# Patient Record
Sex: MALE | Race: WHITE | Employment: OTHER | URBAN - NONMETROPOLITAN AREA
[De-identification: names, ages, dates, MRNs, and addresses within clinical notes are randomized per-mention and may not be internally consistent; named-entity substitution may affect disease eponyms.]

---

## 2017-01-06 ENCOUNTER — OFFICE VISIT (OUTPATIENT)
Dept: UROLOGY | Age: 67
End: 2017-01-06
Payer: MEDICARE

## 2017-01-06 ENCOUNTER — HOSPITAL ENCOUNTER (OUTPATIENT)
Dept: GENERAL RADIOLOGY | Age: 67
Discharge: HOME | End: 2017-01-06
Payer: MEDICARE

## 2017-01-06 VITALS
OXYGEN SATURATION: 98 % | HEART RATE: 85 BPM | HEIGHT: 72 IN | BODY MASS INDEX: 30.75 KG/M2 | TEMPERATURE: 98 F | SYSTOLIC BLOOD PRESSURE: 138 MMHG | DIASTOLIC BLOOD PRESSURE: 78 MMHG | WEIGHT: 227 LBS

## 2017-01-06 DIAGNOSIS — N20.0 CALCULUS OF KIDNEY: ICD-10-CM

## 2017-01-06 DIAGNOSIS — N20.0 RENAL CALCULUS, BILATERAL: Primary | ICD-10-CM

## 2017-01-06 DIAGNOSIS — N20.0 RENAL CALCULUS, BILATERAL: ICD-10-CM

## 2017-01-06 LAB
BILIRUBIN, POC: NORMAL
BLOOD URINE, POC: NORMAL
CLARITY, POC: CLEAR
COLOR, POC: YELLOW
GLUCOSE URINE, POC: NORMAL
KETONES, POC: NORMAL
LEUKOCYTE EST, POC: NORMAL
NITRITE, POC: NORMAL
PH, POC: 7
PROTEIN, POC: NORMAL
SPECIFIC GRAVITY, POC: 1.01
UROBILINOGEN, POC: 0.2

## 2017-01-06 PROCEDURE — 81002 URINALYSIS NONAUTO W/O SCOPE: CPT | Performed by: UROLOGY

## 2017-01-06 PROCEDURE — 99213 OFFICE O/P EST LOW 20 MIN: CPT | Performed by: UROLOGY

## 2017-01-06 PROCEDURE — 74000 XR ABDOMEN LIMITED (KUB): CPT

## 2017-01-06 ASSESSMENT — ENCOUNTER SYMPTOMS
BLURRED VISION: 0
NAUSEA: 0
SORE THROAT: 0
SHORTNESS OF BREATH: 0
HEARTBURN: 0

## 2017-03-12 ENCOUNTER — APPOINTMENT (OUTPATIENT)
Dept: CT IMAGING | Age: 67
DRG: 378 | End: 2017-03-12
Payer: MEDICARE

## 2017-03-12 ENCOUNTER — HOSPITAL ENCOUNTER (INPATIENT)
Age: 67
LOS: 2 days | Discharge: HOME | DRG: 378 | End: 2017-03-14
Attending: EMERGENCY MEDICINE | Admitting: HOSPITALIST
Payer: MEDICARE

## 2017-03-12 ENCOUNTER — APPOINTMENT (OUTPATIENT)
Dept: NUCLEAR MEDICINE | Age: 67
DRG: 378 | End: 2017-03-12
Payer: MEDICARE

## 2017-03-12 DIAGNOSIS — K92.2 GASTROINTESTINAL HEMORRHAGE, UNSPECIFIED GASTROINTESTINAL HEMORRHAGE TYPE: Primary | ICD-10-CM

## 2017-03-12 LAB
ABO/RH: NORMAL
ALBUMIN SERPL-MCNC: 4.1 G/DL (ref 3.5–5.2)
ALP BLD-CCNC: 82 U/L (ref 40–130)
ALT SERPL-CCNC: 29 U/L (ref 5–41)
ANION GAP SERPL CALCULATED.3IONS-SCNC: 9 MMOL/L (ref 7–19)
ANTIBODY SCREEN: NORMAL
AST SERPL-CCNC: 24 U/L (ref 5–40)
BASOPHILS ABSOLUTE: 0 K/UL (ref 0–0.2)
BASOPHILS RELATIVE PERCENT: 0.8 % (ref 0–1)
BILIRUB SERPL-MCNC: <0.2 MG/DL (ref 0.2–1.2)
BLOOD BANK DISPENSE STATUS: NORMAL
BLOOD BANK PRODUCT CODE: NORMAL
BPU ID: NORMAL
BUN BLDV-MCNC: 22 MG/DL (ref 8–23)
CALCIUM SERPL-MCNC: 9 MG/DL (ref 8.8–10.2)
CHLORIDE BLD-SCNC: 105 MMOL/L (ref 98–111)
CO2: 26 MMOL/L (ref 22–29)
CREAT SERPL-MCNC: 0.9 MG/DL (ref 0.5–1.2)
DESCRIPTION BLOOD BANK: NORMAL
EOSINOPHILS ABSOLUTE: 0.2 K/UL (ref 0–0.6)
EOSINOPHILS RELATIVE PERCENT: 3.8 % (ref 0–5)
GFR NON-AFRICAN AMERICAN: >60
GLOBULIN: 2.5 G/DL
GLUCOSE BLD-MCNC: 109 MG/DL (ref 74–109)
HCT VFR BLD CALC: 31.6 % (ref 42–52)
HCT VFR BLD CALC: 38.5 % (ref 42–52)
HEMOGLOBIN: 10.5 G/DL (ref 14–18)
HEMOGLOBIN: 12.7 G/DL (ref 14–18)
INR BLD: 0.95 (ref 0.88–1.18)
LYMPHOCYTES ABSOLUTE: 2.3 K/UL (ref 1.1–4.5)
LYMPHOCYTES RELATIVE PERCENT: 47.3 % (ref 20–40)
MCH RBC QN AUTO: 31.1 PG (ref 27–31)
MCHC RBC AUTO-ENTMCNC: 33 G/DL (ref 33–37)
MCV RBC AUTO: 94.4 FL (ref 80–94)
MONOCYTES ABSOLUTE: 0.5 K/UL (ref 0–0.9)
MONOCYTES RELATIVE PERCENT: 11 % (ref 0–10)
NEUTROPHILS ABSOLUTE: 1.8 K/UL (ref 1.5–7.5)
NEUTROPHILS RELATIVE PERCENT: 36.9 % (ref 50–65)
PDW BLD-RTO: 13.9 % (ref 11.5–14.5)
PLATELET # BLD: 245 K/UL (ref 130–400)
PMV BLD AUTO: 10.6 FL (ref 7.4–10.4)
POTASSIUM SERPL-SCNC: 3.9 MMOL/L (ref 3.5–5)
PROTHROMBIN TIME: 12.7 SEC (ref 12–14.6)
RBC # BLD: 4.08 M/UL (ref 4.7–6.1)
SODIUM BLD-SCNC: 140 MMOL/L (ref 136–145)
TOTAL PROTEIN: 6.6 G/DL (ref 6.6–8.7)
WBC # BLD: 4.8 K/UL (ref 4.8–10.8)

## 2017-03-12 PROCEDURE — 85014 HEMATOCRIT: CPT

## 2017-03-12 PROCEDURE — 6360000002 HC RX W HCPCS: Performed by: EMERGENCY MEDICINE

## 2017-03-12 PROCEDURE — 2580000003 HC RX 258: Performed by: EMERGENCY MEDICINE

## 2017-03-12 PROCEDURE — 86850 RBC ANTIBODY SCREEN: CPT

## 2017-03-12 PROCEDURE — 85025 COMPLETE CBC W/AUTO DIFF WBC: CPT

## 2017-03-12 PROCEDURE — 96374 THER/PROPH/DIAG INJ IV PUSH: CPT

## 2017-03-12 PROCEDURE — 86901 BLOOD TYPING SEROLOGIC RH(D): CPT

## 2017-03-12 PROCEDURE — 85018 HEMOGLOBIN: CPT

## 2017-03-12 PROCEDURE — 99284 EMERGENCY DEPT VISIT MOD MDM: CPT | Performed by: EMERGENCY MEDICINE

## 2017-03-12 PROCEDURE — 99222 1ST HOSP IP/OBS MODERATE 55: CPT | Performed by: INTERNAL MEDICINE

## 2017-03-12 PROCEDURE — 85610 PROTHROMBIN TIME: CPT

## 2017-03-12 PROCEDURE — 2580000003 HC RX 258: Performed by: CLINICAL NURSE SPECIALIST

## 2017-03-12 PROCEDURE — 86900 BLOOD TYPING SEROLOGIC ABO: CPT

## 2017-03-12 PROCEDURE — 99285 EMERGENCY DEPT VISIT HI MDM: CPT

## 2017-03-12 PROCEDURE — 93005 ELECTROCARDIOGRAM TRACING: CPT

## 2017-03-12 PROCEDURE — 36415 COLL VENOUS BLD VENIPUNCTURE: CPT

## 2017-03-12 PROCEDURE — 99231 SBSQ HOSP IP/OBS SF/LOW 25: CPT | Performed by: CLINICAL NURSE SPECIALIST

## 2017-03-12 PROCEDURE — A9560 TC99M LABELED RBC: HCPCS | Performed by: CLINICAL NURSE SPECIALIST

## 2017-03-12 PROCEDURE — 6360000002 HC RX W HCPCS: Performed by: CLINICAL NURSE SPECIALIST

## 2017-03-12 PROCEDURE — 78278 ACUTE GI BLOOD LOSS IMAGING: CPT

## 2017-03-12 PROCEDURE — C9113 INJ PANTOPRAZOLE SODIUM, VIA: HCPCS | Performed by: CLINICAL NURSE SPECIALIST

## 2017-03-12 PROCEDURE — 3430000000 HC RX DIAGNOSTIC RADIOPHARMACEUTICAL: Performed by: CLINICAL NURSE SPECIALIST

## 2017-03-12 PROCEDURE — 2500000003 HC RX 250 WO HCPCS: Performed by: CLINICAL NURSE SPECIALIST

## 2017-03-12 PROCEDURE — 74177 CT ABD & PELVIS W/CONTRAST: CPT

## 2017-03-12 PROCEDURE — 80053 COMPREHEN METABOLIC PANEL: CPT

## 2017-03-12 PROCEDURE — 1210000000 HC MED SURG R&B

## 2017-03-12 PROCEDURE — 6360000004 HC RX CONTRAST MEDICATION: Performed by: INTERNAL MEDICINE

## 2017-03-12 PROCEDURE — C9113 INJ PANTOPRAZOLE SODIUM, VIA: HCPCS | Performed by: EMERGENCY MEDICINE

## 2017-03-12 RX ORDER — CIPROFLOXACIN 2 MG/ML
400 INJECTION, SOLUTION INTRAVENOUS EVERY 12 HOURS
Status: DISCONTINUED | OUTPATIENT
Start: 2017-03-12 | End: 2017-03-14 | Stop reason: HOSPADM

## 2017-03-12 RX ORDER — 0.9 % SODIUM CHLORIDE 0.9 %
250 INTRAVENOUS SOLUTION INTRAVENOUS ONCE
Status: COMPLETED | OUTPATIENT
Start: 2017-03-12 | End: 2017-03-13

## 2017-03-12 RX ORDER — 0.9 % SODIUM CHLORIDE 0.9 %
500 INTRAVENOUS SOLUTION INTRAVENOUS ONCE
Status: COMPLETED | OUTPATIENT
Start: 2017-03-12 | End: 2017-03-12

## 2017-03-12 RX ORDER — ONDANSETRON 2 MG/ML
4 INJECTION INTRAMUSCULAR; INTRAVENOUS EVERY 6 HOURS PRN
Status: DISCONTINUED | OUTPATIENT
Start: 2017-03-12 | End: 2017-03-14 | Stop reason: HOSPADM

## 2017-03-12 RX ORDER — PANTOPRAZOLE SODIUM 40 MG/10ML
40 INJECTION, POWDER, LYOPHILIZED, FOR SOLUTION INTRAVENOUS ONCE
Status: COMPLETED | OUTPATIENT
Start: 2017-03-12 | End: 2017-03-12

## 2017-03-12 RX ORDER — SODIUM CHLORIDE 9 MG/ML
INJECTION, SOLUTION INTRAVENOUS CONTINUOUS
Status: DISCONTINUED | OUTPATIENT
Start: 2017-03-12 | End: 2017-03-14 | Stop reason: HOSPADM

## 2017-03-12 RX ORDER — ONDANSETRON 2 MG/ML
4 INJECTION INTRAMUSCULAR; INTRAVENOUS EVERY 4 HOURS PRN
Status: DISCONTINUED | OUTPATIENT
Start: 2017-03-12 | End: 2017-03-12

## 2017-03-12 RX ORDER — SODIUM CHLORIDE 0.9 % (FLUSH) 0.9 %
10 SYRINGE (ML) INJECTION PRN
Status: DISCONTINUED | OUTPATIENT
Start: 2017-03-12 | End: 2017-03-14 | Stop reason: HOSPADM

## 2017-03-12 RX ORDER — ACETAMINOPHEN 325 MG/1
650 TABLET ORAL EVERY 4 HOURS PRN
Status: DISCONTINUED | OUTPATIENT
Start: 2017-03-12 | End: 2017-03-14 | Stop reason: HOSPADM

## 2017-03-12 RX ORDER — ACETAMINOPHEN 325 MG/1
650 TABLET ORAL EVERY 4 HOURS PRN
Status: DISCONTINUED | OUTPATIENT
Start: 2017-03-12 | End: 2017-03-12

## 2017-03-12 RX ORDER — SODIUM CHLORIDE 0.9 % (FLUSH) 0.9 %
10 SYRINGE (ML) INJECTION EVERY 12 HOURS SCHEDULED
Status: DISCONTINUED | OUTPATIENT
Start: 2017-03-12 | End: 2017-03-14 | Stop reason: HOSPADM

## 2017-03-12 RX ORDER — LORAZEPAM 0.5 MG/1
0.5 TABLET ORAL ONCE
Status: COMPLETED | OUTPATIENT
Start: 2017-03-12 | End: 2017-03-13

## 2017-03-12 RX ADMIN — SODIUM CHLORIDE 1000 ML: 9 INJECTION, SOLUTION INTRAVENOUS at 17:30

## 2017-03-12 RX ADMIN — Medication 20 MILLICURIE: at 20:19

## 2017-03-12 RX ADMIN — CIPROFLOXACIN 400 MG: 2 INJECTION INTRAVENOUS at 18:09

## 2017-03-12 RX ADMIN — SODIUM CHLORIDE 8 MG/HR: 9 INJECTION, SOLUTION INTRAVENOUS at 18:09

## 2017-03-12 RX ADMIN — SODIUM CHLORIDE 500 ML: 9 INJECTION, SOLUTION INTRAVENOUS at 05:58

## 2017-03-12 RX ADMIN — SODIUM CHLORIDE: 9 INJECTION, SOLUTION INTRAVENOUS at 21:30

## 2017-03-12 RX ADMIN — IOVERSOL 90 ML: 741 INJECTION INTRA-ARTERIAL; INTRAVENOUS at 22:47

## 2017-03-12 RX ADMIN — SODIUM CHLORIDE 250 ML: 9 INJECTION, SOLUTION INTRAVENOUS at 23:30

## 2017-03-12 RX ADMIN — SODIUM CHLORIDE: 9 INJECTION, SOLUTION INTRAVENOUS at 16:42

## 2017-03-12 RX ADMIN — PANTOPRAZOLE SODIUM 40 MG: 40 INJECTION, POWDER, FOR SOLUTION INTRAVENOUS at 07:10

## 2017-03-12 RX ADMIN — METRONIDAZOLE 500 MG: 500 INJECTION, SOLUTION INTRAVENOUS at 22:04

## 2017-03-12 ASSESSMENT — ENCOUNTER SYMPTOMS
EYES NEGATIVE: 1
NAUSEA: 0
BLOOD IN STOOL: 1
RESPIRATORY NEGATIVE: 1
ABDOMINAL PAIN: 0
DIARRHEA: 0
COUGH: 0
SHORTNESS OF BREATH: 0
VOMITING: 0

## 2017-03-13 LAB
ANION GAP SERPL CALCULATED.3IONS-SCNC: 9 MMOL/L (ref 7–19)
BUN BLDV-MCNC: 17 MG/DL (ref 8–23)
CALCIUM SERPL-MCNC: 8.1 MG/DL (ref 8.8–10.2)
CHLORIDE BLD-SCNC: 102 MMOL/L (ref 98–111)
CO2: 25 MMOL/L (ref 22–29)
CREAT SERPL-MCNC: 0.8 MG/DL (ref 0.5–1.2)
GFR NON-AFRICAN AMERICAN: >60
GLUCOSE BLD-MCNC: 109 MG/DL (ref 74–109)
HCT VFR BLD CALC: 29.3 % (ref 42–52)
HEMOGLOBIN: 9.7 G/DL (ref 14–18)
MAGNESIUM: 1.9 MG/DL (ref 1.6–2.4)
MCH RBC QN AUTO: 30.9 PG (ref 27–31)
MCHC RBC AUTO-ENTMCNC: 33.1 G/DL (ref 33–37)
MCV RBC AUTO: 93.3 FL (ref 80–94)
PDW BLD-RTO: 14.8 % (ref 11.5–14.5)
PHOSPHORUS: 2.5 MG/DL (ref 2.5–4.5)
PLATELET # BLD: 185 K/UL (ref 130–400)
PMV BLD AUTO: 10.3 FL (ref 7.4–10.4)
POTASSIUM SERPL-SCNC: 3.9 MMOL/L (ref 3.5–5)
RBC # BLD: 3.14 M/UL (ref 4.7–6.1)
SODIUM BLD-SCNC: 136 MMOL/L (ref 136–145)
WBC # BLD: 5.1 K/UL (ref 4.8–10.8)

## 2017-03-13 PROCEDURE — 6370000000 HC RX 637 (ALT 250 FOR IP): Performed by: INTERNAL MEDICINE

## 2017-03-13 PROCEDURE — 2580000003 HC RX 258: Performed by: CLINICAL NURSE SPECIALIST

## 2017-03-13 PROCEDURE — 85027 COMPLETE CBC AUTOMATED: CPT

## 2017-03-13 PROCEDURE — 84100 ASSAY OF PHOSPHORUS: CPT

## 2017-03-13 PROCEDURE — 36430 TRANSFUSION BLD/BLD COMPNT: CPT

## 2017-03-13 PROCEDURE — 99232 SBSQ HOSP IP/OBS MODERATE 35: CPT | Performed by: INTERNAL MEDICINE

## 2017-03-13 PROCEDURE — 6360000002 HC RX W HCPCS: Performed by: CLINICAL NURSE SPECIALIST

## 2017-03-13 PROCEDURE — 99232 SBSQ HOSP IP/OBS MODERATE 35: CPT | Performed by: HOSPITALIST

## 2017-03-13 PROCEDURE — P9016 RBC LEUKOCYTES REDUCED: HCPCS

## 2017-03-13 PROCEDURE — 36415 COLL VENOUS BLD VENIPUNCTURE: CPT

## 2017-03-13 PROCEDURE — 80048 BASIC METABOLIC PNL TOTAL CA: CPT

## 2017-03-13 PROCEDURE — 2500000003 HC RX 250 WO HCPCS: Performed by: CLINICAL NURSE SPECIALIST

## 2017-03-13 PROCEDURE — 1210000000 HC MED SURG R&B

## 2017-03-13 PROCEDURE — 83735 ASSAY OF MAGNESIUM: CPT

## 2017-03-13 PROCEDURE — C9113 INJ PANTOPRAZOLE SODIUM, VIA: HCPCS | Performed by: CLINICAL NURSE SPECIALIST

## 2017-03-13 RX ADMIN — SODIUM CHLORIDE 8 MG/HR: 9 INJECTION, SOLUTION INTRAVENOUS at 18:15

## 2017-03-13 RX ADMIN — LORAZEPAM 0.5 MG: 0.5 TABLET ORAL at 00:13

## 2017-03-13 RX ADMIN — CIPROFLOXACIN 400 MG: 2 INJECTION INTRAVENOUS at 18:15

## 2017-03-13 RX ADMIN — Medication 10 ML: at 08:05

## 2017-03-13 RX ADMIN — SODIUM CHLORIDE 8 MG/HR: 9 INJECTION, SOLUTION INTRAVENOUS at 08:00

## 2017-03-13 RX ADMIN — METRONIDAZOLE 500 MG: 500 INJECTION, SOLUTION INTRAVENOUS at 12:16

## 2017-03-13 RX ADMIN — CIPROFLOXACIN 400 MG: 2 INJECTION INTRAVENOUS at 05:35

## 2017-03-13 RX ADMIN — METRONIDAZOLE 500 MG: 500 INJECTION, SOLUTION INTRAVENOUS at 04:02

## 2017-03-13 RX ADMIN — SODIUM CHLORIDE: 9 INJECTION, SOLUTION INTRAVENOUS at 13:59

## 2017-03-13 RX ADMIN — METRONIDAZOLE 500 MG: 500 INJECTION, SOLUTION INTRAVENOUS at 20:46

## 2017-03-14 VITALS
DIASTOLIC BLOOD PRESSURE: 75 MMHG | TEMPERATURE: 98.2 F | SYSTOLIC BLOOD PRESSURE: 127 MMHG | HEIGHT: 72 IN | OXYGEN SATURATION: 96 % | HEART RATE: 65 BPM | RESPIRATION RATE: 16 BRPM | BODY MASS INDEX: 29.19 KG/M2 | WEIGHT: 215.5 LBS

## 2017-03-14 PROBLEM — K92.2 LOWER GI BLEED: Status: RESOLVED | Noted: 2017-03-12 | Resolved: 2017-03-14

## 2017-03-14 LAB
HCT VFR BLD CALC: 27.2 % (ref 42–52)
HEMOGLOBIN: 9 G/DL (ref 14–18)
MCH RBC QN AUTO: 30.9 PG (ref 27–31)
MCHC RBC AUTO-ENTMCNC: 33.1 G/DL (ref 33–37)
MCV RBC AUTO: 93.5 FL (ref 80–94)
PDW BLD-RTO: 14.5 % (ref 11.5–14.5)
PLATELET # BLD: 189 K/UL (ref 130–400)
PMV BLD AUTO: 10.6 FL (ref 7.4–10.4)
RBC # BLD: 2.91 M/UL (ref 4.7–6.1)
WBC # BLD: 3.6 K/UL (ref 4.8–10.8)

## 2017-03-14 PROCEDURE — 6360000002 HC RX W HCPCS: Performed by: CLINICAL NURSE SPECIALIST

## 2017-03-14 PROCEDURE — 99232 SBSQ HOSP IP/OBS MODERATE 35: CPT | Performed by: INTERNAL MEDICINE

## 2017-03-14 PROCEDURE — 2500000003 HC RX 250 WO HCPCS: Performed by: CLINICAL NURSE SPECIALIST

## 2017-03-14 PROCEDURE — 36415 COLL VENOUS BLD VENIPUNCTURE: CPT

## 2017-03-14 PROCEDURE — 2580000003 HC RX 258: Performed by: CLINICAL NURSE SPECIALIST

## 2017-03-14 PROCEDURE — 99238 HOSP IP/OBS DSCHRG MGMT 30/<: CPT | Performed by: HOSPITALIST

## 2017-03-14 PROCEDURE — 85027 COMPLETE CBC AUTOMATED: CPT

## 2017-03-14 PROCEDURE — C9113 INJ PANTOPRAZOLE SODIUM, VIA: HCPCS | Performed by: CLINICAL NURSE SPECIALIST

## 2017-03-14 RX ADMIN — SODIUM CHLORIDE: 9 INJECTION, SOLUTION INTRAVENOUS at 00:11

## 2017-03-14 RX ADMIN — CIPROFLOXACIN 400 MG: 2 INJECTION INTRAVENOUS at 05:56

## 2017-03-14 RX ADMIN — SODIUM CHLORIDE 8 MG/HR: 9 INJECTION, SOLUTION INTRAVENOUS at 02:56

## 2017-03-14 RX ADMIN — METRONIDAZOLE 500 MG: 500 INJECTION, SOLUTION INTRAVENOUS at 13:00

## 2017-03-14 RX ADMIN — METRONIDAZOLE 500 MG: 500 INJECTION, SOLUTION INTRAVENOUS at 04:28

## 2017-03-15 LAB
EKG P AXIS: 30 DEGREES
EKG P-R INTERVAL: 168 MS
EKG Q-T INTERVAL: 408 MS
EKG QRS DURATION: 90 MS
EKG QTC CALCULATION (BAZETT): 408 MS
EKG T AXIS: 45 DEGREES

## 2017-03-28 ENCOUNTER — OFFICE VISIT (OUTPATIENT)
Dept: FAMILY MEDICINE CLINIC | Age: 67
End: 2017-03-28
Payer: MEDICARE

## 2017-03-28 VITALS
SYSTOLIC BLOOD PRESSURE: 138 MMHG | RESPIRATION RATE: 16 BRPM | HEIGHT: 72 IN | TEMPERATURE: 98.9 F | BODY MASS INDEX: 30.61 KG/M2 | DIASTOLIC BLOOD PRESSURE: 74 MMHG | HEART RATE: 76 BPM | WEIGHT: 226 LBS

## 2017-03-28 DIAGNOSIS — E78.5 HYPERLIPIDEMIA, UNSPECIFIED HYPERLIPIDEMIA TYPE: ICD-10-CM

## 2017-03-28 DIAGNOSIS — K40.90 UNILATERAL INGUINAL HERNIA WITHOUT OBSTRUCTION OR GANGRENE, RECURRENCE NOT SPECIFIED: ICD-10-CM

## 2017-03-28 DIAGNOSIS — K57.31 DIVERTICULOSIS OF LARGE INTESTINE WITH HEMORRHAGE: ICD-10-CM

## 2017-03-28 DIAGNOSIS — N20.0 NEPHROLITHIASIS: ICD-10-CM

## 2017-03-28 DIAGNOSIS — K92.2 LOWER GI BLEED: ICD-10-CM

## 2017-03-28 DIAGNOSIS — D62 ACUTE BLOOD LOSS ANEMIA: ICD-10-CM

## 2017-03-28 DIAGNOSIS — K63.5 COLON POLYPS: ICD-10-CM

## 2017-03-28 DIAGNOSIS — K80.20 CALCULUS OF GALLBLADDER WITHOUT CHOLECYSTITIS WITHOUT OBSTRUCTION: ICD-10-CM

## 2017-03-28 DIAGNOSIS — Z92.89 TRANSFUSION HISTORY: ICD-10-CM

## 2017-03-28 DIAGNOSIS — K57.91 DIVERTICULOSIS OF INTESTINE WITH BLEEDING, UNSPECIFIED INTESTINAL TRACT LOCATION: ICD-10-CM

## 2017-03-28 LAB
ANION GAP SERPL CALCULATED.3IONS-SCNC: 12 MMOL/L (ref 7–19)
BUN BLDV-MCNC: 18 MG/DL (ref 8–23)
CALCIUM SERPL-MCNC: 8.9 MG/DL (ref 8.8–10.2)
CHLORIDE BLD-SCNC: 102 MMOL/L (ref 98–111)
CO2: 25 MMOL/L (ref 22–29)
CREAT SERPL-MCNC: 1 MG/DL (ref 0.5–1.2)
GFR NON-AFRICAN AMERICAN: >60
GLUCOSE BLD-MCNC: 115 MG/DL (ref 74–109)
HCT VFR BLD CALC: 32.8 % (ref 42–52)
HEMOGLOBIN: 10.8 G/DL (ref 14–18)
MCH RBC QN AUTO: 31.6 PG (ref 27–31)
MCHC RBC AUTO-ENTMCNC: 32.9 G/DL (ref 33–37)
MCV RBC AUTO: 95.9 FL (ref 80–94)
PDW BLD-RTO: 15.3 % (ref 11.5–14.5)
PLATELET # BLD: 254 K/UL (ref 130–400)
PMV BLD AUTO: 11.2 FL (ref 7.4–10.4)
POTASSIUM SERPL-SCNC: 4 MMOL/L (ref 3.5–5)
RBC # BLD: 3.42 M/UL (ref 4.7–6.1)
SODIUM BLD-SCNC: 139 MMOL/L (ref 136–145)
WBC # BLD: 4.1 K/UL (ref 4.8–10.8)

## 2017-03-28 PROCEDURE — 1036F TOBACCO NON-USER: CPT | Performed by: FAMILY MEDICINE

## 2017-03-28 PROCEDURE — 1111F DSCHRG MED/CURRENT MED MERGE: CPT | Performed by: FAMILY MEDICINE

## 2017-03-28 PROCEDURE — 4040F PNEUMOC VAC/ADMIN/RCVD: CPT | Performed by: FAMILY MEDICINE

## 2017-03-28 PROCEDURE — G8427 DOCREV CUR MEDS BY ELIG CLIN: HCPCS | Performed by: FAMILY MEDICINE

## 2017-03-28 PROCEDURE — 99214 OFFICE O/P EST MOD 30 MIN: CPT | Performed by: FAMILY MEDICINE

## 2017-03-28 PROCEDURE — 3017F COLORECTAL CA SCREEN DOC REV: CPT | Performed by: FAMILY MEDICINE

## 2017-03-28 PROCEDURE — G8419 CALC BMI OUT NRM PARAM NOF/U: HCPCS | Performed by: FAMILY MEDICINE

## 2017-03-28 PROCEDURE — 1123F ACP DISCUSS/DSCN MKR DOCD: CPT | Performed by: FAMILY MEDICINE

## 2017-03-28 PROCEDURE — G8484 FLU IMMUNIZE NO ADMIN: HCPCS | Performed by: FAMILY MEDICINE

## 2017-03-28 ASSESSMENT — ENCOUNTER SYMPTOMS
NAUSEA: 0
COUGH: 0
ABDOMINAL PAIN: 0
BLOOD IN STOOL: 0
DIARRHEA: 0
CHEST TIGHTNESS: 0
WHEEZING: 0
VOMITING: 0
CONSTIPATION: 0
SHORTNESS OF BREATH: 0

## 2017-04-03 ENCOUNTER — TELEPHONE (OUTPATIENT)
Dept: FAMILY MEDICINE CLINIC | Age: 67
End: 2017-04-03

## 2017-04-03 DIAGNOSIS — K92.2 GASTROINTESTINAL HEMORRHAGE, UNSPECIFIED GASTROINTESTINAL HEMORRHAGE TYPE: ICD-10-CM

## 2017-04-03 RX ORDER — LANOLIN ALCOHOL/MO/W.PET/CERES
325 CREAM (GRAM) TOPICAL 2 TIMES DAILY
Qty: 60 TABLET | Refills: 5 | Status: SHIPPED | OUTPATIENT
Start: 2017-04-03 | End: 2017-12-22 | Stop reason: ALTCHOICE

## 2017-04-26 ENCOUNTER — OFFICE VISIT (OUTPATIENT)
Dept: GASTROENTEROLOGY | Age: 67
End: 2017-04-26
Payer: MEDICARE

## 2017-04-26 VITALS
HEART RATE: 69 BPM | HEIGHT: 73 IN | BODY MASS INDEX: 29.95 KG/M2 | WEIGHT: 226 LBS | OXYGEN SATURATION: 95 % | DIASTOLIC BLOOD PRESSURE: 72 MMHG | SYSTOLIC BLOOD PRESSURE: 122 MMHG

## 2017-04-26 DIAGNOSIS — D62 ACUTE BLOOD LOSS ANEMIA: ICD-10-CM

## 2017-04-26 DIAGNOSIS — Z86.010 HISTORY OF COLON POLYPS: ICD-10-CM

## 2017-04-26 DIAGNOSIS — Z09 HOSPITAL DISCHARGE FOLLOW-UP: ICD-10-CM

## 2017-04-26 DIAGNOSIS — R19.7 BLOODY DIARRHEA: Primary | ICD-10-CM

## 2017-04-26 PROCEDURE — G8427 DOCREV CUR MEDS BY ELIG CLIN: HCPCS | Performed by: NURSE PRACTITIONER

## 2017-04-26 PROCEDURE — 1123F ACP DISCUSS/DSCN MKR DOCD: CPT | Performed by: NURSE PRACTITIONER

## 2017-04-26 PROCEDURE — 99214 OFFICE O/P EST MOD 30 MIN: CPT | Performed by: NURSE PRACTITIONER

## 2017-04-26 PROCEDURE — 4040F PNEUMOC VAC/ADMIN/RCVD: CPT | Performed by: NURSE PRACTITIONER

## 2017-04-26 PROCEDURE — 1036F TOBACCO NON-USER: CPT | Performed by: NURSE PRACTITIONER

## 2017-04-26 PROCEDURE — G8420 CALC BMI NORM PARAMETERS: HCPCS | Performed by: NURSE PRACTITIONER

## 2017-04-26 PROCEDURE — 3017F COLORECTAL CA SCREEN DOC REV: CPT | Performed by: NURSE PRACTITIONER

## 2017-04-26 ASSESSMENT — ENCOUNTER SYMPTOMS
CONSTIPATION: 0
SHORTNESS OF BREATH: 0
COUGH: 0
VOMITING: 0
CHEST TIGHTNESS: 0
BLOOD IN STOOL: 1
SORE THROAT: 0
VOICE CHANGE: 0
NAUSEA: 0
BACK PAIN: 0
TROUBLE SWALLOWING: 1
DIARRHEA: 1
ABDOMINAL DISTENTION: 0
ABDOMINAL PAIN: 0
RECTAL PAIN: 0

## 2017-05-01 DIAGNOSIS — K92.2 GASTROINTESTINAL HEMORRHAGE, UNSPECIFIED GASTROINTESTINAL HEMORRHAGE TYPE: ICD-10-CM

## 2017-05-01 LAB
HCT VFR BLD CALC: 41.6 % (ref 42–52)
HEMOGLOBIN: 13.8 G/DL (ref 14–18)
MCH RBC QN AUTO: 32.2 PG (ref 27–31)
MCHC RBC AUTO-ENTMCNC: 33.2 G/DL (ref 33–37)
MCV RBC AUTO: 97 FL (ref 80–94)
PDW BLD-RTO: 13.6 % (ref 11.5–14.5)
PLATELET # BLD: 206 K/UL (ref 130–400)
PMV BLD AUTO: 11.2 FL (ref 7.4–10.4)
RBC # BLD: 4.29 M/UL (ref 4.7–6.1)
WBC # BLD: 4.6 K/UL (ref 4.8–10.8)

## 2017-05-03 ENCOUNTER — TELEPHONE (OUTPATIENT)
Dept: FAMILY MEDICINE CLINIC | Age: 67
End: 2017-05-03

## 2017-05-04 ENCOUNTER — ANESTHESIA EVENT (OUTPATIENT)
Dept: OPERATING ROOM | Age: 67
End: 2017-05-04

## 2017-05-08 ENCOUNTER — ANESTHESIA (OUTPATIENT)
Dept: OPERATING ROOM | Age: 67
End: 2017-05-08
Payer: MEDICARE

## 2017-05-08 ENCOUNTER — HOSPITAL ENCOUNTER (OUTPATIENT)
Age: 67
Setting detail: OUTPATIENT SURGERY
Discharge: HOME | End: 2017-05-08
Attending: INTERNAL MEDICINE | Admitting: INTERNAL MEDICINE
Payer: MEDICARE

## 2017-05-08 VITALS — DIASTOLIC BLOOD PRESSURE: 99 MMHG | OXYGEN SATURATION: 98 % | SYSTOLIC BLOOD PRESSURE: 156 MMHG

## 2017-05-08 VITALS
SYSTOLIC BLOOD PRESSURE: 129 MMHG | WEIGHT: 214 LBS | HEIGHT: 72 IN | OXYGEN SATURATION: 96 % | BODY MASS INDEX: 28.99 KG/M2 | TEMPERATURE: 97.8 F | DIASTOLIC BLOOD PRESSURE: 87 MMHG | HEART RATE: 60 BPM | RESPIRATION RATE: 18 BRPM

## 2017-05-08 PROCEDURE — 45378 DIAGNOSTIC COLONOSCOPY: CPT

## 2017-05-08 PROCEDURE — G8907 PT DOC NO EVENTS ON DISCHARG: HCPCS | Performed by: NURSE PRACTITIONER

## 2017-05-08 PROCEDURE — 00810 PR ANESTH,INTESTINE,SCOPE,LOW: CPT | Performed by: NURSE ANESTHETIST, CERTIFIED REGISTERED

## 2017-05-08 PROCEDURE — 45378 DIAGNOSTIC COLONOSCOPY: CPT | Performed by: INTERNAL MEDICINE

## 2017-05-08 PROCEDURE — G8918 PT W/O PREOP ORDER IV AB PRO: HCPCS | Performed by: NURSE PRACTITIONER

## 2017-05-08 RX ORDER — PROMETHAZINE HYDROCHLORIDE 25 MG/ML
12.5 INJECTION, SOLUTION INTRAMUSCULAR; INTRAVENOUS
Status: DISCONTINUED | OUTPATIENT
Start: 2017-05-08 | End: 2017-05-08 | Stop reason: HOSPADM

## 2017-05-08 RX ORDER — PROPOFOL 10 MG/ML
INJECTION, EMULSION INTRAVENOUS PRN
Status: DISCONTINUED | OUTPATIENT
Start: 2017-05-08 | End: 2017-05-08 | Stop reason: SDUPTHER

## 2017-05-08 RX ORDER — DIPHENHYDRAMINE HYDROCHLORIDE 50 MG/ML
12.5 INJECTION INTRAMUSCULAR; INTRAVENOUS
Status: DISCONTINUED | OUTPATIENT
Start: 2017-05-08 | End: 2017-05-08 | Stop reason: HOSPADM

## 2017-05-08 RX ORDER — SODIUM CHLORIDE 9 MG/ML
INJECTION, SOLUTION INTRAVENOUS CONTINUOUS
Status: DISCONTINUED | OUTPATIENT
Start: 2017-05-08 | End: 2017-05-08 | Stop reason: HOSPADM

## 2017-05-08 RX ORDER — LABETALOL HYDROCHLORIDE 5 MG/ML
5 INJECTION, SOLUTION INTRAVENOUS EVERY 10 MIN PRN
Status: DISCONTINUED | OUTPATIENT
Start: 2017-05-08 | End: 2017-05-08 | Stop reason: HOSPADM

## 2017-05-08 RX ORDER — ONDANSETRON 2 MG/ML
4 INJECTION INTRAMUSCULAR; INTRAVENOUS
Status: DISCONTINUED | OUTPATIENT
Start: 2017-05-08 | End: 2017-05-08 | Stop reason: HOSPADM

## 2017-05-08 RX ORDER — MEPERIDINE HYDROCHLORIDE 25 MG/ML
12.5 INJECTION INTRAMUSCULAR; INTRAVENOUS; SUBCUTANEOUS EVERY 5 MIN PRN
Status: DISCONTINUED | OUTPATIENT
Start: 2017-05-08 | End: 2017-05-08 | Stop reason: HOSPADM

## 2017-05-08 RX ORDER — HYDRALAZINE HYDROCHLORIDE 20 MG/ML
5 INJECTION INTRAMUSCULAR; INTRAVENOUS EVERY 10 MIN PRN
Status: DISCONTINUED | OUTPATIENT
Start: 2017-05-08 | End: 2017-05-08 | Stop reason: HOSPADM

## 2017-05-08 RX ADMIN — PROPOFOL 200 MG: 10 INJECTION, EMULSION INTRAVENOUS at 09:29

## 2017-05-08 RX ADMIN — SODIUM CHLORIDE: 9 INJECTION, SOLUTION INTRAVENOUS at 08:51

## 2017-12-11 DIAGNOSIS — K57.31 DIVERTICULOSIS OF LARGE INTESTINE WITH HEMORRHAGE: ICD-10-CM

## 2017-12-11 DIAGNOSIS — R01.1 HEART MURMUR: ICD-10-CM

## 2017-12-11 DIAGNOSIS — Z12.5 SCREENING FOR PROSTATE CANCER: ICD-10-CM

## 2017-12-11 DIAGNOSIS — N40.0 BPH WITHOUT URINARY OBSTRUCTION: ICD-10-CM

## 2017-12-11 DIAGNOSIS — E78.2 MIXED HYPERLIPIDEMIA: ICD-10-CM

## 2017-12-13 DIAGNOSIS — R01.1 HEART MURMUR: ICD-10-CM

## 2017-12-13 DIAGNOSIS — Z12.5 SCREENING FOR PROSTATE CANCER: ICD-10-CM

## 2017-12-13 DIAGNOSIS — E78.2 MIXED HYPERLIPIDEMIA: ICD-10-CM

## 2017-12-13 DIAGNOSIS — K57.31 DIVERTICULOSIS OF LARGE INTESTINE WITH HEMORRHAGE: ICD-10-CM

## 2017-12-13 DIAGNOSIS — N40.0 BPH WITHOUT URINARY OBSTRUCTION: ICD-10-CM

## 2017-12-13 LAB
ALBUMIN SERPL-MCNC: 4.5 G/DL (ref 3.5–5.2)
ALP BLD-CCNC: 75 U/L (ref 40–130)
ALT SERPL-CCNC: 30 U/L (ref 5–41)
ANION GAP SERPL CALCULATED.3IONS-SCNC: 13 MMOL/L (ref 7–19)
AST SERPL-CCNC: 29 U/L (ref 5–40)
BILIRUB SERPL-MCNC: 0.4 MG/DL (ref 0.2–1.2)
BILIRUBIN DIRECT: 0.1 MG/DL (ref 0–0.3)
BILIRUBIN URINE: NEGATIVE
BILIRUBIN, INDIRECT: 0.3 MG/DL (ref 0.1–1)
BLOOD, URINE: NEGATIVE
BUN BLDV-MCNC: 18 MG/DL (ref 8–23)
CALCIUM SERPL-MCNC: 9.3 MG/DL (ref 8.8–10.2)
CHLORIDE BLD-SCNC: 101 MMOL/L (ref 98–111)
CLARITY: CLEAR
CO2: 26 MMOL/L (ref 22–29)
COLOR: YELLOW
CREAT SERPL-MCNC: 0.8 MG/DL (ref 0.5–1.2)
GFR NON-AFRICAN AMERICAN: >60
GLUCOSE BLD-MCNC: 102 MG/DL (ref 74–109)
GLUCOSE URINE: NEGATIVE MG/DL
HCT VFR BLD CALC: 45 % (ref 42–52)
HEMOGLOBIN: 14.9 G/DL (ref 14–18)
KETONES, URINE: NEGATIVE MG/DL
LEUKOCYTE ESTERASE, URINE: NEGATIVE
MCH RBC QN AUTO: 31.2 PG (ref 27–31)
MCHC RBC AUTO-ENTMCNC: 33.1 G/DL (ref 33–37)
MCV RBC AUTO: 94.3 FL (ref 80–94)
NITRITE, URINE: NEGATIVE
PDW BLD-RTO: 13.2 % (ref 11.5–14.5)
PH UA: 7
PLATELET # BLD: 235 K/UL (ref 130–400)
PMV BLD AUTO: 10.8 FL (ref 9.4–12.4)
POTASSIUM SERPL-SCNC: 4.9 MMOL/L (ref 3.5–5)
PROTEIN UA: NEGATIVE MG/DL
RBC # BLD: 4.77 M/UL (ref 4.7–6.1)
SODIUM BLD-SCNC: 140 MMOL/L (ref 136–145)
SPECIFIC GRAVITY UA: 1.01
T4 FREE: 1.1 NG/DL (ref 0.9–1.7)
TOTAL PROTEIN: 7.4 G/DL (ref 6.6–8.7)
TSH SERPL DL<=0.05 MIU/L-ACNC: 1.82 UIU/ML (ref 0.27–4.2)
UROBILINOGEN, URINE: 0.2 E.U./DL
WBC # BLD: 4.3 K/UL (ref 4.8–10.8)

## 2017-12-15 LAB
PROSTATE SPECIFIC ANTIGEN FREE: 0.1 NG/ML
PROSTATE SPECIFIC ANTIGEN PERCENT FREE: 33 %
PROSTATE SPECIFIC ANTIGEN: 0.3 NG/ML (ref 0–4)

## 2017-12-18 LAB
CHOLESTEROL, TOTAL: 139 MG/DL
EER LIPOPROFILE BY NMR: ABNORMAL
HDL PARTICLE NO, NMR: 32.2 UMOL/L
HDL SIZE: 8.4 NM
HDLC SERPL-MCNC: 41 MG/DL (ref 40–59)
LARGE HDL PARTICLE, NMR: <2.8 UMOL/L
LARGE VLDL PARTICLE, NMR: <1.5 NMOL/L
LDL CHOLESTEROL: 78 MG/DL
LDL PARTICLE NUMBER, NMR: 1210 NMOL/L
LDL PARTICLE SIZE: 20.5 NM
SMALL LDL PARTICLE, NMR: 703 NMOL/L
TRIGL SERPL-MCNC: 100 MG/DL (ref 30–149)
VLDL SIZE: 46.1 NM

## 2017-12-22 ENCOUNTER — HOSPITAL ENCOUNTER (OUTPATIENT)
Dept: GENERAL RADIOLOGY | Age: 67
Discharge: HOME | End: 2017-12-22
Payer: MEDICARE

## 2017-12-22 ENCOUNTER — OFFICE VISIT (OUTPATIENT)
Dept: FAMILY MEDICINE CLINIC | Age: 67
End: 2017-12-22
Payer: MEDICARE

## 2017-12-22 VITALS
DIASTOLIC BLOOD PRESSURE: 70 MMHG | OXYGEN SATURATION: 95 % | BODY MASS INDEX: 30.67 KG/M2 | HEART RATE: 68 BPM | WEIGHT: 226.13 LBS | SYSTOLIC BLOOD PRESSURE: 118 MMHG | TEMPERATURE: 98.9 F

## 2017-12-22 DIAGNOSIS — Z23 NEED FOR PNEUMOCOCCAL VACCINATION: ICD-10-CM

## 2017-12-22 DIAGNOSIS — Z98.890 H/O CERVICAL SPINE SURGERY: ICD-10-CM

## 2017-12-22 DIAGNOSIS — M25.561 ACUTE PAIN OF RIGHT KNEE: ICD-10-CM

## 2017-12-22 DIAGNOSIS — E78.5 HYPERLIPIDEMIA, UNSPECIFIED HYPERLIPIDEMIA TYPE: ICD-10-CM

## 2017-12-22 DIAGNOSIS — Z87.442 HISTORY OF KIDNEY STONES: ICD-10-CM

## 2017-12-22 DIAGNOSIS — R13.19 OTHER DYSPHAGIA: ICD-10-CM

## 2017-12-22 DIAGNOSIS — T14.8XXA MUSCLE TEAR: ICD-10-CM

## 2017-12-22 DIAGNOSIS — Z00.00 PHYSICAL EXAM: ICD-10-CM

## 2017-12-22 DIAGNOSIS — E78.5 HYPERLIPOPROTEINEMIA: ICD-10-CM

## 2017-12-22 DIAGNOSIS — K57.91 DIVERTICULOSIS OF INTESTINE WITH BLEEDING, UNSPECIFIED INTESTINAL TRACT LOCATION: ICD-10-CM

## 2017-12-22 DIAGNOSIS — Z86.718 HISTORY OF DVT (DEEP VEIN THROMBOSIS): ICD-10-CM

## 2017-12-22 DIAGNOSIS — R13.10 DYSPHAGIA, UNSPECIFIED TYPE: ICD-10-CM

## 2017-12-22 PROCEDURE — G0009 ADMIN PNEUMOCOCCAL VACCINE: HCPCS | Performed by: FAMILY MEDICINE

## 2017-12-22 PROCEDURE — 90732 PPSV23 VACC 2 YRS+ SUBQ/IM: CPT | Performed by: FAMILY MEDICINE

## 2017-12-22 PROCEDURE — 73562 X-RAY EXAM OF KNEE 3: CPT

## 2017-12-22 PROCEDURE — G0439 PPPS, SUBSEQ VISIT: HCPCS | Performed by: FAMILY MEDICINE

## 2017-12-22 RX ORDER — SIMVASTATIN 20 MG
TABLET ORAL
Qty: 90 TABLET | Refills: 3 | Status: SHIPPED | OUTPATIENT
Start: 2017-12-22 | End: 2018-12-23 | Stop reason: SDUPTHER

## 2017-12-22 ASSESSMENT — ENCOUNTER SYMPTOMS
RESPIRATORY NEGATIVE: 1
EYES NEGATIVE: 1
ALLERGIC/IMMUNOLOGIC NEGATIVE: 1
GASTROINTESTINAL NEGATIVE: 1

## 2017-12-22 NOTE — PROGRESS NOTES
Subjective:      Patient ID: Jessica Moreira is a 78 y.o. male. Chief Complaint   Patient presents with    Medicare AWV     physical       HPI  This patient is here today for their annual physical examination. He is a Medicare beneficiary. Currently the patient denies any acute problems ongoing. He has had some chronic issues which showed he is wishing to have evaluated including pain in the inferior aspect of his right knee. He states he did follow here when he was out in his garage 1-1/2-2 months ago. He has had some pain in the inferior aspect of his right knee since that time. He states if he bends down and has a lot of pressure on it and he would like to have this looked at. I did encourage him to use Aleve on an order regular basis for the next 1-2 months. I'm x-raying his right knee on his way out today and if the problem persists or worsens after that, he should have MRI performed. The patient does have a history of hyperlipidemia. Currently he is maintained on Zocor 20 mg which is prescribed at 1 by mouth daily at bedtime. He has been able to cut this back to one half a pill or 10 mg at bedtime so far. He does have some insomnia issues from time to time. He generally is able to get by on only melatonin 3 mg taken by mouth daily at bedtime. He does relate that since we saw him last, he had been in the hospital with the GI bleed. He had blood test) him that was maroon in color. He was seen initially by Dr. Jerald Day and then the head later evaluation and was scoped after dismissal by Dr. Stone. The patient was told that all they could identify was evidence that he had had some bleeding from some diverticular pouches. He denies any subsequent issues since that time. The patient does have some upper GI symptoms that have been persistent now for quite some time. He did have a dysphagia study last year by Dr. Liam Samuel. He reports that the study was okay.  He states that he gets some GERD symptoms if he Acute pain of right knee  XR KNEE RIGHT (3 VIEWS)    Secondary to a fall 1-1/2-2 months ago   3. Dysphagia, unspecified type  [facility]- [place], [place],    4. Need for pneumococcal vaccination  Pneumococcal polysaccharide vaccine 23-valent >= 3yo subcutaneous/IM (PNEUMOVAX 23)   5. Hyperlipidemia, unspecified hyperlipidemia type  simvastatin (ZOCOR) 20 MG tablet   6. History of DVT (deep vein thrombosis)      Left lower extremity after prior surgery on his left foot by podiatry. 7. Diverticulosis of intestine with bleeding, unspecified intestinal tract location     8. History of kidney stones     9. Other dysphagia     10. Muscle tear      Status post muscle tear right shoulder 2013. 11. H/O cervical spine surgery      2015 by Dr. Nusrat Helms   12. Hyperlipoproteinemia               Plan:      I am having Mr. Manuela Queen maintain his :   Outpatient Prescriptions Marked as Taking for the 12/22/17 encounter (Office Visit) with Mirna Frey MD   Medication Sig Dispense Refill    simvastatin (ZOCOR) 20 MG tablet One-half to one every evening 90 tablet 3    Probiotic Product (PROBIOTIC FORMULA PO) Take 1 tablet by mouth      Ascorbic Acid (VITAMIN C PO) Take 1 tablet by mouth daily      melatonin 3 MG TABS tablet Take 3 mg by mouth nightly      aspirin 81 MG tablet Take 81 mg by mouth daily.  Multiple Vitamins-Minerals (MULTIVITAMIN PO) Take  by mouth daily.  vitamin B-12 (CYANOCOBALAMIN) 1000 MCG tablet Take 1,000 mcg by mouth three times a week.      ,Patient states they are no longer utilizing these medication:   Medications Discontinued During This Encounter   Medication Reason    ferrous sulfate 325 (65 FE) MG EC tablet Alternate therapy    simvastatin (ZOCOR) 20 MG tablet Reorder    I have refilled the following medication today:   Requested Prescriptions     Signed Prescriptions Disp Refills    simvastatin (ZOCOR) 20 MG tablet 90 tablet 3     Sig: One-half to one every

## 2017-12-22 NOTE — PROGRESS NOTES
After obtaining consent, and per orders of Dr. Alexis Elmore, injection of Pneumovax 23 given in Left deltoid by Jamilah Pelayo. Patient instructed to remain in clinic for 20 minutes afterwards, and to report any adverse reaction to me immediately.

## 2017-12-27 ENCOUNTER — TELEPHONE (OUTPATIENT)
Dept: FAMILY MEDICINE CLINIC | Age: 67
End: 2017-12-27

## 2017-12-27 DIAGNOSIS — M17.11 OSTEOARTHRITIS OF RIGHT KNEE, UNSPECIFIED OSTEOARTHRITIS TYPE: Primary | ICD-10-CM

## 2018-01-02 DIAGNOSIS — M17.11 OSTEOARTHRITIS OF RIGHT KNEE, UNSPECIFIED OSTEOARTHRITIS TYPE: ICD-10-CM

## 2018-02-05 ENCOUNTER — OFFICE VISIT (OUTPATIENT)
Dept: GASTROENTEROLOGY | Age: 68
End: 2018-02-05
Payer: MEDICARE

## 2018-02-05 VITALS
HEIGHT: 72 IN | BODY MASS INDEX: 31.29 KG/M2 | DIASTOLIC BLOOD PRESSURE: 88 MMHG | HEART RATE: 82 BPM | SYSTOLIC BLOOD PRESSURE: 130 MMHG | OXYGEN SATURATION: 96 % | WEIGHT: 231 LBS

## 2018-02-05 DIAGNOSIS — K22.89 ESOPHAGEAL PAIN: ICD-10-CM

## 2018-02-05 DIAGNOSIS — R13.19 ESOPHAGEAL DYSPHAGIA: Primary | ICD-10-CM

## 2018-02-05 PROCEDURE — 3017F COLORECTAL CA SCREEN DOC REV: CPT | Performed by: NURSE PRACTITIONER

## 2018-02-05 PROCEDURE — G8484 FLU IMMUNIZE NO ADMIN: HCPCS | Performed by: NURSE PRACTITIONER

## 2018-02-05 PROCEDURE — 99214 OFFICE O/P EST MOD 30 MIN: CPT | Performed by: NURSE PRACTITIONER

## 2018-02-05 PROCEDURE — 1036F TOBACCO NON-USER: CPT | Performed by: NURSE PRACTITIONER

## 2018-02-05 PROCEDURE — 1123F ACP DISCUSS/DSCN MKR DOCD: CPT | Performed by: NURSE PRACTITIONER

## 2018-02-05 PROCEDURE — G8427 DOCREV CUR MEDS BY ELIG CLIN: HCPCS | Performed by: NURSE PRACTITIONER

## 2018-02-05 PROCEDURE — 4040F PNEUMOC VAC/ADMIN/RCVD: CPT | Performed by: NURSE PRACTITIONER

## 2018-02-05 PROCEDURE — G8417 CALC BMI ABV UP PARAM F/U: HCPCS | Performed by: NURSE PRACTITIONER

## 2018-02-05 ASSESSMENT — ENCOUNTER SYMPTOMS
BACK PAIN: 0
ABDOMINAL DISTENTION: 0
DIARRHEA: 0
SHORTNESS OF BREATH: 0
NAUSEA: 0
RECTAL PAIN: 0
COUGH: 0
TROUBLE SWALLOWING: 1
VOMITING: 0
CHEST TIGHTNESS: 0
ABDOMINAL PAIN: 0
CONSTIPATION: 0
SORE THROAT: 0
VOICE CHANGE: 0
BLOOD IN STOOL: 0

## 2018-02-05 NOTE — PROGRESS NOTES
Subjective:      Patient ID: Ally Rivera is a 78 y.o. male. PCP: Galina Clemente MD     [facility]    Chief Complaint   Patient presents with    Dysphagia     referred by Dr. Clarisse Molina       Patient c/o dysphagia. Seen a year ago for gi bleeding he complained of intermittent dysphagia at that time. States this started 3-4 years ago. Occurs intermitentlly. Associated with pain when it occurs - pain in esophagus. Mostly occurs with solid foods but if food is stuck, liquids will not pass either. He had esophagram 12/2016 noting no strictures or other abnormalities. Impression   1. Small hiatal hernia. 2. Otherwise negative esophagram/barium swallow. Patient denies any significant heartburn or reflux. Never has a problem unless he eats late or something really spicy. Can control very well with dietary restrictions. He denies n/v, melena, abdominal pain. No weight loss or anemia. Family History   Problem Relation Age of Onset    Cancer Mother     Lung Cancer Mother     Heart Disease Father     Colon Cancer Neg Hx     Colon Polyps Neg Hx     Esophageal Cancer Neg Hx     Liver Cancer Neg Hx     Liver Disease Neg Hx     Rectal Cancer Neg Hx     Stomach Cancer Neg Hx        Past Medical History:   Diagnosis Date    Arthritis     Colon polyps     Difficult intubation     pt just had recent cervical fusion with plate 2 months ago    DVT (deep venous thrombosis) ([address] 2014    left leg    Hx of blood clots     left leg/ after heel surg/ jan. 2015    Hyperlipidemia     Kidney stone on left side        Past Surgical History:   Procedure Laterality Date    BACK SURGERY      CARPAL TUNNEL RELEASE      CERVICAL FUSION      2015, oct    COLONOSCOPY  10/30/2009    [place]: hyperplastic polyp    COLONOSCOPY  02/2015    hyperplastic polyp (5 yr)    FOOT SURGERY  01/2014    Left foot spur removal    LITHOTRIPSY Left 12/29/2015    ESWL [address] LITHOTRIPSY [facility] Cinthia [facility]  [facility]    PILONIDAL CYST EXCISION      IA COLONOSCOPY FLX DX W/COLLJ SPEC WHEN PFRMD N/A 5/8/2017    Dr Blanca Alexis sided diverticulosis-5 yr recall   [place]  2007    right after fall, detached the subclavicle muscle    [address] [facility]    Neck  surgery        Current Outpatient Prescriptions   Medication Sig Dispense Refill    simvastatin (ZOCOR) 20 MG tablet One-half to one every evening 90 tablet 3    Probiotic Product (PROBIOTIC FORMULA PO) Take 1 tablet by mouth      Ascorbic Acid (VITAMIN C PO) Take 1 tablet by mouth daily      melatonin 3 MG TABS tablet Take 3 mg by mouth nightly      aspirin 81 MG tablet Take 81 mg by mouth daily.  Multiple Vitamins-Minerals (MULTIVITAMIN PO) Take  by mouth daily.  vitamin B-12 (CYANOCOBALAMIN) 1000 MCG tablet Take 1,000 mcg by mouth three times a week. No current facility-administered medications for this visit. No Known Allergies     reports that he has never smoked. He has never used smokeless tobacco. He reports that he does not drink alcohol or use drugs. Review of Systems   Constitutional: Negative for appetite change, fever and unexpected weight change. HENT: Positive for trouble swallowing. Negative for sore throat and voice change. Respiratory: Negative for cough, chest tightness and shortness of breath. Cardiovascular: Negative for chest pain, palpitations and leg swelling. Gastrointestinal: Negative for abdominal distention, abdominal pain, blood in stool, constipation, diarrhea, nausea, rectal pain and vomiting. Esophageal pain   Musculoskeletal: Positive for arthralgias. Negative for back pain and gait problem. Skin: Negative for pallor, rash and wound. Neurological: Negative for dizziness, weakness and light-headedness. Hematological: Negative for adenopathy. Does not bruise/bleed easily.         Anemia   All other systems reviewed and are alternatives, and the possible risks and/or complications of the planned procedure and the anesthesia methods.

## 2018-02-12 ENCOUNTER — TELEPHONE (OUTPATIENT)
Dept: GASTROENTEROLOGY | Age: 68
End: 2018-02-12

## 2018-02-12 ENCOUNTER — ANESTHESIA (OUTPATIENT)
Dept: OPERATING ROOM | Age: 68
End: 2018-02-12

## 2018-02-12 ENCOUNTER — ANESTHESIA EVENT (OUTPATIENT)
Dept: OPERATING ROOM | Age: 68
End: 2018-02-12

## 2018-02-12 ENCOUNTER — HOSPITAL ENCOUNTER (OUTPATIENT)
Age: 68
Setting detail: OUTPATIENT SURGERY
Discharge: HOME | End: 2018-02-12
Attending: INTERNAL MEDICINE | Admitting: INTERNAL MEDICINE
Payer: MEDICARE

## 2018-02-12 ENCOUNTER — HOSPITAL ENCOUNTER (OUTPATIENT)
Age: 68
Setting detail: SPECIMEN
Discharge: HOME | End: 2018-02-12
Payer: MEDICARE

## 2018-02-12 VITALS — SYSTOLIC BLOOD PRESSURE: 146 MMHG | OXYGEN SATURATION: 97 % | DIASTOLIC BLOOD PRESSURE: 82 MMHG

## 2018-02-12 VITALS
HEART RATE: 63 BPM | SYSTOLIC BLOOD PRESSURE: 151 MMHG | WEIGHT: 230 LBS | RESPIRATION RATE: 18 BRPM | BODY MASS INDEX: 31.15 KG/M2 | TEMPERATURE: 98.1 F | DIASTOLIC BLOOD PRESSURE: 96 MMHG | OXYGEN SATURATION: 95 % | HEIGHT: 72 IN

## 2018-02-12 PROCEDURE — 87077 CULTURE AEROBIC IDENTIFY: CPT

## 2018-02-12 PROCEDURE — 43239 EGD BIOPSY SINGLE/MULTIPLE: CPT

## 2018-02-12 PROCEDURE — G8907 PT DOC NO EVENTS ON DISCHARG: HCPCS

## 2018-02-12 PROCEDURE — G8918 PT W/O PREOP ORDER IV AB PRO: HCPCS

## 2018-02-12 PROCEDURE — 88305 TISSUE EXAM BY PATHOLOGIST: CPT

## 2018-02-12 PROCEDURE — 43239 EGD BIOPSY SINGLE/MULTIPLE: CPT | Performed by: INTERNAL MEDICINE

## 2018-02-12 RX ORDER — SODIUM CHLORIDE 9 MG/ML
INJECTION, SOLUTION INTRAVENOUS CONTINUOUS
Status: DISCONTINUED | OUTPATIENT
Start: 2018-02-12 | End: 2018-02-12 | Stop reason: HOSPADM

## 2018-02-12 RX ORDER — OMEPRAZOLE 20 MG/1
20 CAPSULE, DELAYED RELEASE ORAL DAILY
Qty: 30 CAPSULE | Refills: 5 | Status: ON HOLD | OUTPATIENT
Start: 2018-02-12 | End: 2018-03-26

## 2018-02-12 RX ORDER — SUCRALFATE 1 G/1
1 TABLET ORAL 4 TIMES DAILY
Qty: 120 TABLET | Refills: 0 | Status: SHIPPED | OUTPATIENT
Start: 2018-02-12 | End: 2019-01-21

## 2018-02-12 RX ORDER — PROPOFOL 10 MG/ML
INJECTION, EMULSION INTRAVENOUS PRN
Status: DISCONTINUED | OUTPATIENT
Start: 2018-02-12 | End: 2018-02-12 | Stop reason: SDUPTHER

## 2018-02-12 RX ORDER — LIDOCAINE HYDROCHLORIDE 10 MG/ML
1 INJECTION, SOLUTION EPIDURAL; INFILTRATION; INTRACAUDAL; PERINEURAL
Status: DISCONTINUED | OUTPATIENT
Start: 2018-02-12 | End: 2018-02-12 | Stop reason: HOSPADM

## 2018-02-12 RX ADMIN — PROPOFOL 200 MG: 10 INJECTION, EMULSION INTRAVENOUS at 12:05

## 2018-02-12 RX ADMIN — SODIUM CHLORIDE: 9 INJECTION, SOLUTION INTRAVENOUS at 11:22

## 2018-02-12 ASSESSMENT — PAIN - FUNCTIONAL ASSESSMENT: PAIN_FUNCTIONAL_ASSESSMENT: 0-10

## 2018-02-12 NOTE — ANESTHESIA POSTPROCEDURE EVALUATION
Department of Anesthesiology  Postprocedure Note    Patient: hSanta Schwab  MRN: [id number]  [place]: 1950  Date of evaluation: 2/12/2018  Time:  12:05 PM     Procedure Summary     Date:  02/12/18 Room / Location:  [facility] ENDO 01 / [facility] OR    Anesthesia Start:  1200 Anesthesia Stop:      Procedure:  EGD BIOPSY (N/A ) Diagnosis:  (ESOPHAGEAL, DYSPHAGIA, ESOPHAGEAL PAIN)    Surgeon:  Td Van DO Responsible Provider: Gely Rodney CRNA    Anesthesia Type:  MAC ASA Status:  1          Anesthesia Type: MAC    Ari Phase I:      Ari Phase II:      Last vitals: Reviewed and per EMR flowsheets.        Anesthesia Post Evaluation    Patient location during evaluation: bedside  Patient participation: complete - patient participated  Level of consciousness: awake  Airway patency: patent  Nausea & Vomiting: no nausea  Complications: no  Cardiovascular status: blood pressure returned to baseline  Respiratory status: room air and spontaneous ventilation  Hydration status: euvolemic

## 2018-02-12 NOTE — ANESTHESIA PRE PROCEDURE
BRIT.                                       Anesthesia Plan      MAC     ASA 1       Induction: intravenous. Anesthetic plan and risks discussed with patient.                       Graciela Nicolas CRNA   2/12/2018

## 2018-02-12 NOTE — TELEPHONE ENCOUNTER
Pt scheduled for 6 week recheck egd on 3/26/18 at 7:00 a.m at [facility] with Dr Caryle Chary.

## 2018-02-12 NOTE — H&P
dysphagia [R13.10] 12/22/2017        All other pertinent GI symptoms negative. Physical Exam:  Cardiac:  [x]WNL  []Comments:  Pulmonary:  [x]WNL   []Comments:  Neuro/Mental Status:  [x]WNL  []Comments:  Abdominal:  [x]WNL    []Comments:  Other:   []WNL  []Comments:    Informed Consent:  The risks and benefits of the procedure have been discussed with either the patient or if they cannot consent, their representative. Assessment:  Patient examined and appropriate for planned sedation and procedure. Plan:  Proceed with planned sedation and procedure as above.     Jacobo Ng DO  12:04 PM

## 2018-02-13 LAB — CLOTEST: NEGATIVE

## 2018-03-26 ENCOUNTER — HOSPITAL ENCOUNTER (OUTPATIENT)
Age: 68
Setting detail: SPECIMEN
Discharge: HOME | End: 2018-03-26
Payer: MEDICARE

## 2018-03-26 ENCOUNTER — ANESTHESIA (OUTPATIENT)
Dept: OPERATING ROOM | Age: 68
End: 2018-03-26

## 2018-03-26 ENCOUNTER — ANESTHESIA EVENT (OUTPATIENT)
Dept: OPERATING ROOM | Age: 68
End: 2018-03-26

## 2018-03-26 ENCOUNTER — HOSPITAL ENCOUNTER (OUTPATIENT)
Age: 68
Setting detail: OUTPATIENT SURGERY
Discharge: HOME | End: 2018-03-26
Attending: INTERNAL MEDICINE | Admitting: INTERNAL MEDICINE
Payer: MEDICARE

## 2018-03-26 VITALS — OXYGEN SATURATION: 96 % | DIASTOLIC BLOOD PRESSURE: 66 MMHG | SYSTOLIC BLOOD PRESSURE: 127 MMHG

## 2018-03-26 VITALS
OXYGEN SATURATION: 97 % | BODY MASS INDEX: 31.15 KG/M2 | DIASTOLIC BLOOD PRESSURE: 56 MMHG | RESPIRATION RATE: 20 BRPM | WEIGHT: 230 LBS | SYSTOLIC BLOOD PRESSURE: 113 MMHG | TEMPERATURE: 98.5 F | HEART RATE: 60 BPM | HEIGHT: 72 IN

## 2018-03-26 PROCEDURE — 43239 EGD BIOPSY SINGLE/MULTIPLE: CPT

## 2018-03-26 PROCEDURE — G8918 PT W/O PREOP ORDER IV AB PRO: HCPCS | Performed by: NURSE PRACTITIONER

## 2018-03-26 PROCEDURE — 43239 EGD BIOPSY SINGLE/MULTIPLE: CPT | Performed by: INTERNAL MEDICINE

## 2018-03-26 PROCEDURE — G8907 PT DOC NO EVENTS ON DISCHARG: HCPCS | Performed by: NURSE PRACTITIONER

## 2018-03-26 PROCEDURE — 43248 EGD GUIDE WIRE INSERTION: CPT

## 2018-03-26 PROCEDURE — 43248 EGD GUIDE WIRE INSERTION: CPT | Performed by: INTERNAL MEDICINE

## 2018-03-26 PROCEDURE — 88305 TISSUE EXAM BY PATHOLOGIST: CPT

## 2018-03-26 RX ORDER — SODIUM CHLORIDE 9 MG/ML
INJECTION, SOLUTION INTRAVENOUS CONTINUOUS
Status: DISCONTINUED | OUTPATIENT
Start: 2018-03-26 | End: 2018-03-26 | Stop reason: HOSPADM

## 2018-03-26 RX ORDER — PROPOFOL 10 MG/ML
INJECTION, EMULSION INTRAVENOUS PRN
Status: DISCONTINUED | OUTPATIENT
Start: 2018-03-26 | End: 2018-03-26 | Stop reason: SDUPTHER

## 2018-03-26 RX ORDER — OMEPRAZOLE 20 MG/1
20 CAPSULE, DELAYED RELEASE ORAL DAILY
Qty: 90 CAPSULE | Refills: 3 | Status: SHIPPED | OUTPATIENT
Start: 2018-03-26 | End: 2022-06-13

## 2018-03-26 RX ORDER — LIDOCAINE HYDROCHLORIDE 10 MG/ML
1 INJECTION, SOLUTION EPIDURAL; INFILTRATION; INTRACAUDAL; PERINEURAL
Status: COMPLETED | OUTPATIENT
Start: 2018-03-26 | End: 2018-03-26

## 2018-03-26 RX ADMIN — PROPOFOL 150 MG: 10 INJECTION, EMULSION INTRAVENOUS at 08:15

## 2018-03-26 RX ADMIN — SODIUM CHLORIDE: 9 INJECTION, SOLUTION INTRAVENOUS at 07:27

## 2018-03-26 RX ADMIN — LIDOCAINE HYDROCHLORIDE 30 MG: 10 INJECTION, SOLUTION EPIDURAL; INFILTRATION; INTRACAUDAL; PERINEURAL at 08:15

## 2018-03-26 NOTE — H&P
Patient Name: Ramirez Murray  : 1950  MRN: [id number]    Allergies: No Known Allergies      ENDOSCOPY / COLONOSCOPY / BRONCHOSCOPY      PRE-SEDATION ASSESSMENT      Procedure:    [] Colonoscopy     [x] Endoscopy      [] ERCP      [] Bronchoscopy      [] Other  [] History and Physical completed in chart for Inpatient or within 30 daysfrom office. I have examined the patient's status immediately prior to the procedure and:    [x] No interval change in patient status since H&P completed  [] Interval change in patient status (explained below)           BRIEF H&P    HPI/changes/indicators/diagnosis  Active Hospital Problems    Diagnosis Date Noted    Esophageal dysphagia [R13.10] 2017       Medications:   Prior to Admission medications    Medication Sig Start Date End Date Taking? Authorizing Provider   omeprazole (PRILOSEC) 20 MG delayed release capsule Take 1 capsule by mouth Daily 18  Yes Jacobo Ng DO   sucralfate (CARAFATE) 1 GM tablet Take 1 tablet by mouth 4 times daily 18  Yes Jacobo Ng DO   simvastatin (ZOCOR) 20 MG tablet One-half to one every evening 17  Yes Camilo Sarmiento MD   Probiotic Product (PROBIOTIC FORMULA PO) Take 1 tablet by mouth   Yes Historical Provider, MD   Ascorbic Acid (VITAMIN C PO) Take 1 tablet by mouth daily   Yes Historical Provider, MD   melatonin 3 MG TABS tablet Take 3 mg by mouth nightly   Yes Historical Provider, MD   Multiple Vitamins-Minerals (MULTIVITAMIN PO) Take  by mouth daily. Yes Historical Provider, MD   vitamin B-12 (CYANOCOBALAMIN) 1000 MCG tablet Take 1,000 mcg by mouth three times a week. Yes Historical Provider, MD   aspirin 81 MG tablet Take 81 mg by mouth daily. Historical Provider, MD       Allergies:   has No Known Allergies.       Vital Signs:   Vitals:    18 0713   BP: (!) 151/88   Pulse: 56   Resp: 20   Temp: 98.5 °F (36.9 °C)   SpO2: 96%       ROS:  Cardiac:  [x]WNL  []Comments:  Pulmonary:  [x]WNL

## 2018-03-26 NOTE — OP NOTE
Post Procedure Note    Name of surgeon / : Wyvonnia Moritz, DO    Date of Service: 03/26/18    Pre-operative Diagnosis:   Active Hospital Problems    Diagnosis Date Noted    Esophageal dysphagia [R13.10] 12/22/2017       Post-operative Diagnosis/Findings: esophageal stricture    Procedure: Procedure(s):  EGD BIOPSY  EGD DILATION SAVORY with 51 F over wire    Anesthesia: Monitor Anesthesia Care    Surgeons/Assistants: Wyvonnia Moritz, DO    Referring Physician: Silvino Alan MD    Procedure Note:  After informed consent was obtained, the patient was placed in the left lateral decubitus position and sedated per MAC. The endoscope was advanced down the oropharynx, down the esophagus, through the gastric lumen, into the duodenum. The small bowel appeared normal.  The gastric antrum was normal. The gastric body was normal.  Retroflexion was done which showed a normal fundus. The scope was withdrawn. The GE junction was at 40 cm. There was a stricture seen in the distal esophagus at 40 cm. The esophagitis was healed. Biopsies were taken to rule out cervantes's. The remaining esophagus was normal.  The scope was then advanced back down into the gastric antrum. A guidewire was placed through the endoscope and the endoscope was removed. A 51 French savory dilator was advanced down the length of the esophagus. The dilator and guidewire were removed. The patient tolerated the procedure well. Estimated Blood Loss: Minimal    Complications: None    Specimens:   ID Type Source Tests Collected by Time Destination   A : distal esophagus bx  Tissue Esophagus SURGICAL PATHOLOGY Wyvonnia Moritz, DO 3/26/2018 7153        Discussion: The patient had an esophageal stricture s/p egd with dilation. Repeat egd with dilation as needed. Ok to stop carafate. Continue omeprazole once daily.     Wyvonnia Moritz, DO  03/26/18  8:27 AM

## 2018-03-26 NOTE — ANESTHESIA PRE PROCEDURE
distance: >3 FB   Neck ROM: full  Mouth opening: > = 3 FB Dental: normal exam         Pulmonary:Negative Pulmonary ROS and normal exam                               Cardiovascular:Negative CV ROS             Beta Blocker:  Not on Beta Blocker         Neuro/Psych:   Negative Neuro/Psych ROS              GI/Hepatic/Renal:   (+) GERD:,           Endo/Other: Negative Endo/Other ROS                    Abdominal:           Vascular: negative vascular ROS. Anesthesia Plan      MAC     ASA 2       Induction: intravenous. Anesthetic plan and risks discussed with patient.                       Siri Curiel CRNA   3/26/2018

## 2018-12-23 DIAGNOSIS — E78.5 HYPERLIPIDEMIA, UNSPECIFIED HYPERLIPIDEMIA TYPE: ICD-10-CM

## 2018-12-24 RX ORDER — SIMVASTATIN 20 MG
TABLET ORAL
Qty: 90 TABLET | Refills: 0 | Status: SHIPPED | OUTPATIENT
Start: 2018-12-24 | End: 2019-01-22 | Stop reason: SDUPTHER

## 2019-01-04 ENCOUNTER — TELEPHONE (OUTPATIENT)
Dept: FAMILY MEDICINE CLINIC | Age: 69
End: 2019-01-04

## 2019-01-07 DIAGNOSIS — Z00.00 ANNUAL PHYSICAL EXAM: Primary | ICD-10-CM

## 2019-01-09 DIAGNOSIS — Z00.00 ANNUAL PHYSICAL EXAM: ICD-10-CM

## 2019-01-09 LAB
ALBUMIN SERPL-MCNC: 4.4 G/DL (ref 3.5–5.2)
ALP BLD-CCNC: 82 U/L (ref 40–130)
ALT SERPL-CCNC: 32 U/L (ref 5–41)
ANION GAP SERPL CALCULATED.3IONS-SCNC: 15 MMOL/L (ref 7–19)
AST SERPL-CCNC: 26 U/L (ref 5–40)
BILIRUB SERPL-MCNC: 0.4 MG/DL (ref 0.2–1.2)
BILIRUBIN DIRECT: 0.1 MG/DL (ref 0–0.3)
BILIRUBIN URINE: NEGATIVE
BILIRUBIN, INDIRECT: 0.3 MG/DL (ref 0.1–1)
BLOOD, URINE: NEGATIVE
BUN BLDV-MCNC: 18 MG/DL (ref 8–23)
CALCIUM SERPL-MCNC: 9.7 MG/DL (ref 8.8–10.2)
CHLORIDE BLD-SCNC: 105 MMOL/L (ref 98–111)
CHOLESTEROL, TOTAL: 124 MG/DL (ref 160–199)
CLARITY: CLEAR
CO2: 24 MMOL/L (ref 22–29)
COLOR: YELLOW
CREAT SERPL-MCNC: 0.9 MG/DL (ref 0.5–1.2)
GFR NON-AFRICAN AMERICAN: >60
GLUCOSE BLD-MCNC: 107 MG/DL (ref 74–109)
GLUCOSE URINE: NEGATIVE MG/DL
HCT VFR BLD CALC: 44.7 % (ref 42–52)
HDLC SERPL-MCNC: 38 MG/DL (ref 55–121)
HEMOGLOBIN: 14.4 G/DL (ref 14–18)
KETONES, URINE: NEGATIVE MG/DL
LDL CHOLESTEROL CALCULATED: 70 MG/DL
LEUKOCYTE ESTERASE, URINE: NEGATIVE
MCH RBC QN AUTO: 30.7 PG (ref 27–31)
MCHC RBC AUTO-ENTMCNC: 32.2 G/DL (ref 33–37)
MCV RBC AUTO: 95.3 FL (ref 80–94)
NITRITE, URINE: NEGATIVE
PDW BLD-RTO: 13.9 % (ref 11.5–14.5)
PH UA: 7
PLATELET # BLD: 225 K/UL (ref 130–400)
PMV BLD AUTO: 10.9 FL (ref 9.4–12.4)
POTASSIUM SERPL-SCNC: 4.6 MMOL/L (ref 3.5–5)
PROTEIN UA: NEGATIVE MG/DL
RBC # BLD: 4.69 M/UL (ref 4.7–6.1)
SODIUM BLD-SCNC: 144 MMOL/L (ref 136–145)
SPECIFIC GRAVITY UA: 1.02
T4 FREE: 1.1 NG/DL (ref 0.9–1.7)
TOTAL PROTEIN: 7.5 G/DL (ref 6.6–8.7)
TRIGL SERPL-MCNC: 79 MG/DL (ref 0–149)
TSH SERPL DL<=0.05 MIU/L-ACNC: 2.2 UIU/ML (ref 0.27–4.2)
UROBILINOGEN, URINE: 0.2 E.U./DL
WBC # BLD: 3.9 K/UL (ref 4.8–10.8)

## 2019-01-12 LAB
PROSTATE SPECIFIC ANTIGEN FREE: 0.1 NG/ML
PROSTATE SPECIFIC ANTIGEN PERCENT FREE: 17 %
PROSTATE SPECIFIC ANTIGEN: 0.6 NG/ML (ref 0–4)

## 2019-01-16 ENCOUNTER — TELEPHONE (OUTPATIENT)
Dept: FAMILY MEDICINE CLINIC | Age: 69
End: 2019-01-16

## 2019-01-21 ENCOUNTER — OFFICE VISIT (OUTPATIENT)
Dept: FAMILY MEDICINE CLINIC | Age: 69
End: 2019-01-21
Payer: MEDICARE

## 2019-01-21 VITALS
BODY MASS INDEX: 32.06 KG/M2 | HEART RATE: 73 BPM | WEIGHT: 229 LBS | SYSTOLIC BLOOD PRESSURE: 146 MMHG | DIASTOLIC BLOOD PRESSURE: 82 MMHG | HEIGHT: 71 IN | OXYGEN SATURATION: 97 % | RESPIRATION RATE: 18 BRPM | TEMPERATURE: 97.8 F

## 2019-01-21 DIAGNOSIS — Z98.890 H/O CERVICAL SPINE SURGERY: ICD-10-CM

## 2019-01-21 DIAGNOSIS — E78.5 HYPERLIPIDEMIA, UNSPECIFIED HYPERLIPIDEMIA TYPE: ICD-10-CM

## 2019-01-21 DIAGNOSIS — I82.409 DEEP VEIN THROMBOSIS (DVT) OF LOWER EXTREMITY, UNSPECIFIED CHRONICITY, UNSPECIFIED LATERALITY, UNSPECIFIED VEIN (HCC): ICD-10-CM

## 2019-01-21 DIAGNOSIS — N20.0 NEPHROLITHIASIS: ICD-10-CM

## 2019-01-21 DIAGNOSIS — K22.70 BARRETT'S ESOPHAGUS WITHOUT DYSPLASIA: ICD-10-CM

## 2019-01-21 DIAGNOSIS — S86.012S: ICD-10-CM

## 2019-01-21 DIAGNOSIS — L98.9 LEG SKIN LESION, RIGHT: ICD-10-CM

## 2019-01-21 DIAGNOSIS — Z00.00 NORMAL PHYSICAL EXAMINATION: ICD-10-CM

## 2019-01-21 DIAGNOSIS — E78.5 HYPERLIPOPROTEINEMIA: ICD-10-CM

## 2019-01-21 DIAGNOSIS — T14.8XXA MUSCLE TEAR: ICD-10-CM

## 2019-01-21 PROCEDURE — 1123F ACP DISCUSS/DSCN MKR DOCD: CPT | Performed by: FAMILY MEDICINE

## 2019-01-21 PROCEDURE — 3017F COLORECTAL CA SCREEN DOC REV: CPT | Performed by: FAMILY MEDICINE

## 2019-01-21 PROCEDURE — G8417 CALC BMI ABV UP PARAM F/U: HCPCS | Performed by: FAMILY MEDICINE

## 2019-01-21 PROCEDURE — G8484 FLU IMMUNIZE NO ADMIN: HCPCS | Performed by: FAMILY MEDICINE

## 2019-01-21 PROCEDURE — 99214 OFFICE O/P EST MOD 30 MIN: CPT | Performed by: FAMILY MEDICINE

## 2019-01-21 PROCEDURE — 4040F PNEUMOC VAC/ADMIN/RCVD: CPT | Performed by: FAMILY MEDICINE

## 2019-01-21 PROCEDURE — 1036F TOBACCO NON-USER: CPT | Performed by: FAMILY MEDICINE

## 2019-01-21 PROCEDURE — 1101F PT FALLS ASSESS-DOCD LE1/YR: CPT | Performed by: FAMILY MEDICINE

## 2019-01-21 PROCEDURE — G8427 DOCREV CUR MEDS BY ELIG CLIN: HCPCS | Performed by: FAMILY MEDICINE

## 2019-01-21 RX ORDER — CLOTRIMAZOLE AND BETAMETHASONE DIPROPIONATE 10; .64 MG/G; MG/G
CREAM TOPICAL
Qty: 45 G | Refills: 3 | Status: SHIPPED | OUTPATIENT
Start: 2019-01-21 | End: 2020-01-22 | Stop reason: SDUPTHER

## 2019-01-21 RX ORDER — SILDENAFIL CITRATE 20 MG/1
20 TABLET ORAL DAILY PRN
Qty: 50 TABLET | Refills: 1 | Status: SHIPPED | OUTPATIENT
Start: 2019-01-21 | End: 2019-01-30

## 2019-01-21 RX ORDER — SILDENAFIL 50 MG/1
50 TABLET, FILM COATED ORAL DAILY PRN
Qty: 50 TABLET | Refills: 1 | Status: SHIPPED | OUTPATIENT
Start: 2019-01-21 | End: 2019-01-21 | Stop reason: CLARIF

## 2019-01-21 ASSESSMENT — ENCOUNTER SYMPTOMS
EYES NEGATIVE: 1
VOMITING: 0
BLOOD IN STOOL: 0
DIARRHEA: 0
WHEEZING: 0
NAUSEA: 0
SHORTNESS OF BREATH: 0
CONSTIPATION: 0
CHEST TIGHTNESS: 0
COUGH: 0

## 2019-01-21 ASSESSMENT — ANXIETY QUESTIONNAIRES: GAD7 TOTAL SCORE: 0

## 2019-01-21 ASSESSMENT — LIFESTYLE VARIABLES: HOW OFTEN DO YOU HAVE A DRINK CONTAINING ALCOHOL: 0

## 2019-01-21 ASSESSMENT — PATIENT HEALTH QUESTIONNAIRE - PHQ9
SUM OF ALL RESPONSES TO PHQ QUESTIONS 1-9: 0
SUM OF ALL RESPONSES TO PHQ QUESTIONS 1-9: 0

## 2019-01-22 DIAGNOSIS — E78.5 HYPERLIPIDEMIA, UNSPECIFIED HYPERLIPIDEMIA TYPE: ICD-10-CM

## 2019-01-22 RX ORDER — SIMVASTATIN 20 MG
TABLET ORAL
Qty: 90 TABLET | Refills: 0 | Status: SHIPPED | OUTPATIENT
Start: 2019-01-22 | End: 2019-12-27 | Stop reason: SDUPTHER

## 2019-01-30 ENCOUNTER — OFFICE VISIT (OUTPATIENT)
Dept: GASTROENTEROLOGY | Age: 69
End: 2019-01-30
Payer: MEDICARE

## 2019-01-30 VITALS
BODY MASS INDEX: 30.85 KG/M2 | DIASTOLIC BLOOD PRESSURE: 88 MMHG | WEIGHT: 227.8 LBS | OXYGEN SATURATION: 98 % | HEIGHT: 72 IN | HEART RATE: 78 BPM | SYSTOLIC BLOOD PRESSURE: 128 MMHG

## 2019-01-30 DIAGNOSIS — K22.70 BARRETT'S ESOPHAGUS DETERMINED BY BIOPSY: ICD-10-CM

## 2019-01-30 DIAGNOSIS — K21.9 CHRONIC GERD: Primary | ICD-10-CM

## 2019-01-30 PROCEDURE — 1123F ACP DISCUSS/DSCN MKR DOCD: CPT | Performed by: NURSE PRACTITIONER

## 2019-01-30 PROCEDURE — 1101F PT FALLS ASSESS-DOCD LE1/YR: CPT | Performed by: NURSE PRACTITIONER

## 2019-01-30 PROCEDURE — G8484 FLU IMMUNIZE NO ADMIN: HCPCS | Performed by: NURSE PRACTITIONER

## 2019-01-30 PROCEDURE — G8417 CALC BMI ABV UP PARAM F/U: HCPCS | Performed by: NURSE PRACTITIONER

## 2019-01-30 PROCEDURE — 1036F TOBACCO NON-USER: CPT | Performed by: NURSE PRACTITIONER

## 2019-01-30 PROCEDURE — G8427 DOCREV CUR MEDS BY ELIG CLIN: HCPCS | Performed by: NURSE PRACTITIONER

## 2019-01-30 PROCEDURE — 3017F COLORECTAL CA SCREEN DOC REV: CPT | Performed by: NURSE PRACTITIONER

## 2019-01-30 PROCEDURE — 99214 OFFICE O/P EST MOD 30 MIN: CPT | Performed by: NURSE PRACTITIONER

## 2019-01-30 PROCEDURE — 4040F PNEUMOC VAC/ADMIN/RCVD: CPT | Performed by: NURSE PRACTITIONER

## 2019-01-30 ASSESSMENT — ENCOUNTER SYMPTOMS
COUGH: 0
BACK PAIN: 0
CHEST TIGHTNESS: 0
VOICE CHANGE: 0
SORE THROAT: 0
SHORTNESS OF BREATH: 0
DIARRHEA: 0
ABDOMINAL PAIN: 0
NAUSEA: 0
ABDOMINAL DISTENTION: 0
CONSTIPATION: 0
RECTAL PAIN: 0
VOMITING: 0
BLOOD IN STOOL: 0

## 2019-02-25 ENCOUNTER — HOSPITAL ENCOUNTER (EMERGENCY)
Age: 69
Discharge: HOME | End: 2019-02-25
Payer: MEDICARE

## 2019-02-25 ENCOUNTER — APPOINTMENT (OUTPATIENT)
Dept: GENERAL RADIOLOGY | Age: 69
End: 2019-02-25
Payer: MEDICARE

## 2019-02-25 VITALS
DIASTOLIC BLOOD PRESSURE: 78 MMHG | RESPIRATION RATE: 20 BRPM | TEMPERATURE: 98.4 F | OXYGEN SATURATION: 98 % | SYSTOLIC BLOOD PRESSURE: 142 MMHG | HEART RATE: 72 BPM

## 2019-02-25 DIAGNOSIS — R11.11 NON-INTRACTABLE VOMITING WITHOUT NAUSEA, UNSPECIFIED VOMITING TYPE: ICD-10-CM

## 2019-02-25 DIAGNOSIS — R10.9 LEFT FLANK PAIN: Primary | ICD-10-CM

## 2019-02-25 LAB
ALBUMIN SERPL-MCNC: 4.9 G/DL (ref 3.5–5.2)
ALP BLD-CCNC: 96 U/L (ref 40–130)
ALT SERPL-CCNC: 34 U/L (ref 5–41)
ANION GAP SERPL CALCULATED.3IONS-SCNC: 12 MMOL/L (ref 7–19)
AST SERPL-CCNC: 27 U/L (ref 5–40)
BACTERIA: NEGATIVE /HPF
BASOPHILS ABSOLUTE: 0 K/UL (ref 0–0.2)
BASOPHILS RELATIVE PERCENT: 0.5 % (ref 0–1)
BILIRUB SERPL-MCNC: 0.3 MG/DL (ref 0.2–1.2)
BILIRUBIN URINE: NEGATIVE
BLOOD, URINE: ABNORMAL
BUN BLDV-MCNC: 17 MG/DL (ref 8–23)
CALCIUM SERPL-MCNC: 9.5 MG/DL (ref 8.8–10.2)
CHLORIDE BLD-SCNC: 100 MMOL/L (ref 98–111)
CLARITY: CLEAR
CO2: 28 MMOL/L (ref 22–29)
COLOR: YELLOW
CREAT SERPL-MCNC: 0.9 MG/DL (ref 0.5–1.2)
EOSINOPHILS ABSOLUTE: 0 K/UL (ref 0–0.6)
EOSINOPHILS RELATIVE PERCENT: 0.1 % (ref 0–5)
EPITHELIAL CELLS, UA: 0 /HPF (ref 0–5)
GFR NON-AFRICAN AMERICAN: >60
GLUCOSE BLD-MCNC: 120 MG/DL (ref 74–109)
GLUCOSE URINE: NEGATIVE MG/DL
HCT VFR BLD CALC: 45.6 % (ref 42–52)
HEMOGLOBIN: 15.2 G/DL (ref 14–18)
HYALINE CASTS: 1 /HPF (ref 0–8)
KETONES, URINE: NEGATIVE MG/DL
LEUKOCYTE ESTERASE, URINE: NEGATIVE
LYMPHOCYTES ABSOLUTE: 1.1 K/UL (ref 1.1–4.5)
LYMPHOCYTES RELATIVE PERCENT: 13.2 % (ref 20–40)
MCH RBC QN AUTO: 31 PG (ref 27–31)
MCHC RBC AUTO-ENTMCNC: 33.3 G/DL (ref 33–37)
MCV RBC AUTO: 93.1 FL (ref 80–94)
MONOCYTES ABSOLUTE: 0.3 K/UL (ref 0–0.9)
MONOCYTES RELATIVE PERCENT: 3.8 % (ref 0–10)
NEUTROPHILS ABSOLUTE: 7 K/UL (ref 1.5–7.5)
NEUTROPHILS RELATIVE PERCENT: 82.2 % (ref 50–65)
NITRITE, URINE: NEGATIVE
PDW BLD-RTO: 13.2 % (ref 11.5–14.5)
PH UA: 7
PLATELET # BLD: 230 K/UL (ref 130–400)
PMV BLD AUTO: 10.6 FL (ref 9.4–12.4)
POTASSIUM SERPL-SCNC: 4.3 MMOL/L (ref 3.5–5)
PROTEIN UA: NEGATIVE MG/DL
RBC # BLD: 4.9 M/UL (ref 4.7–6.1)
RBC UA: 16 /HPF (ref 0–4)
SODIUM BLD-SCNC: 140 MMOL/L (ref 136–145)
SPECIFIC GRAVITY UA: 1.01
TOTAL PROTEIN: 7.9 G/DL (ref 6.6–8.7)
UROBILINOGEN, URINE: 0.2 E.U./DL
WBC # BLD: 8.5 K/UL (ref 4.8–10.8)
WBC UA: 1 /HPF (ref 0–5)

## 2019-02-25 PROCEDURE — 99284 EMERGENCY DEPT VISIT MOD MDM: CPT

## 2019-02-25 PROCEDURE — 36415 COLL VENOUS BLD VENIPUNCTURE: CPT

## 2019-02-25 PROCEDURE — 2580000003 HC RX 258: Performed by: NURSE PRACTITIONER

## 2019-02-25 PROCEDURE — 81001 URINALYSIS AUTO W/SCOPE: CPT

## 2019-02-25 PROCEDURE — 99283 EMERGENCY DEPT VISIT LOW MDM: CPT | Performed by: NURSE PRACTITIONER

## 2019-02-25 PROCEDURE — 85025 COMPLETE CBC W/AUTO DIFF WBC: CPT

## 2019-02-25 PROCEDURE — 6370000000 HC RX 637 (ALT 250 FOR IP)

## 2019-02-25 PROCEDURE — 80053 COMPREHEN METABOLIC PANEL: CPT

## 2019-02-25 PROCEDURE — 74018 RADEX ABDOMEN 1 VIEW: CPT

## 2019-02-25 RX ORDER — ONDANSETRON 4 MG/1
TABLET, ORALLY DISINTEGRATING ORAL
Status: COMPLETED
Start: 2019-02-25 | End: 2019-02-25

## 2019-02-25 RX ORDER — HYDROCODONE BITARTRATE AND ACETAMINOPHEN 5; 325 MG/1; MG/1
1 TABLET ORAL EVERY 6 HOURS PRN
Qty: 10 TABLET | Refills: 0 | Status: SHIPPED | OUTPATIENT
Start: 2019-02-25 | End: 2019-03-04

## 2019-02-25 RX ORDER — ONDANSETRON 2 MG/ML
INJECTION INTRAMUSCULAR; INTRAVENOUS
Status: DISCONTINUED
Start: 2019-02-25 | End: 2019-02-26 | Stop reason: HOSPADM

## 2019-02-25 RX ORDER — 0.9 % SODIUM CHLORIDE 0.9 %
500 INTRAVENOUS SOLUTION INTRAVENOUS ONCE
Status: COMPLETED | OUTPATIENT
Start: 2019-02-25 | End: 2019-02-25

## 2019-02-25 RX ORDER — ONDANSETRON 4 MG/1
4 TABLET, ORALLY DISINTEGRATING ORAL EVERY 8 HOURS PRN
Qty: 10 TABLET | Refills: 0 | Status: ON HOLD | OUTPATIENT
Start: 2019-02-25 | End: 2021-03-28 | Stop reason: HOSPADM

## 2019-02-25 RX ADMIN — SODIUM CHLORIDE 500 ML: 9 INJECTION, SOLUTION INTRAVENOUS at 17:30

## 2019-02-25 RX ADMIN — ONDANSETRON 4 MG: 4 TABLET, ORALLY DISINTEGRATING ORAL at 13:19

## 2019-02-25 ASSESSMENT — ENCOUNTER SYMPTOMS
VOMITING: 1
ABDOMINAL PAIN: 1
NAUSEA: 1

## 2019-05-01 ENCOUNTER — ANESTHESIA EVENT (OUTPATIENT)
Dept: OPERATING ROOM | Age: 69
End: 2019-05-01

## 2019-05-02 ENCOUNTER — HOSPITAL ENCOUNTER (OUTPATIENT)
Age: 69
Setting detail: OUTPATIENT SURGERY
Discharge: HOME | End: 2019-05-02
Attending: INTERNAL MEDICINE | Admitting: INTERNAL MEDICINE
Payer: MEDICARE

## 2019-05-02 ENCOUNTER — HOSPITAL ENCOUNTER (OUTPATIENT)
Age: 69
Setting detail: SPECIMEN
Discharge: HOME | End: 2019-05-02
Payer: MEDICARE

## 2019-05-02 ENCOUNTER — APPOINTMENT (OUTPATIENT)
Dept: OPERATING ROOM | Age: 69
End: 2019-05-02

## 2019-05-02 ENCOUNTER — ANESTHESIA (OUTPATIENT)
Dept: OPERATING ROOM | Age: 69
End: 2019-05-02

## 2019-05-02 VITALS
SYSTOLIC BLOOD PRESSURE: 169 MMHG | WEIGHT: 230 LBS | HEART RATE: 58 BPM | TEMPERATURE: 98.3 F | RESPIRATION RATE: 16 BRPM | HEIGHT: 72 IN | OXYGEN SATURATION: 96 % | BODY MASS INDEX: 31.15 KG/M2 | DIASTOLIC BLOOD PRESSURE: 91 MMHG

## 2019-05-02 VITALS — SYSTOLIC BLOOD PRESSURE: 142 MMHG | DIASTOLIC BLOOD PRESSURE: 81 MMHG | OXYGEN SATURATION: 96 %

## 2019-05-02 PROCEDURE — G8907 PT DOC NO EVENTS ON DISCHARG: HCPCS

## 2019-05-02 PROCEDURE — 88342 IMHCHEM/IMCYTCHM 1ST ANTB: CPT

## 2019-05-02 PROCEDURE — G8918 PT W/O PREOP ORDER IV AB PRO: HCPCS

## 2019-05-02 PROCEDURE — 43239 EGD BIOPSY SINGLE/MULTIPLE: CPT | Performed by: INTERNAL MEDICINE

## 2019-05-02 PROCEDURE — 88305 TISSUE EXAM BY PATHOLOGIST: CPT

## 2019-05-02 PROCEDURE — 43239 EGD BIOPSY SINGLE/MULTIPLE: CPT

## 2019-05-02 RX ORDER — SODIUM CHLORIDE, SODIUM LACTATE, POTASSIUM CHLORIDE, CALCIUM CHLORIDE 600; 310; 30; 20 MG/100ML; MG/100ML; MG/100ML; MG/100ML
INJECTION, SOLUTION INTRAVENOUS CONTINUOUS PRN
Status: DISCONTINUED | OUTPATIENT
Start: 2019-05-02 | End: 2019-05-02 | Stop reason: SDUPTHER

## 2019-05-02 RX ORDER — PROPOFOL 10 MG/ML
INJECTION, EMULSION INTRAVENOUS PRN
Status: DISCONTINUED | OUTPATIENT
Start: 2019-05-02 | End: 2019-05-02 | Stop reason: SDUPTHER

## 2019-05-02 RX ORDER — LIDOCAINE HYDROCHLORIDE 10 MG/ML
INJECTION, SOLUTION EPIDURAL; INFILTRATION; INTRACAUDAL; PERINEURAL PRN
Status: DISCONTINUED | OUTPATIENT
Start: 2019-05-02 | End: 2019-05-02 | Stop reason: SDUPTHER

## 2019-05-02 RX ADMIN — LIDOCAINE HYDROCHLORIDE 30 MG: 10 INJECTION, SOLUTION EPIDURAL; INFILTRATION; INTRACAUDAL; PERINEURAL at 10:13

## 2019-05-02 RX ADMIN — SODIUM CHLORIDE, SODIUM LACTATE, POTASSIUM CHLORIDE, CALCIUM CHLORIDE: 600; 310; 30; 20 INJECTION, SOLUTION INTRAVENOUS at 09:39

## 2019-05-02 RX ADMIN — PROPOFOL 150 MG: 10 INJECTION, EMULSION INTRAVENOUS at 10:13

## 2019-05-02 ASSESSMENT — PAIN SCALES - GENERAL: PAINLEVEL_OUTOF10: 0

## 2019-05-02 NOTE — ANESTHESIA PRE PROCEDURE
Department of Anesthesiology  Preprocedure Note       Name:  Lenora Ramon   Age:  77 y.o.  :  1950                                          MRN:  [id number]         Date:  2019      Surgeon: Michael Postal):  Tre Ann MD    Procedure: Procedure(s):  EGD BIOPSY    Medications prior to admission:   Prior to Admission medications    Medication Sig Start Date End Date Taking? Authorizing Provider   ondansetron (ZOFRAN ODT) 4 MG disintegrating tablet Take 1 tablet by mouth every 8 hours as needed for Nausea or Vomiting 19   LUIS Merino   simvastatin (ZOCOR) 20 MG tablet TAKE 1/2 (ONE-HALF) TO 1 TABLET BY MOUTH ONCE DAILY IN THE EVENING. NEEDS APPOINTMENT BEFORE ANYMORE REFILLS AFTER THIS ONE. 19   Deysi Magana MD   clotrimazole-betamethasone (LOTRISONE) 1-0.05 % cream Apply topically 2 times daily. 19   Deysi Magana MD   omeprazole (PRILOSEC) 20 MG delayed release capsule Take 1 capsule by mouth Daily 3/26/18   Jacobo Ng DO   Probiotic Product (PROBIOTIC FORMULA PO) Take 1 tablet by mouth    Historical Provider, MD   Ascorbic Acid (VITAMIN C PO) Take 1 tablet by mouth daily    Historical Provider, MD   Multiple Vitamins-Minerals (MULTIVITAMIN PO) Take  by mouth daily. Historical Provider, MD   vitamin B-12 (CYANOCOBALAMIN) 1000 MCG tablet Take 1,000 mcg by mouth three times a week. Historical Provider, MD       Current medications:    No current outpatient medications on file. No current facility-administered medications for this visit.         Allergies:  No Known Allergies    Problem List:    Patient Active Problem List   Diagnosis Code    Deep vein thrombosis (DVT) ([facility]) I82.409    Hyperlipidemia E78.5    Decreased carotid pulse R09.89    Numbness and tingling in right hand R20.0, R20.2    Heart murmur R01.1    Chest pain R07.9    History of colon polyps Z86.010    Nephrolithiasis N20.0    Colon polyps K63.5    Gastrointestinal hemorrhage K92.2    Diverticulosis of large intestine with hemorrhage K57.31    Acute blood loss anemia D62    Hypotension I95.9    Bloody diarrhea R19.7    History of kidney stones Z87.442    Diverticulosis of intestine with bleeding K57.91    Esophageal dysphagia R13.10    Acute pain of right knee M25.561    Hyperlipoproteinemia E78.5    H/O cervical spine surgery Z98.890    Esophageal pain K22.8    Garces's esophagus determined by biopsy K22.70    Chronic GERD K21.9       Past Medical History:        Diagnosis Date    Arthritis     Colon polyps     Difficult intubation     pt just had recent cervical fusion with plate 2 months ago    DVT ([facility] thrombosis) [address] [address] 2014    left leg    GERD (gastroesophageal reflux disease)     Hx of blood clots     left leg/ after heel surg/ jan. 2015    Hyperlipidemia     Kidney stone on left side        Past Surgical History:        Procedure Laterality Date    BACK SURGERY      CARPAL TUNNEL RELEASE      CERVICAL FUSION      2015, oct    [facility]    COLONOSCOPY  02/2015    hyperplastic polyp (5 yr)    FOOT SURGERY  01/2014    Left foot spur removal    LITHOTRIPSY Left 12/29/2015    ESWL EXTRACORPEAL SHOCK WAVE LITHOTRIPSY performed by Drew Cai MD at [place] [address]  11/2015    PILONIDAL CYST EXCISION      IA COLONOSCOPY FLX DX W/COLLJ SPEC WHEN PFRMD N/A 5/8/2017    Dr Connor Whiteside sided diverticulosis-5 yr recall    IA EGD TRANSORAL BIOPSY SINGLE/MULTIPLE N/A 3/26/2018    Dr Ng-w/dilation over wire-51 [place]-esophageal stricture-Garces's (+) dysplasia (-)--1 yr recall    SHOULDER SURGERY  2007    right after fall, detached the subclavicle muscle    [address]    Neck  surgery     UPPER GASTROINTESTINAL ENDOSCOPY N/A 2/12/2018    Dr Srinath Guzman Grade B esophagitis, hiatal hernia, stricture-(-) Марина, (+) Garces's (+) low grade dysplasia, active esophagogastritis, repeat: 3/26/18    UPPER GASTROINTESTINAL ENDOSCOPY  3/26/2018    Dr Ng-w/dilation over wire-52 [place]-esophageal stricture-Garces's (+) dysplasia (-)--1 yr recall       Social History:    Social History     Tobacco Use    Smoking status: Never Smoker    Smokeless tobacco: Never Used   Substance Use Topics    Alcohol use: No                                Counseling given: Not Answered      Vital Signs (Current): There were no vitals filed for this visit. BP Readings from Last 3 Encounters:   02/25/19 (!) 142/78   01/30/19 128/88   01/21/19 (!) 146/82       NPO Status:                                                                                 BMI:   Wt Readings from Last 3 Encounters:   01/30/19 227 lb 12.8 oz (103.3 kg)   01/21/19 229 lb (103.9 kg)   03/26/18 230 lb (104.3 kg)     There is no height or weight on file to calculate BMI.    CBC:   Lab Results   Component Value Date    WBC 8.5 02/25/2019    RBC 4.90 02/25/2019    HGB 15.2 02/25/2019    HCT 45.6 02/25/2019    MCV 93.1 02/25/2019    RDW 13.2 02/25/2019     02/25/2019       CMP:   Lab Results   Component Value Date     02/25/2019    K 4.3 02/25/2019     02/25/2019    CO2 28 02/25/2019    BUN 17 02/25/2019    CREATININE 0.9 02/25/2019    LABGLOM >60 02/25/2019    GLUCOSE 120 02/25/2019    PROT 7.9 02/25/2019    CALCIUM 9.5 02/25/2019    BILITOT 0.3 02/25/2019    ALKPHOS 96 02/25/2019    AST 27 02/25/2019    ALT 34 02/25/2019       POC Tests: No results for input(s): POCGLU, POCNA, POCK, POCCL, POCBUN, POCHEMO, POCHCT in the last 72 hours.     Coags:   Lab Results   Component Value Date    PROTIME 12.7 03/12/2017    INR 0.95 03/12/2017    APTT 30.6 12/28/2015       HCG (If Applicable): No results found for: PREGTESTUR, PREGSERUM, HCG, HCGQUANT     ABGs: No results found for: PHART, PO2ART, ZHZ1VGI, UAR4FIQ, BEART, L5HFQOAH     Type & Screen (If Applicable):  No results found for: LABABO, [address]    Anesthesia Evaluation  Patient summary reviewed and Nursing notes reviewed  Airway: Mallampati: II  TM distance: >3 FB   Neck ROM: full  Mouth opening: > = 3 FB Dental: normal exam         Pulmonary:Negative Pulmonary ROS and normal exam                               Cardiovascular:Negative CV ROS             Beta Blocker:  Not on Beta Blocker         Neuro/Psych:   Negative Neuro/Psych ROS              GI/Hepatic/Renal:   (+) GERD:,           Endo/Other: Negative Endo/Other ROS                    Abdominal:           Vascular: negative vascular ROS. Anesthesia Plan      MAC     ASA 2       Induction: intravenous. Anesthetic plan and risks discussed with patient.                       Jose Duncan, APRN - CRNA   5/2/2019

## 2019-05-02 NOTE — OP NOTE
Endoscopic Procedure Note    Patient: Verna Woodard: 1950  Med Rec#: [id number] Acc#: [id number]     Primary Care Provider Marisol Frank MD  Referring Provider: Deniz SMITH    Endoscopist: Roxanne Hermosillo MD    Date of Procedure:  5/2/2019    Procedure:   1. EGD with biopsies      Indications:   1. History of Cervantes's  2. Reflux    Anesthesia:  Sedation was administered by anesthesia who monitored the patient during the procedure. Estimated Blood Loss: minimal    Procedure:   After reviewing the patient's chart and obtaining informed consent, the patient was placed in the left lateral decubitus position. A forward-viewing Olympus endoscope was lubricated and inserted through the mouth into the oropharynx. Under direct visualization, the upper esophagus was intubated. The scope was advanced to the level of the third portion of duodenum. Scope was slowly withdrawn with careful inspection of the mucosal surfaces. The scope was retroflexed for inspection of the gastric fundus and incisura. Findings and maneuvers are listed in impression below. The patient tolerated the procedure well. The scope was removed. There were no immediate complications. Findings:   Esophagus: abnormal: Two small tongues of salmon colored mucosa c/w history of cervantes's noted- biopsied. There is no hiatal hernia present. Stomach: Abnormal: Mild mucosal changes suggestive of gastritis noted -  Gastric biopsies were taken from the antrum and body to rule out Helicobacter pylori infection. Duodenum: normal      IMPRESSION:  1. Gastric and esophageal biopsies obtained as above. RECOMMENDATIONS:    1. Await path results, the patient will be contacted in 7-10 days with biopsy results. 2.  Continue PPI   3. Repeat EGD in 3 years for Cervantes's screening    The results were discussed with the patient and family. A copy of the images obtained were given to the patient.      poonam Do for and in the presence of Dr. Zamzam Lindsey MD.  Electronically signed by Ludmila Macias RN on 5/2/2019 at 9:52 AM    I personally performed the services described in this documentation as scribed by Ludmila Macias, and it appears accurate and complete.      Tre Ann MD  5/2/2019

## 2019-05-02 NOTE — H&P
Patient Name: Saad Ling  : 1950  MRN: [id number]  DATE: 19    Allergies: No Known Allergies     ENDOSCOPY  History and Physical    Procedure:    [] Diagnostic Colonoscopy       [] Screening Colonoscopy  [x] EGD      [] ERCP      [] EUS       [] Other    [x] Previous office notes/History and Physical reviewed from the patients chart. Please see EMR for further details of HPI. I have examined the patient's status immediately prior to the procedure and:      Indications/HPI:    []Abdominal Pain   []Cancer- GI/Lung     []Fhx of colon CA/polyps  []History of Polyps  [x]Barretts            []Melena  []Abnormal Imaging              []Dysphagia              []Persistent Pneumonia   []Anemia                            []Food Impaction        []History of Polyps  [] GI Bleed             []Pulmonary nodule/Mass   []Change in bowel habits [x]Heartburn/Reflux  []Rectal Bleed (BRBPR)  []Chest Pain - Non Cardiac []Heme (+) Stool []Ulcers  []Constipation  []Hemoptysis  []Varices  []Diarrhea  []Hypoxemia    []Nausea/Vomiting   []Screening   []Crohns/Colitis  []Other:     Anesthesia:   [x] MAC [] Moderate Sedation   [] General   [] None     ROS: 12 pt Review of Symptoms was negative unless mentioned above    Medications:   Prior to Admission medications    Medication Sig Start Date End Date Taking? Authorizing Provider   ondansetron (ZOFRAN ODT) 4 MG disintegrating tablet Take 1 tablet by mouth every 8 hours as needed for Nausea or Vomiting 19   LUIS Finnegan   simvastatin (ZOCOR) 20 MG tablet TAKE 1/2 (ONE-HALF) TO 1 TABLET BY MOUTH ONCE DAILY IN THE EVENING. NEEDS APPOINTMENT BEFORE ANYMORE REFILLS AFTER THIS ONE. 19   Scott Kearns MD   clotrimazole-betamethasone (LOTRISONE) 1-0.05 % cream Apply topically 2 times daily.  19   Scott Kearns MD   omeprazole (PRILOSEC) 20 MG delayed release capsule Take 1 capsule by mouth Daily 3/26/18   Jacobo Ng DO   Probiotic Product (PROBIOTIC FORMULA PO) Take 1 tablet by mouth    Historical Provider, MD   Ascorbic Acid (VITAMIN C PO) Take 1 tablet by mouth daily    Historical Provider, MD   Multiple Vitamins-Minerals (MULTIVITAMIN PO) Take  by mouth daily. Historical Provider, MD   vitamin B-12 (CYANOCOBALAMIN) 1000 MCG tablet Take 1,000 mcg by mouth three times a week. Historical Provider, MD       Past Medical History:  Past Medical History:   Diagnosis Date    Arthritis     Colon polyps     Difficult intubation     pt just had recent cervical fusion with plate 2 months ago    DVT (deep venous thrombosis) ([address] 2014    left leg    GERD (gastroesophageal reflux disease)     Hx of blood clots     left leg/ after heel surg/ jan. 2015    Hyperlipidemia     Kidney stone on left side        Past Surgical History:  Past Surgical History:   Procedure Laterality Date    BACK SURGERY      CARPAL TUNNEL RELEASE      CERVICAL FUSION      2015, oct    COLONOSCOPY  10/30/2009    [place]: hyperplastic polyp    COLONOSCOPY  02/2015    hyperplastic polyp (5 yr)    FOOT SURGERY  01/2014    Left foot spur removal    LITHOTRIPSY Left 12/29/2015    ESWL EXTRACORPEAL SHOCK WAVE LITHOTRIPSY performed by Leta De La Torre MD at [place] [address]  11/2015    PILONIDAL CYST EXCISION      [place] COLONOSCOPY FLX DX W/COLLJ SPEC WHEN PFRMD N/A 5/8/2017    Dr Kimberly Levi sided diverticulosis-5 yr recall    [place] EGD TRANSORAL BIOPSY SINGLE/MULTIPLE N/A 3/26/2018    Dr Ng-w/dilation over wire-51 Welsh-esophageal stricture-Garces's (+) dysplasia (-)--1 yr recall    SHOULDER SURGERY  2007    right after fall, detached the subclavicle muscle    [facility]    Neck  surgery     UPPER GASTROINTESTINAL ENDOSCOPY N/A 2/12/2018    Dr Liv Blackburn Grade B esophagitis, hiatal hernia, stricture-(-) Марина, (+) Garces's (+) low grade dysplasia, active esophagogastritis, repeat: 3/26/18    UPPER GASTROINTESTINAL ENDOSCOPY  3/26/2018    Dr Ng-w/dilation over wire-51 Kiswahili-esophageal stricture-Garces's (+) dysplasia (-)--1 yr recall       Social History:  Social History     Tobacco Use    Smoking status: Never Smoker    Smokeless tobacco: Never Used   Substance Use Topics    Alcohol use: No    Drug use: No       Vital Signs: There were no vitals filed for this visit. Physical Exam:  Cardiac:  [x]WNL  []Comments:  Pulmonary:  [x]WNL   []Comments:  Neuro/Mental Status:  [x]WNL  []Comments:  Abdominal:  [x]WNL    []Comments:  Other:   []WNL  []Comments:    Informed Consent:  The risks and benefits of the procedure have been discussed with either the patient or if they cannot consent, their representative. Assessment:  Patient examined and appropriate for planned sedation and procedure. Plan:  Proceed with planned sedation and procedure as above. Neel Richardson am scribing for and in the presence of Dr. Sarwat Varghese MD.  Electronically signed by Ta Lowery RN on 5/2/2019 at 9:26 AM    I personally performed the services described in this documentation as scribed by Ta Lowery, and it appears accurate and complete.      Sarwat Varghese MD  5/2/2019

## 2019-05-03 ENCOUNTER — LAB REQUISITION (OUTPATIENT)
Dept: LAB | Facility: HOSPITAL | Age: 69
End: 2019-05-03

## 2019-05-03 DIAGNOSIS — Z00.00 ROUTINE GENERAL MEDICAL EXAMINATION AT A HEALTH CARE FACILITY: ICD-10-CM

## 2019-05-03 PROCEDURE — 88342 IMHCHEM/IMCYTCHM 1ST ANTB: CPT

## 2019-05-06 LAB
LAB AP CASE REPORT: NORMAL
PATH REPORT.FINAL DX SPEC: NORMAL

## 2019-12-16 DIAGNOSIS — E78.5 HYPERLIPIDEMIA, UNSPECIFIED HYPERLIPIDEMIA TYPE: ICD-10-CM

## 2019-12-27 DIAGNOSIS — E78.5 HYPERLIPIDEMIA, UNSPECIFIED HYPERLIPIDEMIA TYPE: Primary | ICD-10-CM

## 2019-12-27 RX ORDER — SIMVASTATIN 20 MG
TABLET ORAL
Qty: 30 TABLET | Refills: 0 | Status: SHIPPED | OUTPATIENT
Start: 2019-12-27 | End: 2020-01-22 | Stop reason: ALTCHOICE

## 2020-01-13 ENCOUNTER — TELEPHONE (OUTPATIENT)
Dept: FAMILY MEDICINE CLINIC | Age: 70
End: 2020-01-13

## 2020-01-15 DIAGNOSIS — Z00.00 ANNUAL PHYSICAL EXAM: ICD-10-CM

## 2020-01-15 LAB
ALBUMIN SERPL-MCNC: 4.5 G/DL (ref 3.5–5.2)
ALP BLD-CCNC: 83 U/L (ref 40–130)
ALT SERPL-CCNC: 24 U/L (ref 5–41)
ANION GAP SERPL CALCULATED.3IONS-SCNC: 16 MMOL/L (ref 7–19)
AST SERPL-CCNC: 23 U/L (ref 5–40)
BASOPHILS ABSOLUTE: 0.1 K/UL (ref 0–0.2)
BASOPHILS RELATIVE PERCENT: 1.2 % (ref 0–1)
BILIRUB SERPL-MCNC: 0.5 MG/DL (ref 0.2–1.2)
BILIRUBIN DIRECT: 0.1 MG/DL (ref 0–0.3)
BILIRUBIN URINE: NEGATIVE
BILIRUBIN, INDIRECT: 0.4 MG/DL (ref 0.1–1)
BLOOD, URINE: NEGATIVE
BUN BLDV-MCNC: 22 MG/DL (ref 8–23)
CALCIUM SERPL-MCNC: 9.3 MG/DL (ref 8.8–10.2)
CHLORIDE BLD-SCNC: 102 MMOL/L (ref 98–111)
CHOLESTEROL, TOTAL: 105 MG/DL (ref 160–199)
CLARITY: CLEAR
CO2: 23 MMOL/L (ref 22–29)
COLOR: ABNORMAL
CREAT SERPL-MCNC: 0.9 MG/DL (ref 0.5–1.2)
EOSINOPHILS ABSOLUTE: 0.1 K/UL (ref 0–0.6)
EOSINOPHILS RELATIVE PERCENT: 3.1 % (ref 0–5)
GFR NON-AFRICAN AMERICAN: >60
GLUCOSE BLD-MCNC: 110 MG/DL (ref 74–109)
GLUCOSE URINE: NEGATIVE MG/DL
HCT VFR BLD CALC: 46.3 % (ref 42–52)
HDLC SERPL-MCNC: 38 MG/DL (ref 55–121)
HEMOGLOBIN: 15 G/DL (ref 14–18)
IMMATURE GRANULOCYTES #: 0 K/UL
KETONES, URINE: NEGATIVE MG/DL
LDL CHOLESTEROL CALCULATED: 54 MG/DL
LEUKOCYTE ESTERASE, URINE: NEGATIVE
LYMPHOCYTES ABSOLUTE: 1.7 K/UL (ref 1.1–4.5)
LYMPHOCYTES RELATIVE PERCENT: 41.1 % (ref 20–40)
MCH RBC QN AUTO: 31.4 PG (ref 27–31)
MCHC RBC AUTO-ENTMCNC: 32.4 G/DL (ref 33–37)
MCV RBC AUTO: 97.1 FL (ref 80–94)
MONOCYTES ABSOLUTE: 0.4 K/UL (ref 0–0.9)
MONOCYTES RELATIVE PERCENT: 10.1 % (ref 0–10)
NEUTROPHILS ABSOLUTE: 1.8 K/UL (ref 1.5–7.5)
NEUTROPHILS RELATIVE PERCENT: 44.3 % (ref 50–65)
NITRITE, URINE: NEGATIVE
PDW BLD-RTO: 13.4 % (ref 11.5–14.5)
PH UA: 7 (ref 5–8)
PLATELET # BLD: 220 K/UL (ref 130–400)
PMV BLD AUTO: 11.2 FL (ref 9.4–12.4)
POTASSIUM SERPL-SCNC: 4.7 MMOL/L (ref 3.5–5)
PROTEIN UA: NEGATIVE MG/DL
RBC # BLD: 4.77 M/UL (ref 4.7–6.1)
SODIUM BLD-SCNC: 141 MMOL/L (ref 136–145)
SPECIFIC GRAVITY UA: 1.02 (ref 1–1.03)
T4 FREE: 1.17 NG/DL (ref 0.93–1.7)
TOTAL PROTEIN: 7 G/DL (ref 6.6–8.7)
TRIGL SERPL-MCNC: 63 MG/DL (ref 0–149)
TSH SERPL DL<=0.05 MIU/L-ACNC: 2.22 UIU/ML (ref 0.27–4.2)
UROBILINOGEN, URINE: 0.2 E.U./DL
WBC # BLD: 4.2 K/UL (ref 4.8–10.8)

## 2020-01-18 LAB
PROSTATE SPECIFIC ANTIGEN FREE: 0.1 NG/ML
PROSTATE SPECIFIC ANTIGEN PERCENT FREE: 25 %
PROSTATE SPECIFIC ANTIGEN: 0.4 NG/ML (ref 0–4)

## 2020-01-21 ENCOUNTER — TELEPHONE (OUTPATIENT)
Dept: FAMILY MEDICINE CLINIC | Age: 70
End: 2020-01-21

## 2020-01-22 ENCOUNTER — OFFICE VISIT (OUTPATIENT)
Dept: FAMILY MEDICINE CLINIC | Age: 70
End: 2020-01-22
Payer: MEDICARE

## 2020-01-22 VITALS
HEART RATE: 100 BPM | HEIGHT: 72 IN | TEMPERATURE: 98.3 F | BODY MASS INDEX: 30.23 KG/M2 | DIASTOLIC BLOOD PRESSURE: 68 MMHG | OXYGEN SATURATION: 97 % | RESPIRATION RATE: 16 BRPM | WEIGHT: 223.2 LBS | SYSTOLIC BLOOD PRESSURE: 138 MMHG

## 2020-01-22 PROCEDURE — 99214 OFFICE O/P EST MOD 30 MIN: CPT | Performed by: FAMILY MEDICINE

## 2020-01-22 PROCEDURE — 93000 ELECTROCARDIOGRAM COMPLETE: CPT | Performed by: FAMILY MEDICINE

## 2020-01-22 PROCEDURE — 3017F COLORECTAL CA SCREEN DOC REV: CPT | Performed by: FAMILY MEDICINE

## 2020-01-22 PROCEDURE — G8427 DOCREV CUR MEDS BY ELIG CLIN: HCPCS | Performed by: FAMILY MEDICINE

## 2020-01-22 PROCEDURE — G8417 CALC BMI ABV UP PARAM F/U: HCPCS | Performed by: FAMILY MEDICINE

## 2020-01-22 PROCEDURE — 4040F PNEUMOC VAC/ADMIN/RCVD: CPT | Performed by: FAMILY MEDICINE

## 2020-01-22 PROCEDURE — G8484 FLU IMMUNIZE NO ADMIN: HCPCS | Performed by: FAMILY MEDICINE

## 2020-01-22 PROCEDURE — 1123F ACP DISCUSS/DSCN MKR DOCD: CPT | Performed by: FAMILY MEDICINE

## 2020-01-22 PROCEDURE — 1036F TOBACCO NON-USER: CPT | Performed by: FAMILY MEDICINE

## 2020-01-22 RX ORDER — CLOTRIMAZOLE AND BETAMETHASONE DIPROPIONATE 10; .64 MG/G; MG/G
CREAM TOPICAL
Qty: 45 G | Refills: 3 | Status: SHIPPED | OUTPATIENT
Start: 2020-01-22 | End: 2020-11-10 | Stop reason: ALTCHOICE

## 2020-01-22 ASSESSMENT — ENCOUNTER SYMPTOMS
SHORTNESS OF BREATH: 0
NAUSEA: 0
CHEST TIGHTNESS: 0
CONSTIPATION: 0
COUGH: 0
DIARRHEA: 0
BLOOD IN STOOL: 0
WHEEZING: 0
EYES NEGATIVE: 1
VOMITING: 0

## 2020-01-22 ASSESSMENT — PATIENT HEALTH QUESTIONNAIRE - PHQ9
SUM OF ALL RESPONSES TO PHQ QUESTIONS 1-9: 0
SUM OF ALL RESPONSES TO PHQ QUESTIONS 1-9: 0

## 2020-01-22 ASSESSMENT — LIFESTYLE VARIABLES: HOW OFTEN DO YOU HAVE A DRINK CONTAINING ALCOHOL: 0

## 2020-01-22 NOTE — PROGRESS NOTES
Subjective:      Patient ID: Yosef Larose is a 77 y.o. male. HPI when the patient arrived he was here for made Medicare annual wellness exam but he had extenuating circumstances. The patient was noted to have tachycardia and he was evaluated thusly. Patient is seen here by the undersigned for management of chronic disease states. Today he is here regarding Medicare annual wellness visit. During the rooming of the patient he was noted to have some skipping in his heart. He does have on exam today 1/6 systolic murmur auscultated loudest at the right upper sternal border likely indicative of aortic stenosis component. Also he was noted to have some skipping and I was not sure if this was atrial fibrillation or if in fact it was a PAC. We did do EKG and the patient notably was having atrial fibrillation. He does have a history of having heart murmur in the past as he was identified with this by Dr. Nubia Donato many years ago. Apparently he may have had some skipping around at that time. He is not on anticoagulants at this time. The patient did take an 81 mg aspirin for many years but then had GI bleed so he was told to stop that. Additionally, he did have Achilles tendon repair of the left lower extremity approximately 2015 and subsequent to his surgical intervention did develop a DVT in his left lower extremity. The patient was on anticoagulation for short period of time during that episode but did tolerate this well. States that he has had 2 stress test done years ago most recently by Dr. Bird Co. Did review epic records and look for EKG tracings. He did have EKG done heart of 2017 which was in normal sinus rhythm. In December of 2015 he did have preop EKG for his Achilles repair and this did show normal sinus rhythm as well. EKG of 12/15/2016 he was noted to have an occasional PAC. No evidence of atrial fibrillation has been identified at this point in time.   I did recommend cardiology Garces's esophagus. He was managed with treatment and currently remains on Prilosec 20 mg p.o. daily which he states has controlled the issue. Review of Systems   Constitutional: Negative. HENT: Negative. Eyes: Negative. Respiratory: Negative for cough, chest tightness, shortness of breath and wheezing. Cardiovascular: Negative for chest pain, palpitations and leg swelling. Does have a past history of heart murmur noted long ago by Dr. Jay Mcgovern. Also has been told that he had some skipping in his heart. The patient is not  on the coagulant therapy at this time. He does have a history of DVT after he had Achilles tendon repair on the left lower extremity. Involve the left lower extremity. For short period of time he was on anticoagulation then. Subsequent problems have been reported. Gastrointestinal: Negative for blood in stool, constipation, diarrhea, nausea and vomiting. Genitourinary: Negative for hematuria. Skin: Negative. Neurological: Negative. Psychiatric/Behavioral: Negative. Objective:   Physical Exam  Vitals signs and nursing note reviewed. Constitutional:       General: He is not in acute distress. Appearance: Normal appearance. He is well-developed. He is not ill-appearing, toxic-appearing or diaphoretic. HENT:      Head: Normocephalic and atraumatic. Right Ear: Tympanic membrane, ear canal and external ear normal. There is no impacted cerumen. Left Ear: Tympanic membrane, ear canal and external ear normal. There is no impacted cerumen. Nose: Nose normal.      Mouth/Throat:      Lips: Pink. Mouth: Mucous membranes are moist.      Dentition: Normal dentition. Tongue: No lesions. Pharynx: Oropharynx is clear. Uvula midline. Tonsils: No tonsillar exudate or tonsillar abscesses. Eyes:      General: Lids are normal.         Right eye: No discharge. Left eye: No discharge.       Extraocular Movements: Extraocular movements intact. Right eye: Normal extraocular motion. Left eye: Normal extraocular motion. Conjunctiva/sclera: Conjunctivae normal.      Right eye: Right conjunctiva is not injected. Left eye: Left conjunctiva is not injected. Pupils: Pupils are equal, round, and reactive to light. Neck:      Musculoskeletal: Normal range of motion and neck supple. No neck rigidity or muscular tenderness. Thyroid: No thyromegaly. Vascular: No carotid bruit or JVD. Cardiovascular:      Rate and Rhythm: Normal rate. Rhythm irregular. Pulses:           Carotid pulses are 2+ on the right side and 2+ on the left side. Radial pulses are 2+ on the right side and 2+ on the left side. Heart sounds: Normal heart sounds, S1 normal and S2 normal. No murmur. No friction rub. No gallop. Comments: Is noted to have slightly irregular rhythm to the undersigned. Because of this, we did do an EKG and the patient does in fact have atrial fibrillation. Rate is fairly well controlled at 95. Pulmonary:      Effort: Pulmonary effort is normal. No accessory muscle usage or respiratory distress. Breath sounds: Normal breath sounds. No stridor. No wheezing, rhonchi or rales. Chest:      Chest wall: No tenderness. Abdominal:      General: Bowel sounds are normal. There is no distension or abdominal bruit. Palpations: Abdomen is soft. There is no mass. Tenderness: There is no tenderness. There is no right CVA tenderness, left CVA tenderness, guarding or rebound. Hernia: No hernia is present. Musculoskeletal: Normal range of motion. General: No swelling, tenderness, deformity or signs of injury. Right lower leg: No edema. Left lower leg: No edema. Lymphadenopathy:      Cervical: No cervical adenopathy. Right cervical: No superficial cervical adenopathy. Left cervical: No superficial cervical adenopathy.    Skin:     General: Skin is

## 2020-01-23 ENCOUNTER — OFFICE VISIT (OUTPATIENT)
Dept: CARDIOLOGY | Age: 70
End: 2020-01-23
Payer: MEDICARE

## 2020-01-23 VITALS
BODY MASS INDEX: 30.88 KG/M2 | DIASTOLIC BLOOD PRESSURE: 108 MMHG | SYSTOLIC BLOOD PRESSURE: 172 MMHG | HEART RATE: 80 BPM | WEIGHT: 228 LBS | HEIGHT: 72 IN

## 2020-01-23 PROBLEM — I49.5 SINOATRIAL NODE DYSFUNCTION (HCC): Status: ACTIVE | Noted: 2020-01-23

## 2020-01-23 PROCEDURE — 99204 OFFICE O/P NEW MOD 45 MIN: CPT | Performed by: INTERNAL MEDICINE

## 2020-01-23 PROCEDURE — G8484 FLU IMMUNIZE NO ADMIN: HCPCS | Performed by: INTERNAL MEDICINE

## 2020-01-23 PROCEDURE — G8417 CALC BMI ABV UP PARAM F/U: HCPCS | Performed by: INTERNAL MEDICINE

## 2020-01-23 PROCEDURE — 1123F ACP DISCUSS/DSCN MKR DOCD: CPT | Performed by: INTERNAL MEDICINE

## 2020-01-23 PROCEDURE — G8427 DOCREV CUR MEDS BY ELIG CLIN: HCPCS | Performed by: INTERNAL MEDICINE

## 2020-01-23 PROCEDURE — 4040F PNEUMOC VAC/ADMIN/RCVD: CPT | Performed by: INTERNAL MEDICINE

## 2020-01-23 PROCEDURE — 1036F TOBACCO NON-USER: CPT | Performed by: INTERNAL MEDICINE

## 2020-01-23 PROCEDURE — 3017F COLORECTAL CA SCREEN DOC REV: CPT | Performed by: INTERNAL MEDICINE

## 2020-01-23 NOTE — PROGRESS NOTES
[facility] [address], [address] [address]  Phone: [phone]  Fax: [phone] Complaint / Reason for Being Seen:  Newly discovered atrial fibrillation    Subjective:  Patient went in for his yearly physical with Dr. Nancy Leroy yesterday and was found to be in atrial fibrillation. This is new for him and was started on Eliquis 5 mg BID. Patient is asymptomatic and was unaware he was out of rhythm. Patient has a history of small GI bleed from asa but had a DVT in leg after and was able to tolerate blood thinner at that time. Old records have been obtained from the referring providers. Those records have been reviewed and summarized. Rachel Miles is a 77 y.o. male with the [facility]:  Patient Active Problem List    Diagnosis Date Noted    Garces's esophagus without dysplasia 01/30/2019    Chronic GERD 01/30/2019    Esophageal pain 02/05/2018    History of kidney stones 12/22/2017    Diverticulosis of intestine with bleeding 12/22/2017    Esophageal dysphagia 12/22/2017    Acute pain of right knee 12/22/2017    Hyperlipoproteinemia 12/22/2017    H/O cervical spine surgery 12/22/2017    Bloody diarrhea 04/26/2017    Hypotension     Gastrointestinal hemorrhage 03/12/2017    Colon polyps     Diverticulosis of large intestine with hemorrhage     Acute blood loss anemia     Nephrolithiasis 12/29/2015    History of colon polyps 11/19/2014    Decreased carotid pulse 09/19/2014    Numbness and tingling in right hand 09/19/2014    Heart murmur 09/19/2014    Chest pain 09/19/2014    Deep vein thrombosis (DVT) ([address] 05/14/2014    Hyperlipidemia 05/14/2014     Current Outpatient Medications   Medication Sig Dispense Refill    clotrimazole-betamethasone (LOTRISONE) 1-0.05 % cream Apply topically 2 times daily.  45 g 3    ondansetron (ZOFRAN ODT) 4 MG disintegrating tablet Take 1 tablet by mouth every 8 hours as needed for Nausea or Vomiting 10 tablet 0    omeprazole (PRILOSEC) 20 MG delayed release capsule Take 1 capsule by mouth Daily 90 capsule 3    Probiotic Product (PROBIOTIC FORMULA PO) Take 1 tablet by mouth      Ascorbic Acid (VITAMIN C PO) Take 1 tablet by mouth daily      Multiple Vitamins-Minerals (MULTIVITAMIN PO) Take  by mouth daily.  vitamin B-12 (CYANOCOBALAMIN) 1000 MCG tablet Take 1,000 mcg by mouth daily        No current facility-administered medications for this visit. Allergies: Patient has no known allergies. Past Medical History:   Diagnosis Date    Arthritis     Colon polyps     Difficult intubation     pt just had recent cervical fusion with plate 2 months ago    D[address] ([facility] [address]) [address] 2014    left leg    GERD (gastroesophageal reflux disease)     Hx of blood clots     left leg/ after heel surg/ jan. 2015    Hyperlipidemia     Kidney stone on left side      Past Surgical History:   Procedure Laterality Date    BACK SURGERY      CARPAL TUNNEL RELEASE      CERVICAL FUSION      2015, oct    [facility]    COLONOSCOPY  02/2015    hyperplastic polyp (5 yr)    FOOT SURGERY  01/2014    Left foot spur removal    LITHOTRIPSY Left 12/29/2015    ESWL EXTRACORPEAL SHOCK WAVE LITHOTRIPSY performed by Gyu Guerrier MD at [place] [address]  11/2015    PILONIDAL CYST EXCISION      [place] COLONOSCOPY FLX DX W/COLLJ SPEC WHEN PFRMD N/A 5/8/2017    Dr Alex Estrada sided diverticulosis-5 yr recall    [place] EGD TRANSORAL BIOPSY SINGLE/MULTIPLE N/A 3/26/2018    Dr Ng-w/dilation over wire-51 French-esophageal stricture-Garces's (+) dysplasia (-)--1 yr recall    SHOULDER SURGERY  2007    right after fall, detached the subclavicle muscle    [facility]    Neck  surgery     UPPER GASTROINTESTINAL ENDOSCOPY N/A 2/12/2018    Dr Lan Gardner Grade B esophagitis, hiatal hernia, stricture-(-) Марина, (+)

## 2020-01-23 NOTE — PATIENT INSTRUCTIONS
Arrive tomorrow 1/24/20 @ 900am, go to main entrance of the hospital make an immediate left and check in at the end of the cortez. NPO after midnight tonight may take morning medications with sip of water.

## 2020-01-24 ENCOUNTER — HOSPITAL ENCOUNTER (OUTPATIENT)
Dept: CARDIAC CATH/INVASIVE PROCEDURES | Age: 70
Setting detail: OBSERVATION
Discharge: HOME | End: 2020-01-25
Attending: INTERNAL MEDICINE | Admitting: INTERNAL MEDICINE
Payer: MEDICARE

## 2020-01-24 PROBLEM — I48.19 PERSISTENT ATRIAL FIBRILLATION (HCC): Status: ACTIVE | Noted: 2020-01-24

## 2020-01-24 LAB
INR BLD: 1.11 (ref 0.88–1.18)
PROTHROMBIN TIME: 13.7 SEC (ref 12–14.6)

## 2020-01-24 PROCEDURE — 6370000000 HC RX 637 (ALT 250 FOR IP)

## 2020-01-24 PROCEDURE — 2580000003 HC RX 258: Performed by: INTERNAL MEDICINE

## 2020-01-24 PROCEDURE — 6370000000 HC RX 637 (ALT 250 FOR IP): Performed by: INTERNAL MEDICINE

## 2020-01-24 PROCEDURE — 92960 CARDIOVERSION ELECTRIC EXT: CPT | Performed by: INTERNAL MEDICINE

## 2020-01-24 PROCEDURE — 99152 MOD SED SAME PHYS/QHP 5/>YRS: CPT | Performed by: INTERNAL MEDICINE

## 2020-01-24 PROCEDURE — 85610 PROTHROMBIN TIME: CPT

## 2020-01-24 PROCEDURE — 93312 ECHO TRANSESOPHAGEAL: CPT | Performed by: INTERNAL MEDICINE

## 2020-01-24 PROCEDURE — 36415 COLL VENOUS BLD VENIPUNCTURE: CPT

## 2020-01-24 PROCEDURE — 93325 DOPPLER ECHO COLOR FLOW MAPG: CPT

## 2020-01-24 PROCEDURE — 99153 MOD SED SAME PHYS/QHP EA: CPT | Performed by: INTERNAL MEDICINE

## 2020-01-24 PROCEDURE — G0378 HOSPITAL OBSERVATION PER HR: HCPCS

## 2020-01-24 PROCEDURE — 6360000002 HC RX W HCPCS

## 2020-01-24 PROCEDURE — 93005 ELECTROCARDIOGRAM TRACING: CPT | Performed by: INTERNAL MEDICINE

## 2020-01-24 RX ORDER — CHOLECALCIFEROL (VITAMIN D3) 125 MCG
1000 CAPSULE ORAL DAILY
Status: DISCONTINUED | OUTPATIENT
Start: 2020-01-24 | End: 2020-01-25 | Stop reason: HOSPADM

## 2020-01-24 RX ORDER — ONDANSETRON 4 MG/1
4 TABLET, ORALLY DISINTEGRATING ORAL EVERY 8 HOURS PRN
Status: DISCONTINUED | OUTPATIENT
Start: 2020-01-24 | End: 2020-01-25 | Stop reason: HOSPADM

## 2020-01-24 RX ORDER — WARFARIN SODIUM 5 MG/1
5 TABLET ORAL ONCE
Status: COMPLETED | OUTPATIENT
Start: 2020-01-24 | End: 2020-01-24

## 2020-01-24 RX ORDER — OMEPRAZOLE 20 MG/1
20 CAPSULE, DELAYED RELEASE ORAL DAILY
Status: DISCONTINUED | OUTPATIENT
Start: 2020-01-24 | End: 2020-01-25 | Stop reason: HOSPADM

## 2020-01-24 RX ORDER — SODIUM CHLORIDE 0.9 % (FLUSH) 0.9 %
10 SYRINGE (ML) INJECTION PRN
Status: DISCONTINUED | OUTPATIENT
Start: 2020-01-24 | End: 2020-01-25 | Stop reason: HOSPADM

## 2020-01-24 RX ORDER — SODIUM CHLORIDE 0.9 % (FLUSH) 0.9 %
10 SYRINGE (ML) INJECTION EVERY 12 HOURS SCHEDULED
Status: DISCONTINUED | OUTPATIENT
Start: 2020-01-24 | End: 2020-01-25 | Stop reason: HOSPADM

## 2020-01-24 RX ORDER — ASPIRIN 81 MG/1
81 TABLET, CHEWABLE ORAL DAILY
Status: DISCONTINUED | OUTPATIENT
Start: 2020-01-24 | End: 2020-01-25 | Stop reason: HOSPADM

## 2020-01-24 RX ORDER — SOTALOL HYDROCHLORIDE 80 MG/1
80 TABLET ORAL 2 TIMES DAILY
Status: DISCONTINUED | OUTPATIENT
Start: 2020-01-24 | End: 2020-01-25 | Stop reason: HOSPADM

## 2020-01-24 RX ORDER — SOTALOL HYDROCHLORIDE 80 MG/1
80 TABLET ORAL 2 TIMES DAILY
Status: DISCONTINUED | OUTPATIENT
Start: 2020-01-24 | End: 2020-01-24 | Stop reason: SDUPTHER

## 2020-01-24 RX ORDER — SODIUM CHLORIDE 9 MG/ML
INJECTION, SOLUTION INTRAVENOUS CONTINUOUS
Status: DISCONTINUED | OUTPATIENT
Start: 2020-01-24 | End: 2020-01-25

## 2020-01-24 RX ADMIN — SOTALOL HYDROCHLORIDE 80 MG: 80 TABLET ORAL at 21:55

## 2020-01-24 RX ADMIN — SODIUM CHLORIDE: 9 INJECTION, SOLUTION INTRAVENOUS at 09:53

## 2020-01-24 RX ADMIN — ASPIRIN 81 MG 81 MG: 81 TABLET ORAL at 15:24

## 2020-01-24 RX ADMIN — SOTALOL HYDROCHLORIDE 80 MG: 80 TABLET ORAL at 15:24

## 2020-01-24 RX ADMIN — SODIUM CHLORIDE, PRESERVATIVE FREE 10 ML: 5 INJECTION INTRAVENOUS at 21:55

## 2020-01-24 RX ADMIN — WARFARIN SODIUM 5 MG: 5 TABLET ORAL at 18:15

## 2020-01-24 RX ADMIN — OMEPRAZOLE 20 MG: 20 CAPSULE, DELAYED RELEASE ORAL at 15:24

## 2020-01-24 RX ADMIN — CYANOCOBALAMIN TAB 500 MCG 1000 MCG: 500 TAB at 15:24

## 2020-01-24 RX ADMIN — APIXABAN 5 MG: 5 TABLET, FILM COATED ORAL at 21:55

## 2020-01-24 NOTE — PROGRESS NOTES
Clinical Pharmacy Note     Saw García is a 77 y.o. male for whom pharmacy has been asked to manage warfarin therapy. Reason for Admission: Newly discovered atrial fibrillation     Consulting Physician:Yves  Warfarin dose prior to admission: none----just started Eliquis 5mg po BID yesterday and last dose was this am at 0600. Warfarin indication: Afib  Target INR range: 2-3             Past Medical History:   Diagnosis Date    Arthritis      Atrial fibrillation ([facility]      Colon polyps      Difficult intubation       pt just had recent cervical fusion with plate 2 months ago    DVT (deep venous thrombosis) ([facility] 2014     left leg    GERD (gastroesophageal reflux disease)      Hx of blood clots       left leg/ after heel surg/ jan. 2015    Hyperlipidemia      Kidney stone on left side              Results for Kortney Garcia (MRN [id number]) as of 1/24/2020 16:28   Ref. Range 5/7/2014 00:00 12/28/2015 11:56 3/12/2017 05:40 1/24/2020 15:23   Prothrombin Time Latest Ref Range: 12.0 - 14.6 sec  12.3 12.7 13.7   INR Latest Ref Range: 0.88 - 1.18  2.2 0.94 0.95 1.11        Current warfarin drug-drug interactions:Omeprazole, overlapping Eliquis (Apixaban can elevate the INR). Date INR Warfarin Dose    01/24/20 1. 11  5mg                                                       Plan: Warfarin 5mg po X1 tonight. Continue Eliquis 5mg po bid as overlap coverage until INR is therapeutic (>2). Daily PT/INR until stable within therapeutic range. Thank you for the consult.       Electronically signed by Marko Amezcua, [address] [place] [address] on 1/24/2020 at 1:49 PM

## 2020-01-24 NOTE — PROCEDURES
[address] [place] [address]       Two Procedures were performed in this single setting    1. Transesophageal Echocardiogram        .Date of Procedure:  1/24/20      :  REE Blank MD     Indications:  Persistent / newly discovered atrial fibrillation (sino - atrial node dysfunction) of uncertain duration     Description of Procedure: Moderate Conscious Sedation Protocol Used During this Procedure -    An independent trained observer, registered nurse, administered medications at my direction. We, myself and the independent trained observer / registered nurse, monitored the patients's level of consciousness, vital signs, and physiological status, (including ECG and pulse oximetry) throughout the procedure duration. (See start and stop times below). Anesthesia: Moderate   Sedation: 9 mg Midazolam (Versed)  0 mcg Fentanyl   Start time: 11:45   Stop time: 12:38   ASA Class: III                Additionally, please review the \"[facility] Procedure Report\", which is generated electronically through the [employer]. This report includes additional details regarding this procedure including, but not limited to:                 1.         Patient Data,              2.         Admission,              3.         Procedure,              4.         Hemodynamics,              5.         Vital Signs,              6.         Medications, including conscious sedation medications given during the                         procedure (as note above),              7.         Procedure Log,              8.         Device Usage,              9.         Signature Audit [place], and,              10.       Signatures. It should be noted, that I sign the \"Signatures\" line electronically through the \"HPF Def Portals\" tab on my computer. The patient was placed int he left lateral decubitus position.   The probe was passed easily to the fundus of the stomach with adequate views

## 2020-01-24 NOTE — H&P
Rishi Muhammad MD   Physician   Cardiology   Progress Notes      Signed   Encounter Date:  1/23/2020          Related encounter: Office Visit from 1/23/2020 in [facility] All Collapse All      Show:Clear all  [x]Manual[x]Template[]Copied    Added by:  [x]Jasson Villalta MD[x][place], [place]    []Amanver for details  [facility] [place] [address], [address] [address]  Phone: [phone]  Fax: [phone]        Chief Complaint / Reason for Being Seen:  Newly discovered atrial fibrillation     Subjective:  Patient went in for his yearly physical with Dr. David Beavers yesterday and was found to be in atrial fibrillation. This is new for him and was started on Eliquis 5 mg BID. Patient is asymptomatic and was unaware he was out of rhythm. Patient has a history of small GI bleed from asa but had a DVT in leg after and was able to tolerate blood thinner at that time. Old records have been obtained from the referring providers.   Those records have been reviewed and summarized.       Jelena Rojo is a 77 y.o. male with the following history as recorded in "[facility]:       Patient Active Problem List     Diagnosis Date Noted    Garces's esophagus without dysplasia 01/30/2019    Chronic GERD 01/30/2019    Esophageal pain 02/05/2018    History of kidney stones 12/22/2017    Diverticulosis of intestine with bleeding 12/22/2017    Esophageal dysphagia 12/22/2017    Acute pain of right knee 12/22/2017    Hyperlipoproteinemia 12/22/2017    H/O cervical spine surgery 12/22/2017    Bloody diarrhea 04/26/2017    Hypotension      Gastrointestinal hemorrhage 03/12/2017    Colon polyps      Diverticulosis of large intestine with hemorrhage      Acute blood loss anemia      Nephrolithiasis 12/29/2015    History of colon polyps 11/19/2014    Decreased carotid pulse 09/19/2014    Numbness and tingling in right hand EXCISION        MD COLONOSCOPY FLX DX W/COLLJ SPEC WHEN PFRMD N/A 5/8/2017     Dr Brittaney Horowitz sided diverticulosis-5 yr recall    MD EGD TRANSORAL BIOPSY SINGLE/MULTIPLE N/A 3/26/2018     Dr Ng-w/dilation over wire-51 Tamazight-esophageal stricture-Garces's (+) dysplasia (-)--1 yr recall    SHOULDER SURGERY   2007     right after fall, detached the subclavicle muscle    SPINE SURGERY   1990     Neck  surgery     UPPER GASTROINTESTINAL ENDOSCOPY N/A 2/12/2018     Dr Gao Catracho Grade B esophagitis, hiatal hernia, stricture-(-) Марина, (+) Garces's (+) low grade dysplasia, active esophagogastritis, repeat: 3/26/18    UPPER GASTROINTESTINAL ENDOSCOPY   3/26/2018     Dr Ng-w/dilation over wire-51 Tamazight-esophageal stricture-Garces's (+) dysplasia (-)--1 yr recall    UPPER GASTROINTESTINAL ENDOSCOPY N/A 5/2/2019     Dr Diane Welch (+) Garces's, (-) dysplasia         Family History         Family History   Problem Relation Age of Onset    Cancer Mother      Lung Cancer Mother      Heart Disease Father      Colon Cancer Neg Hx      Colon Polyps Neg Hx      Esophageal Cancer Neg Hx      Liver Cancer Neg Hx      Liver Disease Neg Hx      Rectal Cancer Neg Hx      Stomach Cancer Neg Hx           Social History           Tobacco Use    Smoking status: Never Smoker    Smokeless tobacco: Never Used   Substance Use Topics    Alcohol use: No            Review of System:        Except as noted in HPI, cardiovascular and respiratory systems are otherwise negative.        Objective:     BP (!) 172/108   Pulse 80   Ht 6' (1.829 m)   Wt 228 lb (103.4 kg)   BMI 30.92 kg/m²      GENERAL - well developed and well nourished    HEENT -  PERRLA, Hearing appears normal  NECK - no thyromegaly, no JVD, trachea is in the midline  CARDIOVASCULAR - PMI is in the mid line clavicular position, Normal S1 and S2 with a grade 1/6 systolic murmur. No S3 or S4    PULMONARY - no respiratory distress. No wheezes or rales.  Lungs are clear to ausculation   ABDOMEN  - soft, non tender, no rebound  MUSCULOSKELETAL  - range of motion of the upper and lower extermites appears normal and equal and is without pain   EXTREMITIES - no significant edema   NEUROLOGIC - gait and station are normal  SKIN - turgor is normal  PSYCHIATRIC - normal mood and affect, alert and orientated x 3,        ASSESSMENT:     ALL THE CARDIOLOGY PROBLEMS ARE LISTED ABOVE; HOWEVER, THE FOLLOWING SPECIFIC CARDIAC PROBLEMS / CONDITIONS WERE ADDRESSED AND TREATED DURING THE OFFICE VISIT TODAY:          Cardiac Specific Problem   Discussion and Plan             1. New atrial fibrillation  are worsening    Newly discovered at yearly physical yesterday, was started on Eliquis 5 mg BID. Patient is asymptomatic. All previous EKGs were NSR. Will order lexiscan & echo after DCCV  Discussed patient's options of doing VAISHNAVI/DCCV or waiting 6 weeks to let blood thinner get in his system and bring him back in for DCCV at that time. Patient has no Rx coverage and will not be able to afford Eliquis, will plan to bring in for VAISHNAVI/DCCV and to start antiarrythmic and switch to coumadin,.                2. Hyperlipidemia  show no change    Managed by Dr. Sabiha Fitch, cholesterol medication was stopped yesterday due to multiple labs in low range.              3.                  PLAN:     1. Continue present medications except for changes as noted above  2. Continue to monitor rhythm  3. Further orders per clinical course.      Discussed with patient.     I greatly appreciate the opportunity to meet Santiago  and your confidence in allowing me to participate in his cardiovascular care.  Tavo TAYLOR 50. [facility] [address] [place] [address] am scribing for and in the presence of REE Gee MD,FACC.     Gayathri English MA     1/23/20 10:21 AM  IREE MD, [facility], personally performed the services described in this documentation as scribed by Romario Brandon in my presence, and it is both accurate and complete.     Electronically signed by REE Irizarry MD, [facility]     1/23/20 10:33 AM               Revision History                                Date of the Proposed Procedure:s   01/24/20     Proposed Procedures:  Transesophageal Echocardiography (VAISHNAVI) Guided - Direct Current Cardioversion (DDCV)    :  REE Irizarry MD    Indications:  Paroxymal Atrial Fibrillation    American Society of Anesthesiologists (ASA) Classification III    Plan of Sedation:  Moderate Sedation    Mallampati Classification:  II    I have examined this patient and find no interval changes since the original History and Physical / Consult note written by myself, on 01/23/20     With the constellation of symptoms and these findings, I recommend Transesophageal Echocardiography (VAISHNAVI) Guided - Direct Current Cardioversion (DCCV). I discussed with him  in detail  the risks and benefits of these procedure, the first to be performed is the Transesophageal Echocardiography (VAISHNAVI). The risks mentioned to him are relative low but include trauma to the throat and esophagus, aspiration, pulmonary wheezing, accidental placement of the tube in the trachea and arrhythmia. The total risk of complications with transesophageal echocardiography (VAISHNAVI) is 0.2% to 0.5% and the risk of death is extremely rare (0.0004%). (Modified from Braunwald's Heart Disease, 10th edition, page [phone]). After the Transesophageal Echocardiography is preformed and with acceptable findings, I discussed with him  in detail  the risks and benefits of Direct Current Cardioversion (DCCV).   The risks of Direct Current Cardioversion I mentioned to him are rare but can potentially include:  More dangerous heart rhythms (which are treated with medications or a stronger electrical shock), other less dangerous abnormal rhythms, temporary low blood pressure, heart damage (usually temporary and without symptoms), heart failure, skin irritation, and dislodged blood clots, which can cause stroke, pulmonary embolism, or other problems. Additionally, there are risks associated with sedation which includes transient drop in blood pressure and need for assisted ventilation. These procedures are scheduled for 01/24/20 .     Bjorn Felton MD

## 2020-01-25 ENCOUNTER — APPOINTMENT (OUTPATIENT)
Dept: NUCLEAR MEDICINE | Age: 70
End: 2020-01-25
Attending: INTERNAL MEDICINE
Payer: MEDICARE

## 2020-01-25 VITALS
SYSTOLIC BLOOD PRESSURE: 151 MMHG | OXYGEN SATURATION: 91 % | DIASTOLIC BLOOD PRESSURE: 83 MMHG | TEMPERATURE: 97.3 F | BODY MASS INDEX: 30.27 KG/M2 | RESPIRATION RATE: 16 BRPM | HEART RATE: 63 BPM | HEIGHT: 72 IN | WEIGHT: 223.5 LBS

## 2020-01-25 LAB
ANION GAP SERPL CALCULATED.3IONS-SCNC: 11 MMOL/L (ref 7–19)
BUN BLDV-MCNC: 21 MG/DL (ref 8–23)
CALCIUM SERPL-MCNC: 9.6 MG/DL (ref 8.8–10.2)
CHLORIDE BLD-SCNC: 100 MMOL/L (ref 98–111)
CO2: 27 MMOL/L (ref 22–29)
CREAT SERPL-MCNC: 1 MG/DL (ref 0.5–1.2)
GFR NON-AFRICAN AMERICAN: >60
GLUCOSE BLD-MCNC: 104 MG/DL (ref 74–109)
HCT VFR BLD CALC: 45.6 % (ref 42–52)
HEMOGLOBIN: 14.5 G/DL (ref 14–18)
INR BLD: 1.25 (ref 0.88–1.18)
MCH RBC QN AUTO: 30.6 PG (ref 27–31)
MCHC RBC AUTO-ENTMCNC: 31.8 G/DL (ref 33–37)
MCV RBC AUTO: 96.2 FL (ref 80–94)
PDW BLD-RTO: 13.5 % (ref 11.5–14.5)
PLATELET # BLD: 240 K/UL (ref 130–400)
PMV BLD AUTO: 10.5 FL (ref 9.4–12.4)
POTASSIUM REFLEX MAGNESIUM: 4.2 MMOL/L (ref 3.5–5)
PROTHROMBIN TIME: 15.1 SEC (ref 12–14.6)
RBC # BLD: 4.74 M/UL (ref 4.7–6.1)
SODIUM BLD-SCNC: 138 MMOL/L (ref 136–145)
WBC # BLD: 7.4 K/UL (ref 4.8–10.8)

## 2020-01-25 PROCEDURE — 85610 PROTHROMBIN TIME: CPT

## 2020-01-25 PROCEDURE — 93005 ELECTROCARDIOGRAM TRACING: CPT | Performed by: INTERNAL MEDICINE

## 2020-01-25 PROCEDURE — G0378 HOSPITAL OBSERVATION PER HR: HCPCS

## 2020-01-25 PROCEDURE — 3430000000 HC RX DIAGNOSTIC RADIOPHARMACEUTICAL: Performed by: INTERNAL MEDICINE

## 2020-01-25 PROCEDURE — 85027 COMPLETE CBC AUTOMATED: CPT

## 2020-01-25 PROCEDURE — 2580000003 HC RX 258: Performed by: INTERNAL MEDICINE

## 2020-01-25 PROCEDURE — 6370000000 HC RX 637 (ALT 250 FOR IP): Performed by: INTERNAL MEDICINE

## 2020-01-25 PROCEDURE — 80048 BASIC METABOLIC PNL TOTAL CA: CPT

## 2020-01-25 PROCEDURE — 6360000002 HC RX W HCPCS: Performed by: INTERNAL MEDICINE

## 2020-01-25 PROCEDURE — A9500 TC99M SESTAMIBI: HCPCS | Performed by: INTERNAL MEDICINE

## 2020-01-25 PROCEDURE — 78452 HT MUSCLE IMAGE SPECT MULT: CPT

## 2020-01-25 PROCEDURE — 93306 TTE W/DOPPLER COMPLETE: CPT

## 2020-01-25 PROCEDURE — 36415 COLL VENOUS BLD VENIPUNCTURE: CPT

## 2020-01-25 PROCEDURE — 99217 PR OBSERVATION CARE DISCHARGE MANAGEMENT: CPT | Performed by: INTERNAL MEDICINE

## 2020-01-25 RX ORDER — SOTALOL HYDROCHLORIDE 80 MG/1
80 TABLET ORAL 2 TIMES DAILY
Qty: 60 TABLET | Refills: 3 | Status: SHIPPED | OUTPATIENT
Start: 2020-01-25 | End: 2020-05-07 | Stop reason: SDUPTHER

## 2020-01-25 RX ORDER — WARFARIN SODIUM 5 MG/1
5 TABLET ORAL ONCE
Status: COMPLETED | OUTPATIENT
Start: 2020-01-25 | End: 2020-01-25

## 2020-01-25 RX ORDER — WARFARIN SODIUM 5 MG/1
5 TABLET ORAL DAILY
Qty: 30 TABLET | Refills: 3 | Status: SHIPPED | OUTPATIENT
Start: 2020-01-25 | End: 2020-07-02 | Stop reason: ALTCHOICE

## 2020-01-25 RX ORDER — ASPIRIN 81 MG/1
81 TABLET, CHEWABLE ORAL DAILY
Qty: 30 TABLET | Refills: 3 | Status: ON HOLD | OUTPATIENT
Start: 2020-01-26 | End: 2020-07-16 | Stop reason: ALTCHOICE

## 2020-01-25 RX ADMIN — TETRAKIS(2-METHOXYISOBUTYLISOCYANIDE)COPPER(I) TETRAFLUOROBORATE 30 MILLICURIE: 1 INJECTION, POWDER, LYOPHILIZED, FOR SOLUTION INTRAVENOUS at 10:08

## 2020-01-25 RX ADMIN — SODIUM CHLORIDE, PRESERVATIVE FREE 10 ML: 5 INJECTION INTRAVENOUS at 08:12

## 2020-01-25 RX ADMIN — REGADENOSON 0.4 MG: 0.08 INJECTION, SOLUTION INTRAVENOUS at 10:08

## 2020-01-25 RX ADMIN — TETRAKIS(2-METHOXYISOBUTYLISOCYANIDE)COPPER(I) TETRAFLUOROBORATE 10 MILLICURIE: 1 INJECTION, POWDER, LYOPHILIZED, FOR SOLUTION INTRAVENOUS at 10:07

## 2020-01-25 RX ADMIN — ASPIRIN 81 MG 81 MG: 81 TABLET ORAL at 08:09

## 2020-01-25 RX ADMIN — OMEPRAZOLE 20 MG: 20 CAPSULE, DELAYED RELEASE ORAL at 06:20

## 2020-01-25 RX ADMIN — CYANOCOBALAMIN TAB 500 MCG 1000 MCG: 500 TAB at 08:09

## 2020-01-25 RX ADMIN — WARFARIN SODIUM 5 MG: 5 TABLET ORAL at 17:46

## 2020-01-25 RX ADMIN — SOTALOL HYDROCHLORIDE 80 MG: 80 TABLET ORAL at 17:13

## 2020-01-25 ASSESSMENT — PAIN SCALES - GENERAL: PAINLEVEL_OUTOF10: 0

## 2020-01-25 NOTE — PLAN OF CARE
Problem: Safety:  Goal: Free from accidental physical injury  Description  Free from accidental physical injury  Outcome: Ongoing  Goal: Free from intentional harm  Description  Free from intentional harm  Outcome: Ongoing     Problem: Daily Care:  Goal: Daily care needs are met  Description  Daily care needs are met  Outcome: Ongoing     Problem:  Activity:  Goal: Ability to tolerate increased activity will improve  Description  Ability to tolerate increased activity will improve  Outcome: Ongoing  Goal: Expression of feelings of increased energy will increase  Description  Expression of feelings of increased energy will increase  Outcome: Ongoing     Problem: Cardiac:  Goal: Ability to maintain an adequate cardiac output will improve  Description  Ability to maintain an adequate cardiac output will improve  Outcome: Ongoing  Goal: Complications related to the disease process, condition or treatment will be avoided or minimized  Description  Complications related to the disease process, condition or treatment will be avoided or minimized  Outcome: Ongoing

## 2020-01-25 NOTE — PROGRESS NOTES
Clinical Pharmacy Note    Warfarin consult follow-up    Recent Labs     01/25/20  0249   INR 1.25*     Recent Labs     01/25/20  1313   HGB 14.5   HCT 45.6        Current warfarin drug-drug interactions:Omeprazole, overlapping Eliquis (Apixaban can elevate the INR). Date INR Warfarin Dose    01/24/20 1. 11  5mg     01/25/20 1.24   5 mg                                       Notes:        Give Coumadin 5 mg x 1 today             Daily PT/INR until stable within therapeutic range.      Electronically signed by Ronald Zayas [facility] on 1/25/2020 at 2:31 PM

## 2020-01-25 NOTE — PLAN OF CARE
Problem: Safety:  Goal: Free from accidental physical injury  Description  Free from accidental physical injury  1/25/2020 0205 by Parul Vuong RN  Outcome: Ongoing  1/24/2020 1811 by Breanne Aggarwal RN  Outcome: Ongoing  Goal: Free from intentional harm  Description  Free from intentional harm  1/25/2020 0205 by Parul Vuong RN  Outcome: Ongoing  1/24/2020 1811 by Breanne Aggarwal RN  Outcome: Ongoing     Problem: Daily Care:  Goal: Daily care needs are met  Description  Daily care needs are met  1/25/2020 0205 by Parul Vuong RN  Outcome: Ongoing  1/24/2020 1811 by Breanne Aggarwal RN  Outcome: Ongoing     Problem:  Activity:  Goal: Ability to tolerate increased activity will improve  Description  Ability to tolerate increased activity will improve  1/25/2020 0205 by Parul Vuong RN  Outcome: Ongoing  1/24/2020 1811 by Breanne Aggarwal RN  Outcome: Ongoing  Goal: Expression of feelings of increased energy will increase  Description  Expression of feelings of increased energy will increase  1/25/2020 0205 by Parul Vuong RN  Outcome: Ongoing  1/24/2020 1811 by Breanne Aggarwal RN  Outcome: Ongoing     Problem: Cardiac:  Goal: Ability to maintain an adequate cardiac output will improve  Description  Ability to maintain an adequate cardiac output will improve  1/25/2020 0205 by Parul Vuong RN  Outcome: Ongoing  1/24/2020 1811 by Breanne Aggarwal RN  Outcome: Ongoing  Goal: Complications related to the disease process, condition or treatment will be avoided or minimized  Description  Complications related to the disease process, condition or treatment will be avoided or minimized  1/25/2020 0205 by Parul Vuong RN  Outcome: Ongoing  1/24/2020 1811 by Breanne Aggarwal RN  Outcome: Ongoing   Electronically signed by Parul Vuong RN on 1/25/2020 at 2:05 AM

## 2020-01-26 LAB
EKG P AXIS: NORMAL DEGREES
EKG P-R INTERVAL: NORMAL MS
EKG Q-T INTERVAL: 376 MS
EKG QRS DURATION: 90 MS
EKG QTC CALCULATION (BAZETT): 398 MS
EKG T AXIS: 42 DEGREES

## 2020-01-26 NOTE — DISCHARGE SUMMARY
[id number] [facility]    Discharge Summary          I personally saw the patient and rounded with:  Renny Wheeler, on  1/25/20      The observations documented in this note, including the assessment and plan are mine              Patient ID: Rico Sites      Patient's PCP: Richerd Nyhan, MD    Admit Date: 1/24/2020     Discharge Date:  01/25/20     Admitting Physician:  Kay Sims MD       Discharge Physician: Kay Sims MD     Discharge Diagnoses:    1. Sinoatrial node dysfunction, admitted with persistent AF, discharged in normal sinus rhythm after successful DCCV 1/24/2019                TYPE DURATION NONVALVULAR     VALVULAR PRIOR TREATMENT ANTI-  COAGULATION                  Persistent          Unknown but more likely than not, persistent    Nonvalvular    Successful DC cardioversion of atrial fibrillation to normal sinus rhythm on amiodarone and eliquis. Discharged on betapace and warfarin   warfarin         2. Systemic arterial hypertension  3. Hypercholesteremia      Cardiac Specific Diagnoses:    Specialty Problems        Cardiology Problems    Sinoatrial node dysfunction (HCC)        Persistent atrial fibrillation        Deep vein thrombosis (DVT) (HCC)        Hypotension                The patient was seen and examined on day of discharge and this discharge summary is in conjunction with any daily progress note from day of discharge. History of Present Illness: The patient was seen in the office on 1/23/2020 by myself and the following was noted:    \"Patient went in for his yearly physical with Dr. Kailyn Shaw yesterday and was found to be in atrial fibrillation.  This is new for him and was started on Eliquis 5 mg BID.  Patient is asymptomatic and was unaware he was out of rhythm.  Patient has a history of small GI bleed from asa but had a DVT in leg after and was able to tolerate blood thinner at that time.  \"     Additionally,  It was noted by the same observer at that PHOS 2.5 03/13/2017         Discharge Medications:     Current Discharge Medication List           Details   aspirin 81 MG chewable tablet Take 1 tablet by mouth daily  Qty: 30 tablet, Refills: 3      sotalol (BETAPACE) 80 MG tablet Take 1 tablet by mouth 2 times daily  Qty: 60 tablet, Refills: 3      warfarin (COUMADIN) 5 MG tablet Take 1 tablet by mouth daily  Qty: 30 tablet, Refills: 3              Details   clotrimazole-betamethasone (LOTRISONE) 1-0.05 % cream Apply topically 2 times daily. Qty: 45 g, Refills: 3    Associated Diagnoses: Leg skin lesion, right      omeprazole (PRILOSEC) 20 MG delayed release capsule Take 1 capsule by mouth Daily  Qty: 90 capsule, Refills: 3      Probiotic Product (PROBIOTIC FORMULA PO) Take 1 tablet by mouth      Ascorbic Acid (VITAMIN C PO) Take 1 tablet by mouth daily      Multiple Vitamins-Minerals (MULTIVITAMIN PO) Take  by mouth daily. vitamin B-12 (CYANOCOBALAMIN) 1000 MCG tablet Take 1,000 mcg by mouth daily       ondansetron (ZOFRAN ODT) 4 MG disintegrating tablet Take 1 tablet by mouth every 8 hours as needed for Nausea or Vomiting  Qty: 10 tablet, Refills: 0               Condition At Discharge:  Improved    Disposition:        1. Home  2. Follow up with cardiology as arranged  3. Follow up with primary care provider as arranged      [id number] Deepti Higgins with me to discharge after the bedrest is complete, hemostatis is achieved, the patient is hemodynamically stable and comfortable. Please remind the patient that driving is absolutely prohibited for the next day (24 hours) after this procedure. Additionally, caution should be exercised to avoid heights, swimming, tub baths, open flames, or heavy machinery.         Electronically signed by Mckayla Pugh MD on 1/25/20

## 2020-01-27 LAB
EKG P AXIS: 25 DEGREES
EKG P-R INTERVAL: 204 MS
EKG Q-T INTERVAL: 430 MS
EKG QRS DURATION: 84 MS
EKG QTC CALCULATION (BAZETT): 435 MS
EKG T AXIS: 6 DEGREES
LV EF: 60 %
LVEF MODALITY: NORMAL

## 2020-01-27 PROCEDURE — 93010 ELECTROCARDIOGRAM REPORT: CPT | Performed by: INTERNAL MEDICINE

## 2020-01-28 ENCOUNTER — OFFICE VISIT (OUTPATIENT)
Dept: FAMILY MEDICINE CLINIC | Age: 70
End: 2020-01-28
Payer: MEDICARE

## 2020-01-28 VITALS
BODY MASS INDEX: 30.66 KG/M2 | SYSTOLIC BLOOD PRESSURE: 138 MMHG | HEART RATE: 71 BPM | HEIGHT: 72 IN | TEMPERATURE: 98.1 F | RESPIRATION RATE: 16 BRPM | OXYGEN SATURATION: 95 % | WEIGHT: 226.4 LBS | DIASTOLIC BLOOD PRESSURE: 68 MMHG

## 2020-01-28 DIAGNOSIS — I48.91 ATRIAL FIBRILLATION, UNSPECIFIED TYPE (HCC): ICD-10-CM

## 2020-01-28 LAB
INR BLD: 1.2 (ref 0.9–1.1)
LV EF: 62 %
LVEF MODALITY: NORMAL

## 2020-01-28 PROCEDURE — 3017F COLORECTAL CA SCREEN DOC REV: CPT | Performed by: FAMILY MEDICINE

## 2020-01-28 PROCEDURE — G8417 CALC BMI ABV UP PARAM F/U: HCPCS | Performed by: FAMILY MEDICINE

## 2020-01-28 PROCEDURE — 99214 OFFICE O/P EST MOD 30 MIN: CPT | Performed by: FAMILY MEDICINE

## 2020-01-28 PROCEDURE — 1036F TOBACCO NON-USER: CPT | Performed by: FAMILY MEDICINE

## 2020-01-28 PROCEDURE — 1123F ACP DISCUSS/DSCN MKR DOCD: CPT | Performed by: FAMILY MEDICINE

## 2020-01-28 PROCEDURE — 93000 ELECTROCARDIOGRAM COMPLETE: CPT | Performed by: FAMILY MEDICINE

## 2020-01-28 PROCEDURE — G8484 FLU IMMUNIZE NO ADMIN: HCPCS | Performed by: FAMILY MEDICINE

## 2020-01-28 PROCEDURE — G8427 DOCREV CUR MEDS BY ELIG CLIN: HCPCS | Performed by: FAMILY MEDICINE

## 2020-01-28 PROCEDURE — 4040F PNEUMOC VAC/ADMIN/RCVD: CPT | Performed by: FAMILY MEDICINE

## 2020-01-28 ASSESSMENT — ENCOUNTER SYMPTOMS
BLOOD IN STOOL: 0
NAUSEA: 0
VOMITING: 0
CHEST TIGHTNESS: 0
EYES NEGATIVE: 1
CONSTIPATION: 0
SHORTNESS OF BREATH: 0
WHEEZING: 0
DIARRHEA: 0
COUGH: 0

## 2020-01-28 NOTE — PROGRESS NOTES
Subjective:      Patient ID: Gio Umana is a 77 y.o. male. HPI   During the rooming of the patient he was noted to have some skipping in his heart.  He does have on exam today 1/6 systolic murmur auscultated loudest at the right upper sternal border likely indicative of aortic stenosis component.  Also he was noted to have some skipping and I was not sure if this was atrial fibrillation or if in fact it was a PAC.  We did do EKG and the patient notably was having atrial fibrillation.  He does have a history of having heart murmur in the past as he was identified with this by Dr. Cyndie Cuello many years ago.  Apparently he may have had some skipping around at that time. [facility] is not on anticoagulants at this time.  The patient did take an 81 mg aspirin for many years but then had GI bleed so he was told to stop that.  Additionally, he did have Achilles tendon repair of the left lower extremity approximately 2015 and subsequent to his surgical intervention did develop a DVT in his left lower extremity.  The patient was on anticoagulation for short period of time during that episode but did tolerate this well.  States that he has had 2 stress test done years ago most recently by Dr. Scott Gomez review epic records and look for EKG tracings.  He did have EKG done heart of 2017 which was in normal sinus rhythm.  In December of 2015 he did have preop EKG for his Achilles repair and this did show normal sinus rhythm as well.  EKG of 12/15/2016 he was noted to have an occasional PAC.  No evidence of atrial fibrillation has been identified at this point in time.  I did recommend cardiology consultation for the patient and did discuss this with him in detail. Discussed this situation with Dr. Glen Artis firsthand. He did in fact see him on day after my evaluation here.   After his evaluation of Lili Guy, he recommended to him that he come into the hospital for admission have esophageal echogram and then undergo cardioversion. He was admitted to the hospital at [facility] on 1/24/2020 and had these above studies done. Patient had cardioversion x4 and was able to be replaced into normal sinus rhythm. Due to the outrageous expense of the Eliquis that we had arranged for him to take initially, he was reluctant to take that medicine. Dr. Mary Penn then did put him on Coumadin at 5mg after loading dose daily. Is here today for further evaluation and management regarding that. PT/INR today was noted to be 1.2. I did ask him to take an extra 5 mg right away. We did discuss alternatives to that including Eliquis 5 mg p.o. twice daily with a co-pay card that may entitled him to a $10 co-pay for the next 24 months. He certainly was receptive to this and took a prescription to [facility] to check and see about the feasibility of this with his particular insurance.   Jose Guadalupe Vallejo was in normal sinus rhythm on the EKG that I did here on him today. There were no acute changes and his heart rate was noted to be 63. He will call me and let me know about the situation with regard to the Eliquis and his insurance coverage. They are not able to utilize the Eliquis, I then will begin further management of his INR times with Coumadin utilization. Was noted to have a very soft systolic heart murmur approximately 1/6 in intensity auscultated loudest at the right upper sternal border.     He does have a history of hyperlipidemia.  Lipids were done prior to our visit here today and show that Strahl was indeed actually low again at 1/1/2005.  Triglycerides are quite good at 63.  HDL is a bit low at 38 but LDL is good at 54.  The patient takes one half of a 20 mg Zocor and we did discuss this. Jesika Bertrand would like to stop this if possible so we are going to stop Zocor and recheck lipids and hepatic function panel in 6 months. Review of Systems   Constitutional: Negative. HENT: Negative. Eyes: Negative.     Respiratory: Negative for cough, chest

## 2020-02-11 ENCOUNTER — TELEPHONE (OUTPATIENT)
Dept: FAMILY MEDICINE CLINIC | Age: 70
End: 2020-02-11

## 2020-02-11 NOTE — TELEPHONE ENCOUNTER
Patient left voicemail saying that he started noticing bright red blood in his urine yesterday. Patient says that he is currently taking 2-5mg Eliquis and low dose aspirin. Patient wondering if Dr. Kenyon Phipps would like to adjust his medication. Discussed with Dr. Kenyon Phipps. Patient to stop aspirin. If symptoms do not improve patient should follow up in office. Patient verbalized understanding.

## 2020-02-17 ENCOUNTER — TELEPHONE (OUTPATIENT)
Dept: FAMILY MEDICINE CLINIC | Age: 70
End: 2020-02-17

## 2020-02-24 ENCOUNTER — OFFICE VISIT (OUTPATIENT)
Dept: FAMILY MEDICINE CLINIC | Age: 70
End: 2020-02-24
Payer: MEDICARE

## 2020-02-24 VITALS
SYSTOLIC BLOOD PRESSURE: 148 MMHG | DIASTOLIC BLOOD PRESSURE: 70 MMHG | BODY MASS INDEX: 31.02 KG/M2 | OXYGEN SATURATION: 97 % | HEIGHT: 72 IN | HEART RATE: 70 BPM | TEMPERATURE: 98.2 F | RESPIRATION RATE: 16 BRPM | WEIGHT: 229 LBS

## 2020-02-24 PROCEDURE — 99213 OFFICE O/P EST LOW 20 MIN: CPT | Performed by: FAMILY MEDICINE

## 2020-02-24 PROCEDURE — 1036F TOBACCO NON-USER: CPT | Performed by: FAMILY MEDICINE

## 2020-02-24 PROCEDURE — G8417 CALC BMI ABV UP PARAM F/U: HCPCS | Performed by: FAMILY MEDICINE

## 2020-02-24 PROCEDURE — 4040F PNEUMOC VAC/ADMIN/RCVD: CPT | Performed by: FAMILY MEDICINE

## 2020-02-24 PROCEDURE — 3017F COLORECTAL CA SCREEN DOC REV: CPT | Performed by: FAMILY MEDICINE

## 2020-02-24 PROCEDURE — G8484 FLU IMMUNIZE NO ADMIN: HCPCS | Performed by: FAMILY MEDICINE

## 2020-02-24 PROCEDURE — 1123F ACP DISCUSS/DSCN MKR DOCD: CPT | Performed by: FAMILY MEDICINE

## 2020-02-24 PROCEDURE — G8427 DOCREV CUR MEDS BY ELIG CLIN: HCPCS | Performed by: FAMILY MEDICINE

## 2020-02-24 PROCEDURE — 12002 RPR S/N/AX/GEN/TRNK2.6-7.5CM: CPT | Performed by: FAMILY MEDICINE

## 2020-02-24 ASSESSMENT — ENCOUNTER SYMPTOMS
EYES NEGATIVE: 1
DIARRHEA: 0
CHEST TIGHTNESS: 0
SHORTNESS OF BREATH: 0
COUGH: 0
CONSTIPATION: 0
WHEEZING: 0
BLOOD IN STOOL: 0
NAUSEA: 0
VOMITING: 0

## 2020-02-24 NOTE — PROGRESS NOTES
nausea and vomiting. Genitourinary: Negative for hematuria. Skin: Positive for wound. Neurological: Negative. Psychiatric/Behavioral: Negative. Objective:   Physical Exam  Vitals signs and nursing note reviewed. Constitutional:       General: He is not in acute distress. Appearance: Normal appearance. He is well-developed. He is not ill-appearing, toxic-appearing or diaphoretic. HENT:      Head: Normocephalic and atraumatic. Right Ear: Tympanic membrane, ear canal and external ear normal. There is no impacted cerumen. Left Ear: Tympanic membrane, ear canal and external ear normal. There is no impacted cerumen. Nose: Nose normal.      Mouth/Throat:      Lips: Pink. Mouth: Mucous membranes are moist.      Dentition: Normal dentition. Tongue: No lesions. Pharynx: Oropharynx is clear. Uvula midline. Tonsils: No tonsillar exudate or tonsillar abscesses. Eyes:      General: Lids are normal.         Right eye: No discharge. Left eye: No discharge. Extraocular Movements: Extraocular movements intact. Right eye: Normal extraocular motion. Left eye: Normal extraocular motion. Conjunctiva/sclera: Conjunctivae normal.      Right eye: Right conjunctiva is not injected. Left eye: Left conjunctiva is not injected. Pupils: Pupils are equal, round, and reactive to light. Neck:      Musculoskeletal: Normal range of motion and neck supple. No neck rigidity or muscular tenderness. Thyroid: No thyromegaly. Vascular: No carotid bruit or JVD. Cardiovascular:      Rate and Rhythm: Normal rate and regular rhythm. Pulses:           Carotid pulses are 2+ on the right side and 2+ on the left side. Radial pulses are 2+ on the right side and 2+ on the left side. Heart sounds: Normal heart sounds, S1 normal and S2 normal. No murmur. No friction rub. No gallop.     Pulmonary:      Effort: Pulmonary effort is normal. No accessory muscle usage or respiratory distress. Breath sounds: Normal breath sounds. No stridor. No wheezing, rhonchi or rales. Chest:      Chest wall: No tenderness. Abdominal:      General: Bowel sounds are normal. There is no distension or abdominal bruit. Palpations: Abdomen is soft. There is no mass. Tenderness: There is no abdominal tenderness. There is no right CVA tenderness, left CVA tenderness, guarding or rebound. Hernia: No hernia is present. Musculoskeletal: Normal range of motion. General: No swelling, tenderness, deformity or signs of injury. Right lower leg: No edema. Left lower leg: No edema. Lymphadenopathy:      Cervical: No cervical adenopathy. Right cervical: No superficial cervical adenopathy. Left cervical: No superficial cervical adenopathy. Skin:     General: Skin is warm and dry. Nails: There is no clubbing. Neurological:      General: No focal deficit present. Mental Status: He is alert and oriented to person, place, and time. Mental status is at baseline. Cranial Nerves: No facial asymmetry. Motor: No weakness or tremor. Coordination: Coordination normal.      Gait: Gait normal.      Deep Tendon Reflexes: Reflexes are normal and symmetric. Psychiatric:         Attention and Perception: Attention normal.         Mood and Affect: Mood normal.         Speech: Speech normal.         Behavior: Behavior normal.         Thought Content: Thought content normal.         Cognition and Memory: Memory normal.         Judgment: Judgment normal.         BP (!) 148/70 (Site: Left Upper Arm, Position: Sitting, Cuff Size: Large Adult)   Pulse 70   Temp 98.2 °F (36.8 °C) (Oral)   Resp 16   Ht 6' (1.829 m)   Wt 229 lb (103.9 kg)   SpO2 97%   BMI 31.06 kg/m²   Assessment:         Diagnosis Orders   1.  Laceration of right thumb without foreign body without damage to nail, initial encounter  Tetanus-Diphth-Acell Pertussis (BOOSTRIX) injection 0.5 mL   2. Suture of skin wound     3. History of DVT (deep vein thrombosis)      Occurred after Achilles tendon repair. Patient required Coumadin for short-term utilization during that treatment. 4. Garces's esophagus without dysplasia     5. Paroxysmal atrial fibrillation (HCC)     6. Anticoagulated      Patient anticoagulated for atrial fibrillation with Eliquis at 5 mg p.o. twice daily           Plan:       I am having Mr. Spring Sites maintain his :   Outpatient Medications Marked as Taking for the 2/24/20 encounter (Office Visit) with Richerd Nyhan, MD   Medication Sig Dispense Refill    apixaban (ELIQUIS) 5 MG TABS tablet Take 1 tablet by mouth 2 times daily 60 tablet 5    aspirin 81 MG chewable tablet Take 1 tablet by mouth daily 30 tablet 3    sotalol (BETAPACE) 80 MG tablet Take 1 tablet by mouth 2 times daily 60 tablet 3    warfarin (COUMADIN) 5 MG tablet Take 1 tablet by mouth daily 30 tablet 3    clotrimazole-betamethasone (LOTRISONE) 1-0.05 % cream Apply topically 2 times daily. 45 g 3    ondansetron (ZOFRAN ODT) 4 MG disintegrating tablet Take 1 tablet by mouth every 8 hours as needed for Nausea or Vomiting 10 tablet 0    omeprazole (PRILOSEC) 20 MG delayed release capsule Take 1 capsule by mouth Daily 90 capsule 3    Probiotic Product (PROBIOTIC FORMULA PO) Take 1 tablet by mouth      Ascorbic Acid (VITAMIN C PO) Take 1 tablet by mouth daily      Multiple Vitamins-Minerals (MULTIVITAMIN PO) Take  by mouth daily.  vitamin B-12 (CYANOCOBALAMIN) 1000 MCG tablet Take 1,000 mcg by mouth daily      ,Patient states they are no longer utilizing these medication: There are no discontinued medications.  I have refilled the following medication today:     Orders Placed This Encounter   Medications    Tetanus-Diphth-Acell Pertussis (BOOSTRIX) injection 0.5 mL             Richerd Nyhan, MD

## 2020-03-09 ENCOUNTER — OFFICE VISIT (OUTPATIENT)
Dept: FAMILY MEDICINE CLINIC | Age: 70
End: 2020-03-09
Payer: MEDICARE

## 2020-03-09 VITALS
OXYGEN SATURATION: 98 % | HEART RATE: 60 BPM | WEIGHT: 229 LBS | BODY MASS INDEX: 31.02 KG/M2 | TEMPERATURE: 98.2 F | RESPIRATION RATE: 16 BRPM | DIASTOLIC BLOOD PRESSURE: 70 MMHG | HEIGHT: 72 IN | SYSTOLIC BLOOD PRESSURE: 130 MMHG

## 2020-03-09 PROCEDURE — G8484 FLU IMMUNIZE NO ADMIN: HCPCS | Performed by: FAMILY MEDICINE

## 2020-03-09 PROCEDURE — 4040F PNEUMOC VAC/ADMIN/RCVD: CPT | Performed by: FAMILY MEDICINE

## 2020-03-09 PROCEDURE — 1123F ACP DISCUSS/DSCN MKR DOCD: CPT | Performed by: FAMILY MEDICINE

## 2020-03-09 PROCEDURE — G8417 CALC BMI ABV UP PARAM F/U: HCPCS | Performed by: FAMILY MEDICINE

## 2020-03-09 PROCEDURE — G8427 DOCREV CUR MEDS BY ELIG CLIN: HCPCS | Performed by: FAMILY MEDICINE

## 2020-03-09 PROCEDURE — 99213 OFFICE O/P EST LOW 20 MIN: CPT | Performed by: FAMILY MEDICINE

## 2020-03-09 PROCEDURE — 1036F TOBACCO NON-USER: CPT | Performed by: FAMILY MEDICINE

## 2020-03-09 PROCEDURE — 3017F COLORECTAL CA SCREEN DOC REV: CPT | Performed by: FAMILY MEDICINE

## 2020-03-09 RX ORDER — CEFDINIR 300 MG/1
300 CAPSULE ORAL 2 TIMES DAILY
Qty: 20 CAPSULE | Refills: 0 | Status: SHIPPED | OUTPATIENT
Start: 2020-03-09 | End: 2020-03-19

## 2020-03-09 RX ORDER — MONTELUKAST SODIUM 10 MG/1
10 TABLET ORAL DAILY
Qty: 30 TABLET | Refills: 3 | Status: ON HOLD | OUTPATIENT
Start: 2020-03-09 | End: 2020-07-16 | Stop reason: ALTCHOICE

## 2020-03-09 ASSESSMENT — ENCOUNTER SYMPTOMS
COUGH: 0
DIARRHEA: 0
WHEEZING: 0
VOMITING: 0
NAUSEA: 0
SHORTNESS OF BREATH: 0
EYES NEGATIVE: 1
BLOOD IN STOOL: 0
CONSTIPATION: 0
CHEST TIGHTNESS: 0

## 2020-03-09 NOTE — PROGRESS NOTES
Patient with sutures to left thumb. Sutures removed X 4. Edges well approximated. No redness or swelling. Steri strips applied.

## 2020-03-09 NOTE — PROGRESS NOTES
Subjective:      Patient ID: Magaly Ga is a 77 y.o. male. HPI  Had a previous visit on 1/28/2020, he was noted to be in atrial fibrillation. He was sent to see Dr. Angelia Carter who did cardiovert him and put him on Eliquis at 5 mg p.o. twice daily which he remains on at this time. Keri Armas does have a history of having heart murmur in the past as he was identified with this by Dr. Maricarmen Dacosta many years ago.  Apparently he may have had some skipping around at that time.    The patient did take an 81 mg aspirin for many years but then had GI bleed so he was told to stop that. Before, he has tolerated the Eliquis well at 5 mg twice daily. Additionally, he did have Achilles tendon repair of the left lower extremity approximately 2015 and subsequent to his surgical intervention did develop a DVT in his left lower extremity.  The patient was on anticoagulation for short period of time during that episode but did tolerate this well.  States that he has had 2 stress test done years ago most recently by Dr. Barbara Lanza review epic records and look for EKG tracings.  He did have EKG done heart of 2017 which was in normal sinus rhythm.  In December of 2015 he did have preop EKG for his Achilles repair and this did show normal sinus rhythm as well. DVT occurred after his Achilles repair. EKG of 12/15/2016 he was noted to have an occasional PAC.  No evidence of atrial fibrillation had been venously identified.   There are Shauna Riedel did, in fact, see him on day after my evaluation here.  After his evaluation of [place], he recommended to him that he come into the hospital for admission have esophageal echogram and then undergo cardioversion. Keri Armas was admitted to the hospital at [facility] on 1/24/2020 and had these above studies done. Fuentes Capri had cardioversion x4 and was able to be replaced into normal sinus rhythm.  Due to the outrageous expense of the Eliquis that we had arranged for him to take initially, he was reluctant to take that medicine.  Dr. Ramin Hernandez then did put him on Coumadin at 5mg after loading dose daily.  Is here today for further evaluation and management regarding that.  PT/INR today was noted to be 1.2.  I did ask him to take an extra 5 mg right away.  We did discuss alternatives to that including Eliquis 5 mg p.o. twice daily with a co-pay card that may entitled him to a $10 co-pay for the next 24 months. Anthony Fox certainly was receptive to this and took a prescription to [facility] to check and see about the feasibility of this with his particular insurance.   Anders was in normal sinus rhythm on the EKG that I did here on him today. There were no acute changes and his heart rate was noted to be 63.     He did state today that he had had some congestion that had been ongoing for a number of days now. He is able to cough up some sputum which has a slight tinge in color to it. Because of this, I am going to give him some cefdinir 300 mg p.o. twice daily for acute sinusitis of an unspecified location. Medication will be prescribed for 10 days. He also is encouraged to utilize Singulair at 10 mg p.o. daily and he should use a vaporizer anytime he is exposed to gas heat.     He does have a history of hyperlipidemia.  Lipids were done prior to our visit here today and show that Jose Asencio was indeed actually low again at 1/1/2005.  Triglycerides are quite good at 63.  HDL is a bit low at 38 but LDL is good at 54.  The patient takes one half of a 20 mg Zocor and we did discuss this. Anthony Fox would like to stop this if possible so we did  stop Zocor and we will  recheck lipids and hepatic function panel in 6 months. Did sustain laceration to his right hand on 2/24/2020 which was sutured here by the undersigned. He is here today for suture removal of that wound. Sutures were removed without difficulty no evidence of infection was noted. Steri-Strips were applied by Jolynn Leventhal, RN. Review of Systems   Constitutional: Negative. side and 2+ on the left side. Radial pulses are 2+ on the right side and 2+ on the left side. Heart sounds: Normal heart sounds, S1 normal and S2 normal. No murmur. No friction rub. No gallop. Pulmonary:      Effort: Pulmonary effort is normal. No accessory muscle usage or respiratory distress. Breath sounds: Normal breath sounds. No stridor. No wheezing, rhonchi or rales. Chest:      Chest wall: No tenderness. Abdominal:      General: Bowel sounds are normal. There is no distension or abdominal bruit. Palpations: Abdomen is soft. There is no mass. Tenderness: There is no abdominal tenderness. There is no right CVA tenderness, left CVA tenderness, guarding or rebound. Hernia: No hernia is present. Musculoskeletal: Normal range of motion. General: No swelling, tenderness, deformity or signs of injury. Right lower leg: No edema. Left lower leg: No edema. Lymphadenopathy:      Cervical: No cervical adenopathy. Right cervical: No superficial cervical adenopathy. Left cervical: No superficial cervical adenopathy. Skin:     General: Skin is warm and dry. Nails: There is no clubbing. Neurological:      General: No focal deficit present. Mental Status: He is alert and oriented to person, place, and time. Mental status is at baseline. Cranial Nerves: No facial asymmetry. Motor: No weakness or tremor. Coordination: Coordination normal.      Gait: Gait normal.      Deep Tendon Reflexes: Reflexes are normal and symmetric. Psychiatric:         Attention and Perception: Attention normal.         Mood and Affect: Mood normal.         Speech: Speech normal.         Behavior: Behavior normal.         Thought Content:  Thought content normal.         Cognition and Memory: Memory normal.         Judgment: Judgment normal.         /70 (Site: Left Upper Arm, Position: Sitting, Cuff Size: Large Adult)   Pulse 60   Temp 98.2 °F (36.8 °C) (Oral)   Resp 16   Ht 6' (1.829 m)   Wt 229 lb (103.9 kg)   SpO2 98%   BMI 31.06 kg/m²   Assessment:         Diagnosis Orders   1. Acute sinusitis, recurrence not specified, unspecified location  cefdinir (OMNICEF) 300 MG capsule    montelukast (SINGULAIR) 10 MG tablet   2. Persistent atrial fibrillation     3. Sinoatrial node dysfunction (HCC)     4. Chronic GERD     5. Garces's esophagus without dysplasia     6. Hyperlipoproteinemia     7. H/O cervical spine surgery     8. Deep vein thrombosis (DVT) of left lower extremity, unspecified chronicity, unspecified vein (HCC)             Plan:       I am having Mr. Jonny Carrillo maintain his :   Outpatient Medications Marked as Taking for the 3/9/20 encounter (Office Visit) with Ermias Lopez MD   Medication Sig Dispense Refill    cefdinir (OMNICEF) 300 MG capsule Take 1 capsule by mouth 2 times daily for 10 days 20 capsule 0    montelukast (SINGULAIR) 10 MG tablet Take 1 tablet by mouth daily 30 tablet 3    apixaban (ELIQUIS) 5 MG TABS tablet Take 1 tablet by mouth 2 times daily 60 tablet 5    aspirin 81 MG chewable tablet Take 1 tablet by mouth daily 30 tablet 3    sotalol (BETAPACE) 80 MG tablet Take 1 tablet by mouth 2 times daily 60 tablet 3    warfarin (COUMADIN) 5 MG tablet Take 1 tablet by mouth daily 30 tablet 3    clotrimazole-betamethasone (LOTRISONE) 1-0.05 % cream Apply topically 2 times daily. 45 g 3    ondansetron (ZOFRAN ODT) 4 MG disintegrating tablet Take 1 tablet by mouth every 8 hours as needed for Nausea or Vomiting 10 tablet 0    omeprazole (PRILOSEC) 20 MG delayed release capsule Take 1 capsule by mouth Daily 90 capsule 3    Probiotic Product (PROBIOTIC FORMULA PO) Take 1 tablet by mouth      Ascorbic Acid (VITAMIN C PO) Take 1 tablet by mouth daily      Multiple Vitamins-Minerals (MULTIVITAMIN PO) Take  by mouth daily.       vitamin B-12 (CYANOCOBALAMIN) 1000 MCG tablet Take 1,000 mcg by mouth daily Requested Prescriptions     Signed Prescriptions Disp Refills    cefdinir (OMNICEF) 300 MG capsule 20 capsule 0     Sig: Take 1 capsule by mouth 2 times daily for 10 days    montelukast (SINGULAIR) 10 MG tablet 30 tablet 3     Sig: Take 1 tablet by mouth daily   .      Orders Placed This Encounter   Medications    cefdinir (OMNICEF) 300 MG capsule     Sig: Take 1 capsule by mouth 2 times daily for 10 days     Dispense:  20 capsule     Refill:  0    montelukast (SINGULAIR) 10 MG tablet     Sig: Take 1 tablet by mouth daily     Dispense:  30 tablet     Refill:  3             Denia Waters MD

## 2020-04-02 ENCOUNTER — TELEMEDICINE (OUTPATIENT)
Dept: FAMILY MEDICINE CLINIC | Age: 70
End: 2020-04-02
Payer: MEDICARE

## 2020-04-02 VITALS
WEIGHT: 225 LBS | HEIGHT: 72 IN | DIASTOLIC BLOOD PRESSURE: 80 MMHG | HEART RATE: 68 BPM | SYSTOLIC BLOOD PRESSURE: 140 MMHG | BODY MASS INDEX: 30.48 KG/M2

## 2020-04-02 PROCEDURE — G0439 PPPS, SUBSEQ VISIT: HCPCS | Performed by: FAMILY MEDICINE

## 2020-04-02 PROCEDURE — 4040F PNEUMOC VAC/ADMIN/RCVD: CPT | Performed by: FAMILY MEDICINE

## 2020-04-02 PROCEDURE — 3017F COLORECTAL CA SCREEN DOC REV: CPT | Performed by: FAMILY MEDICINE

## 2020-04-02 PROCEDURE — 1123F ACP DISCUSS/DSCN MKR DOCD: CPT | Performed by: FAMILY MEDICINE

## 2020-04-02 ASSESSMENT — PATIENT HEALTH QUESTIONNAIRE - PHQ9
SUM OF ALL RESPONSES TO PHQ QUESTIONS 1-9: 0
SUM OF ALL RESPONSES TO PHQ QUESTIONS 1-9: 0

## 2020-04-02 ASSESSMENT — LIFESTYLE VARIABLES: HOW OFTEN DO YOU HAVE A DRINK CONTAINING ALCOHOL: 0

## 2020-04-02 NOTE — PROGRESS NOTES
Medicare Annual Wellness Visit  Name: Radha Ventura Date: 2020   MRN: [id number] Sex: Male   Age: 77 y.o. Ethnicity: Non-/Non    : 1950 Race: Rajan Reedre is here for Medicare AWV    Screenings for behavioral, psychosocial and functional/safety risks, and cognitive dysfunction are all negative except as indicated below. These results, as well as other patient data from the [address] form, are documented in Flowsheets linked to this Encounter. No Known Allergies    Prior to Visit Medications    Medication Sig Taking? Authorizing Provider   montelukast (SINGULAIR) 10 MG tablet Take 1 tablet by mouth daily  Domingo Araujo MD   apixaban (ELIQUIS) 5 MG TABS tablet Take 1 tablet by mouth 2 times daily  Domingo Araujo MD   aspirin 81 MG chewable tablet Take 1 tablet by mouth daily  Isidra Bullard MD   sotalol (BETAPACE) 80 MG tablet Take 1 tablet by mouth 2 times daily  Isidra Bullard MD   warfarin (COUMADIN) 5 MG tablet Take 1 tablet by mouth daily  Isidra Bullard MD   clotrimazole-betamethasone (LOTRISONE) 1-0.05 % cream Apply topically 2 times daily. Domingo Araujo MD   ondansetron (ZOFRAN ODT) 4 MG disintegrating tablet Take 1 tablet by mouth every 8 hours as needed for Nausea or Vomiting  LUIS Gonzalez   omeprazole (PRILOSEC) 20 MG delayed release capsule Take 1 capsule by mouth Daily  Jacobo Ng DO   Probiotic Product (PROBIOTIC FORMULA PO) Take 1 tablet by mouth  Historical Provider, MD   Ascorbic Acid (VITAMIN C PO) Take 1 tablet by mouth daily  Historical Provider, MD   Multiple Vitamins-Minerals (MULTIVITAMIN PO) Take  by mouth daily.   Historical Provider, MD   vitamin B-12 (CYANOCOBALAMIN) 1000 MCG tablet Take 1,000 mcg by mouth daily   Historical Provider, MD       Past Medical History:   Diagnosis Date    Arthritis     Atrial fibrillation ([address]     Colon polyps     Difficult intubation     pt just had recent cervical fusion [facility])     Colon polyps     Difficult intubation     pt just had recent cervical fusion with plate 2 months ago    DVT (deep venous thrombosis) ([address] 2014    left leg    GERD (gastroesophageal reflux disease)     Hx of blood clots     left leg/ after heel surg/ jan. 2015    Hyperlipidemia     recently able to be taken off cholesterol meds    Hypertension     Kidney stone on left side        Past Surgical History:   Procedure Laterality Date    BACK SURGERY      CARPAL TUNNEL RELEASE      CERVICAL FUSION      2015, oct    COLONOSCOPY  10/30/2009    Bod[place]azmynuk: hyperplastic polyp    COLONOSCOPY  02/2015    hyperplastic polyp (5 yr)    DILATATION, ESOPHAGUS      ENDOSCOPY, COLON, DIAGNOSTIC      EYE SURGERY      FOOT SURGERY  01/2014    Left foot spur removal    LITHOTRIPSY Left 12/29/2015    ESWL EXTRACORPEAL SHOCK WAVE LITHOTRIPSY performed by Abel Monteiro MD at [place] [address]  11/2015    PILONIDAL CYST EXCISION      HI COLONOSCOPY FLX DX W/COLLJ SPEC WHEN PFRMD N/A 5/8/2017    Dr Sanford Downy sided diverticulosis-5 yr recall    HI EGD TRANSORAL BIOPSY SINGLE/MULTIPLE N/A 3/26/2018    Dr Ng-w/dilation over wire-51 Chinese-esophageal stricture-Garces's (+) dysplasia (-)--1 yr recall    SHOULDER SURGERY  2007    right after fall, detached the subclavicle muscle    [address] [facility]    Neck  surgery     UPPER GASTROINTESTINAL ENDOSCOPY N/A 2/12/2018    Dr Wolfgang Kapoor Grade B esophagitis, hiatal hernia, stricture-(-) Марина, (+) Garces's (+) low grade dysplasia, active esophagogastritis, repeat: 3/26/18    UPPER GASTROINTESTINAL ENDOSCOPY  3/26/2018    Dr Ng-w/dilation over wire-51 Chinese-esophageal stricture-Garces's (+) dysplasia (-)--1 yr recall    UPPER GASTROINTESTINAL ENDOSCOPY N/A 5/2/2019    Dr Ramon Noe (+) Garces's, (-) dysplasia       Family History   Problem Relation Age of Onset    Cancer Mother     Lung Cancer Mother     Heart Disease Father     Heart Disease Sister     Diabetes Sister     Heart Disease Brother     Diabetes Brother     Colon Cancer Neg Hx     Colon Polyps Neg Hx     Esophageal Cancer Neg Hx     Liver Cancer Neg Hx     Liver Disease Neg Hx     Rectal Cancer Neg Hx     Stomach Cancer Neg Hx        CareTeam (Including outside providers/suppliers regularly involved in providing care):   Patient Care Team:  Yesi Lopez MD as PCP - General (Family Medicine)  Yesi Lopez MD as PCP - [facility] Empaneled Provider  LUIS Mckeon as Nurse Practitioner (Certified Nurse Practitioner)    Wt Readings from Last 3 Encounters:   04/02/20 225 lb (102.1 kg)   03/09/20 229 lb (103.9 kg)   02/24/20 229 lb (103.9 kg)     Vitals:    04/02/20 1405   BP: (!) 140/80   Pulse: 68   Weight: 225 lb (102.1 kg)   Height: 6' (1.829 m)     Body mass index is 30.52 kg/m². Based upon direct observation of the patient, evaluation of cognition reveals recent and remote memory intact. Patient's complete Health Risk Assessment and screening values have been reviewed and are found in Flowsheets. The following problems were reviewed today and where indicated follow up appointments were made and/or referrals ordered. Positive Risk Factor Screenings with Interventions:     Health Habits/Nutrition:  Health Habits/Nutrition  Do you exercise for at least 20 minutes 2-3 times per week?: (!) No  Have you lost any weight without trying in the past 3 months?: No  Do you eat fewer than 2 meals per day?: No  Have you seen a dentist within the past year?: Yes  Body mass index is 30.52 kg/m².   Health Habits/Nutrition Interventions:  · Inadequate physical activity:  patient is not ready to increase his/her physical activity level at this time    Personalized Preventive Plan   Current Health Maintenance Status  Immunization History   Administered Date(s) Administered    Pneumococcal Conjugate 13-valent ([id number]) 12/15/2016    Pneumococcal Polysaccharide ([id number]) 12/22/2017

## 2020-05-07 RX ORDER — SOTALOL HYDROCHLORIDE 80 MG/1
80 TABLET ORAL 2 TIMES DAILY
Qty: 60 TABLET | Refills: 5 | Status: SHIPPED | OUTPATIENT
Start: 2020-05-07 | End: 2020-10-26 | Stop reason: SDUPTHER

## 2020-07-02 ENCOUNTER — OFFICE VISIT (OUTPATIENT)
Dept: FAMILY MEDICINE CLINIC | Age: 70
End: 2020-07-02
Payer: MEDICARE

## 2020-07-02 ENCOUNTER — HOSPITAL ENCOUNTER (OUTPATIENT)
Dept: GENERAL RADIOLOGY | Age: 70
Discharge: HOME | End: 2020-07-02
Payer: MEDICARE

## 2020-07-02 VITALS
DIASTOLIC BLOOD PRESSURE: 70 MMHG | SYSTOLIC BLOOD PRESSURE: 130 MMHG | OXYGEN SATURATION: 95 % | RESPIRATION RATE: 16 BRPM | TEMPERATURE: 97.9 F | HEIGHT: 72 IN | BODY MASS INDEX: 30.88 KG/M2 | HEART RATE: 67 BPM | WEIGHT: 228 LBS

## 2020-07-02 LAB
AMPHETAMINE SCREEN, URINE: NEGATIVE
BARBITURATE SCREEN, URINE: NEGATIVE
BENZODIAZEPINE SCREEN, URINE: NEGATIVE
BUPRENORPHINE URINE: NEGATIVE
COCAINE METABOLITE SCREEN URINE: NEGATIVE
GABAPENTIN SCREEN, URINE: NORMAL
MDMA URINE: NEGATIVE
METHADONE SCREEN, URINE: NEGATIVE
METHAMPHETAMINE, URINE: NEGATIVE
OPIATE SCREEN URINE: NEGATIVE
OXYCODONE SCREEN URINE: NEGATIVE
PHENCYCLIDINE SCREEN URINE: NEGATIVE
PROPOXYPHENE SCREEN, URINE: NORMAL
THC SCREEN, URINE: NEGATIVE
TRICYCLIC ANTIDEPRESSANTS, UR: NORMAL

## 2020-07-02 PROCEDURE — 96372 THER/PROPH/DIAG INJ SC/IM: CPT | Performed by: FAMILY MEDICINE

## 2020-07-02 PROCEDURE — 72100 X-RAY EXAM L-S SPINE 2/3 VWS: CPT

## 2020-07-02 PROCEDURE — 1036F TOBACCO NON-USER: CPT | Performed by: FAMILY MEDICINE

## 2020-07-02 PROCEDURE — 73502 X-RAY EXAM HIP UNI 2-3 VIEWS: CPT

## 2020-07-02 PROCEDURE — 4040F PNEUMOC VAC/ADMIN/RCVD: CPT | Performed by: FAMILY MEDICINE

## 2020-07-02 PROCEDURE — 80305 DRUG TEST PRSMV DIR OPT OBS: CPT | Performed by: FAMILY MEDICINE

## 2020-07-02 PROCEDURE — 3017F COLORECTAL CA SCREEN DOC REV: CPT | Performed by: FAMILY MEDICINE

## 2020-07-02 PROCEDURE — G8427 DOCREV CUR MEDS BY ELIG CLIN: HCPCS | Performed by: FAMILY MEDICINE

## 2020-07-02 PROCEDURE — 99214 OFFICE O/P EST MOD 30 MIN: CPT | Performed by: FAMILY MEDICINE

## 2020-07-02 PROCEDURE — G8417 CALC BMI ABV UP PARAM F/U: HCPCS | Performed by: FAMILY MEDICINE

## 2020-07-02 PROCEDURE — 1123F ACP DISCUSS/DSCN MKR DOCD: CPT | Performed by: FAMILY MEDICINE

## 2020-07-02 RX ORDER — MELOXICAM 7.5 MG/1
7.5 TABLET ORAL DAILY
Qty: 30 TABLET | Refills: 3 | Status: SHIPPED | OUTPATIENT
Start: 2020-07-02 | End: 2020-11-10 | Stop reason: ALTCHOICE

## 2020-07-02 RX ORDER — TRIAMCINOLONE ACETONIDE 40 MG/ML
80 INJECTION, SUSPENSION INTRA-ARTICULAR; INTRAMUSCULAR ONCE
Status: COMPLETED | OUTPATIENT
Start: 2020-07-02 | End: 2020-07-02

## 2020-07-02 RX ORDER — OXYCODONE AND ACETAMINOPHEN 10; 325 MG/1; MG/1
1 TABLET ORAL EVERY 8 HOURS PRN
Qty: 90 TABLET | Refills: 0 | Status: SHIPPED | OUTPATIENT
Start: 2020-07-02 | End: 2020-08-01

## 2020-07-02 RX ADMIN — TRIAMCINOLONE ACETONIDE 80 MG: 40 INJECTION, SUSPENSION INTRA-ARTICULAR; INTRAMUSCULAR at 11:24

## 2020-07-02 ASSESSMENT — ENCOUNTER SYMPTOMS
EYES NEGATIVE: 1
VOMITING: 0
BLOOD IN STOOL: 0
WHEEZING: 0
NAUSEA: 0
CONSTIPATION: 0
COUGH: 0
DIARRHEA: 0
BACK PAIN: 1
SHORTNESS OF BREATH: 0
CHEST TIGHTNESS: 0

## 2020-07-02 NOTE — PROGRESS NOTES
Subjective:      Patient ID: Manolo Silva is a 72 y.o. male. HPI this patient is seen here intermittently by the undersigned for management of chronic disease states. He has a long history of chronic spinal disease. He has had cervical spine operation x2 in the past by Dr. Mario Bonner of [facility]. He was told at that time that he did have spinal stenosis in his low back but has not had surgical intervention. He has had intermittent flareups with back issues. Most often he has low back pain with radiculopathy to the left lower extremity. He states that now over the past 4 to 5 days he has been having significant pain in his right low back extending to the right lower extremity. He states that his right leg actually does go out on him and he does have to catch to something to prevent falling. I am going to order LS spine view along with right hip just to be on the safe side. We then will be giving him Kenalog 80 mg IM and placing him on meloxicam at 7.5 mg p.o. daily with food. He is given Percocet 10 to use it every 8 hour intervals as needed for severe pain. He does have available Flexeril 10 mg at home but I would have him use that for muscle spasm. If the need arises later on, we may consider treatment for neuropathic issues such as Neurontin or Lyrica. I did tell Judi Alaniz that very likely we would be ordering either an MRI or CT of his low back as well. Had a previous visit on 1/28/2020, he was noted to be in atrial fibrillation. He was sent to see Dr. Nolvia Thomas who did cardiovert him and put him on Eliquis at 5 mg p.o. twice daily which he remains on at this time. Alireza Arriaza does have a history of having heart murmur in the past as he was identified with this by Dr. Hector Weinberg many years ago.  Apparently he may have had some skipping around at that time.    The patient did take an 81 mg aspirin for many years but then had GI bleed so he was told to stop that.   Before, he has tolerated the Eliquis well at 5 mg twice daily. Additionally, he did have Achilles tendon repair of the left lower extremity approximately 2015 and subsequent to his surgical intervention did develop a DVT in his left lower extremity.  The patient was on anticoagulation for short period of time during that episode but did tolerate this well.  States that he has had 2 stress test done years ago most recently by Dr. Azul Ahn review epic records and look for EKG tracings.  He did have EKG done heart of 2017 which was in normal sinus rhythm.  In December of 2015 he did have preop EKG for his Achilles repair and this did show normal sinus rhythm as well. DVT occurred after his Achilles repair. EKG of 12/15/2016 he was noted to have an occasional PAC.  No evidence of atrial fibrillation had been venously identified. There are Medina Haigler did, in fact, see him on day after my evaluation here.  After his evaluation of Lena Alan, he recommended to him that he come into the hospital for admission have esophageal echogram and then undergo cardioversion. Roseline Boggs was admitted to the hospital at [facility] on 1/24/2020 and had these above studies done. Damaris Berkowitz had cardioversion x4 and was able to be replaced into normal sinus rhythm.  Due to the outrageous expense of the Eliquis that we had arranged for him to take initially, he was reluctant to take that medicine.  Dr. Delta Ernandez then did put him on Coumadin at 5mg after loading dose daily. Lena Alan is no longer taking Coumadin but remains on Eliquis at 5 mg twice daily.  He  is here today for further evaluation and management regarding that.  PT/INR today was noted to be 1.2.  I did ask him to take an extra 5 mg right away.  We did discuss alternatives to that including Eliquis 5 mg p.o. twice daily with a co-pay card that may entitled him to a $10 co-pay for the next 24 months. Roseline Boggs certainly was receptive to this and [facility] to check and see about the feasibility of this with his particular insurance.   Anders was in normal sinus rhythm on the EKG that I did here on him today.  There were no acute changes and his heart rate was noted to be 63.      He does have a history of hyperlipidemia.  Lipids were done prior to our visit here today and show that Anaya Mathis was indeed actually low again at 1/1/2005.  Triglycerides are quite good at 63.  HDL is a bit low at 38 but LDL is good at 54.  The patient takes one half of a 20 mg Zocor and we did discuss this. Chrissy De Santiago would like to stop this if possible so we did  stop Zocor and we will  recheck lipids and hepatic function panel in 6 months. Is being given the first of 2 shingles vaccines here in the office today as well.     Review of Systems   Constitutional: Negative. HENT: Negative. Eyes: Negative. Respiratory: Negative for cough, chest tightness, shortness of breath and wheezing. Cardiovascular: Negative for chest pain, palpitations and leg swelling. Gastrointestinal: Negative for blood in stool, constipation, diarrhea, nausea and vomiting. Genitourinary: Negative for hematuria. Musculoskeletal: Positive for arthralgias and back pain. Skin: Negative. Neurological: Negative. Psychiatric/Behavioral: Negative. Objective:   Physical Exam  Vitals signs and nursing note reviewed. Constitutional:       General: He is not in acute distress. Appearance: Normal appearance. He is well-developed. He is not ill-appearing, toxic-appearing or diaphoretic. HENT:      Head: Normocephalic and atraumatic. Right Ear: Tympanic membrane, ear canal and external ear normal. There is no impacted cerumen. Left Ear: Tympanic membrane, ear canal and external ear normal. There is no impacted cerumen. Nose: Nose normal.      Mouth/Throat:      Lips: Pink. Mouth: Mucous membranes are moist.      Dentition: Normal dentition. Tongue: No lesions. Pharynx: Oropharynx is clear. Uvula midline.       Tonsils: No tonsillar exudate or tonsillar abscesses. Eyes:      General: Lids are normal.         Right eye: No discharge. Left eye: No discharge. Extraocular Movements: Extraocular movements intact. Right eye: Normal extraocular motion. Left eye: Normal extraocular motion. Conjunctiva/sclera: Conjunctivae normal.      Right eye: Right conjunctiva is not injected. Left eye: Left conjunctiva is not injected. Pupils: Pupils are equal, round, and reactive to light. Neck:      Musculoskeletal: Normal range of motion and neck supple. No neck rigidity or muscular tenderness. Thyroid: No thyromegaly. Vascular: No carotid bruit or JVD. Cardiovascular:      Rate and Rhythm: Normal rate and regular rhythm. Pulses:           Carotid pulses are 2+ on the right side and 2+ on the left side. Radial pulses are 2+ on the right side and 2+ on the left side. Heart sounds: Normal heart sounds, S1 normal and S2 normal. No murmur. No friction rub. No gallop. Pulmonary:      Effort: Pulmonary effort is normal. No accessory muscle usage or respiratory distress. Breath sounds: Normal breath sounds. No stridor. No wheezing, rhonchi or rales. Chest:      Chest wall: No tenderness. Abdominal:      General: Bowel sounds are normal. There is no distension or abdominal bruit. Palpations: Abdomen is soft. There is no mass. Tenderness: There is no abdominal tenderness. There is no right CVA tenderness, left CVA tenderness, guarding or rebound. Hernia: No hernia is present. Musculoskeletal: Normal range of motion. General: No swelling, tenderness, deformity or signs of injury. Right lower leg: No edema. Left lower leg: No edema. Lymphadenopathy:      Cervical: No cervical adenopathy. Right cervical: No superficial cervical adenopathy. Left cervical: No superficial cervical adenopathy. Skin:     General: Skin is warm and dry. Nails: There is no clubbing. Neurological:      General: No focal deficit present. Mental Status: He is alert and oriented to person, place, and time. Mental status is at baseline. Cranial Nerves: No facial asymmetry. Motor: No weakness or tremor. Coordination: Coordination normal.      Gait: Gait normal.      Deep Tendon Reflexes: Reflexes are normal and symmetric. Psychiatric:         Attention and Perception: Attention normal.         Mood and Affect: Mood normal.         Speech: Speech normal.         Behavior: Behavior normal.         Thought Content: Thought content normal.         Cognition and Memory: Memory normal.         Judgment: Judgment normal.         /70   Pulse 67   Temp 97.9 °F (36.6 °C) (Infrared)   Resp 16   Ht 6' (1.829 m)   Wt 228 lb (103.4 kg)   SpO2 95%   BMI 30.92 kg/m²   Assessment:         Diagnosis Orders   1. Hyperlipoproteinemia  Lipid Panel    Hepatic Function Panel   2. Low back pain, unspecified back pain laterality, unspecified chronicity, unspecified whether sciatica present  XR HIP RIGHT (2-3 VIEWS)    meloxicam (MOBIC) 7.5 MG tablet    triamcinolone acetonide (KENALOG-40) injection 80 mg    oxyCODONE-acetaminophen (PERCOCET)  MG per tablet    XR LUMBAR SPINE (2-3 VIEWS)    POCT Rapid Drug Screen   3. Pain of right hip joint  XR HIP RIGHT (2-3 VIEWS)    meloxicam (MOBIC) 7.5 MG tablet    triamcinolone acetonide (KENALOG-40) injection 80 mg    oxyCODONE-acetaminophen (PERCOCET)  MG per tablet    XR LUMBAR SPINE (2-3 VIEWS)   4. Chronic deep vein thrombosis (DVT) of distal vein of lower extremity, unspecified laterality (HCC)     5. Chronic GERD     6. H/O cervical spine surgery     7.  Need for shingles vaccine  zoster recombinant adjuvanted vaccine [facility]) injection 50 mcg           Plan:       I am having Mr. Hernadez Lias maintain his :   Outpatient Medications Marked as Taking for the 7/2/20 encounter (Office Visit) with Jordan Almanzar MD   Medication Sig Dispense Refill    meloxicam (MOBIC) 7.5 MG tablet Take 1 tablet by mouth daily 30 tablet 3    oxyCODONE-acetaminophen (PERCOCET)  MG per tablet Take 1 tablet by mouth every 8 hours as needed for Pain for up to 30 days. 90 tablet 0    sotalol (BETAPACE) 80 MG tablet Take 1 tablet by mouth 2 times daily 60 tablet 5    montelukast (SINGULAIR) 10 MG tablet Take 1 tablet by mouth daily 30 tablet 3    apixaban (ELIQUIS) 5 MG TABS tablet Take 1 tablet by mouth 2 times daily 60 tablet 5    aspirin 81 MG chewable tablet Take 1 tablet by mouth daily 30 tablet 3    clotrimazole-betamethasone (LOTRISONE) 1-0.05 % cream Apply topically 2 times daily. 45 g 3    ondansetron (ZOFRAN ODT) 4 MG disintegrating tablet Take 1 tablet by mouth every 8 hours as needed for Nausea or Vomiting 10 tablet 0    omeprazole (PRILOSEC) 20 MG delayed release capsule Take 1 capsule by mouth Daily 90 capsule 3    Probiotic Product (PROBIOTIC FORMULA PO) Take 1 tablet by mouth      Ascorbic Acid (VITAMIN C PO) Take 1 tablet by mouth daily      Multiple Vitamins-Minerals (MULTIVITAMIN PO) Take  by mouth daily.  vitamin B-12 (CYANOCOBALAMIN) 1000 MCG tablet Take 1,000 mcg by mouth daily      ,Patient states they are no longer utilizing these medication:   Medications Discontinued During This Encounter   Medication Reason    warfarin (COUMADIN) 5 MG tablet DISCONTINUED BY ANOTHER CLINICIAN    I have refilled the following medication today:   Requested Prescriptions     Signed Prescriptions Disp Refills    meloxicam (MOBIC) 7.5 MG tablet 30 tablet 3     Sig: Take 1 tablet by mouth daily    oxyCODONE-acetaminophen (PERCOCET)  MG per tablet 90 tablet 0     Sig: Take 1 tablet by mouth every 8 hours as needed for Pain for up to 30 days.    .       Orders Placed This Encounter   Procedures    XR HIP RIGHT (2-3 VIEWS)     Standing Status:   Future     Number of Occurrences:   1     Standing Expiration Date:   7/2/2021     Order Specific Question:   Reason for exam:     Answer:   pain    XR LUMBAR SPINE (2-3 VIEWS)     Standing Status:   Future     Number of Occurrences:   1     Standing Expiration Date:   7/2/2021     Order Specific Question:   Reason for exam:     Answer:   back pain    Lipid Panel     Standing Status:   Future     Standing Expiration Date:   7/2/2021     Order Specific Question:   Is Patient Fasting?/# of Hours     Answer:   8    Hepatic Function Panel     Standing Status:   Future     Standing Expiration Date:   7/2/2021    POCT Rapid Drug Screen     Orders Placed This Encounter   Medications    meloxicam (MOBIC) 7.5 MG tablet     Sig: Take 1 tablet by mouth daily     Dispense:  30 tablet     Refill:  3    triamcinolone acetonide (KENALOG-40) injection 80 mg    oxyCODONE-acetaminophen (PERCOCET)  MG per tablet     Sig: Take 1 tablet by mouth every 8 hours as needed for Pain for up to 30 days.      Dispense:  90 tablet     Refill:  0     Reduce doses taken as pain becomes manageable    zoster recombinant adjuvanted vaccine [facility]) injection 50 mcg             Twin Albert MD

## 2020-07-02 NOTE — PROGRESS NOTES
After obtaining consent, and per orders of Dr. Álvarez Found, injection of Kenalog given in Right upper quad. gluteus by Archana Mcfarlane. Patient instructed to remain in clinic for 20 minutes afterwards, and to report any adverse reaction to me immediately. After obtaining consent, and per orders of Dr. Álvarez Found, injection of Shingrix given in Right deltoid by Archana Mcfarlane. Patient instructed to remain in clinic for 20 minutes afterwards, and to report any adverse reaction to me immediately.

## 2020-07-02 NOTE — LETTER
MEDICATION AGREEMENT     Canary Lennox  [birth year]      For certain conditions, multiple classes of medications may be used to help better manage your symptoms, and to improve your ability to function at home, work and in social settings. However, these medications do have risks, which will be discussed with you, including addiction and dependency. The following prescribed medications need frequent monitoring and will require you to partner and assist in your healthcare. Medication  Dose, instructions and quantity as indicated on current prescription bottle Diagnosis/Reason(s) for Taking Category     Percocet 10mg/325mg q 8 prn Back pain                            Benefits and goals of Controlled Substance Medications: There are two potential goals for your treatment: (1) decreased pain and suffering (2) improved daily life functions. There are many possible treatments for your chronic condition(s), and, in addition to controlled substance medications, we will try alternatives such as physical therapy, yoga, massage, home daily exercise, meditation, relaxation techniques, injections, chiropractic manipulations, surgery, cognitive therapy, hypnosis and many medications that are not habit-forming. Use of controlled substance medications may be helpful, but they are unlikely to resolve all of your symptoms or restore all function.      Risks of Controlled Substance Medications: Benzodiazepines and non-benzodiazepine sleep medications: These medications can lead to problems such as addiction/dependence, sedation, fatigue, lightheadedness, dizziness, incoordination, falls, depression, hallucinations, and impaired judgment, memory and concentration. The ability to drive and operate machinery may also be affected. Abnormal sleep-related behaviors have been reported, including sleep walking, driving, making telephone calls, eating, or having sex while not fully awake. These medications can suppress breathing and worsen sleep apnea, particularly when combined with alcohol or other sedating medications, potentially leading to death. Dependence withdrawal symptoms may include tremors, anxiety, hallucinations and seizures. Stimulants:  Common adverse effects include addiction/dependence, increased blood pressure and heart rate, decreased appetite, nausea, involuntary weight loss, insomnia, irritability, and headaches. These risks may increase when these medications are combined with other stimulants, such as caffeine pills or energy drinks, certain weight loss supplements and oral decongestants. Dependence withdrawal symptoms may include depressed mood, loss of interest, suicidal thoughts, anxiety, fatigue, appetite changes and agitation. Testosterone replacement therapy:  Potential side effects include increased risk of stroke and heart attack, blood clots, increased blood pressure, increased cholesterol, enlarged prostate, sleep apnea, irritability/aggression and other mood disorders, and decreased fertility. Other:     1. I understand that I have the following responsibilities:  · I will take medications at the dose and frequency prescribed. · I will not increase or change how I take my medications without the approval of the health care provider who signs this Medication Agreement. · I will arrange for refills at the prescribed interval ONLY during regular office hours. I will not ask for refills earlier than agreed, after-hours, on holidays or on weekends. · I will obtain all refills for these medications at  ·  ____________________________________  pharmacy (phone number  ·  ________________________), with full consent for my provider and pharmacist to exchange information in writing or verbally. · I will not request any pain medications or controlled substances from other providers and will inform this provider of all other medications I am taking. · I will inform my other health care providers that I am taking these medications and of the existence of this [id number]. In the event of an emergency, I will provide the same information to the emergency department providers. · I will protect my prescriptions and medications. I understand that lost or misplaced prescriptions will not be replaced. · I will keep medications only for my own use and will not share them with others. I will keep all medications away from children. · I agree to participate in any medical, psychological or psychiatric assessments recommended by my provider. · I will actively participate in any program designed to improve function, including social, physical, psychological and daily or work activities. 2. I will not use illegal or street drugs or another person's prescription. If I have an addiction problem with drugs or alcohol and my provider asks me to enter a program to address this issue, I agree to follow through.  Such programs may include:  · 12-Step program and securing a sponsor  · Individual counseling   · Inpatient or outpatient treatment  · Other:_____________________________________________________________________________________________________________________________________________ If in treatment, I will request that a copy of the programs initial evaluation and treatment recommendations be sent to this provider and will not expect refills until that is received. I will also request written monthly updates be sent to this provider to verify my continuing treatment. 3. I will consent to drug screening upon my providers request to assure I am only taking the prescribed drugs, described in this MEDICATION AGREEMENT. I understand that a drug screen is a laboratory test in which a sample of my urine, blood or saliva is checked to see what drugs I have been taking. 4. I agree that I will treat the providers and staff at this office with respect at all times. I will keep all of my scheduled appointments, but if I need to cancel my appointment, I will do so a minimum of 24 hours before it is scheduled. 5. I understand that this provider may stop prescribing the medications listed if:  · I do not show any improvement in pain, or my activity has not improved. · I develop rapid tolerance or loss of improvement, as described in my treatment plan. · I develop significant side effects from the medication. · My behavior is inconsistent with the responsibilities outlined above, which may also result in my being prevented from receiving further care from this office.   · Other:____________________________________________________________________    AGREEMENT:

## 2020-07-09 DIAGNOSIS — E78.5 HYPERLIPOPROTEINEMIA: ICD-10-CM

## 2020-07-09 LAB
ALBUMIN SERPL-MCNC: 4.3 G/DL (ref 3.5–5.2)
ALP BLD-CCNC: 79 U/L (ref 40–130)
ALT SERPL-CCNC: 23 U/L (ref 5–41)
AST SERPL-CCNC: 18 U/L (ref 5–40)
BILIRUB SERPL-MCNC: 0.5 MG/DL (ref 0.2–1.2)
BILIRUBIN DIRECT: 0.2 MG/DL (ref 0–0.3)
BILIRUBIN, INDIRECT: 0.3 MG/DL (ref 0.1–1)
CHOLESTEROL, TOTAL: 196 MG/DL (ref 160–199)
HDLC SERPL-MCNC: 36 MG/DL (ref 55–121)
LDL CHOLESTEROL CALCULATED: 141 MG/DL
TOTAL PROTEIN: 7.3 G/DL (ref 6.6–8.7)
TRIGL SERPL-MCNC: 93 MG/DL (ref 0–149)

## 2020-07-10 ENCOUNTER — TELEPHONE (OUTPATIENT)
Dept: FAMILY MEDICINE CLINIC | Age: 70
End: 2020-07-10

## 2020-07-15 ENCOUNTER — OFFICE VISIT (OUTPATIENT)
Age: 70
End: 2020-07-15

## 2020-07-15 ENCOUNTER — HOSPITAL ENCOUNTER (OUTPATIENT)
Dept: PREADMISSION TESTING | Age: 70
Setting detail: OUTPATIENT SURGERY
Discharge: HOME | End: 2020-07-19
Payer: MEDICARE

## 2020-07-15 VITALS — WEIGHT: 220 LBS | BODY MASS INDEX: 29.8 KG/M2 | HEIGHT: 72 IN

## 2020-07-15 VITALS — HEART RATE: 57 BPM | OXYGEN SATURATION: 95 % | TEMPERATURE: 97.9 F

## 2020-07-15 LAB
ANION GAP SERPL CALCULATED.3IONS-SCNC: 10 MMOL/L (ref 7–19)
BASOPHILS ABSOLUTE: 0.1 K/UL (ref 0–0.2)
BASOPHILS RELATIVE PERCENT: 1 % (ref 0–1)
BUN BLDV-MCNC: 23 MG/DL (ref 8–23)
CALCIUM SERPL-MCNC: 9.5 MG/DL (ref 8.8–10.2)
CHLORIDE BLD-SCNC: 102 MMOL/L (ref 98–111)
CO2: 27 MMOL/L (ref 22–29)
CREAT SERPL-MCNC: 1 MG/DL (ref 0.5–1.2)
EKG P AXIS: 43 DEGREES
EKG P-R INTERVAL: 170 MS
EKG Q-T INTERVAL: 436 MS
EKG QRS DURATION: 84 MS
EKG QTC CALCULATION (BAZETT): 429 MS
EKG T AXIS: 38 DEGREES
EOSINOPHILS ABSOLUTE: 0.1 K/UL (ref 0–0.6)
EOSINOPHILS RELATIVE PERCENT: 2.3 % (ref 0–5)
GFR AFRICAN AMERICAN: >59
GFR NON-AFRICAN AMERICAN: >60
GLUCOSE BLD-MCNC: 115 MG/DL (ref 74–109)
HCT VFR BLD CALC: 43.8 % (ref 42–52)
HEMOGLOBIN: 14.4 G/DL (ref 14–18)
IMMATURE GRANULOCYTES #: 0 K/UL
LYMPHOCYTES ABSOLUTE: 1.6 K/UL (ref 1.1–4.5)
LYMPHOCYTES RELATIVE PERCENT: 27.3 % (ref 20–40)
MCH RBC QN AUTO: 31.4 PG (ref 27–31)
MCHC RBC AUTO-ENTMCNC: 32.9 G/DL (ref 33–37)
MCV RBC AUTO: 95.6 FL (ref 80–94)
MONOCYTES ABSOLUTE: 0.5 K/UL (ref 0–0.9)
MONOCYTES RELATIVE PERCENT: 8.5 % (ref 0–10)
NEUTROPHILS ABSOLUTE: 3.6 K/UL (ref 1.5–7.5)
NEUTROPHILS RELATIVE PERCENT: 60.6 % (ref 50–65)
PDW BLD-RTO: 13.3 % (ref 11.5–14.5)
PLATELET # BLD: 233 K/UL (ref 130–400)
PMV BLD AUTO: 10.5 FL (ref 9.4–12.4)
POTASSIUM SERPL-SCNC: 4.3 MMOL/L (ref 3.5–5)
RBC # BLD: 4.58 M/UL (ref 4.7–6.1)
SARS-COV-2, PCR: NOT DETECTED
SODIUM BLD-SCNC: 139 MMOL/L (ref 136–145)
WBC # BLD: 6 K/UL (ref 4.8–10.8)

## 2020-07-15 PROCEDURE — 93005 ELECTROCARDIOGRAM TRACING: CPT | Performed by: ORTHOPAEDIC SURGERY

## 2020-07-15 PROCEDURE — 93010 ELECTROCARDIOGRAM REPORT: CPT | Performed by: INTERNAL MEDICINE

## 2020-07-15 PROCEDURE — 80048 BASIC METABOLIC PNL TOTAL CA: CPT

## 2020-07-15 PROCEDURE — 85025 COMPLETE CBC W/AUTO DIFF WBC: CPT

## 2020-07-16 ENCOUNTER — APPOINTMENT (OUTPATIENT)
Dept: GENERAL RADIOLOGY | Age: 70
End: 2020-07-16
Attending: ORTHOPAEDIC SURGERY
Payer: MEDICARE

## 2020-07-16 ENCOUNTER — ANESTHESIA (OUTPATIENT)
Dept: OPERATING ROOM | Age: 70
End: 2020-07-16
Payer: MEDICARE

## 2020-07-16 ENCOUNTER — ANESTHESIA EVENT (OUTPATIENT)
Dept: OPERATING ROOM | Age: 70
End: 2020-07-16
Payer: MEDICARE

## 2020-07-16 ENCOUNTER — HOSPITAL ENCOUNTER (OUTPATIENT)
Age: 70
Setting detail: OUTPATIENT SURGERY
Discharge: HOME | End: 2020-07-16
Attending: ORTHOPAEDIC SURGERY | Admitting: ORTHOPAEDIC SURGERY
Payer: MEDICARE

## 2020-07-16 VITALS
BODY MASS INDEX: 29.8 KG/M2 | HEIGHT: 72 IN | OXYGEN SATURATION: 95 % | DIASTOLIC BLOOD PRESSURE: 80 MMHG | HEART RATE: 53 BPM | RESPIRATION RATE: 16 BRPM | WEIGHT: 220 LBS | TEMPERATURE: 97.2 F | SYSTOLIC BLOOD PRESSURE: 153 MMHG

## 2020-07-16 VITALS
TEMPERATURE: 96.8 F | RESPIRATION RATE: 5 BRPM | DIASTOLIC BLOOD PRESSURE: 72 MMHG | SYSTOLIC BLOOD PRESSURE: 157 MMHG | OXYGEN SATURATION: 97 %

## 2020-07-16 PROCEDURE — 3700000001 HC ADD 15 MINUTES (ANESTHESIA): Performed by: ORTHOPAEDIC SURGERY

## 2020-07-16 PROCEDURE — 7100000001 HC PACU RECOVERY - ADDTL 15 MIN: Performed by: ORTHOPAEDIC SURGERY

## 2020-07-16 PROCEDURE — 6360000002 HC RX W HCPCS: Performed by: NURSE ANESTHETIST, CERTIFIED REGISTERED

## 2020-07-16 PROCEDURE — 7100000011 HC PHASE II RECOVERY - ADDTL 15 MIN: Performed by: ORTHOPAEDIC SURGERY

## 2020-07-16 PROCEDURE — 2580000003 HC RX 258: Performed by: ANESTHESIOLOGY

## 2020-07-16 PROCEDURE — 6360000002 HC RX W HCPCS: Performed by: ANESTHESIOLOGY

## 2020-07-16 PROCEDURE — 64415 NJX AA&/STRD BRCH PLXS IMG: CPT | Performed by: NURSE ANESTHETIST, CERTIFIED REGISTERED

## 2020-07-16 PROCEDURE — 6360000002 HC RX W HCPCS: Performed by: ORTHOPAEDIC SURGERY

## 2020-07-16 PROCEDURE — 7100000000 HC PACU RECOVERY - FIRST 15 MIN: Performed by: ORTHOPAEDIC SURGERY

## 2020-07-16 PROCEDURE — C1713 ANCHOR/SCREW BN/BN,TIS/BN: HCPCS | Performed by: ORTHOPAEDIC SURGERY

## 2020-07-16 PROCEDURE — 3600000004 HC SURGERY LEVEL 4 BASE: Performed by: ORTHOPAEDIC SURGERY

## 2020-07-16 PROCEDURE — 3700000000 HC ANESTHESIA ATTENDED CARE: Performed by: ORTHOPAEDIC SURGERY

## 2020-07-16 PROCEDURE — 2709999900 HC NON-CHARGEABLE SUPPLY: Performed by: ORTHOPAEDIC SURGERY

## 2020-07-16 PROCEDURE — 2500000003 HC RX 250 WO HCPCS: Performed by: ANESTHESIOLOGY

## 2020-07-16 PROCEDURE — 2500000003 HC RX 250 WO HCPCS: Performed by: NURSE ANESTHETIST, CERTIFIED REGISTERED

## 2020-07-16 PROCEDURE — 2720000010 HC SURG SUPPLY STERILE: Performed by: ORTHOPAEDIC SURGERY

## 2020-07-16 PROCEDURE — 3209999900 FLUORO FOR SURGICAL PROCEDURES

## 2020-07-16 PROCEDURE — 3600000014 HC SURGERY LEVEL 4 ADDTL 15MIN: Performed by: ORTHOPAEDIC SURGERY

## 2020-07-16 PROCEDURE — 6360000002 HC RX W HCPCS

## 2020-07-16 PROCEDURE — 7100000010 HC PHASE II RECOVERY - FIRST 15 MIN: Performed by: ORTHOPAEDIC SURGERY

## 2020-07-16 PROCEDURE — 73070 X-RAY EXAM OF ELBOW: CPT

## 2020-07-16 DEVICE — SYSTEM IMPL DST BICEPS REP DEL W/ BICEPS BTTN 7X10MM PEEK: Type: IMPLANTABLE DEVICE | Site: ARM | Status: FUNCTIONAL

## 2020-07-16 RX ORDER — HYDROMORPHONE HYDROCHLORIDE 1 MG/ML
0.5 INJECTION, SOLUTION INTRAMUSCULAR; INTRAVENOUS; SUBCUTANEOUS EVERY 5 MIN PRN
Status: DISCONTINUED | OUTPATIENT
Start: 2020-07-16 | End: 2020-07-16 | Stop reason: HOSPADM

## 2020-07-16 RX ORDER — MEPERIDINE HYDROCHLORIDE 50 MG/ML
12.5 INJECTION INTRAMUSCULAR; INTRAVENOUS; SUBCUTANEOUS EVERY 5 MIN PRN
Status: DISCONTINUED | OUTPATIENT
Start: 2020-07-16 | End: 2020-07-16 | Stop reason: HOSPADM

## 2020-07-16 RX ORDER — LIDOCAINE HYDROCHLORIDE 10 MG/ML
1 INJECTION, SOLUTION EPIDURAL; INFILTRATION; INTRACAUDAL; PERINEURAL
Status: COMPLETED | OUTPATIENT
Start: 2020-07-16 | End: 2020-07-16

## 2020-07-16 RX ORDER — SODIUM CHLORIDE 0.9 % (FLUSH) 0.9 %
10 SYRINGE (ML) INJECTION EVERY 12 HOURS SCHEDULED
Status: DISCONTINUED | OUTPATIENT
Start: 2020-07-16 | End: 2020-07-16 | Stop reason: HOSPADM

## 2020-07-16 RX ORDER — SODIUM CHLORIDE, SODIUM LACTATE, POTASSIUM CHLORIDE, CALCIUM CHLORIDE 600; 310; 30; 20 MG/100ML; MG/100ML; MG/100ML; MG/100ML
INJECTION, SOLUTION INTRAVENOUS CONTINUOUS
Status: DISCONTINUED | OUTPATIENT
Start: 2020-07-16 | End: 2020-07-16 | Stop reason: HOSPADM

## 2020-07-16 RX ORDER — FENTANYL CITRATE 50 UG/ML
25 INJECTION, SOLUTION INTRAMUSCULAR; INTRAVENOUS
Status: DISCONTINUED | OUTPATIENT
Start: 2020-07-16 | End: 2020-07-16 | Stop reason: HOSPADM

## 2020-07-16 RX ORDER — FENTANYL CITRATE 50 UG/ML
50 INJECTION, SOLUTION INTRAMUSCULAR; INTRAVENOUS
Status: DISCONTINUED | OUTPATIENT
Start: 2020-07-16 | End: 2020-07-16 | Stop reason: HOSPADM

## 2020-07-16 RX ORDER — OXYCODONE HYDROCHLORIDE 5 MG/1
5 TABLET ORAL EVERY 4 HOURS PRN
Qty: 30 TABLET | Refills: 0 | Status: SHIPPED | OUTPATIENT
Start: 2020-07-16 | End: 2020-07-19

## 2020-07-16 RX ORDER — ENALAPRILAT 2.5 MG/2ML
1.25 INJECTION INTRAVENOUS
Status: DISCONTINUED | OUTPATIENT
Start: 2020-07-16 | End: 2020-07-16 | Stop reason: HOSPADM

## 2020-07-16 RX ORDER — ONDANSETRON 2 MG/ML
INJECTION INTRAMUSCULAR; INTRAVENOUS PRN
Status: DISCONTINUED | OUTPATIENT
Start: 2020-07-16 | End: 2020-07-16 | Stop reason: SDUPTHER

## 2020-07-16 RX ORDER — MIDAZOLAM HYDROCHLORIDE 1 MG/ML
2 INJECTION INTRAMUSCULAR; INTRAVENOUS
Status: COMPLETED | OUTPATIENT
Start: 2020-07-16 | End: 2020-07-16

## 2020-07-16 RX ORDER — MORPHINE SULFATE 4 MG/ML
2 INJECTION, SOLUTION INTRAMUSCULAR; INTRAVENOUS EVERY 5 MIN PRN
Status: DISCONTINUED | OUTPATIENT
Start: 2020-07-16 | End: 2020-07-16 | Stop reason: HOSPADM

## 2020-07-16 RX ORDER — ROCURONIUM BROMIDE 10 MG/ML
INJECTION, SOLUTION INTRAVENOUS PRN
Status: DISCONTINUED | OUTPATIENT
Start: 2020-07-16 | End: 2020-07-16 | Stop reason: SDUPTHER

## 2020-07-16 RX ORDER — GLYCOPYRROLATE 0.6MG/3ML
SYRINGE (ML) INTRAVENOUS PRN
Status: DISCONTINUED | OUTPATIENT
Start: 2020-07-16 | End: 2020-07-16 | Stop reason: SDUPTHER

## 2020-07-16 RX ORDER — METOCLOPRAMIDE HYDROCHLORIDE 5 MG/ML
10 INJECTION INTRAMUSCULAR; INTRAVENOUS
Status: DISCONTINUED | OUTPATIENT
Start: 2020-07-16 | End: 2020-07-16 | Stop reason: HOSPADM

## 2020-07-16 RX ORDER — SODIUM CHLORIDE 0.9 % (FLUSH) 0.9 %
10 SYRINGE (ML) INJECTION PRN
Status: DISCONTINUED | OUTPATIENT
Start: 2020-07-16 | End: 2020-07-16 | Stop reason: HOSPADM

## 2020-07-16 RX ORDER — ROPIVACAINE HYDROCHLORIDE 5 MG/ML
INJECTION, SOLUTION EPIDURAL; INFILTRATION; PERINEURAL
Status: COMPLETED | OUTPATIENT
Start: 2020-07-16 | End: 2020-07-16

## 2020-07-16 RX ORDER — PROMETHAZINE HYDROCHLORIDE 25 MG/ML
6.25 INJECTION, SOLUTION INTRAMUSCULAR; INTRAVENOUS
Status: DISCONTINUED | OUTPATIENT
Start: 2020-07-16 | End: 2020-07-16 | Stop reason: HOSPADM

## 2020-07-16 RX ORDER — MORPHINE SULFATE 4 MG/ML
4 INJECTION, SOLUTION INTRAMUSCULAR; INTRAVENOUS EVERY 5 MIN PRN
Status: DISCONTINUED | OUTPATIENT
Start: 2020-07-16 | End: 2020-07-16 | Stop reason: HOSPADM

## 2020-07-16 RX ORDER — SODIUM CHLORIDE 9 MG/ML
INJECTION, SOLUTION INTRAVENOUS CONTINUOUS
Status: DISCONTINUED | OUTPATIENT
Start: 2020-07-16 | End: 2020-07-16 | Stop reason: HOSPADM

## 2020-07-16 RX ORDER — DIPHENHYDRAMINE HYDROCHLORIDE 50 MG/ML
12.5 INJECTION INTRAMUSCULAR; INTRAVENOUS
Status: DISCONTINUED | OUTPATIENT
Start: 2020-07-16 | End: 2020-07-16 | Stop reason: HOSPADM

## 2020-07-16 RX ORDER — IBUPROFEN 400 MG/1
400 TABLET ORAL EVERY 8 HOURS PRN
Qty: 30 TABLET | Refills: 0 | Status: SHIPPED | OUTPATIENT
Start: 2020-07-16 | End: 2020-11-10 | Stop reason: ALTCHOICE

## 2020-07-16 RX ORDER — MIDAZOLAM HYDROCHLORIDE 1 MG/ML
INJECTION INTRAMUSCULAR; INTRAVENOUS
Status: COMPLETED
Start: 2020-07-16 | End: 2020-07-16

## 2020-07-16 RX ORDER — HYDRALAZINE HYDROCHLORIDE 20 MG/ML
5 INJECTION INTRAMUSCULAR; INTRAVENOUS EVERY 10 MIN PRN
Status: DISCONTINUED | OUTPATIENT
Start: 2020-07-16 | End: 2020-07-16 | Stop reason: HOSPADM

## 2020-07-16 RX ORDER — PROPOFOL 10 MG/ML
INJECTION, EMULSION INTRAVENOUS PRN
Status: DISCONTINUED | OUTPATIENT
Start: 2020-07-16 | End: 2020-07-16 | Stop reason: SDUPTHER

## 2020-07-16 RX ORDER — LABETALOL HYDROCHLORIDE 5 MG/ML
5 INJECTION, SOLUTION INTRAVENOUS EVERY 10 MIN PRN
Status: DISCONTINUED | OUTPATIENT
Start: 2020-07-16 | End: 2020-07-16 | Stop reason: HOSPADM

## 2020-07-16 RX ORDER — HYDROMORPHONE HYDROCHLORIDE 1 MG/ML
0.25 INJECTION, SOLUTION INTRAMUSCULAR; INTRAVENOUS; SUBCUTANEOUS EVERY 5 MIN PRN
Status: DISCONTINUED | OUTPATIENT
Start: 2020-07-16 | End: 2020-07-16 | Stop reason: HOSPADM

## 2020-07-16 RX ADMIN — Medication 0.1 MG: at 07:36

## 2020-07-16 RX ADMIN — ROCURONIUM BROMIDE 10 MG: 10 INJECTION, SOLUTION INTRAVENOUS at 08:13

## 2020-07-16 RX ADMIN — Medication 0.6 MG: at 08:38

## 2020-07-16 RX ADMIN — PROPOFOL 200 MG: 10 INJECTION, EMULSION INTRAVENOUS at 07:07

## 2020-07-16 RX ADMIN — Medication 0.1 MG: at 07:56

## 2020-07-16 RX ADMIN — ONDANSETRON HYDROCHLORIDE 4 MG: 2 INJECTION, SOLUTION INTRAMUSCULAR; INTRAVENOUS at 08:35

## 2020-07-16 RX ADMIN — ROCURONIUM BROMIDE 30 MG: 10 INJECTION, SOLUTION INTRAVENOUS at 07:07

## 2020-07-16 RX ADMIN — NEOSTIGMINE METHYLSULFATE 3 MG: 1 INJECTION, SOLUTION INTRAMUSCULAR; INTRAVENOUS; SUBCUTANEOUS at 08:38

## 2020-07-16 RX ADMIN — SODIUM CHLORIDE, SODIUM LACTATE, POTASSIUM CHLORIDE, AND CALCIUM CHLORIDE: 600; 310; 30; 20 INJECTION, SOLUTION INTRAVENOUS at 06:01

## 2020-07-16 RX ADMIN — SODIUM CHLORIDE, SODIUM LACTATE, POTASSIUM CHLORIDE, AND CALCIUM CHLORIDE: 600; 310; 30; 20 INJECTION, SOLUTION INTRAVENOUS at 07:02

## 2020-07-16 RX ADMIN — FENTANYL CITRATE 25 MCG: 50 INJECTION INTRAMUSCULAR; INTRAVENOUS at 07:26

## 2020-07-16 RX ADMIN — LIDOCAINE HYDROCHLORIDE 50 MG: 10 INJECTION, SOLUTION EPIDURAL; INFILTRATION; INTRACAUDAL; PERINEURAL at 07:07

## 2020-07-16 RX ADMIN — ONDANSETRON HYDROCHLORIDE 4 MG: 2 INJECTION, SOLUTION INTRAMUSCULAR; INTRAVENOUS at 07:28

## 2020-07-16 RX ADMIN — ROPIVACAINE HYDROCHLORIDE 30 ML: 5 INJECTION, SOLUTION EPIDURAL; INFILTRATION; PERINEURAL at 06:50

## 2020-07-16 RX ADMIN — FENTANYL CITRATE 50 MCG: 50 INJECTION INTRAMUSCULAR; INTRAVENOUS at 08:13

## 2020-07-16 RX ADMIN — Medication 2 G: at 07:15

## 2020-07-16 RX ADMIN — MIDAZOLAM HYDROCHLORIDE 2 MG: 1 INJECTION INTRAMUSCULAR; INTRAVENOUS at 06:43

## 2020-07-16 RX ADMIN — MIDAZOLAM 2 MG: 1 INJECTION INTRAMUSCULAR; INTRAVENOUS at 06:43

## 2020-07-16 RX ADMIN — FENTANYL CITRATE 25 MCG: 50 INJECTION INTRAMUSCULAR; INTRAVENOUS at 07:28

## 2020-07-16 ASSESSMENT — PAIN DESCRIPTION - PAIN TYPE
TYPE: SURGICAL PAIN
TYPE: SURGICAL PAIN

## 2020-07-16 ASSESSMENT — ENCOUNTER SYMPTOMS: SHORTNESS OF BREATH: 0

## 2020-07-16 ASSESSMENT — LIFESTYLE VARIABLES: SMOKING_STATUS: 0

## 2020-07-16 ASSESSMENT — PAIN SCALES - GENERAL
PAINLEVEL_OUTOF10: 0
PAINLEVEL_OUTOF10: 0

## 2020-07-16 NOTE — OP NOTE
SHAISTA vChatter OF [facility] JOSE ENRIQUE [address], [address] [facility]                                OPERATIVE REPORT    PATIENT NAME: Shashank Cardoza                :        1950  MED REC NO:   [id number]                              ROOM:  ACCOUNT NO:   [de-identified]                           ADMIT DATE: 2020  PROVIDER:     Cherelle Gaviria MD    DATE OF PROCEDURE:  2020    PREOPERATIVE DIAGNOSIS:  Right distal biceps tendon rupture. POSTOPERATIVE DIAGNOSIS:  Right distal biceps tendon rupture. PROCEDURE:  Right biceps tendon repair with the Arthrex biceps  EndoButton tension-slide technique. SURGEON:  Cherelle Gaviria MD    ASSISTANT:  Michaela Alberts    ANESTHESIA:  General with interscalene block. ESTIMATED BLOOD LOSS:  Minimal.    TOURNIQUET TIME:  None. COMPLICATIONS:  None. FINDINGS:  None. PROCEDURE:  The patient was brought into the operating room, placed in  the supine position on the operating room table. General endotracheal  anesthetic was placed, given 2 gm of Ancef IV. The right upper  extremity was prepped and draped with chlorhexidine and alcohol in  standard draping technique. A transverse incision was made over just  about 3 cm distal to the elbow flexion crease transversely. Careful  dissection was carried downward through dermis. Lateral cutaneous nerve  branch protected. Interval between the brachioradialis and brachialis  was developed by finger dissection down to the radial tuberosity. Retractors were placed, care to stay directly on bone to avoid injuring  the posterior interosseous nerve. The biceps tendon was identified. It  was completely torn. It was debrided back to 7 mm in diameter with the  Bovie back to healthy tendon tissue. The FiberLoop was then used to  create a baseball whipstitch in the distal 1 inch of the tendon.   The  FiberLoop was then cut at its distal end and the two suture limbs  hemostated. We then went back to the radial tuberosity, we cleaned that  up with the Bovie, placed the Steinmann pin centered on the bicipital  tuberosity, advanced it perpendicular to the bone, aimed a little bit  ulnarly and passed it through both cortices. We then over-reamed that  with an 8 mm reamer just to the near cortex. We then thoroughly  irrigated and cleaned all the bone debris out. We checked with our  C-arm when we placed that pin just to make sure it was in the right  position. At this point, we got the EndoButton on its  and  passed the suture limbs through it as described in the techniques  manual, removed the pin and passed the EndoButton down through the far  cortex hole and pulled back with both suture limbs to lock the  EndoButton in place. We then flexed the elbow little bit and pulled the  tendon down into the repair to a centimeter marked depth we had marked  on the tendon with a marking pen. Once in place, we then passed the  needle through the tendon at the bone-tendon junction and tied it back  to the other suture limb with 3 knots. We then placed one of the limbs  over a 7 mm PEEK anchor, and then on the radial side of the hole we  placed that anchor, screwed it into place flush with the cortical bone,  it gave excellent fixation of the tendon. The suture limb through this  PEEK anchor was then tied to the other suture limb with 3 knots, sutures  cut. Brought in C-arm, made sure everything was in good position. The  elbow went through a full range of motion including full supination,  pronation and the tendon was completely intact, nice and had good  tension. Wound was irrigated. Subcu closed with 2-0 Vicryl, skin  closed with 3-0 Vicryl and Prineo. Sterile dressings applied. He was  awakened, extubated and transferred to the recovery room in stable  condition. He will be in an arm sling for 2 weeks, no lifting more than  a coffee cup.   He can take his bandage off tomorrow.         Domonique Hinton MD    D: 07/16/2020 9:41:58      T: 07/16/2020 10:20:57     [id number]  Job#: [id number]     Doc#: [id number]    CC:

## 2020-07-16 NOTE — ANESTHESIA PRE PROCEDURE
10/30/2009    [place]: hyperplastic polyp    COLONOSCOPY  02/2015    hyperplastic polyp (5 yr)    DILATATION, ESOPHAGUS      ENDOSCOPY, COLON, DIAGNOSTIC      EYE SURGERY      FOOT SURGERY  01/2014    Left foot spur removal    LITHOTRIPSY Left 12/29/2015    ESWL EXTRACORPEAL SHOCK WAVE LITHOTRIPSY performed by Gail Fritz MD at [facility]  11/2015    PILONIDAL CYST EXCISION      [place] COLONOSCOPY FLX DX W/COLLJ SPEC WHEN PFRMD N/A 5/8/2017    Dr Elise Yanez sided diverticulosis-5 yr recall    [place] EGD TRANSORAL BIOPSY SINGLE/MULTIPLE N/A 3/26/2018    Dr Ng-w/dilation over wire-51 [place]-esophageal stricture-Garces's (+) dysplasia (-)--1 yr recall    SHOULDER SURGERY  2007    right after fall, detached the subclavicle muscle    [address]    Neck  surgery     UPPER GASTROINTESTINAL ENDOSCOPY N/A 2/12/2018    Dr Magaly Mera Grade B esophagitis, hiatal hernia, stricture-(-) Марина, (+) Garces's (+) low grade dysplasia, active esophagogastritis, repeat: 3/26/18    UPPER GASTROINTESTINAL ENDOSCOPY  3/26/2018    Dr Ng-w/dilation over wire-51 [place]-esophageal stricture-Garces's (+) dysplasia (-)--1 yr recall    UPPER GASTROINTESTINAL ENDOSCOPY N/A 5/2/2019    Dr Ricardo Jimenez (+) Garces's, (-) dysplasia       Social History:    Social History     Tobacco Use    Smoking status: Never Smoker    Smokeless tobacco: Never Used   Substance Use Topics    Alcohol use:  No                                Counseling given: Not Answered      Vital Signs (Current):   Vitals:    07/16/20 0553   BP: 129/79   Pulse: 52   Resp: 18   Temp: 97.9 °F (36.6 °C)   TempSrc: Temporal   SpO2: 98%   Weight: 220 lb (99.8 kg)   Height: 6' (1.829 m)                                              BP Readings from Last 3 Encounters:   07/16/20 129/79   07/02/20 130/70   04/02/20 (!) 140/80       NPO Status: Time of last liquid consumption: 0000                        Time of last solid consumption: 0000 Date of last liquid consumption: 07/15/20                        Date of last solid food consumption: 07/15/20    BMI:   Wt Readings from Last 3 Encounters:   07/16/20 220 lb (99.8 kg)   07/15/20 220 lb (99.8 kg)   07/02/20 228 lb (103.4 kg)     Body mass index is 29.84 kg/m². CBC:   Lab Results   Component Value Date    WBC 6.0 07/15/2020    RBC 4.58 07/15/2020    HGB 14.4 07/15/2020    HCT 43.8 07/15/2020    MCV 95.6 07/15/2020    RDW 13.3 07/15/2020     07/15/2020       CMP:   Lab Results   Component Value Date     07/15/2020    K 4.3 07/15/2020    K 4.2 01/25/2020     07/15/2020    CO2 27 07/15/2020    BUN 23 07/15/2020    CREATININE 1.0 07/15/2020    GFRAA >59 07/15/2020    LABGLOM >60 07/15/2020    GLUCOSE 115 07/15/2020    PROT 7.3 07/09/2020    CALCIUM 9.5 07/15/2020    BILITOT 0.5 07/09/2020    ALKPHOS 79 07/09/2020    AST 18 07/09/2020    ALT 23 07/09/2020       POC Tests: No results for input(s): POCGLU, POCNA, POCK, POCCL, POCBUN, POCHEMO, POCHCT in the last 72 hours.     Coags:   Lab Results   Component Value Date    PROTIME 15.1 01/25/2020    INR 1.2 01/28/2020    APTT 30.6 12/28/2015       HCG (If Applicable): No results found for: PREGTESTUR, PREGSERUM, HCG, HCGQUANT     ABGs: No results found for: PHART, PO2ART, JSW8BGV, QOR5XEU, BEART, U8PAXCZH     Type & Screen (If Applicable):  No results found for: LABABO, LABRH    Drug/Infectious Status (If Applicable):  No results found for: HIV, HEPCAB    COVID-19 Screening (If Applicable):   Lab Results   Component Value Date    COVID19 Not Detected 07/15/2020         Anesthesia Evaluation  Patient summary reviewed and Nursing notes reviewed no history of anesthetic complications:   Airway: Mallampati: I  TM distance: >3 FB   Neck ROM: limited  Mouth opening: > = 3 FB Dental:          Pulmonary:       (-) shortness of breath, sleep apnea and not a current smoker                           Cardiovascular:  Exercise tolerance: good (>4 METS),   (+) hypertension:, dysrhythmias (rate controlled with sotalol): atrial fibrillation,     (-) valvular problems/murmurs, past MI, CAD, CABG/stent and  angina          Echocardiogram reviewed  Stress test reviewed       Beta Blocker:  Dose within 24 Hrs      ROS comment: Stress test:  Findings:   1. Analysis of the stress and rest images reveals no obvious areas of   ischemia or infarction. 2. Analysis of the gated images reveals grossly normal left   ventricular function with a calculated ejection fraction of 62 %. Conclusions:   Grossly negative Cardiolyte for the presence of ischemia or infarction    VAISHNAVI:  Findings:     1.  The aortic root was not well visualized. 2.  The aortic valve was tri leaflet.  There was no significant aortic stenosis, there was no aortic insufficiency. 3.  The mitral valve leaflets were of normal thickness and mobility.  There was no significant mitral stenosis, there was mild mitral regurgitation. 4.  The left atrium was not well visualized.    5.  The left ventricle was of normal size with an estimated ejection fraction of > 60%. 6. Radha Dart was noted to no thrombus in the appendage, nor is there atrial \"smoke\"  7.  No significant pericardial effusion was detected. 8.  There was no intra cardiac communication noted with agitated saline injection.     Complications:  None     Impression:     This transesophageal echocardiogram revealed:     1.   No obvious intra cardiac communication or thrombus noted     Neuro/Psych:      (-) seizures and CVA           GI/Hepatic/Renal:   (+) GERD: well controlled,      (-) liver disease and no renal disease       Endo/Other:    (+) blood dyscrasia (elequis use stopped 2 days ago)::., .    (-) diabetes mellitus, no electrolyte abnormalities               Abdominal:           Vascular:     - DVT and PE. Anesthesia Plan      general and regional     ASA 3     (Very limited neck extension. Supraclavicular block.)  Induction: intravenous. MIPS: Postoperative opioids intended and Prophylactic antiemetics administered. Anesthetic plan and risks discussed with patient.                       Alda Vega DO   7/16/2020

## 2020-07-16 NOTE — ANESTHESIA PROCEDURE NOTES
Peripheral Block    Patient location during procedure: holding area  Start time: 7/16/2020 6:50 AM  End time: 7/16/2020 6:50 AM  Staffing  Anesthesiologist: Liliana Ayers DO  Performed: anesthesiologist   Preanesthetic Checklist  Completed: patient identified, site marked, surgical consent, pre-op evaluation, timeout performed, IV checked, risks and benefits discussed, monitors and equipment checked, anesthesia consent given, oxygen available and patient being monitored  Peripheral Block  Patient position: supine  Prep: ChloraPrep  Patient monitoring: cardiac monitor, continuous pulse ox, frequent blood pressure checks and IV access  Block type: Brachial plexus  Laterality: right  Injection technique: single-shot  Procedures: ultrasound guided  Supraclavicular  Provider prep: sterile gloves and mask  Needle  Needle type: combined needle/nerve stimulator   Needle gauge: 22 G  Needle length: 5 cm  Needle localization: ultrasound guidance  Test dose: negative  Assessment  Injection assessment: negative aspiration for heme, no paresthesia on injection and local visualized surrounding nerve on ultrasound  Paresthesia pain: none  Slow fractionated injection: yes  Hemodynamics: stable  Additional Notes  Needle was introduced aprroximately the C5-C6 region and using a inplane imaging technique the needle was directed thru the middle scalene muscle and brought to bear adjacent to the brachial plexus. Needle advancement was under direct vision at all times . Local Anesthesia was injected and all vascular structures were avoided. The local anesthetic hydrodissected in a perineural fashion. Ropivicaine 0.5% injected in divided doses, total of 30 cc injected. The plexus appeared anatomically normal and no obvious pathology was noted.    Medications Administered  Ropivacaine (NAROPIN) injection 0.5%, 30 mL  Reason for block: post-op pain management

## 2020-07-16 NOTE — ANESTHESIA POSTPROCEDURE EVALUATION
Department of Anesthesiology  Postprocedure Note    Patient: Audrey Donohue  MRN: [id number]  [place]: 1950  Date of evaluation: 7/16/2020  Time:  9:01 AM     Procedure Summary     Date:  07/16/20 Room / Location:  [address]    Anesthesia Start:  8739 Anesthesia Stop:  0901    Procedure:  RIGHT BICEPS TENDON REPAIR (Right Arm Upper) Diagnosis:  (M25.521)    Surgeon:  Maicol Iglesias MD Responsible Provider:  LUIS Avilez CRNA    Anesthesia Type:  general, regional ASA Status:  3          Anesthesia Type: general, regional    Ari Phase I: Ari Score: 6    Ari Phase II:      Last vitals: Reviewed and per EMR flowsheets.        Anesthesia Post Evaluation    Patient location during evaluation: PACU  Patient participation: waiting for patient participation  Level of consciousness: sleepy but conscious  Pain score: 0  Airway patency: patent  Nausea & Vomiting: no nausea and no vomiting  Complications: no  Cardiovascular status: hemodynamically stable  Respiratory status: acceptable  Hydration status: euvolemic

## 2020-08-04 ENCOUNTER — HOSPITAL ENCOUNTER (OUTPATIENT)
Dept: MRI IMAGING | Age: 70
Discharge: HOME | End: 2020-08-04
Payer: MEDICARE

## 2020-08-04 PROCEDURE — 72148 MRI LUMBAR SPINE W/O DYE: CPT

## 2020-08-06 ENCOUNTER — TELEPHONE (OUTPATIENT)
Dept: FAMILY MEDICINE CLINIC | Age: 70
End: 2020-08-06

## 2020-09-29 ENCOUNTER — HOSPITAL ENCOUNTER (OUTPATIENT)
Dept: PAIN MANAGEMENT | Age: 70
Discharge: HOME | End: 2020-09-29
Payer: MEDICARE

## 2020-09-29 VITALS
DIASTOLIC BLOOD PRESSURE: 98 MMHG | SYSTOLIC BLOOD PRESSURE: 155 MMHG | OXYGEN SATURATION: 92 % | TEMPERATURE: 97.6 F | HEART RATE: 78 BPM | RESPIRATION RATE: 18 BRPM

## 2020-09-29 PROCEDURE — 3209999900 FLUORO FOR SURGICAL PROCEDURES

## 2020-09-29 PROCEDURE — 2580000003 HC RX 258

## 2020-09-29 PROCEDURE — 6360000002 HC RX W HCPCS

## 2020-09-29 PROCEDURE — 62323 NJX INTERLAMINAR LMBR/SAC: CPT

## 2020-09-29 PROCEDURE — 2500000003 HC RX 250 WO HCPCS

## 2020-09-29 RX ORDER — LIDOCAINE HYDROCHLORIDE 10 MG/ML
INJECTION, SOLUTION EPIDURAL; INFILTRATION; INTRACAUDAL; PERINEURAL
Status: COMPLETED | OUTPATIENT
Start: 2020-09-29 | End: 2020-09-29

## 2020-09-29 RX ORDER — METHYLPREDNISOLONE ACETATE 80 MG/ML
INJECTION, SUSPENSION INTRA-ARTICULAR; INTRALESIONAL; INTRAMUSCULAR; SOFT TISSUE
Status: COMPLETED | OUTPATIENT
Start: 2020-09-29 | End: 2020-09-29

## 2020-09-29 RX ORDER — SODIUM CHLORIDE 9 MG/ML
INJECTION INTRAVENOUS
Status: COMPLETED | OUTPATIENT
Start: 2020-09-29 | End: 2020-09-29

## 2020-09-29 RX ADMIN — SODIUM CHLORIDE 5 ML: 9 INJECTION INTRAVENOUS at 08:17

## 2020-09-29 RX ADMIN — METHYLPREDNISOLONE ACETATE 80 MG: 80 INJECTION, SUSPENSION INTRA-ARTICULAR; INTRALESIONAL; INTRAMUSCULAR; SOFT TISSUE at 08:17

## 2020-09-29 RX ADMIN — LIDOCAINE HYDROCHLORIDE 5 ML: 10 INJECTION, SOLUTION EPIDURAL; INFILTRATION; INTRACAUDAL; PERINEURAL at 08:16

## 2020-09-29 ASSESSMENT — PAIN SCALES - GENERAL: PAINLEVEL_OUTOF10: 1

## 2020-09-29 ASSESSMENT — PAIN DESCRIPTION - ONSET: ONSET: AWAKENED FROM SLEEP

## 2020-09-29 ASSESSMENT — PAIN DESCRIPTION - ORIENTATION: ORIENTATION: LEFT

## 2020-09-29 ASSESSMENT — PAIN DESCRIPTION - PAIN TYPE: TYPE: CHRONIC PAIN

## 2020-09-29 ASSESSMENT — PAIN DESCRIPTION - DESCRIPTORS: DESCRIPTORS: SORE

## 2020-09-29 ASSESSMENT — PAIN DESCRIPTION - FREQUENCY: FREQUENCY: CONTINUOUS

## 2020-09-29 ASSESSMENT — PAIN - FUNCTIONAL ASSESSMENT: PAIN_FUNCTIONAL_ASSESSMENT: PREVENTS OR INTERFERES SOME ACTIVE ACTIVITIES AND ADLS

## 2020-09-29 ASSESSMENT — PAIN DESCRIPTION - PROGRESSION: CLINICAL_PROGRESSION: GRADUALLY WORSENING

## 2020-09-29 ASSESSMENT — PAIN DESCRIPTION - LOCATION: LOCATION: BACK;LEG

## 2020-09-29 NOTE — INTERVAL H&P NOTE
Update History & Physical    The patient's History and Physical  was reviewed with the patient and I examined the patient. There was  NO [id number]}. The surgical site was confirmed by the patient and me. Plan: The risks, benefits, expected outcome, and alternative to the recommended procedure have been discussed with the patient. Patient understands and wants to proceed with the procedure.      Electronically signed by Sadiq Nair MD on 9/29/2020 at 8:22 AM

## 2020-10-02 ENCOUNTER — NURSE ONLY (OUTPATIENT)
Dept: FAMILY MEDICINE CLINIC | Age: 70
End: 2020-10-02

## 2020-10-02 NOTE — PROGRESS NOTES
After obtaining consent, and per orders of Billie Boateng, injection of Shingrix given in Right deltoid by Margaret Enriqueist. Patient instructed to remain in clinic for 20 minutes afterwards, and to report any adverse reaction to me immediately.

## 2020-10-26 ENCOUNTER — OFFICE VISIT (OUTPATIENT)
Dept: CARDIOLOGY | Age: 70
End: 2020-10-26
Payer: MEDICARE

## 2020-10-26 VITALS
SYSTOLIC BLOOD PRESSURE: 126 MMHG | HEIGHT: 72 IN | HEART RATE: 54 BPM | WEIGHT: 221 LBS | OXYGEN SATURATION: 97 % | BODY MASS INDEX: 29.93 KG/M2 | DIASTOLIC BLOOD PRESSURE: 78 MMHG

## 2020-10-26 PROCEDURE — 93000 ELECTROCARDIOGRAM COMPLETE: CPT | Performed by: INTERNAL MEDICINE

## 2020-10-26 PROCEDURE — 99213 OFFICE O/P EST LOW 20 MIN: CPT | Performed by: INTERNAL MEDICINE

## 2020-10-26 RX ORDER — SOTALOL HYDROCHLORIDE 80 MG/1
80 TABLET ORAL 2 TIMES DAILY
Qty: 60 TABLET | Refills: 5 | Status: SHIPPED | OUTPATIENT
Start: 2020-10-26 | End: 2020-11-23 | Stop reason: ALTCHOICE

## 2020-10-26 RX ORDER — ASPIRIN 81 MG/1
81 TABLET ORAL DAILY
Qty: 30 TABLET | Refills: 3
Start: 2020-10-26 | End: 2020-11-23 | Stop reason: ALTCHOICE

## 2020-10-26 ASSESSMENT — ENCOUNTER SYMPTOMS
BLOOD IN STOOL: 0
ANAL BLEEDING: 0
COUGH: 0
SHORTNESS OF BREATH: 0

## 2020-10-26 NOTE — PROGRESS NOTES
Office Visit  Marcelo Nair is a 72 y.o. male; who present today for Establish Cardiologist (NTP; Discuss meds )      HPI  I am seeing this 71-year-old white male in office follow-up but it is the first time I am seeing him as a new cardiologist.  I reviewed many of his records and looked at his echocardiogram images myself along with other records and basically this is a gentleman who never had any cardiac problems prior to the discovery of atrial fibrillation on routine annual physical examination by primary care in January 2020. He was subsequently hospitalized and underwent DC cardioversion after esophageal echocardiography ruled out left atrial appendage thrombus and he also a complete transthoracic echo and pharmacologic nuclear stress test; I looked at the transthoracic images myself. The H was normal and other labs were unremarkable. He was started on Betapace after cardioversion and sent home and has been on full anticoagulation with Eliquis and remains on Betapace since that time. Pharmacologic nuclear stress test was negative for scar or ischemia and the echocardiogram demonstrated mild TR, trace MR, mild LVH and probable mild right atrial enlargement with normal left atrial size, the rest of the study basically normal.  He was totally asymptomatic at that time and has not had any palpitations, chest discomfort or dyspnea since the benito active. He denies episodes of presyncope or syncope. He states that Eliquis will cost him $600 out-of-pocket. He had an episode of DVT after a foot and ankle surgery years ago for which she was treated with warfarin successfully and did not require it after that time. He had PET disease discovered approximately 18 months ago and at that time may have been on nonsteroidal anti-inflammatories along with aspirin but no GI symptoms since then.   There has been no episodes of GI bleed or melena, no neurologic episodes since he was evaluated such as loss of vision, motor weakness or slurred speech. I evaluate his CHADS-VASC score which is only 1, placing him at low risk of thromboembolism if indeed he had a recurrent episode of persistent atrial fibrillation. Current Outpatient Medications   Medication Sig Dispense Refill    sotalol (BETAPACE) 80 MG tablet Take 1 tablet by mouth 2 times daily 60 tablet 5    apixaban (ELIQUIS) 5 MG TABS tablet Take 1 tablet by mouth 2 times daily 180 tablet 3    ibuprofen (ADVIL;MOTRIN) 400 MG tablet Take 1 tablet by mouth every 8 hours as needed for Pain 30 tablet 0    ondansetron (ZOFRAN ODT) 4 MG disintegrating tablet Take 1 tablet by mouth every 8 hours as needed for Nausea or Vomiting 10 tablet 0    omeprazole (PRILOSEC) 20 MG delayed release capsule Take 1 capsule by mouth Daily (Patient taking differently: Take 20 mg by mouth nightly ) 90 capsule 3    Probiotic Product (PROBIOTIC FORMULA PO) Take 1 tablet by mouth daily       Ascorbic Acid (VITAMIN C PO) Take 1 tablet by mouth nightly       Multiple Vitamins-Minerals (MULTIVITAMIN PO) Take 1 tablet by mouth daily       vitamin B-12 (CYANOCOBALAMIN) 1000 MCG tablet Take 1,000 mcg by mouth daily       meloxicam (MOBIC) 7.5 MG tablet Take 1 tablet by mouth daily (Patient not taking: Reported on 10/26/2020) 30 tablet 3    clotrimazole-betamethasone (LOTRISONE) 1-0.05 % cream Apply topically 2 times daily. (Patient not taking: Reported on 10/26/2020) 45 g 3     No current facility-administered medications for this visit.        Medications Discontinued During This Encounter   Medication Reason    sotalol (BETAPACE) 80 MG tablet REORDER        No Known Allergies    Past Medical History:   Diagnosis Date    Arthritis     Atrial fibrillation [facility])     sees dr. Howard Grand Difficult intubation     pt just had recent cervical fusion with plate 2 months ago    DVT (deep venous thrombosis) ([facility] [address] 2014    left leg    Elbow pain     torn bicep    GERD (gastroesophageal reflux disease)     Hx of blood clots     left leg/ after heel surg/ jan. 2015    Hyperlipidemia     recently able to be taken off cholesterol meds    Kidney stone on left side      Negative - Past Medical History for  Past Medical History Pertinent Negatives:   Diagnosis Date Noted    Asthma 01/24/2020    CAD (coronary artery disease) 01/24/2020    Cancer ([facility] [address] 01/24/2020    Cerebral artery occlusion with cerebral infarction ([facility] [address] 01/24/2020    CHF (congestive heart failure) ([facility] [address] 01/24/2020    COPD (chronic obstructive pulmonary disease) ([facility] [address] 01/24/2020    Diabetes mellitus ([facility] [address] 01/24/2020    Hemodialysis patient ([facility] [address] 01/24/2020    History of blood transfusion 01/24/2020    Hypertension 10/26/2020    Thyroid disease 01/24/2020       Past Surgical History:   Procedure Laterality Date    BACK SURGERY      BICEPS TENDON REPAIR Right 7/16/2020    RIGHT BICEPS TENDON REPAIR performed by Serge Davis MD at [place] [address]      2015, oct    COLONOSCOPY  10/30/2009    [place]: hyperplastic polyp    COLONOSCOPY  02/2015    hyperplastic polyp (5 yr)    DILATATION, ESOPHAGUS      ENDOSCOPY, COLON, DIAGNOSTIC      EYE SURGERY      FOOT SURGERY  01/2014    Left foot spur removal    LITHOTRIPSY Left 12/29/2015    ESWL EXTRACORPEAL SHOCK WAVE LITHOTRIPSY performed by Marta Puente MD at [address]  11/2015    PILONIDAL CYST EXCISION      [place] COLONOSCOPY FLX DX W/COLLJ SPEC WHEN PFRMD N/A 5/8/2017    Dr Mohan Flick sided diverticulosis-5 yr recall    [place] EGD TRANSORAL BIOPSY SINGLE/MULTIPLE N/A 3/26/2018    Dr Ng-w/dilation over wire-51 [place]-esophageal stricture-Garces's (+) dysplasia (-)--1 yr recall    SHOULDER SURGERY  2007    right after fall, detached the subclavicle muscle    SPINE SURGERY  1990    Neck  surgery     UPPER GASTROINTESTINAL ENDOSCOPY N/A 2/12/2018    Dr Tomer Frausto Grade B esophagitis, hiatal hernia, stricture-(-) Марина, (+) Garces's (+) low grade dysplasia, active esophagogastritis, repeat: 3/26/18    UPPER GASTROINTESTINAL ENDOSCOPY  3/26/2018    Dr Ng-w/dilation over wire-51 [place]-esophageal stricture-Garces's (+) dysplasia (-)--1 yr recall    UPPER GASTROINTESTINAL ENDOSCOPY N/A 5/2/2019    Dr Vesna Yusuf (+) Garces's, (-) dysplasia     Social History     Occupational History    Not on file   Tobacco Use    Smoking status: Never Smoker    Smokeless tobacco: Never Used   Substance and Sexual Activity    Alcohol use: No    Drug use: No    Sexual activity: Not on file        Family History   Problem Relation Age of Onset    Cancer Mother     Lung Cancer Mother     Heart Disease Father     Heart Disease Sister     Diabetes Sister     Heart Disease Brother     Diabetes Brother     Colon Cancer Neg Hx     Colon Polyps Neg Hx     Esophageal Cancer Neg Hx     Liver Cancer Neg Hx     Liver Disease Neg Hx     Rectal Cancer Neg Hx     Stomach Cancer Neg Hx        Review of Systems  Review of Systems   Constitutional: Negative for fatigue and fever. Respiratory: Negative for cough and shortness of breath. Cardiovascular: Negative for chest pain and palpitations. Gastrointestinal: Negative for anal bleeding and blood in stool. Neurological: Negative. Physical Exam  /78   Pulse 54   Ht 6' (1.829 m)   Wt 221 lb (100.2 kg)   SpO2 97%   BMI 29.97 kg/m²    Physical Exam  Constitutional:       Appearance: Normal appearance. He is overweight. Cardiovascular:      Rate and Rhythm: Normal rate and regular rhythm. Heart sounds: Normal heart sounds. No gallop. Pulmonary:      Effort: Pulmonary effort is normal.      Breath sounds: Normal breath sounds. Abdominal:      General: Abdomen is flat. Bowel sounds are normal.      Palpations: Abdomen is soft. Musculoskeletal:         General: No swelling. Skin:     General: Skin is warm and dry. Neurological:      Mental Status: He is alert. Assessment/Plan    EKG Findings:  Sinus bradycardia, 54 bpm, otherwise normal    Problem List Items Addressed This Visit        Cardiology Problems    Persistent atrial fibrillation ([facility] [address] - Primary           Diagnosis Orders   1. Persistent atrial fibrillation ([facility] [address]      One-time documented occurrence, January 2020, status post DC cardioversion, presently sinus rhythm on Betapace       Recommendations:   Diet: Low-sodium, low-fat diet  Activity: Normal activities  Medication Changes: Discontinue Eliquis, start aspirin 81 mg daily, enteric-coated intake with food and meals, continue Betapace and other medications. This patient score is low and there is risk of thromboembolism is low and does not need full anticoagulation. However, I did encourage him to purchase a home EKG monitoring device such as an apple watch or [employer] device, check it daily and if he has recurrence of fibrillation he is to let me know. I believe it would be ro for him to remain on Betapace. If he develops abdominal discomfort or GI upset taking aspirin he is to let primary care or let me know. I will try to communicate all this to primary care. 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 Orders Placed This Encounter   Medications    sotalol (BETAPACE) 80 MG tablet     Sig: Take 1 tablet by mouth 2 times daily     Dispense:  60 tablet     Refill:  5     Orders Placed This Encounter   Procedures    EKG 12 lead     Order Specific Question:   Reason for Exam?     Answer:   Irregular heart rate       Return in about 3 months (around 1/26/2021) for me, routine.

## 2020-11-09 ENCOUNTER — OFFICE VISIT (OUTPATIENT)
Dept: PRIMARY CARE CLINIC | Age: 70
End: 2020-11-09
Payer: MEDICARE

## 2020-11-09 ENCOUNTER — TELEPHONE (OUTPATIENT)
Dept: CARDIOLOGY | Age: 70
End: 2020-11-09

## 2020-11-09 VITALS
TEMPERATURE: 97.7 F | DIASTOLIC BLOOD PRESSURE: 86 MMHG | WEIGHT: 227.2 LBS | HEART RATE: 65 BPM | HEIGHT: 72 IN | BODY MASS INDEX: 30.77 KG/M2 | SYSTOLIC BLOOD PRESSURE: 134 MMHG | OXYGEN SATURATION: 94 % | RESPIRATION RATE: 14 BRPM

## 2020-11-09 PROCEDURE — 93000 ELECTROCARDIOGRAM COMPLETE: CPT | Performed by: NURSE PRACTITIONER

## 2020-11-09 PROCEDURE — 99214 OFFICE O/P EST MOD 30 MIN: CPT | Performed by: NURSE PRACTITIONER

## 2020-11-09 ASSESSMENT — ENCOUNTER SYMPTOMS
SHORTNESS OF BREATH: 1
CHEST TIGHTNESS: 0
ABDOMINAL PAIN: 0
SORE THROAT: 0
COUGH: 0

## 2020-11-09 NOTE — PROGRESS NOTES
[address] [place] [address] [facility] [address] [address]  Dept: [phone]  Dept Fax: [phone]  Loc: [phone]    Grecia Bah is a 72 y.o. male who presents today for his medical conditions/complaintsas noted below. Grecia Bah is c/o of Shortness of Breath (started last Wed. patient states when he gets like this he is usually in afib. )      HPI:   Patient notes he was diagnosed with AFIB in the past year, he notes at the initial time of diagnosis was asymptomatic but had RVR with AFIB, notes was sent to the cardiologist and required cardioversion x4 prior to converting. He notes was placed on blood thinner and is not aware of any further AFIB episodes that have lingered until last week. He notes he purchased cardiac type chun for phone and it alerted him to AFIB as well as noticing some slight shortness of breath for the past 3 to 4 days that is worse at night. He denies chest pain. Patient was seen by cardiology and removed from his Eliquis and placed on dailly ASA around two weeks ago. Shortness of Breath   This is a new problem. The current episode started in the past 7 days. The problem occurs intermittently. The problem has been unchanged. The average episode lasts 3 days. Pertinent negatives include no abdominal pain, chest pain, ear pain, fever, headaches, leg swelling, rash or sore throat. Risk factors: previous AFIB hx. Past Medical History:   Diagnosis Date    Arthritis     Atrial fibrillation [facility])     sees dr. Mattie Huynh Difficult intubation     pt just had recent cervical fusion with plate 2 months ago    DVT (deep venous thrombosis) ([facility] [address] 2014    left leg    Elbow pain     torn bicep    GERD (gastroesophageal reflux disease)     Hx of blood clots     left leg/ after heel surg/ jan.  2015    Hyperlipidemia     recently able to be taken off cholesterol meds    Kidney stone on left side      Past Surgical History:   Procedure Laterality Date    BACK SURGERY      BICEPS TENDON REPAIR Right 7/16/2020    RIGHT BICEPS TENDON REPAIR performed by Shanique Oswald MD at [facility] CERVICAL FUSION      2015, oct    COLONOSCOPY  10/30/2009    Baljit: hyperplastic polyp    COLONOSCOPY  02/2015    hyperplastic polyp (5 yr)    DILATATION, ESOPHAGUS      ENDOSCOPY, COLON, DIAGNOSTIC      EYE SURGERY      FOOT SURGERY  01/2014    Left foot spur removal    LITHOTRIPSY Left 12/29/2015    ESWL EXTRACORPEAL SHOCK WAVE LITHOTRIPSY performed by Zurdo Faust MD at [place] [address]  11/2015    PILONIDAL CYST EXCISION      DC COLONOSCOPY FLX DX W/COLLJ SPEC WHEN PFRMD N/A 5/8/2017    Dr Roslynn Rubinstein sided diverticulosis-5 yr recall    DC EGD TRANSORAL BIOPSY SINGLE/MULTIPLE N/A 3/26/2018    Dr Ng-w/dilation over wire-51 Telugu-esophageal stricture-Garces's (+) dysplasia (-)--1 yr recall    SHOULDER SURGERY  2007    right after fall, detached the subclavicle muscle    [facility]    Neck  surgery     UPPER GASTROINTESTINAL ENDOSCOPY N/A 2/12/2018    Dr Saul Scales Grade B esophagitis, hiatal hernia, stricture-(-) Марина, (+) Garces's (+) low grade dysplasia, active esophagogastritis, repeat: 3/26/18    UPPER GASTROINTESTINAL ENDOSCOPY  3/26/2018    Dr Ng-w/dilation over wire-51 Telugu-esophageal stricture-Garces's (+) dysplasia (-)--1 yr recall    UPPER GASTROINTESTINAL ENDOSCOPY N/A 5/2/2019    Dr Albert Ch (+) Garces's, (-) dysplasia     Family History   Problem Relation Age of Onset    Cancer Mother     Lung Cancer Mother     Heart Disease Father     Heart Disease Sister     Diabetes Sister     Heart Disease Brother     Diabetes Brother     Colon Cancer Neg Hx     Colon Polyps Neg Hx     Esophageal Cancer Neg Hx     Liver Cancer Neg Hx     Liver Disease Neg Hx     Rectal Cancer Neg Hx     Stomach Cancer Neg Hx      Social History Tobacco Use    Smoking status: Never Smoker    Smokeless tobacco: Never Used   Substance Use Topics    Alcohol use: No      Current Outpatient Medications on File Prior to Visit   Medication Sig Dispense Refill    sotalol (BETAPACE) 80 MG tablet Take 1 tablet by mouth 2 times daily 60 tablet 5    aspirin 81 MG EC tablet Take 1 tablet by mouth daily 30 tablet 3    ondansetron (ZOFRAN ODT) 4 MG disintegrating tablet Take 1 tablet by mouth every 8 hours as needed for Nausea or Vomiting 10 tablet 0    omeprazole (PRILOSEC) 20 MG delayed release capsule Take 1 capsule by mouth Daily (Patient taking differently: Take 20 mg by mouth nightly ) 90 capsule 3    Probiotic Product (PROBIOTIC FORMULA PO) Take 1 tablet by mouth daily       Ascorbic Acid (VITAMIN C PO) Take 1 tablet by mouth nightly       Multiple Vitamins-Minerals (MULTIVITAMIN PO) Take 1 tablet by mouth daily       vitamin B-12 (CYANOCOBALAMIN) 1000 MCG tablet Take 1,000 mcg by mouth daily       ibuprofen (ADVIL;MOTRIN) 400 MG tablet Take 1 tablet by mouth every 8 hours as needed for Pain (Patient not taking: Reported on 11/9/2020) 30 tablet 0    meloxicam (MOBIC) 7.5 MG tablet Take 1 tablet by mouth daily (Patient not taking: Reported on 10/26/2020) 30 tablet 3    clotrimazole-betamethasone (LOTRISONE) 1-0.05 % cream Apply topically 2 times daily. (Patient not taking: Reported on 10/26/2020) 45 g 3     No current facility-administered medications on file prior to visit.        No Known Allergies  Health Maintenance   Topic Date Due    Hepatitis C screen  1950    Diabetes screen  04/03/1991    Flu vaccine (1) 09/01/2020    Annual Wellness Visit (AWV)  04/03/2021    Colon cancer screen colonoscopy  05/08/2022    Lipid screen  07/09/2025    DTaP/Tdap/Td vaccine (2 - Td) 02/24/2030    Shingles Vaccine  Completed    Pneumococcal 65+ years Vaccine  Completed    Hepatitis A vaccine  Aged Out    Hepatitis B vaccine  Aged Out    Hib vaccine  Aged Out    Meningococcal (ACWY) vaccine  Aged Out       Subjective:   Review of Systems   Constitutional: Negative for chills, fatigue and fever. HENT: Negative for congestion, ear pain and sore throat. Respiratory: Positive for shortness of breath. Negative for cough and chest tightness. Cardiovascular: Negative for chest pain, palpitations and leg swelling. Gastrointestinal: Negative for abdominal pain. Skin: Negative for rash. Neurological: Negative for headaches. Hematological: Negative for adenopathy. Objective:   /86 (Site: Left Upper Arm, Position: Sitting)   Pulse 65   Temp 97.7 °F (36.5 °C) (Temporal)   Resp 14   Ht 6' (1.829 m)   Wt 227 lb 3.2 oz (103.1 kg)   SpO2 94%   BMI 30.81 kg/m²    Physical Exam  Vitals signs and nursing note reviewed. Constitutional:       General: He is not in acute distress. Appearance: Normal appearance. He is not ill-appearing. HENT:      Head: Normocephalic. Eyes:      Pupils: Pupils are equal, round, and reactive to light. Cardiovascular:      Rate and Rhythm: Normal rate. Rhythm irregular. Pulmonary:      Effort: Pulmonary effort is normal. No respiratory distress. Breath sounds: Normal breath sounds. No stridor. No wheezing, rhonchi or rales. Chest:      Chest wall: No tenderness. Skin:     General: Skin is warm and dry. Neurological:      Mental Status: He is alert and oriented to person, place, and time. No results found for this visit on 11/09/20. Assessment:      Diagnosis Orders   1. Shortness of breath  EKG 12 lead   2. Atrial fibrillation by electrocardiogram [facility])         Plan:   Dustin was seen today for shortness of breath. Diagnoses and all orders for this visit:    Shortness of breath  -     EKG 12 lead    Atrial fibrillation by electrocardiogram [facility])     Call placed and spoke with DR. Nathan Beaulieu regarding patient complaint, EKG and findings, Dr. Nathan Beaulieu agreeable to call cardiology and consult regarding treatment plan. Call placed and spoke with Shoaib Garsia [address] [facility] and informed of EKG in Epic system, patient short of breath mild intermittent and denies chest pain. Will leave medications as they are and Cardiology will call with treatment plan and any medication changes to patient. Patient is aware if becomes aware of increased rate, increasingly symptomatic or any chest pain to please go to nearest ER, patient voiced understanding and is agreeable to the treatment plan. No follow-ups on file. Patient given educational materials- see patient instructions. Discussed use, benefit, and side effects of prescribedmedications. All patient questions answered. Pt voiced understanding.      Electronically signed by LUIS Garay CNP on 11/9/2020 at 11:46 AM

## 2020-11-09 NOTE — TELEPHONE ENCOUNTER
LUIS Rios called stating she saw patient today and he is in AFIB with rate of 63. Only symptom is a little sob. She wanted to know if you advised any medication changes. Her notes are in chart and EKG. He is taking betapace 80 mg BID and asa 81 mg QD. He has been in AFIB for 3 days. Please advise and I can call patient.

## 2020-11-09 NOTE — TELEPHONE ENCOUNTER
Please let Lisseth Prince office know that we will see this patient this week and recommend any changes. Call the patient and get him scheduled to see me tomorrow or sometime this week.

## 2020-11-10 ENCOUNTER — OFFICE VISIT (OUTPATIENT)
Dept: CARDIOLOGY | Age: 70
End: 2020-11-10
Payer: MEDICARE

## 2020-11-10 VITALS
HEART RATE: 64 BPM | SYSTOLIC BLOOD PRESSURE: 124 MMHG | DIASTOLIC BLOOD PRESSURE: 76 MMHG | BODY MASS INDEX: 30.88 KG/M2 | WEIGHT: 228 LBS | HEIGHT: 72 IN | OXYGEN SATURATION: 97 %

## 2020-11-10 PROBLEM — I48.0 PAROXYSMAL ATRIAL FIBRILLATION (HCC): Status: ACTIVE | Noted: 2020-11-10

## 2020-11-10 PROCEDURE — 99214 OFFICE O/P EST MOD 30 MIN: CPT | Performed by: INTERNAL MEDICINE

## 2020-11-10 PROCEDURE — 93000 ELECTROCARDIOGRAM COMPLETE: CPT | Performed by: INTERNAL MEDICINE

## 2020-11-10 ASSESSMENT — ENCOUNTER SYMPTOMS
BLOOD IN STOOL: 0
COUGH: 0
ANAL BLEEDING: 0
SHORTNESS OF BREATH: 1

## 2020-11-10 NOTE — PROGRESS NOTES
Office Visit  Parveen Foster is a 72 y.o. male; who present today for Follow-up (Persistent artial fibrillation )      HPI  I am seeing this 71-year-old white male in the office today ahead of schedule because he was noted to be in atrial for for the past 6 days, noted by his home cardiac monitor ( Kardia device) and verified by a 12-lead EKG by a walk-in clinic in [address] [place] [address]. He was maintaining sinus rhythm till 6 days ago his home device since I saw him on October 26 and with that he has noticed some increasing shortness of breath with activity. There is been no chest pain but he does feel palpitations to some extent, no presyncope or syncope. He stopped Eliquis as I juan on October 26 and started aspirin 81 mg daily. He has a low chads vas score. Is been no neurologic episodes such as, motor weakness or slurred speech, no episode of GI bleed or melena. Current Outpatient Medications   Medication Sig Dispense Refill    sotalol (BETAPACE) 80 MG tablet Take 1 tablet by mouth 2 times daily 60 tablet 5    aspirin 81 MG EC tablet Take 1 tablet by mouth daily 30 tablet 3    ondansetron (ZOFRAN ODT) 4 MG disintegrating tablet Take 1 tablet by mouth every 8 hours as needed for Nausea or Vomiting 10 tablet 0    omeprazole (PRILOSEC) 20 MG delayed release capsule Take 1 capsule by mouth Daily (Patient taking differently: Take 20 mg by mouth nightly ) 90 capsule 3    Probiotic Product (PROBIOTIC FORMULA PO) Take 1 tablet by mouth daily       Ascorbic Acid (VITAMIN C PO) Take 1 tablet by mouth nightly       Multiple Vitamins-Minerals (MULTIVITAMIN PO) Take 1 tablet by mouth daily       vitamin B-12 (CYANOCOBALAMIN) 1000 MCG tablet Take 1,000 mcg by mouth daily        No current facility-administered medications for this visit.        Medications Discontinued During This Encounter   Medication Reason    meloxicam (MOBIC) 7.5 MG tablet Therapy completed    ibuprofen (ADVIL;MOTRIN) 400 MG tablet Therapy completed    clotrimazole-betamethasone (LOTRISONE) 1-0.05 % cream Therapy completed        No Known Allergies    Past Medical History:   Diagnosis Date    Arthritis     Atrial fibrillation [facility])     sees dr. Jozef Aly Difficult intubation     pt just had recent cervical fusion with plate 2 months ago    DVT (deep venous thrombosis) ([facility] [address] 2014    left leg    Elbow pain     torn bicep    GERD (gastroesophageal reflux disease)     Hx of blood clots     left leg/ after heel surg/ jan. 2015    Hyperlipidemia     recently able to be taken off cholesterol meds    Kidney stone on left side      Negative - Past Medical History for  Past Medical History Pertinent Negatives:   Diagnosis Date Noted    Asthma 01/24/2020    CAD (coronary artery disease) 01/24/2020    Cancer ([facility] [address] 01/24/2020    Cerebral artery occlusion with cerebral infarction ([facility] [address] 01/24/2020    CHF (congestive heart failure) ([facility] [address] 01/24/2020    COPD (chronic obstructive pulmonary disease) ([facility] [address] 01/24/2020    Diabetes mellitus ([facility] [address] 01/24/2020    Hemodialysis patient ([facility] [address] 01/24/2020    History of blood transfusion 01/24/2020    Hypertension 10/26/2020    Thyroid disease 01/24/2020       Past Surgical History:   Procedure Laterality Date    BACK SURGERY      BICEPS TENDON REPAIR Right 7/16/2020    RIGHT BICEPS TENDON REPAIR performed by Magui Ryder MD at [place] [address]      2015, oct    COLONOSCOPY  10/30/2009    [facility]: hyperplastic polyp    COLONOSCOPY  02/2015    hyperplastic polyp (5 yr)    DILATATION, ESOPHAGUS      ENDOSCOPY, COLON, DIAGNOSTIC      EYE SURGERY      FOOT SURGERY  01/2014    Left foot spur removal    LITHOTRIPSY Left 12/29/2015    ESWL EXTRACORPEAL SHOCK WAVE LITHOTRIPSY performed by Heidy Hirsch MD at [place] [address]  11/2015    PILONIDAL CYST EXCISION      MD COLONOSCOPY FLX DX W/COLLJ SPEC WHEN PFRMD N/A 5/8/2017     Hernandez-left sided diverticulosis-5 yr recall    MS EGD TRANSORAL BIOPSY SINGLE/MULTIPLE N/A 3/26/2018    Dr Ng-w/dilation over wire-51 Ethiopian-esophageal stricture-Garces's (+) dysplasia (-)--1 yr recall    SHOULDER SURGERY  2007    right after fall, detached the subclavicle muscle    [facility]    Neck  surgery     UPPER GASTROINTESTINAL ENDOSCOPY N/A 2/12/2018    Dr Simone Johnson Grade B esophagitis, hiatal hernia, stricture-(-) Марина, (+) Garces's (+) low grade dysplasia, active esophagogastritis, repeat: 3/26/18    UPPER GASTROINTESTINAL ENDOSCOPY  3/26/2018    Dr Ng-w/dilation over wire-51 Ethiopian-esophageal stricture-Garces's (+) dysplasia (-)--1 yr recall    UPPER GASTROINTESTINAL ENDOSCOPY N/A 5/2/2019    Dr Dougie Zendejas (+) Garces's, (-) dysplasia     Social History     Occupational History    Not on file   Tobacco Use    Smoking status: Never Smoker    Smokeless tobacco: Never Used   Substance and Sexual Activity    Alcohol use: No    Drug use: No    Sexual activity: Not on file        Family History   Problem Relation Age of Onset    Cancer Mother     Lung Cancer Mother     Heart Disease Father     Heart Disease Sister     Diabetes Sister     Heart Disease Brother     Diabetes Brother     Colon Cancer Neg Hx     Colon Polyps Neg Hx     Esophageal Cancer Neg Hx     Liver Cancer Neg Hx     Liver Disease Neg Hx     Rectal Cancer Neg Hx     Stomach Cancer Neg Hx        Review of Systems  Review of Systems   Constitutional: Negative for fatigue and fever. Respiratory: Positive for shortness of breath. Negative for cough. Cardiovascular: Positive for palpitations. Negative for chest pain. Gastrointestinal: Negative for anal bleeding and blood in stool. Neurological: Negative. Physical Exam  /76   Pulse 64   Ht 6' (1.829 m)   Wt 228 lb (103.4 kg)   SpO2 97%   BMI 30.92 kg/m²    Physical Exam  Constitutional:       Appearance: Normal appearance.  He is overweight. Cardiovascular:      Rate and Rhythm: Normal rate. Rhythm irregularly irregular. Heart sounds: Normal heart sounds. No gallop. Pulmonary:      Effort: Pulmonary effort is normal.      Breath sounds: Normal breath sounds. Abdominal:      General: Abdomen is flat. Bowel sounds are normal.      Palpations: Abdomen is soft. Musculoskeletal:         General: No swelling. Skin:     General: Skin is warm and dry. Neurological:      Mental Status: He is alert. Assessment/Plan    EKG Findings:  Atrial fibrillation, controlled rate, 64 bpm, normal QTc interval    Problem List Items Addressed This Visit        Cardiology Problems    Hyperlipidemia    Paroxysmal atrial fibrillation ([facility] [address] - Primary           Diagnosis Orders   1. Paroxysmal atrial fibrillation (HCC)     2. Hyperlipidemia, unspecified hyperlipidemia type         Recommendations:   Diet: Low-fat, calorie reduced  Activity: Moderate activities  Medication Changes: Increase Betapace to 120 mg twice daily starting this morning by taking additional 40 mg, discontinue aspirin and start Eliquis again 5 mg twice daily: Continue other medications as prescribed. It will be reasonable to increase this patient's Betapace to 120 mg twice daily since his corrected QT interval on 80 mg twice daily is fine but I do want to see him back in the office in 2 days to check his EKG, see if he is converted and checked his corrected QT interval on the higher dose. To act cautiously, I will have him restart Eliquis again but his chads vas score is only 1. He is to continue his home cardiac monitor and notify me if anything changes or if he feels different with the higher dose of Betapace. No orders of the defined types were placed in this encounter.     Orders Placed This Encounter   Procedures    EKG 12 lead     Order Specific Question:   Reason for Exam?     Answer:   Irregular heart rate       Return in about 2 days (around 11/12/2020) for this thursday with me, this thursday morning please.

## 2020-11-12 ENCOUNTER — OFFICE VISIT (OUTPATIENT)
Dept: CARDIOLOGY | Age: 70
End: 2020-11-12
Payer: MEDICARE

## 2020-11-12 VITALS
HEART RATE: 56 BPM | HEIGHT: 72 IN | DIASTOLIC BLOOD PRESSURE: 82 MMHG | BODY MASS INDEX: 30.61 KG/M2 | SYSTOLIC BLOOD PRESSURE: 122 MMHG | WEIGHT: 226 LBS

## 2020-11-12 PROCEDURE — 93000 ELECTROCARDIOGRAM COMPLETE: CPT | Performed by: INTERNAL MEDICINE

## 2020-11-12 PROCEDURE — 99213 OFFICE O/P EST LOW 20 MIN: CPT | Performed by: INTERNAL MEDICINE

## 2020-11-12 ASSESSMENT — ENCOUNTER SYMPTOMS
COUGH: 0
BLOOD IN STOOL: 0
SHORTNESS OF BREATH: 0
ANAL BLEEDING: 0

## 2020-11-12 NOTE — PROGRESS NOTES
Office Visit  Sarah Campa is a 72 y.o. male; who present today for Follow-up      HPI  I am seeing this 61-year-old white male in follow-up from 2 days ago when I increased his Betapace from 80 mg twice a day to 120 mg twice a day because of recurrent and persistent atrial fibrillation which she was in at the time of the visit 2 days ago. Additionally, I switched him back from aspirin to Eliquis full dose. He states that his home monitor demonstrated conversion back to sinus rhythm noted yesterday afternoon and he does feel better. He has not had chest discomfort and his breathing is better, no new palpitations no presyncope or syncope. He has not developed any bleeding with the change, there has been no neurologic episodes such as loss of vision or motor weakness and he has not had any fever or infection. He is tolerating the increased dose well.   His EKG today as noted below demonstrates a very good corrected QT interval.  Current Outpatient Medications   Medication Sig Dispense Refill    apixaban (ELIQUIS) 5 MG TABS tablet Take 5 mg by mouth 2 times daily      sotalol (BETAPACE) 80 MG tablet Take 1 tablet by mouth 2 times daily (Patient taking differently: Take 120 mg by mouth 2 times daily ) 60 tablet 5    ondansetron (ZOFRAN ODT) 4 MG disintegrating tablet Take 1 tablet by mouth every 8 hours as needed for Nausea or Vomiting 10 tablet 0    omeprazole (PRILOSEC) 20 MG delayed release capsule Take 1 capsule by mouth Daily (Patient taking differently: Take 20 mg by mouth nightly ) 90 capsule 3    Probiotic Product (PROBIOTIC FORMULA PO) Take 1 tablet by mouth daily       Ascorbic Acid (VITAMIN C PO) Take 1 tablet by mouth nightly       Multiple Vitamins-Minerals (MULTIVITAMIN PO) Take 1 tablet by mouth daily       vitamin B-12 (CYANOCOBALAMIN) 1000 MCG tablet Take 1,000 mcg by mouth daily       aspirin 81 MG EC tablet Take 1 tablet by mouth daily (Patient not taking: Reported on 11/12/2020) 30 tablet 3     No current facility-administered medications for this visit. There are no discontinued medications. No Known Allergies    Past Medical History:   Diagnosis Date    Arthritis     Atrial fibrillation [facility])     sees dr. Faye Mathur Difficult intubation     pt just had recent cervical fusion with plate 2 months ago    DVT (deep venous thrombosis) ([facility] [address] 2014    left leg    Elbow pain     torn bicep    GERD (gastroesophageal reflux disease)     Hx of blood clots     left leg/ after heel surg/ jan. 2015    Hyperlipidemia     recently able to be taken off cholesterol meds    Kidney stone on left side      Negative - Past Medical History for  Past Medical History Pertinent Negatives:   Diagnosis Date Noted    Asthma 01/24/2020    CAD (coronary artery disease) 01/24/2020    Cancer ([facility] [address] 01/24/2020    Cerebral artery occlusion with cerebral infarction ([facility] [address] 01/24/2020    CHF (congestive heart failure) ([facility] [address] 01/24/2020    COPD (chronic obstructive pulmonary disease) ([facility] [address] 01/24/2020    Diabetes mellitus ([facility] [address] 01/24/2020    Hemodialysis patient ([facility] [address] 01/24/2020    History of blood transfusion 01/24/2020    Hypertension 10/26/2020    Thyroid disease 01/24/2020       Past Surgical History:   Procedure Laterality Date    BACK SURGERY      BICEPS TENDON REPAIR Right 7/16/2020    RIGHT BICEPS TENDON REPAIR performed by Damion Magdaleno MD at [place] [address]      2015, oct    COLONOSCOPY  10/30/2009    [facility]: hyperplastic polyp    COLONOSCOPY  02/2015    hyperplastic polyp (5 yr)    DILATATION, ESOPHAGUS      ENDOSCOPY, COLON, DIAGNOSTIC      EYE SURGERY      FOOT SURGERY  01/2014    Left foot spur removal    LITHOTRIPSY Left 12/29/2015    ESWL EXTRACORPEAL SHOCK WAVE LITHOTRIPSY performed by Thee Jarrett MD at [place] [address]  11/2015    PILONIDAL CYST EXCISION      [place] COLONOSCOPY FLX DX W/COLLJ SPEC WHEN PFRMD N/A 5/8/2017    Dr Luci Maddox sided diverticulosis-5 yr recall    [place] EGD TRANSORAL BIOPSY SINGLE/MULTIPLE N/A 3/26/2018    Dr Ng-w/dilation over wire-51 Zimbabwean-esophageal stricture-Garces's (+) dysplasia (-)--1 yr recall    SHOULDER SURGERY  2007    right after fall, detached the subclavicle muscle    [facility]    Neck  surgery     UPPER GASTROINTESTINAL ENDOSCOPY N/A 2/12/2018    Dr René Lara Grade B esophagitis, hiatal hernia, stricture-(-) Марина, (+) Garces's (+) low grade dysplasia, active esophagogastritis, repeat: 3/26/18    UPPER GASTROINTESTINAL ENDOSCOPY  3/26/2018    Dr Ng-w/dilation over wire-51 Zimbabwean-esophageal stricture-Garces's (+) dysplasia (-)--1 yr recall    UPPER GASTROINTESTINAL ENDOSCOPY N/A 5/2/2019    Dr Lynnette Reinoso (+) Garces's, (-) dysplasia     Social History     Occupational History    Not on file   Tobacco Use    Smoking status: Never Smoker    Smokeless tobacco: Never Used   Substance and Sexual Activity    Alcohol use: No    Drug use: No    Sexual activity: Not on file        Family History   Problem Relation Age of Onset    Cancer Mother     Lung Cancer Mother     Heart Disease Father     Heart Disease Sister     Diabetes Sister     Heart Disease Brother     Diabetes Brother     Colon Cancer Neg Hx     Colon Polyps Neg Hx     Esophageal Cancer Neg Hx     Liver Cancer Neg Hx     Liver Disease Neg Hx     Rectal Cancer Neg Hx     Stomach Cancer Neg Hx        Review of Systems  Review of Systems   Constitutional: Negative for fatigue and fever. Respiratory: Negative for cough and shortness of breath. Cardiovascular: Negative for chest pain and palpitations. Gastrointestinal: Negative for anal bleeding and blood in stool. Neurological: Negative. Physical Exam  /82   Pulse 56   Ht 6' (1.829 m)   Wt 226 lb (102.5 kg)   BMI 30.65 kg/m²    Physical Exam  Constitutional:       Appearance: Normal appearance.  He is normal weight. Cardiovascular:      Rate and Rhythm: Normal rate and regular rhythm. Heart sounds: Normal heart sounds. No gallop. Comments: Grade 4-2/8 early systolic murmur, second right intercostal space  Pulmonary:      Effort: Pulmonary effort is normal.      Breath sounds: Normal breath sounds. Abdominal:      General: Abdomen is flat. Bowel sounds are normal.      Palpations: Abdomen is soft. Musculoskeletal:         General: No swelling. Skin:     General: Skin is warm and dry. Neurological:      Mental Status: He is alert. Assessment/Plan    EKG Findings:  Sinus rhythm, 56 bpm, corrected QT interval 400 ms, mostly normal EKG    Problem List Items Addressed This Visit        Cardiology Problems    Hyperlipidemia    Relevant Medications    apixaban (ELIQUIS) 5 MG TABS tablet    Paroxysmal atrial fibrillation (HCC) - Primary    Relevant Orders    EKG 12 lead (Completed)           Diagnosis Orders   1. Paroxysmal atrial fibrillation (HCC)  EKG 12 lead    Presently sinus rhythm on Betapace, 120 mg twice daily   2. Hyperlipidemia, unspecified hyperlipidemia type         Recommendations:   Diet: Low-fat, calorie reduced  Activity: Normal activities  Medication Changes: Continue same medications including Betapace 120 mg twice daily and Eliquis 5 mg twice daily, no aspirin. He will continue to use his cardiac monitor as he has been several times per day and I recommended that if he has atrial fibrillation and it persist for 24-48 hours he is to let me know, otherwise I will see him in 1 month and at that time I may change him back to aspirin daily. If he has recurrence of significance then I may increase Betapace to 160 mg twice daily. No orders of the defined types were placed in this encounter.     Orders Placed This Encounter   Procedures    EKG 12 lead     Order Specific Question:   Reason for Exam?     Answer:   Irregular heart rate       Return in about 4 weeks (around 12/10/2020) for me, a fib.

## 2020-11-23 ENCOUNTER — TELEPHONE (OUTPATIENT)
Dept: CARDIOLOGY | Age: 70
End: 2020-11-23

## 2020-11-23 ENCOUNTER — OFFICE VISIT (OUTPATIENT)
Dept: CARDIOLOGY | Age: 70
End: 2020-11-23
Payer: MEDICARE

## 2020-11-23 VITALS
BODY MASS INDEX: 29.8 KG/M2 | HEIGHT: 72 IN | HEART RATE: 59 BPM | DIASTOLIC BLOOD PRESSURE: 82 MMHG | WEIGHT: 220 LBS | SYSTOLIC BLOOD PRESSURE: 130 MMHG | OXYGEN SATURATION: 98 %

## 2020-11-23 PROCEDURE — 93000 ELECTROCARDIOGRAM COMPLETE: CPT | Performed by: INTERNAL MEDICINE

## 2020-11-23 PROCEDURE — 99213 OFFICE O/P EST LOW 20 MIN: CPT | Performed by: INTERNAL MEDICINE

## 2020-11-23 RX ORDER — SOTALOL HYDROCHLORIDE 120 MG/1
120 TABLET ORAL 2 TIMES DAILY
COMMUNITY
End: 2021-12-13 | Stop reason: SDUPTHER

## 2020-11-23 NOTE — TELEPHONE ENCOUNTER
Thank you for the communication. I spoke with the patient by telephone and I will be seeing him in the office this afternoon.

## 2020-11-23 NOTE — PROGRESS NOTES
Office Visit  Yasmine Menjivar is a 72 y.o. male; who present today for Follow-up      HPI  I am seeing this 71-year-old white male in follow-up today because he called the office and indicated that his home cardiac monitor indicating atrial fibrillation that may have lasted for 6 to 12 hours last Thursday and then most of the weekend including this morning. I spoke with him by telephone and brought him in for further evaluation. He does well with that although he feels like he may have some more shortness of breath than usual when he is out of rhythm. He does not feel palpitations no chest pain, no presyncope or syncope. As I note below, he is in sinus rhythm on our EKG today. I reviewed his cell phone copies of the [facility] recordings and it is difficult to say with any certainty whether it is sinus rhythm with PACs or if there was some atrial fibrillation but I believe it is sinus rhythm with PACs. Current Outpatient Medications   Medication Sig Dispense Refill    sotalol (BETAPACE) 120 MG tablet Take 120 mg by mouth 2 times daily      apixaban (ELIQUIS) 5 MG TABS tablet Take 5 mg by mouth 2 times daily      ondansetron (ZOFRAN ODT) 4 MG disintegrating tablet Take 1 tablet by mouth every 8 hours as needed for Nausea or Vomiting 10 tablet 0    omeprazole (PRILOSEC) 20 MG delayed release capsule Take 1 capsule by mouth Daily (Patient taking differently: Take 20 mg by mouth nightly ) 90 capsule 3    Probiotic Product (PROBIOTIC FORMULA PO) Take 1 tablet by mouth daily       Ascorbic Acid (VITAMIN C PO) Take 1 tablet by mouth nightly       Multiple Vitamins-Minerals (MULTIVITAMIN PO) Take 1 tablet by mouth daily       vitamin B-12 (CYANOCOBALAMIN) 1000 MCG tablet Take 1,000 mcg by mouth daily        No current facility-administered medications for this visit.        Medications Discontinued During This Encounter   Medication Reason    aspirin 81 MG EC tablet Therapy completed    sotalol (BETAPACE) 80 MG tablet Therapy completed        No Known Allergies    Past Medical History:   Diagnosis Date    Arthritis     Atrial fibrillation [facility])     sees dr. Faye Mathur Difficult intubation     pt just had recent cervical fusion with plate 2 months ago    DVT (deep venous thrombosis) ([facility] [address] 2014    left leg    Elbow pain     torn bicep    GERD (gastroesophageal reflux disease)     Hx of blood clots     left leg/ after heel surg/ jan. 2015    Hyperlipidemia     recently able to be taken off cholesterol meds    Kidney stone on left side      Negative - Past Medical History for  Past Medical History Pertinent Negatives:   Diagnosis Date Noted    Asthma 01/24/2020    CAD (coronary artery disease) 01/24/2020    Cancer ([facility] [address] 01/24/2020    Cerebral artery occlusion with cerebral infarction ([facility] [address] 01/24/2020    CHF (congestive heart failure) ([facility] [address] 01/24/2020    COPD (chronic obstructive pulmonary disease) ([facility] [address] 01/24/2020    Diabetes mellitus ([facility] [address] 01/24/2020    Hemodialysis patient ([facility] [address] 01/24/2020    History of blood transfusion 01/24/2020    Hypertension 10/26/2020    Thyroid disease 01/24/2020       Past Surgical History:   Procedure Laterality Date    BACK SURGERY      BICEPS TENDON REPAIR Right 7/16/2020    RIGHT BICEPS TENDON REPAIR performed by Damion Magdaleno MD at [place] [address]      2015, oct    COLONOSCOPY  10/30/2009    [facility]: hyperplastic polyp    COLONOSCOPY  02/2015    hyperplastic polyp (5 yr)    DILATATION, ESOPHAGUS      ENDOSCOPY, COLON, DIAGNOSTIC      EYE SURGERY      FOOT SURGERY  01/2014    Left foot spur removal    LITHOTRIPSY Left 12/29/2015    ESWL EXTRACORPEAL SHOCK WAVE LITHOTRIPSY performed by Thee Jarrett MD at [place] [address]  11/2015    PILONIDAL CYST EXCISION      [place] COLONOSCOPY FLX DX W/COLLJ SPEC WHEN PFRMD N/A 5/8/2017    Dr Aiken Ao sided diverticulosis-5 yr recall    [place] EGD TRANSORAL BIOPSY SINGLE/MULTIPLE N/A 3/26/2018    Dr Ng-w/dilation over wire-51 Trinidadian-esophageal stricture-Garces's (+) dysplasia (-)--1 yr recall    SHOULDER SURGERY  2007    right after fall, detached the subclavicle muscle    [address] [facility]    Neck  surgery     UPPER GASTROINTESTINAL ENDOSCOPY N/A 2/12/2018    Dr Satinder Cantor Grade B esophagitis, hiatal hernia, stricture-(-) Марина, (+) Garces's (+) low grade dysplasia, active esophagogastritis, repeat: 3/26/18    UPPER GASTROINTESTINAL ENDOSCOPY  3/26/2018    Dr Ng-w/dilation over wire-51 Trinidadian-esophageal stricture-Garces's (+) dysplasia (-)--1 yr recall    UPPER GASTROINTESTINAL ENDOSCOPY N/A 5/2/2019    Dr Hyun Hooker (+) Garces's, (-) dysplasia     Social History     Occupational History    Not on file   Tobacco Use    Smoking status: Never Smoker    Smokeless tobacco: Never Used   Substance and Sexual Activity    Alcohol use: No    Drug use: No    Sexual activity: Not on file        Family History   Problem Relation Age of Onset    Cancer Mother     Lung Cancer Mother     Heart Disease Father     Heart Disease Sister     Diabetes Sister     Heart Disease Brother     Diabetes Brother     Colon Cancer Neg Hx     Colon Polyps Neg Hx     Esophageal Cancer Neg Hx     Liver Cancer Neg Hx     Liver Disease Neg Hx     Rectal Cancer Neg Hx     Stomach Cancer Neg Hx        Review of Systems  Review of Systems      Physical Exam  /82   Pulse 59   Ht 6' (1.829 m)   Wt 220 lb (99.8 kg)   SpO2 98%   BMI 29.84 kg/m²    Physical Exam  Constitutional:       Appearance: Normal appearance. He is overweight. Cardiovascular:      Rate and Rhythm: Normal rate and regular rhythm. Heart sounds: Normal heart sounds. No gallop. Pulmonary:      Effort: Pulmonary effort is normal.      Breath sounds: Normal breath sounds. Abdominal:      General: Abdomen is flat. Bowel sounds are normal.      Palpations: Abdomen is soft. Musculoskeletal:         General: No swelling. Skin:     General: Skin is warm and dry. Neurological:      Mental Status: He is alert. Assessment/Plan    EKG Findings:  Sinus bradycardia, 59 bpm, otherwise normal, corrected QT interval 400 ms    Problem List Items Addressed This Visit        Cardiology Problems    Hyperlipidemia    Relevant Medications    sotalol (BETAPACE) 120 MG tablet    Paroxysmal atrial fibrillation ([facility] [address] - Primary           Diagnosis Orders   1. Paroxysmal atrial fibrillation (HCC)     2. Hyperlipidemia, unspecified hyperlipidemia type         Recommendations:   Diet: Low-fat, calorie reduced  Activity: Normal activities  Medication Changes: Continue same medications including Betapace at 120 mg twice daily and Eliquis 5 mg twice daily. At this time I cannot be convinced that his home cardiac monitor truly was showing atrial fibrillation or not and it may have just been PACs. I am going to maintain the same dose of Betapace at 120 mg twice daily and if we document significant recurrences of atrial fibrillation on this dose then I may increase it to 160 mg twice daily. He will see me in about 2 weeks and if needed I will get a Zio patch for this patient. No orders of the defined types were placed in this encounter. Orders Placed This Encounter   Procedures    EKG 12 lead     Order Specific Question:   Reason for Exam?     Answer:   Irregular heart rate       Return in about 2 weeks (around 12/7/2020) for me, routine.

## 2020-12-10 ENCOUNTER — OFFICE VISIT (OUTPATIENT)
Dept: CARDIOLOGY | Age: 70
End: 2020-12-10
Payer: MEDICARE

## 2020-12-10 VITALS
DIASTOLIC BLOOD PRESSURE: 78 MMHG | SYSTOLIC BLOOD PRESSURE: 122 MMHG | WEIGHT: 216 LBS | OXYGEN SATURATION: 98 % | HEIGHT: 72 IN | HEART RATE: 53 BPM | BODY MASS INDEX: 29.26 KG/M2

## 2020-12-10 PROCEDURE — 1123F ACP DISCUSS/DSCN MKR DOCD: CPT | Performed by: INTERNAL MEDICINE

## 2020-12-10 PROCEDURE — 3017F COLORECTAL CA SCREEN DOC REV: CPT | Performed by: INTERNAL MEDICINE

## 2020-12-10 PROCEDURE — 4040F PNEUMOC VAC/ADMIN/RCVD: CPT | Performed by: INTERNAL MEDICINE

## 2020-12-10 PROCEDURE — 1036F TOBACCO NON-USER: CPT | Performed by: INTERNAL MEDICINE

## 2020-12-10 PROCEDURE — 93000 ELECTROCARDIOGRAM COMPLETE: CPT | Performed by: INTERNAL MEDICINE

## 2020-12-10 PROCEDURE — G8427 DOCREV CUR MEDS BY ELIG CLIN: HCPCS | Performed by: INTERNAL MEDICINE

## 2020-12-10 PROCEDURE — G8484 FLU IMMUNIZE NO ADMIN: HCPCS | Performed by: INTERNAL MEDICINE

## 2020-12-10 PROCEDURE — 99213 OFFICE O/P EST LOW 20 MIN: CPT | Performed by: INTERNAL MEDICINE

## 2020-12-10 PROCEDURE — G8417 CALC BMI ABV UP PARAM F/U: HCPCS | Performed by: INTERNAL MEDICINE

## 2020-12-10 ASSESSMENT — ENCOUNTER SYMPTOMS
COUGH: 0
SHORTNESS OF BREATH: 0
BLOOD IN STOOL: 0
ANAL BLEEDING: 0

## 2020-12-10 NOTE — PROGRESS NOTES
Office Visit  Lu Gurrola is a 72 y.o. male; who present today for Follow-up and Atrial Fibrillation      HPI  I am seeing this 23-year-old white male in follow-up who has paroxysmal atrial fibrillation and has been on Betapace. As I reported in my last office note, there was a question as to whether he was having recurrent episodes based upon his home cardiac monitor but that did not appear to be accurate. I did not change the dose of Betapace. Since that visit he has been doing very well and has had no palpitations to suggest recurrence and his cardiac monitor is consistently showing sinus rhythm. He is semiactive and does not experience any chest discomfort or dyspnea, no presyncope or syncope. There has been no bleeding or melena, no neurologic episodes such as loss of vision, motor weakness or slurred speech. Current Outpatient Medications   Medication Sig Dispense Refill    sotalol (BETAPACE) 120 MG tablet Take 120 mg by mouth 2 times daily      apixaban (ELIQUIS) 5 MG TABS tablet Take 5 mg by mouth 2 times daily      ondansetron (ZOFRAN ODT) 4 MG disintegrating tablet Take 1 tablet by mouth every 8 hours as needed for Nausea or Vomiting 10 tablet 0    omeprazole (PRILOSEC) 20 MG delayed release capsule Take 1 capsule by mouth Daily (Patient taking differently: Take 20 mg by mouth nightly ) 90 capsule 3    Probiotic Product (PROBIOTIC FORMULA PO) Take 1 tablet by mouth daily       Ascorbic Acid (VITAMIN C PO) Take 1 tablet by mouth nightly       Multiple Vitamins-Minerals (MULTIVITAMIN PO) Take 1 tablet by mouth daily       vitamin B-12 (CYANOCOBALAMIN) 1000 MCG tablet Take 1,000 mcg by mouth daily        No current facility-administered medications for this visit. There are no discontinued medications.      No Known Allergies    Past Medical History:   Diagnosis Date    Arthritis     Atrial fibrillation [facility])     sees dr. Daphnie Ackerman Difficult intubation     pt just had recent cervical fusion with plate 2 months ago    DVT (deep venous thrombosis) ([facility] [address] 2014    left leg    Elbow pain     torn bicep    GERD (gastroesophageal reflux disease)     Hx of blood clots     left leg/ after heel surg/ jan. 2015    Hyperlipidemia     recently able to be taken off cholesterol meds    Kidney stone on left side      Negative - Past Medical History for  Past Medical History Pertinent Negatives:   Diagnosis Date Noted    Asthma 01/24/2020    CAD (coronary artery disease) 01/24/2020    Cancer ([facility] [address] 01/24/2020    Cerebral artery occlusion with cerebral infarction ([facility] [address] 01/24/2020    CHF (congestive heart failure) ([facility] [address] 01/24/2020    COPD (chronic obstructive pulmonary disease) ([facility] [address] 01/24/2020    Diabetes mellitus ([facility] [address] 01/24/2020    Hemodialysis patient ([facility] [address] 01/24/2020    History of blood transfusion 01/24/2020    Hypertension 10/26/2020    Thyroid disease 01/24/2020       Past Surgical History:   Procedure Laterality Date    BACK SURGERY      BICEPS TENDON REPAIR Right 7/16/2020    RIGHT BICEPS TENDON REPAIR performed by Gerda Aguilar MD at [place] [address]      2015, oct    COLONOSCOPY  10/30/2009    [facility]: hyperplastic polyp    COLONOSCOPY  02/2015    hyperplastic polyp (5 yr)    DILATATION, ESOPHAGUS      ENDOSCOPY, COLON, DIAGNOSTIC      EYE SURGERY      FOOT SURGERY  01/2014    Left foot spur removal    LITHOTRIPSY Left 12/29/2015    ESWL EXTRACORPEAL SHOCK WAVE LITHOTRIPSY performed by Eleanor Shepard MD at [place] [address]  11/2015    PILONIDAL CYST EXCISION      [place] COLONOSCOPY FLX DX W/COLLJ SPEC WHEN PFRMD N/A 5/8/2017    Dr Lennie Huynh sided diverticulosis-5 yr recall    [place] EGD TRANSORAL BIOPSY SINGLE/MULTIPLE N/A 3/26/2018    Dr Ng-w/dilation over wire-51 Sinhala-esophageal stricture-Garces's (+) dysplasia (-)--1 yr recall    SHOULDER SURGERY  2007    right after fall, detached the subclavicle muscle    SPINE SURGERY  1990    Neck  surgery     UPPER GASTROINTESTINAL ENDOSCOPY N/A 2/12/2018    Dr Marek Lou Grade B esophagitis, hiatal hernia, stricture-(-) Марина, (+) Garces's (+) low grade dysplasia, active esophagogastritis, repeat: 3/26/18    UPPER GASTROINTESTINAL ENDOSCOPY  3/26/2018    Dr Ng-w/dilation over wire-51 Guyanese-esophageal stricture-Garces's (+) dysplasia (-)--1 yr recall    UPPER GASTROINTESTINAL ENDOSCOPY N/A 5/2/2019    Dr Ayde Wooten (+) Garces's, (-) dysplasia     Social History     Occupational History    Not on file   Tobacco Use    Smoking status: Never Smoker    Smokeless tobacco: Never Used   Substance and Sexual Activity    Alcohol use: No    Drug use: No    Sexual activity: Not on file        Family History   Problem Relation Age of Onset    Cancer Mother     Lung Cancer Mother     Heart Disease Father     Heart Disease Sister     Diabetes Sister     Heart Disease Brother     Diabetes Brother     Colon Cancer Neg Hx     Colon Polyps Neg Hx     Esophageal Cancer Neg Hx     Liver Cancer Neg Hx     Liver Disease Neg Hx     Rectal Cancer Neg Hx     Stomach Cancer Neg Hx        Review of Systems  Review of Systems   Constitutional: Negative for fatigue and fever. Respiratory: Negative for cough and shortness of breath. Cardiovascular: Negative for chest pain and palpitations. Gastrointestinal: Negative for anal bleeding and blood in stool. Neurological: Negative. Physical Exam  /78   Pulse 53   Ht 6' (1.829 m)   Wt 216 lb (98 kg)   SpO2 98%   BMI 29.29 kg/m²    Physical Exam  Constitutional:       Appearance: Normal appearance. He is normal weight. Cardiovascular:      Rate and Rhythm: Normal rate and regular rhythm. Heart sounds: Normal heart sounds. No gallop. Pulmonary:      Effort: Pulmonary effort is normal.      Breath sounds: Normal breath sounds. Abdominal:      General: Abdomen is flat.  Bowel sounds are normal.      Palpations: Abdomen is soft. Musculoskeletal:         General: No swelling. Skin:     General: Skin is warm and dry. Neurological:      Mental Status: He is alert. Assessment/Plan    EKG Findings:  EKG demonstrates sinus bradycardia, early repolarization, corrected QT interval as measured by me 427 ms    Problem List Items Addressed This Visit        Cardiology Problems    Hyperlipidemia    Paroxysmal atrial fibrillation ([facility] [address] - Primary    Relevant Orders    EKG 12 lead (Completed)           Diagnosis Orders   1. Paroxysmal atrial fibrillation (HCC)  EKG 12 lead    Presently sinus rhythm on Betapace   2. Hyperlipidemia, unspecified hyperlipidemia type         Recommendations:   Diet: Low-fat, calorie reduced diet  Activity: Normal activities  Medication Changes: Discontinue Eliquis and start aspirin 81 mg daily, continue other medications as prescribed. This patient is maintaining sinus rhythm and he has a low chads-vascular score of 1 and does not need full anticoagulation. He is to let me know if anything changes or he is seeing atrial fibrillation on his home monitor. No orders of the defined types were placed in this encounter. Orders Placed This Encounter   Procedures    EKG 12 lead     Order Specific Question:   Reason for Exam?     Answer:   Irregular heart rate       Return in about 6 months (around 6/10/2021) for me, routine.

## 2020-12-18 ENCOUNTER — TELEPHONE (OUTPATIENT)
Dept: CARDIOTHORACIC SURGERY | Age: 70
End: 2020-12-18

## 2020-12-18 NOTE — TELEPHONE ENCOUNTER
Pt called stating sotalol 120mg that Dr Whitney Goldberg put in the computer to [facility] in [place] did not go through. Called pharmacy with order.

## 2021-03-22 PROCEDURE — 99284 EMERGENCY DEPT VISIT MOD MDM: CPT

## 2021-03-23 ENCOUNTER — ANESTHESIA EVENT (OUTPATIENT)
Dept: OPERATING ROOM | Age: 71
DRG: 418 | End: 2021-03-23
Payer: MEDICARE

## 2021-03-23 ENCOUNTER — HOSPITAL ENCOUNTER (INPATIENT)
Age: 71
LOS: 3 days | Discharge: HOME | DRG: 418 | End: 2021-03-28
Attending: PEDIATRICS | Admitting: SURGERY
Payer: MEDICARE

## 2021-03-23 ENCOUNTER — APPOINTMENT (OUTPATIENT)
Dept: GENERAL RADIOLOGY | Age: 71
DRG: 418 | End: 2021-03-23
Payer: MEDICARE

## 2021-03-23 ENCOUNTER — ANESTHESIA (OUTPATIENT)
Dept: OPERATING ROOM | Age: 71
DRG: 418 | End: 2021-03-23
Payer: MEDICARE

## 2021-03-23 ENCOUNTER — APPOINTMENT (OUTPATIENT)
Dept: ULTRASOUND IMAGING | Age: 71
DRG: 418 | End: 2021-03-23
Payer: MEDICARE

## 2021-03-23 VITALS — OXYGEN SATURATION: 100 % | SYSTOLIC BLOOD PRESSURE: 103 MMHG | TEMPERATURE: 97 F | DIASTOLIC BLOOD PRESSURE: 61 MMHG

## 2021-03-23 DIAGNOSIS — R10.10 PAIN OF UPPER ABDOMEN: ICD-10-CM

## 2021-03-23 DIAGNOSIS — K81.0 ACUTE CHOLECYSTITIS: Primary | ICD-10-CM

## 2021-03-23 DIAGNOSIS — R07.9 CHEST PAIN, UNSPECIFIED TYPE: ICD-10-CM

## 2021-03-23 LAB
ALBUMIN SERPL-MCNC: 3.7 G/DL (ref 3.5–5.2)
ALBUMIN SERPL-MCNC: 4.5 G/DL (ref 3.5–5.2)
ALP BLD-CCNC: 108 U/L (ref 40–130)
ALP BLD-CCNC: 123 U/L (ref 40–130)
ALT SERPL-CCNC: 104 U/L (ref 5–41)
ALT SERPL-CCNC: 35 U/L (ref 5–41)
ANION GAP SERPL CALCULATED.3IONS-SCNC: 10 MMOL/L (ref 7–19)
ANION GAP SERPL CALCULATED.3IONS-SCNC: 5 MMOL/L (ref 7–19)
APTT: 29.9 SEC (ref 26–36.2)
AST SERPL-CCNC: 27 U/L (ref 5–40)
AST SERPL-CCNC: 68 U/L (ref 5–40)
BASOPHILS ABSOLUTE: 0 K/UL (ref 0–0.2)
BASOPHILS RELATIVE PERCENT: 0.5 % (ref 0–1)
BILIRUB SERPL-MCNC: 0.4 MG/DL (ref 0.2–1.2)
BILIRUB SERPL-MCNC: <0.2 MG/DL (ref 0.2–1.2)
BUN BLDV-MCNC: 18 MG/DL (ref 8–23)
BUN BLDV-MCNC: 21 MG/DL (ref 8–23)
CALCIUM SERPL-MCNC: 8.5 MG/DL (ref 8.8–10.2)
CALCIUM SERPL-MCNC: 9.1 MG/DL (ref 8.8–10.2)
CHLORIDE BLD-SCNC: 104 MMOL/L (ref 98–111)
CHLORIDE BLD-SCNC: 105 MMOL/L (ref 98–111)
CO2: 26 MMOL/L (ref 22–29)
CO2: 30 MMOL/L (ref 22–29)
CREAT SERPL-MCNC: 0.8 MG/DL (ref 0.5–1.2)
CREAT SERPL-MCNC: 0.9 MG/DL (ref 0.5–1.2)
EKG P AXIS: NORMAL DEGREES
EKG P AXIS: NORMAL DEGREES
EKG P-R INTERVAL: NORMAL MS
EKG P-R INTERVAL: NORMAL MS
EKG Q-T INTERVAL: 394 MS
EKG Q-T INTERVAL: 396 MS
EKG QRS DURATION: 82 MS
EKG QRS DURATION: 84 MS
EKG QTC CALCULATION (BAZETT): 401 MS
EKG QTC CALCULATION (BAZETT): 416 MS
EKG T AXIS: 53 DEGREES
EKG T AXIS: 65 DEGREES
EOSINOPHILS ABSOLUTE: 0.1 K/UL (ref 0–0.6)
EOSINOPHILS RELATIVE PERCENT: 1.6 % (ref 0–5)
GFR AFRICAN AMERICAN: >59
GFR AFRICAN AMERICAN: >59
GFR NON-AFRICAN AMERICAN: >60
GFR NON-AFRICAN AMERICAN: >60
GLUCOSE BLD-MCNC: 105 MG/DL (ref 74–109)
GLUCOSE BLD-MCNC: 170 MG/DL (ref 74–109)
HCT VFR BLD CALC: 39.7 % (ref 42–52)
HCT VFR BLD CALC: 44.3 % (ref 42–52)
HEMOGLOBIN: 13.2 G/DL (ref 14–18)
HEMOGLOBIN: 14.7 G/DL (ref 14–18)
IMMATURE GRANULOCYTES #: 0 K/UL
INR BLD: 0.95 (ref 0.88–1.18)
LIPASE: 19 U/L (ref 13–60)
LYMPHOCYTES ABSOLUTE: 1.7 K/UL (ref 1.1–4.5)
LYMPHOCYTES RELATIVE PERCENT: 19.2 % (ref 20–40)
MCH RBC QN AUTO: 31.3 PG (ref 27–31)
MCH RBC QN AUTO: 31.7 PG (ref 27–31)
MCHC RBC AUTO-ENTMCNC: 33.2 G/DL (ref 33–37)
MCHC RBC AUTO-ENTMCNC: 33.2 G/DL (ref 33–37)
MCV RBC AUTO: 94.5 FL (ref 80–94)
MCV RBC AUTO: 95.2 FL (ref 80–94)
MONOCYTES ABSOLUTE: 0.8 K/UL (ref 0–0.9)
MONOCYTES RELATIVE PERCENT: 8.8 % (ref 0–10)
NEUTROPHILS ABSOLUTE: 6.1 K/UL (ref 1.5–7.5)
NEUTROPHILS RELATIVE PERCENT: 69.6 % (ref 50–65)
PDW BLD-RTO: 13.5 % (ref 11.5–14.5)
PDW BLD-RTO: 13.6 % (ref 11.5–14.5)
PLATELET # BLD: 190 K/UL (ref 130–400)
PLATELET # BLD: 213 K/UL (ref 130–400)
PMV BLD AUTO: 10.7 FL (ref 9.4–12.4)
PMV BLD AUTO: 10.9 FL (ref 9.4–12.4)
POTASSIUM SERPL-SCNC: 4.1 MMOL/L (ref 3.5–5)
POTASSIUM SERPL-SCNC: 4.4 MMOL/L (ref 3.5–5)
PRO-BNP: 351 PG/ML (ref 0–900)
PROTHROMBIN TIME: 12.6 SEC (ref 12–14.6)
RBC # BLD: 4.17 M/UL (ref 4.7–6.1)
RBC # BLD: 4.69 M/UL (ref 4.7–6.1)
SARS-COV-2, NAAT: NOT DETECTED
SODIUM BLD-SCNC: 140 MMOL/L (ref 136–145)
SODIUM BLD-SCNC: 140 MMOL/L (ref 136–145)
TOTAL PROTEIN: 6.1 G/DL (ref 6.6–8.7)
TOTAL PROTEIN: 7.4 G/DL (ref 6.6–8.7)
TROPONIN: <0.01 NG/ML (ref 0–0.03)
WBC # BLD: 6.3 K/UL (ref 4.8–10.8)
WBC # BLD: 8.7 K/UL (ref 4.8–10.8)

## 2021-03-23 PROCEDURE — G0378 HOSPITAL OBSERVATION PER HR: HCPCS

## 2021-03-23 PROCEDURE — 2500000003 HC RX 250 WO HCPCS

## 2021-03-23 PROCEDURE — 7100000000 HC PACU RECOVERY - FIRST 15 MIN: Performed by: SURGERY

## 2021-03-23 PROCEDURE — 93005 ELECTROCARDIOGRAM TRACING: CPT | Performed by: EMERGENCY MEDICINE

## 2021-03-23 PROCEDURE — 85025 COMPLETE CBC W/AUTO DIFF WBC: CPT

## 2021-03-23 PROCEDURE — 84484 ASSAY OF TROPONIN QUANT: CPT

## 2021-03-23 PROCEDURE — 6360000002 HC RX W HCPCS: Performed by: SURGERY

## 2021-03-23 PROCEDURE — 6360000002 HC RX W HCPCS: Performed by: EMERGENCY MEDICINE

## 2021-03-23 PROCEDURE — 6360000002 HC RX W HCPCS

## 2021-03-23 PROCEDURE — 80053 COMPREHEN METABOLIC PANEL: CPT

## 2021-03-23 PROCEDURE — 7100000001 HC PACU RECOVERY - ADDTL 15 MIN: Performed by: SURGERY

## 2021-03-23 PROCEDURE — 2580000003 HC RX 258: Performed by: ANESTHESIOLOGY

## 2021-03-23 PROCEDURE — 93005 ELECTROCARDIOGRAM TRACING: CPT | Performed by: PEDIATRICS

## 2021-03-23 PROCEDURE — G0378 HOSPITAL OBSERVATION PER HR: HCPCS | Performed by: INTERNAL MEDICINE

## 2021-03-23 PROCEDURE — 93010 ELECTROCARDIOGRAM REPORT: CPT | Performed by: INTERNAL MEDICINE

## 2021-03-23 PROCEDURE — 2580000003 HC RX 258: Performed by: EMERGENCY MEDICINE

## 2021-03-23 PROCEDURE — 3209999900 FLUORO FOR SURGICAL PROCEDURES

## 2021-03-23 PROCEDURE — 0FT44ZZ RESECTION OF GALLBLADDER, PERCUTANEOUS ENDOSCOPIC APPROACH: ICD-10-PCS | Performed by: SURGERY

## 2021-03-23 PROCEDURE — 2500000003 HC RX 250 WO HCPCS: Performed by: SURGERY

## 2021-03-23 PROCEDURE — 2720000010 HC SURG SUPPLY STERILE: Performed by: SURGERY

## 2021-03-23 PROCEDURE — 76705 ECHO EXAM OF ABDOMEN: CPT

## 2021-03-23 PROCEDURE — 47563 LAPARO CHOLECYSTECTOMY/GRAPH: CPT | Performed by: SURGERY

## 2021-03-23 PROCEDURE — 85027 COMPLETE CBC AUTOMATED: CPT

## 2021-03-23 PROCEDURE — 36415 COLL VENOUS BLD VENIPUNCTURE: CPT

## 2021-03-23 PROCEDURE — 6360000004 HC RX CONTRAST MEDICATION: Performed by: SURGERY

## 2021-03-23 PROCEDURE — 96366 THER/PROPH/DIAG IV INF ADDON: CPT

## 2021-03-23 PROCEDURE — 3700000001 HC ADD 15 MINUTES (ANESTHESIA): Performed by: SURGERY

## 2021-03-23 PROCEDURE — 88304 TISSUE EXAM BY PATHOLOGIST: CPT

## 2021-03-23 PROCEDURE — 83690 ASSAY OF LIPASE: CPT

## 2021-03-23 PROCEDURE — 3700000000 HC ANESTHESIA ATTENDED CARE: Performed by: SURGERY

## 2021-03-23 PROCEDURE — 85610 PROTHROMBIN TIME: CPT

## 2021-03-23 PROCEDURE — 3600000014 HC SURGERY LEVEL 4 ADDTL 15MIN: Performed by: SURGERY

## 2021-03-23 PROCEDURE — 74300 X-RAY BILE DUCTS/PANCREAS: CPT

## 2021-03-23 PROCEDURE — 2709999900 HC NON-CHARGEABLE SUPPLY: Performed by: SURGERY

## 2021-03-23 PROCEDURE — 87635 SARS-COV-2 COVID-19 AMP PRB: CPT

## 2021-03-23 PROCEDURE — 71045 X-RAY EXAM CHEST 1 VIEW: CPT

## 2021-03-23 PROCEDURE — 3600000004 HC SURGERY LEVEL 4 BASE: Performed by: SURGERY

## 2021-03-23 PROCEDURE — 2580000003 HC RX 258: Performed by: SURGERY

## 2021-03-23 PROCEDURE — 83880 ASSAY OF NATRIURETIC PEPTIDE: CPT

## 2021-03-23 PROCEDURE — 99219 PR INITIAL OBSERVATION CARE/DAY 50 MINUTES: CPT | Performed by: SURGERY

## 2021-03-23 PROCEDURE — 6360000002 HC RX W HCPCS: Performed by: ANESTHESIOLOGY

## 2021-03-23 PROCEDURE — 85730 THROMBOPLASTIN TIME PARTIAL: CPT

## 2021-03-23 PROCEDURE — 96365 THER/PROPH/DIAG IV INF INIT: CPT

## 2021-03-23 PROCEDURE — 2580000003 HC RX 258

## 2021-03-23 RX ORDER — PROPOFOL 10 MG/ML
INJECTION, EMULSION INTRAVENOUS PRN
Status: DISCONTINUED | OUTPATIENT
Start: 2021-03-23 | End: 2021-03-23 | Stop reason: SDUPTHER

## 2021-03-23 RX ORDER — DEXAMETHASONE SODIUM PHOSPHATE 4 MG/ML
4 INJECTION, SOLUTION INTRA-ARTICULAR; INTRALESIONAL; INTRAMUSCULAR; INTRAVENOUS; SOFT TISSUE ONCE
Status: DISCONTINUED | OUTPATIENT
Start: 2021-03-23 | End: 2021-03-23 | Stop reason: HOSPADM

## 2021-03-23 RX ORDER — BUPIVACAINE HYDROCHLORIDE AND EPINEPHRINE 2.5; 5 MG/ML; UG/ML
INJECTION, SOLUTION INFILTRATION; PERINEURAL PRN
Status: DISCONTINUED | OUTPATIENT
Start: 2021-03-23 | End: 2021-03-23 | Stop reason: HOSPADM

## 2021-03-23 RX ORDER — SODIUM CHLORIDE, SODIUM LACTATE, POTASSIUM CHLORIDE, CALCIUM CHLORIDE 600; 310; 30; 20 MG/100ML; MG/100ML; MG/100ML; MG/100ML
INJECTION, SOLUTION INTRAVENOUS CONTINUOUS
Status: DISCONTINUED | OUTPATIENT
Start: 2021-03-23 | End: 2021-03-24

## 2021-03-23 RX ORDER — SOTALOL HYDROCHLORIDE 120 MG/1
120 TABLET ORAL 2 TIMES DAILY
Status: DISCONTINUED | OUTPATIENT
Start: 2021-03-23 | End: 2021-03-28 | Stop reason: HOSPADM

## 2021-03-23 RX ORDER — HYDROCODONE BITARTRATE AND ACETAMINOPHEN 5; 325 MG/1; MG/1
2 TABLET ORAL EVERY 4 HOURS PRN
Status: DISCONTINUED | OUTPATIENT
Start: 2021-03-23 | End: 2021-03-24 | Stop reason: ALTCHOICE

## 2021-03-23 RX ORDER — HYDROCODONE BITARTRATE AND ACETAMINOPHEN 5; 325 MG/1; MG/1
1 TABLET ORAL EVERY 4 HOURS PRN
Status: DISCONTINUED | OUTPATIENT
Start: 2021-03-23 | End: 2021-03-24 | Stop reason: ALTCHOICE

## 2021-03-23 RX ORDER — ROCURONIUM BROMIDE 10 MG/ML
INJECTION, SOLUTION INTRAVENOUS PRN
Status: DISCONTINUED | OUTPATIENT
Start: 2021-03-23 | End: 2021-03-23 | Stop reason: SDUPTHER

## 2021-03-23 RX ORDER — HYDROMORPHONE HYDROCHLORIDE 1 MG/ML
0.5 INJECTION, SOLUTION INTRAMUSCULAR; INTRAVENOUS; SUBCUTANEOUS
Status: DISCONTINUED | OUTPATIENT
Start: 2021-03-23 | End: 2021-03-23

## 2021-03-23 RX ORDER — SODIUM CHLORIDE 0.9 % (FLUSH) 0.9 %
10 SYRINGE (ML) INJECTION PRN
Status: DISCONTINUED | OUTPATIENT
Start: 2021-03-23 | End: 2021-03-23 | Stop reason: HOSPADM

## 2021-03-23 RX ORDER — ACETAMINOPHEN 325 MG/1
650 TABLET ORAL EVERY 4 HOURS PRN
Status: DISCONTINUED | OUTPATIENT
Start: 2021-03-23 | End: 2021-03-28 | Stop reason: HOSPADM

## 2021-03-23 RX ORDER — FENTANYL CITRATE 50 UG/ML
50 INJECTION, SOLUTION INTRAMUSCULAR; INTRAVENOUS EVERY 5 MIN PRN
Status: DISCONTINUED | OUTPATIENT
Start: 2021-03-23 | End: 2021-03-23 | Stop reason: HOSPADM

## 2021-03-23 RX ORDER — HYDROMORPHONE HYDROCHLORIDE 1 MG/ML
0.5 INJECTION, SOLUTION INTRAMUSCULAR; INTRAVENOUS; SUBCUTANEOUS
Status: DISCONTINUED | OUTPATIENT
Start: 2021-03-23 | End: 2021-03-28 | Stop reason: HOSPADM

## 2021-03-23 RX ORDER — ASPIRIN 81 MG/1
81 TABLET ORAL DAILY
COMMUNITY

## 2021-03-23 RX ORDER — PROMETHAZINE HYDROCHLORIDE 25 MG/ML
6.25 INJECTION, SOLUTION INTRAMUSCULAR; INTRAVENOUS ONCE
Status: COMPLETED | OUTPATIENT
Start: 2021-03-23 | End: 2021-03-23

## 2021-03-23 RX ORDER — PANTOPRAZOLE SODIUM 40 MG/1
40 TABLET, DELAYED RELEASE ORAL
Status: DISCONTINUED | OUTPATIENT
Start: 2021-03-24 | End: 2021-03-28 | Stop reason: HOSPADM

## 2021-03-23 RX ORDER — SODIUM CHLORIDE 0.9 % (FLUSH) 0.9 %
10 SYRINGE (ML) INJECTION PRN
Status: DISCONTINUED | OUTPATIENT
Start: 2021-03-23 | End: 2021-03-23 | Stop reason: SDUPTHER

## 2021-03-23 RX ORDER — ONDANSETRON 2 MG/ML
INJECTION INTRAMUSCULAR; INTRAVENOUS PRN
Status: DISCONTINUED | OUTPATIENT
Start: 2021-03-23 | End: 2021-03-23 | Stop reason: SDUPTHER

## 2021-03-23 RX ORDER — SUCCINYLCHOLINE/SOD CL,ISO/PF 100 MG/5ML
SYRINGE (ML) INTRAVENOUS PRN
Status: DISCONTINUED | OUTPATIENT
Start: 2021-03-23 | End: 2021-03-23 | Stop reason: SDUPTHER

## 2021-03-23 RX ORDER — ONDANSETRON 2 MG/ML
4 INJECTION INTRAMUSCULAR; INTRAVENOUS
Status: COMPLETED | OUTPATIENT
Start: 2021-03-23 | End: 2021-03-23

## 2021-03-23 RX ORDER — SODIUM CHLORIDE, SODIUM LACTATE, POTASSIUM CHLORIDE, CALCIUM CHLORIDE 600; 310; 30; 20 MG/100ML; MG/100ML; MG/100ML; MG/100ML
INJECTION, SOLUTION INTRAVENOUS CONTINUOUS PRN
Status: DISCONTINUED | OUTPATIENT
Start: 2021-03-23 | End: 2021-03-23 | Stop reason: SDUPTHER

## 2021-03-23 RX ORDER — FENTANYL CITRATE 50 UG/ML
INJECTION, SOLUTION INTRAMUSCULAR; INTRAVENOUS PRN
Status: DISCONTINUED | OUTPATIENT
Start: 2021-03-23 | End: 2021-03-23 | Stop reason: SDUPTHER

## 2021-03-23 RX ORDER — EPHEDRINE SULFATE 50 MG/ML
INJECTION, SOLUTION INTRAVENOUS PRN
Status: DISCONTINUED | OUTPATIENT
Start: 2021-03-23 | End: 2021-03-23 | Stop reason: SDUPTHER

## 2021-03-23 RX ORDER — SODIUM CHLORIDE 0.9 % (FLUSH) 0.9 %
10 SYRINGE (ML) INJECTION PRN
Status: DISCONTINUED | OUTPATIENT
Start: 2021-03-23 | End: 2021-03-28 | Stop reason: HOSPADM

## 2021-03-23 RX ORDER — SODIUM CHLORIDE 9 MG/ML
INJECTION, SOLUTION INTRAVENOUS CONTINUOUS
Status: DISCONTINUED | OUTPATIENT
Start: 2021-03-23 | End: 2021-03-28 | Stop reason: HOSPADM

## 2021-03-23 RX ORDER — HYDROMORPHONE HYDROCHLORIDE 1 MG/ML
0.5 INJECTION, SOLUTION INTRAMUSCULAR; INTRAVENOUS; SUBCUTANEOUS EVERY 5 MIN PRN
Status: DISCONTINUED | OUTPATIENT
Start: 2021-03-23 | End: 2021-03-23 | Stop reason: HOSPADM

## 2021-03-23 RX ORDER — ONDANSETRON 2 MG/ML
4 INJECTION INTRAMUSCULAR; INTRAVENOUS EVERY 8 HOURS PRN
Status: DISCONTINUED | OUTPATIENT
Start: 2021-03-23 | End: 2021-03-28 | Stop reason: HOSPADM

## 2021-03-23 RX ORDER — SODIUM CHLORIDE 0.9 % (FLUSH) 0.9 %
10 SYRINGE (ML) INJECTION EVERY 12 HOURS SCHEDULED
Status: DISCONTINUED | OUTPATIENT
Start: 2021-03-23 | End: 2021-03-28 | Stop reason: HOSPADM

## 2021-03-23 RX ORDER — LIDOCAINE HYDROCHLORIDE 10 MG/ML
INJECTION, SOLUTION EPIDURAL; INFILTRATION; INTRACAUDAL; PERINEURAL PRN
Status: DISCONTINUED | OUTPATIENT
Start: 2021-03-23 | End: 2021-03-23 | Stop reason: SDUPTHER

## 2021-03-23 RX ORDER — SODIUM CHLORIDE 0.9 % (FLUSH) 0.9 %
10 SYRINGE (ML) INJECTION EVERY 12 HOURS SCHEDULED
Status: DISCONTINUED | OUTPATIENT
Start: 2021-03-23 | End: 2021-03-23 | Stop reason: HOSPADM

## 2021-03-23 RX ORDER — HYDROMORPHONE HYDROCHLORIDE 1 MG/ML
0.25 INJECTION, SOLUTION INTRAMUSCULAR; INTRAVENOUS; SUBCUTANEOUS EVERY 5 MIN PRN
Status: DISCONTINUED | OUTPATIENT
Start: 2021-03-23 | End: 2021-03-23 | Stop reason: HOSPADM

## 2021-03-23 RX ORDER — 0.9 % SODIUM CHLORIDE 0.9 %
1000 INTRAVENOUS SOLUTION INTRAVENOUS ONCE
Status: COMPLETED | OUTPATIENT
Start: 2021-03-23 | End: 2021-03-23

## 2021-03-23 RX ORDER — SODIUM CHLORIDE 0.9 % (FLUSH) 0.9 %
10 SYRINGE (ML) INJECTION EVERY 12 HOURS SCHEDULED
Status: DISCONTINUED | OUTPATIENT
Start: 2021-03-23 | End: 2021-03-23 | Stop reason: SDUPTHER

## 2021-03-23 RX ADMIN — FENTANYL CITRATE 100 MCG: 50 INJECTION, SOLUTION INTRAMUSCULAR; INTRAVENOUS at 16:15

## 2021-03-23 RX ADMIN — EPHEDRINE SULFATE 10 MG: 50 INJECTION INTRAMUSCULAR; INTRAVENOUS; SUBCUTANEOUS at 16:32

## 2021-03-23 RX ADMIN — FENTANYL CITRATE 50 MCG: 50 INJECTION, SOLUTION INTRAMUSCULAR; INTRAVENOUS at 16:56

## 2021-03-23 RX ADMIN — ONDANSETRON 4 MG: 2 INJECTION INTRAMUSCULAR; INTRAVENOUS at 18:41

## 2021-03-23 RX ADMIN — SODIUM CHLORIDE, PRESERVATIVE FREE 10 ML: 5 INJECTION INTRAVENOUS at 19:54

## 2021-03-23 RX ADMIN — SODIUM CHLORIDE, SODIUM LACTATE, POTASSIUM CHLORIDE, AND CALCIUM CHLORIDE: 600; 310; 30; 20 INJECTION, SOLUTION INTRAVENOUS at 16:11

## 2021-03-23 RX ADMIN — SODIUM CHLORIDE, SODIUM LACTATE, POTASSIUM CHLORIDE, AND CALCIUM CHLORIDE: 600; 310; 30; 20 INJECTION, SOLUTION INTRAVENOUS at 18:00

## 2021-03-23 RX ADMIN — LIDOCAINE HYDROCHLORIDE 50 MG: 10 INJECTION, SOLUTION EPIDURAL; INFILTRATION; INTRACAUDAL; PERINEURAL at 16:17

## 2021-03-23 RX ADMIN — ROCURONIUM BROMIDE 20 MG: 10 INJECTION, SOLUTION INTRAVENOUS at 16:55

## 2021-03-23 RX ADMIN — PHENYLEPHRINE HYDROCHLORIDE 100 MCG: 10 INJECTION INTRAVENOUS at 16:33

## 2021-03-23 RX ADMIN — FENTANYL CITRATE 50 MCG: 50 INJECTION, SOLUTION INTRAMUSCULAR; INTRAVENOUS at 16:53

## 2021-03-23 RX ADMIN — ONDANSETRON HYDROCHLORIDE 4 MG: 2 INJECTION, SOLUTION INTRAMUSCULAR; INTRAVENOUS at 18:08

## 2021-03-23 RX ADMIN — ROCURONIUM BROMIDE 30 MG: 10 INJECTION, SOLUTION INTRAVENOUS at 16:35

## 2021-03-23 RX ADMIN — HYDROMORPHONE HYDROCHLORIDE 0.5 MG: 1 INJECTION, SOLUTION INTRAMUSCULAR; INTRAVENOUS; SUBCUTANEOUS at 18:40

## 2021-03-23 RX ADMIN — SUGAMMADEX 200 MG: 100 INJECTION, SOLUTION INTRAVENOUS at 18:15

## 2021-03-23 RX ADMIN — SODIUM CHLORIDE 1000 ML: 9 INJECTION, SOLUTION INTRAVENOUS at 05:14

## 2021-03-23 RX ADMIN — PROPOFOL 200 MG: 10 INJECTION, EMULSION INTRAVENOUS at 16:17

## 2021-03-23 RX ADMIN — HYDROMORPHONE HYDROCHLORIDE 0.5 MG: 1 INJECTION, SOLUTION INTRAMUSCULAR; INTRAVENOUS; SUBCUTANEOUS at 19:15

## 2021-03-23 RX ADMIN — HYDROMORPHONE HYDROCHLORIDE 0.5 MG: 1 INJECTION, SOLUTION INTRAMUSCULAR; INTRAVENOUS; SUBCUTANEOUS at 18:51

## 2021-03-23 RX ADMIN — PHENYLEPHRINE HYDROCHLORIDE 100 MCG: 10 INJECTION INTRAVENOUS at 16:45

## 2021-03-23 RX ADMIN — PROMETHAZINE HYDROCHLORIDE 6.25 MG: 25 INJECTION INTRAMUSCULAR; INTRAVENOUS at 18:56

## 2021-03-23 RX ADMIN — EPHEDRINE SULFATE 10 MG: 50 INJECTION INTRAMUSCULAR; INTRAVENOUS; SUBCUTANEOUS at 16:47

## 2021-03-23 RX ADMIN — SODIUM CHLORIDE: 9 INJECTION, SOLUTION INTRAVENOUS at 06:30

## 2021-03-23 RX ADMIN — Medication 180 MG: at 16:17

## 2021-03-23 RX ADMIN — SODIUM CHLORIDE, POTASSIUM CHLORIDE, SODIUM LACTATE AND CALCIUM CHLORIDE: 600; 310; 30; 20 INJECTION, SOLUTION INTRAVENOUS at 19:55

## 2021-03-23 RX ADMIN — HYDROMORPHONE HYDROCHLORIDE 0.5 MG: 1 INJECTION, SOLUTION INTRAMUSCULAR; INTRAVENOUS; SUBCUTANEOUS at 21:15

## 2021-03-23 RX ADMIN — PIPERACILLIN AND TAZOBACTAM 3375 MG: 3; .375 INJECTION, POWDER, LYOPHILIZED, FOR SOLUTION INTRAVENOUS at 05:26

## 2021-03-23 ASSESSMENT — ENCOUNTER SYMPTOMS
CONSTIPATION: 0
EYE PAIN: 0
ABDOMINAL PAIN: 1
EYE DISCHARGE: 0
VOMITING: 1
WHEEZING: 0
ABDOMINAL DISTENTION: 1
EYE REDNESS: 0
COLOR CHANGE: 0
SHORTNESS OF BREATH: 0
BACK PAIN: 0
NAUSEA: 1
COUGH: 0
SORE THROAT: 0
DIARRHEA: 0
RHINORRHEA: 0
SHORTNESS OF BREATH: 1

## 2021-03-23 ASSESSMENT — PAIN SCALES - GENERAL
PAINLEVEL_OUTOF10: 7
PAINLEVEL_OUTOF10: 0
PAINLEVEL_OUTOF10: 7
PAINLEVEL_OUTOF10: 7
PAINLEVEL_OUTOF10: 9

## 2021-03-23 ASSESSMENT — PAIN DESCRIPTION - PROGRESSION: CLINICAL_PROGRESSION: NOT CHANGED

## 2021-03-23 ASSESSMENT — PAIN - FUNCTIONAL ASSESSMENT: PAIN_FUNCTIONAL_ASSESSMENT: PREVENTS OR INTERFERES SOME ACTIVE ACTIVITIES AND ADLS

## 2021-03-23 ASSESSMENT — LIFESTYLE VARIABLES: SMOKING_STATUS: 0

## 2021-03-23 NOTE — ED PROVIDER NOTES
[address] [facility]  eMERGENCYdEPARTMENT eNCOUnter      Pt Name: Marbella Carlos  MRN: [id number]  [place] 1950  Date of evaluation: 3/22/2021  Mal Saravia MD    Emergency Department care of this patient was assumed at 0430 from Dr. Bashir Soto. We have discussed the case and the plan of care. I have seen and evaluated patient and reviewed ED course. CHIEF COMPLAINT       Chief Complaint   Patient presents with    Abdominal Pain     started yesterday    Chest Pain    Shortness of Breath         PHYSICAL EXAM    (up to 7 for level 4, 8 or more for level 5)     ED Triage Vitals   BP Temp Temp src Pulse Resp SpO2 Height Weight   03/23/21 0007 03/23/21 0007 -- 03/23/21 0007 03/23/21 0007 03/23/21 0007 03/23/21 0024 03/23/21 0024   (!) 151/98 97.8 °F (36.6 °C)  73 16 93 % 6' (1.829 m) 225 lb (102.1 kg)       Physical Exam  Vitals signs and nursing note reviewed. Constitutional:       General: He is not in acute distress. Appearance: He is well-developed. He is not ill-appearing or diaphoretic. Comments: conversive in NAD   Abdominal:      General: Abdomen is flat. Tenderness: There is abdominal tenderness in the right upper quadrant. There is no guarding. Neurological:      Mental Status: He is alert. DIAGNOSTIC RESULTS         RADIOLOGY:   Non-plain film imagessuch as CT, Ultrasound and MRI are read by the radiologist. Plain radiographic images are visualized and preliminarily interpreted by the emergency physician with the below findings:        XR CHEST PORTABLE    (Results Pending)   [address] organ?  GALLBLADDER, PANCREAS, SPLEEN, LIVER    (Results Pending)           LABS:  Labs Reviewed   CBC WITH AUTO DIFFERENTIAL - Abnormal; Notable for the following components:       Result Value    RBC 4.69 (*)     MCV 94.5 (*)     MCH 31.3 (*)     Neutrophils % 69.6 (*)     Lymphocytes % 19.2 (*)     All other components within normal limits   COMPREHENSIVE METABOLIC PANEL - Abnormal; Notable for the following components:    Glucose 170 (*)     All other components within normal limits   COVID-19, RAPID   TROPONIN   BRAIN NATRIURETIC PEPTIDE   LIPASE   PROTIME-INR   APTT   TROPONIN   TROPONIN       All other labs were within normal range or not returned as of this dictation. EMERGENCY DEPARTMENT COURSE and DIFFERENTIAL DIAGNOSIS/MDM:   Vitals:    Vitals:    03/23/21 0234 03/23/21 0304 03/23/21 0404 03/23/21 0434   BP: 109/72 118/77 103/73 102/69   Pulse: 73 69 67 72   Resp: 20 20 20 21   Temp:       SpO2: 91% 92% 95% 97%   Weight:       Height:           MDM  Number of Diagnoses or Management Options     Amount and/or Complexity of Data Reviewed  Clinical lab tests: reviewed  Tests in the radiology section of CPT®: reviewed  Discuss the patient with other providers: yes      Sudden onset abd pain in epigastric/ruq region last night, \"balloon pressure\", n/v, pt well appearing and pain improved on my eval, u/s pending when assumed care, labs reassuring, u/s with numerous gallstones, pericholecystic fluid, wall 4mm, cbd 1.4cm, lfts and bili wnl, likely early cholecystitis, given zosyn, npo, d/w Dr. Maria Del Carmen Ernandez for admission 6708        CONSULTS:  None    PROCEDURES:  Unless otherwise noted below, none     Procedures    FINAL IMPRESSION      1. Acute cholecystitis    2. Chest pain, unspecified type    3.  Pain of upper abdomen          DISPOSITION/PLAN   DISPOSITION Admitted    PATIENT REFERRED TO:  Carmen Anglin MD  [address] [place] [address]  [phone]    Schedule an appointment as soon as possible for a visit       Nate Wilson MD  [address] [facility]  [phone]    Schedule an appointment as soon as possible for a visit         DISCHARGE MEDICATIONS:  New Prescriptions    No medications on file          (Please note that portions ofthis note were completed with a voice recognition program.  Efforts were made to edit the dictations but

## 2021-03-23 NOTE — ANESTHESIA POSTPROCEDURE EVALUATION
Department of Anesthesiology  Postprocedure Note    Patient: Mary Beth Hamilton  MRN: [id number]  [place]: 1950  Date of evaluation: 3/23/2021  Time:  6:27 PM     Procedure Summary     Date: 03/23/21 Room / Location: [facility] OR [address] [place] [address]    Anesthesia Start: [address] Floor Anesthesia Stop: 1827    Procedure: CHOLECYSTECTOMY LAPAROSCOPIC with IOC (N/A Abdomen) Diagnosis: (acute cholecystitis)    Surgeons: Pina Pepe MD Responsible Provider: LUIS De La Torre CRNA    Anesthesia Type: general ASA Status: 3          Anesthesia Type: general    Ari Phase I: Ari Score: 6    Ari Phase II:      Last vitals: Reviewed and per EMR flowsheets.        Anesthesia Post Evaluation

## 2021-03-23 NOTE — BRIEF OP NOTE
Brief Postoperative Note      Patient: Denver Mandujano  YOB: 1950  MRN: [id number]    Date of Procedure: 3/23/2021    Pre-Op Diagnosis: acute cholecystitis    Post-Op Diagnosis: Same       Procedure(s):  CHOLECYSTECTOMY LAPAROSCOPIC with IOC    Surgeon(s):  Josy Higgins MD    Assistant:  * No surgical staff found *    Anesthesia: General    Estimated Blood Loss (mL): less than 50     Complications: None    Specimens:   ID Type Source Tests Collected by Time Destination   A : gallbladder and contents Tissue Gallbladder SURGICAL PATHOLOGY Josy Higgins MD 3/23/2021 1756        Implants:  * No implants in log *      Drains:   Closed/Suction Drain Inferior Abdomen Bulb 15 Bulgarian (Active)       Findings: dilated neck and dilated CBD. No definite filling defect in CBD. 15 round FIORELLA left.  Recheck liver enzymes in am    Electronically signed by Josy Higgins MD on 3/23/2021 at 6:18 PM

## 2021-03-23 NOTE — H&P
Renetta Hung is an 72 y.o.  male. The patient is a 72year old male who presented to the ER with a complaint of abdominal pain. He reports that he was feeling okay, and yesterday morning started having some abdominal bloating and pressure. Then towards supper time, had severe worsening or pain in the epigastric area and RUQ. The pain is described as a 'tremendous pressure.' It was worsened with movement and trying to eat supper. It was associated with an episode of nausea and vomiting. He denies any diarrhea or constipation, no fever of chills. He has never had pain like this before. He came in to the ER, where he was found to have [address] with findings consistent with acute cholecystitis. He did not have elevated WBC, liver enzymes, or lipase. He reports that at one point while in the ER, he had sudden improvement in his pain, and since then has been feeling much better. He still has tenderness in the RUQ, but no further epigastric pain. Past Medical History:   Diagnosis Date    Arthritis     Atrial fibrillation [facility])     sees dr. Dona Delgado Difficult intubation     pt just had recent cervical fusion with plate 2 months ago    DVT (deep venous thrombosis) ([facility] [address] 2014    left leg    Elbow pain     torn bicep    GERD (gastroesophageal reflux disease)     Hx of blood clots     left leg/ after heel surg/ jan. 2015    Hyperlipidemia     recently able to be taken off cholesterol meds    Kidney stone on left side        Allergies: No Known Allergies    Active Problems:    Acute cholecystitis  Resolved Problems:    * No resolved hospital problems. *    Blood pressure 112/76, pulse 64, temperature 97 °F (36.1 °C), resp. rate 14, height 6' (1.829 m), weight 225 lb (102.1 kg), SpO2 92 %. Review of Systems   Constitutional: Negative for activity change, appetite change and fever. HENT: Negative for congestion, rhinorrhea and sore throat.     Eyes: Negative for pain, redness and visual disturbance. Respiratory: Negative for cough, shortness of breath and wheezing. Cardiovascular: Positive for palpitations (h/o a fib). Negative for chest pain and leg swelling. Gastrointestinal: Positive for abdominal distention, abdominal pain, nausea and vomiting. Negative for constipation and diarrhea. Endocrine: Negative for polydipsia, polyphagia and polyuria. Genitourinary: Negative for dysuria, frequency and hematuria. Musculoskeletal: Negative for arthralgias, back pain and gait problem. Skin: Negative for rash and wound. Neurological: Negative for dizziness, seizures and headaches. Psychiatric/Behavioral: Negative for confusion. The patient is not nervous/anxious. Physical Exam  Vitals signs reviewed. Constitutional:       General: He is not in acute distress. Appearance: Normal appearance. HENT:      Head: Normocephalic and atraumatic. Nose: Nose normal.   Eyes:      Pupils: Pupils are equal, round, and reactive to light. Neck:      Musculoskeletal: Neck supple. No neck rigidity. Comments: Decreased ROM due to previous fusion, limited extension    Cardiovascular:      Rate and Rhythm: Normal rate. Heart sounds: No murmur. Pulmonary:      Effort: Pulmonary effort is normal. No respiratory distress. Abdominal:      General: There is no distension. Palpations: Abdomen is soft. There is no mass. Tenderness: There is abdominal tenderness (RUQ). There is no guarding. Hernia: No hernia is present. Musculoskeletal: Normal range of motion. General: No swelling or deformity. Skin:     General: Skin is warm and dry. Coloration: Skin is not jaundiced. Neurological:      General: No focal deficit present. Mental Status: He is alert and oriented to person, place, and time. Cranial Nerves: No cranial nerve deficit.    Psychiatric:         Mood and Affect: Mood normal.         Behavior: Behavior normal. US:  Impression:     The cholelithiasis with finding suggesting cholecystitis. Moderate dilatation common bile duct. The etiology is not certain. Nonobstructing left renal calculus. Signed by Dr Dutch Turner on 3/23/2021 7:06 AM     CBC:   Lab Results   Component Value Date    WBC 8.7 03/23/2021    RBC 4.69 03/23/2021    HGB 14.7 03/23/2021    HCT 44.3 03/23/2021    MCV 94.5 03/23/2021    MCH 31.3 03/23/2021    MCHC 33.2 03/23/2021    RDW 13.5 03/23/2021     03/23/2021    MPV 10.7 03/23/2021     CMP:    Lab Results   Component Value Date     03/23/2021    K 4.4 03/23/2021    K 4.2 01/25/2020     03/23/2021    CO2 26 03/23/2021    BUN 21 03/23/2021    CREATININE 0.9 03/23/2021    GFRAA >59 03/23/2021    LABGLOM >60 03/23/2021    GLUCOSE 170 03/23/2021    PROT 7.4 03/23/2021    LABALBU 4.5 03/23/2021    CALCIUM 9.1 03/23/2021    BILITOT <0.2 03/23/2021    ALKPHOS 123 03/23/2021    AST 27 03/23/2021    ALT 35 03/23/2021         Assessment:  72year old male with acute cholecystitis and dilated CBD    Plan:  Discussed with the patient that, even though his enzymes were not elevated, but symptoms, he sounds like he passed a CBD stone. He is  in RUQ with findings consistent with acute cholecystitis on US. The need for cholecystectomy was discussed. The risks and benefits of laparoscopic cholecystectomy were discussed, including but no limited to, bleeding, infection, injury to surrounding structures, need for open surgery, post op bile leak, and post operative diarrhea. The patient expressed understanding and would like to proceed with surgery. He had a covid test in ER which was negative. Continue with NPO, IVF, and IV abx. Will recheck labs and have telemetry placed.     Cadence Jarquin MD  3/23/2021

## 2021-03-23 NOTE — PROGRESS NOTES
Guerda Castro arrived to room # 535. Presented with: abdominal pain   Mental Status: Patient is oriented, alert, coherent, logical, thought processes intact and able to concentrate and follow conversation. Vitals:    03/23/21 0553   BP:    Pulse:    Resp:    Temp: 98 °F (36.7 °C)   SpO2:      Patient safety contract and falls prevention contract reviewed with patient Yes. Oriented Patient to room. Call light within reach. Yes. Needs, issues or concerns expressed at this time: yes, .       Electronically signed by Brianne Massey RN on 3/23/2021 at 6:08 AM

## 2021-03-23 NOTE — ED PROVIDER NOTES
[address] [facility] [facility]  eMERGENCYdEPARTMENT eNCOUnter      Pt Name: Zeina Eastman  MRN: [id number]  [place] 1950  Date of evaluation: 3/22/2021  Frederick Marks MD    Emergency Department care of this patient was assumed at [phone] from Dr. Yaw Tinoco. We have discussed the case and the plan of care. I have seen and evaluated patient and reviewed ED course. CHIEF COMPLAINT       Chief Complaint   Patient presents with    Abdominal Pain     started yesterday    Chest Pain    Shortness of Breath         PHYSICAL EXAM    (up to 7 for level 4, 8 or more for level 5)     ED Triage Vitals   BP Temp Temp src Pulse Resp SpO2 Height Weight   03/23/21 0007 03/23/21 0007 -- 03/23/21 0007 03/23/21 0007 03/23/21 0007 03/23/21 0024 03/23/21 0024   (!) 151/98 97.8 °F (36.6 °C)  73 16 93 % 6' (1.829 m) 225 lb (102.1 kg)       Physical Exam  Please see Dr. Shaylee Torres note for full details. DIAGNOSTIC RESULTS     EKG: All EKG's are interpreted by the Emergency Department Physician who either signs or Co-signs this chart inthe absence of a cardiologist.        RADIOLOGY:   Non-plain film imagessuch as CT, Ultrasound and MRI are read by the radiologist. Plain radiographic images are visualized and preliminarily interpreted by the emergency physician with the below findings:          XR CHEST PORTABLE    (Results Pending)   [address] organ?  GALLBLADDER, PANCREAS, SPLEEN, LIVER    (Results Pending)           LABS:  Labs Reviewed   CBC WITH AUTO DIFFERENTIAL - Abnormal; Notable for the following components:       Result Value    RBC 4.69 (*)     MCV 94.5 (*)     MCH 31.3 (*)     Neutrophils % 69.6 (*)     Lymphocytes % 19.2 (*)     All other components within normal limits   COMPREHENSIVE METABOLIC PANEL - Abnormal; Notable for the following components:    Glucose 170 (*)     All other components within normal limits   COVID-19, RAPID   TROPONIN   BRAIN NATRIURETIC PEPTIDE   LIPASE   PROTIME-INR   APTT

## 2021-03-23 NOTE — ANESTHESIA PRE PROCEDURE
Department of Anesthesiology  Preprocedure Note       Name:  Zoe Ingram   Age:  72 y.o.  :  1950                                          MRN:  [id number]         Date:  3/23/2021      Surgeon: Skyler Chisholm):  Lino Toney MD    Procedure: Procedure(s):  CHOLECYSTECTOMY LAPAROSCOPIC with IOC    Medications prior to admission:   Prior to Admission medications    Medication Sig Start Date End Date Taking?  Authorizing Provider   aspirin 81 MG EC tablet Take 81 mg by mouth daily   Yes Historical Provider, MD   sotalol (BETAPACE) 120 MG tablet Take 120 mg by mouth 2 times daily   Yes Historical Provider, MD   omeprazole (PRILOSEC) 20 MG delayed release capsule Take 1 capsule by mouth Daily  Patient taking differently: Take 20 mg by mouth nightly  3/26/18  Yes Jacobo Ng DO   Probiotic Product (PROBIOTIC FORMULA PO) Take 1 tablet by mouth daily    Yes Historical Provider, MD   Ascorbic Acid (VITAMIN C PO) Take 1 tablet by mouth nightly    Yes Historical Provider, MD   Multiple Vitamins-Minerals (MULTIVITAMIN PO) Take 1 tablet by mouth daily    Yes Historical Provider, MD   vitamin B-12 (CYANOCOBALAMIN) 1000 MCG tablet Take 1,000 mcg by mouth daily    Yes Historical Provider, MD   ondansetron (ZOFRAN ODT) 4 MG disintegrating tablet Take 1 tablet by mouth every 8 hours as needed for Nausea or Vomiting 19   LUIS Cordon       Current medications:    Current Facility-Administered Medications   Medication Dose Route Frequency Provider Last Rate Last Admin    [facility]] sodium chloride flush 0.9 % injection 10 mL  10 mL Intravenous 2 times per day Lino Toney MD        [facility]] sodium chloride flush 0.9 % injection 10 mL  10 mL Intravenous PRN Lino Toney MD        [facility]] acetaminophen (TYLENOL) tablet 650 mg  650 mg Oral Q4H PRN Lino Toney MD        [facility]] 0.9 % sodium chloride infusion   Intravenous Continuous Lino Toney MD 75 mL/hr at 21 0630 New Bag at 03/23/21 0630    [[facility] Hold] ondansetron (ZOFRAN) injection 4 mg  4 mg Intravenous Q8H PRN Nasim Shah MD        [facility]] HYDROmorphone HCl PF (DILAUDID) injection 0.5 mg  0.5 mg Intravenous Q3H PRN Nasim Shah MD        [facility]] sodium chloride flush 0.9 % injection 10 mL  10 mL Intravenous 2 times per day Nasim Shah MD        [facility]] sodium chloride flush 0.9 % injection 10 mL  10 mL Intravenous PRN Nasim Shah MD        [facility] Hold] pantoprazole (PROTONIX) tablet 40 mg  40 mg Oral QAM AC Nasim Shah MD        [facility] Hold] sotalol (BETAPACE) tablet 120 mg  120 mg Oral BID Nasim Shah MD        lactated ringers infusion   Intravenous Continuous Vero Pat MD           Allergies:  No Known Allergies    Problem List:    Patient Active Problem List   Diagnosis Code    Deep vein thrombosis (DVT) (HCC) I82.409    Hyperlipidemia E78.5    Decreased carotid pulse R09.89    Numbness and tingling in right hand R20.0, R20.2    Heart murmur R01.1    Chest pain R07.9    History of colon polyps Z86.010    Nephrolithiasis N20.0    Colon polyps K63.5    Gastrointestinal hemorrhage K92.2    Diverticulosis of large intestine with hemorrhage K57.31    Acute blood loss anemia D62    Hypotension I95.9    Bloody diarrhea R19.7    History of kidney stones Z87.442    Diverticulosis of intestine with bleeding K57.91    Esophageal dysphagia R13.10    Acute pain of right knee M25.561    Hyperlipoproteinemia E78.5    H/O cervical spine surgery Z98.890    Esophageal pain K22.8    Garces's esophagus without dysplasia K22.70    Chronic GERD K21.9    Sinoatrial node dysfunction (HCC) I49.5    Paroxysmal atrial fibrillation (HCC) I48.0    Acute cholecystitis K81.0       Past Medical History:        Diagnosis Date    Arthritis     Atrial fibrillation [facility])     sees dr. Renard Dela Cruz Colon polyps     Difficult intubation     pt just had recent cervical fusion with plate 2 months ago  DVT (deep venous thrombosis) ([facility] [address] 2014    left leg    Elbow pain     torn bicep    GERD (gastroesophageal reflux disease)     Hx of blood clots     left leg/ after heel surg/ jan. 2015    Hyperlipidemia     recently able to be taken off cholesterol meds    Kidney stone on left side        Past Surgical History:        Procedure Laterality Date    BACK SURGERY      BICEPS TENDON REPAIR Right 7/16/2020    RIGHT BICEPS TENDON REPAIR performed by Delfina Haynes MD at [place] [address]      2015, oct    COLONOSCOPY  10/30/2009    Baljit: hyperplastic polyp    COLONOSCOPY  02/2015    hyperplastic polyp (5 yr)    DILATATION, ESOPHAGUS      ENDOSCOPY, COLON, DIAGNOSTIC      EYE SURGERY      FOOT SURGERY  01/2014    Left foot spur removal    LITHOTRIPSY Left 12/29/2015    ESWL EXTRACORPEAL SHOCK WAVE LITHOTRIPSY performed by Raymon Christy MD at [place] [address]  11/2015    PILONIDAL CYST EXCISION      MD COLONOSCOPY FLX DX W/COLLJ SPEC WHEN PFRMD N/A 5/8/2017    Dr Golden Ramkala sided diverticulosis-5 yr recall    MD EGD TRANSORAL BIOPSY SINGLE/MULTIPLE N/A 3/26/2018    Dr Ng-w/dilation over wire-51 Wolof-esophageal stricture-Garces's (+) dysplasia (-)--1 yr recall    SHOULDER SURGERY  2007    right after fall, detached the subclavicle muscle    [facility]    Neck  surgery     UPPER GASTROINTESTINAL ENDOSCOPY N/A 2/12/2018    Dr Ariadna Torres Grade B esophagitis, hiatal hernia, stricture-(-) Марина, (+) Garces's (+) low grade dysplasia, active esophagogastritis, repeat: 3/26/18    UPPER GASTROINTESTINAL ENDOSCOPY  3/26/2018    Dr Ng-w/dilation over wire-51 Wolof-esophageal stricture-Garces's (+) dysplasia (-)--1 yr recall    UPPER GASTROINTESTINAL ENDOSCOPY N/A 5/2/2019    Dr Awilda Noland (+) Garces's, (-) dysplasia       Social History:    Social History     Tobacco Use    Smoking status: Never Smoker    Smokeless tobacco: Never Used   Substance Use Topics    Alcohol use: No                                Counseling given: Not Answered      Vital Signs (Current):   Vitals:    03/23/21 0553 03/23/21 0711 03/23/21 1137 03/23/21 1542   BP:  112/76 101/62 113/78   Pulse:  64 66 69   Resp:  14 14 16   Temp: 98 °F (36.7 °C) 97 °F (36.1 °C) 96.9 °F (36.1 °C) 97.1 °F (36.2 °C)   SpO2:  92% 97% 94%   Weight:       Height:                                                  BP Readings from Last 3 Encounters:   03/23/21 113/78   12/10/20 122/78   11/23/20 130/82       NPO Status: Time of last liquid consumption: 0000                        Time of last solid consumption: 0000                        Date of last liquid consumption: 03/22/21                        Date of last solid food consumption: 03/22/21    BMI:   Wt Readings from Last 3 Encounters:   03/23/21 225 lb (102.1 kg)   12/10/20 216 lb (98 kg)   11/23/20 220 lb (99.8 kg)     Body mass index is 30.52 kg/m². CBC:   Lab Results   Component Value Date    WBC 6.3 03/23/2021    RBC 4.17 03/23/2021    HGB 13.2 03/23/2021    HCT 39.7 03/23/2021    MCV 95.2 03/23/2021    RDW 13.6 03/23/2021     03/23/2021       CMP:   Lab Results   Component Value Date     03/23/2021    K 4.1 03/23/2021    K 4.2 01/25/2020     03/23/2021    CO2 30 03/23/2021    BUN 18 03/23/2021    CREATININE 0.8 03/23/2021    GFRAA >59 03/23/2021    LABGLOM >60 03/23/2021    GLUCOSE 105 03/23/2021    PROT 6.1 03/23/2021    CALCIUM 8.5 03/23/2021    BILITOT 0.4 03/23/2021    ALKPHOS 108 03/23/2021    AST 68 03/23/2021     03/23/2021       POC Tests: No results for input(s): POCGLU, POCNA, POCK, POCCL, POCBUN, POCHEMO, POCHCT in the last 72 hours.     Coags:   Lab Results   Component Value Date    PROTIME 12.6 03/23/2021    INR 0.95 03/23/2021    APTT 29.9 03/23/2021       HCG (If Applicable): No results found for: PREGTESTUR, PREGSERUM, HCG, HCGQUANT     ABGs: No results found for: PHART, PO2ART, GYS3LBX, NQL4LEX, BEART, T5SNVSMF     Type & Screen (If Applicable):  No results found for: LABABO, LABRH    Drug/Infectious Status (If Applicable):  No results found for: HIV, HEPCAB    COVID-19 Screening (If Applicable):   Lab Results   Component Value Date    COVID19 Not Detected 03/23/2021    COVID19 Not Detected 07/15/2020           Anesthesia Evaluation  Patient summary reviewed no history of anesthetic complications:   Airway: Mallampati: I  TM distance: >3 FB   Neck ROM: full  Mouth opening: > = 3 FB Dental:          Pulmonary:normal exam  breath sounds clear to auscultation      (-) asthma, recent URI, sleep apnea and not a current smoker          Patient did not smoke on day of surgery. Cardiovascular:  Exercise tolerance: good (>4 METS),   (+) dysrhythmias: atrial fibrillation,     (-) pacemaker, hypertension, past MI, CABG/stent and  angina    ECG reviewed  Rhythm: irregular  Rate: normal           Beta Blocker:  Dose within 24 Hrs         Neuro/Psych:      (-) seizures, TIA and CVA           GI/Hepatic/Renal:   (+) GERD:,      (-) liver disease and no renal disease       Endo/Other:        (-) diabetes mellitus, hypothyroidism, hyperthyroidism               Abdominal:           Vascular:                                        Anesthesia Plan      general     ASA 3     (Preop dexamethasone, famotidine)  Induction: intravenous. MIPS: Postoperative opioids intended and Prophylactic antiemetics administered. Anesthetic plan and risks discussed with patient. Use of blood products discussed with patient whom consented to blood products.                    Rafi Beckman MD   3/23/2021

## 2021-03-23 NOTE — ED NOTES
Patient placed in a gown Patient placed on cardiac monitor, continuous pulse oximeter, and NIBP monitor.  Monitor alarms on.       Grzegorz Kelly RN  03/23/21 0028

## 2021-03-23 NOTE — ED PROVIDER NOTES
[facility] [address]  eMERGENCY dEPARTMENT eNCOUnter      Pt Name: Romelia Adams  MRN: [id number]  [place] 1950  Date of evaluation: 3/22/2021  Provider: Ирина Cullen MD    [address] [place] [address]       Chief Complaint   Patient presents with    Abdominal Pain     started yesterday    Chest Pain    Shortness of Breath         HISTORY OF PRESENT ILLNESS   (Location/Symptom, Timing/Onset,Context/Setting, Quality, Duration, Modifying Factors, Severity)  Note limiting factors. Romelia Adams is a 72 y.o. male who presents to the emergency department with abdominal pain. Patient states pain began yesterday. Patient points to upper mid abdomen and lower chest as location. Patient states that pain felt like \"a balloon or pressure. \"  Pain started after patient ate yesterday. Pain was initially severe \"it took my breath away. \"  Patient admitted to shortness of breath. Patient states that he mowed the yard yesterday. The exertion did not change pain but \"turning my body made it worse. \"  Patient experienced vomiting x1 episode. Patient denies sweatiness, diarrhea, black or bloody stools, palpitations, seek out. Patient denies radiation of pain or exacerbating or alleviating factors. HPI    NursingNotes were reviewed. REVIEW OF SYSTEMS    (2-9 systems for level 4, 10 or more for level 5)     Review of Systems   Constitutional: Negative for chills and fever. HENT: Negative for congestion and rhinorrhea. Eyes: Negative for discharge. Respiratory: Positive for shortness of breath. Negative for cough. Cardiovascular: Positive for chest pain. Negative for palpitations. Gastrointestinal: Positive for abdominal pain, nausea and vomiting. Genitourinary: Negative for difficulty urinating and dysuria. Musculoskeletal: Negative for back pain and neck pain. Skin: Negative for color change and pallor. Neurological: Negative for syncope and light-headedness.    Psychiatric/Behavioral: Negative for agitation and confusion. All other systems reviewed and are negative. PAST MEDICALHISTORY     Past Medical History:   Diagnosis Date    Arthritis     Atrial fibrillation [facility])     sees dr. Dona Delgado Difficult intubation     pt just had recent cervical fusion with plate 2 months ago    DVT (deep venous thrombosis) ([address] 2014    left leg    Elbow pain     torn bicep    GERD (gastroesophageal reflux disease)     Hx of blood clots     left leg/ after heel surg/ jan.  2015    Hyperlipidemia     recently able to be taken off cholesterol meds    Kidney stone on left side          SURGICAL HISTORY       Past Surgical History:   Procedure Laterality Date    BACK SURGERY      BICEPS TENDON REPAIR Right 07/16/2020    RIGHT BICEPS TENDON REPAIR performed by Darron Colvin MD at [place] [address]      2015, oct    CHOLECYSTECTOMY, LAPAROSCOPIC N/A 03/23/2021    CHOLECYSTECTOMY LAPAROSCOPIC with IOC performed by Peggy Avila MD at [address] [place] [address]  10/30/2009    [facility]: hyperplastic polyp    COLONOSCOPY  02/2015    hyperplastic polyp (5 yr)    DILATATION, ESOPHAGUS      ENDOSCOPY, COLON, DIAGNOSTIC      ERCP N/A 03/24/2021    Dr LITO Grider-w/sphincterotomy, placement of a 10F x 5 cm biliary stent, balloon sweep-Cholangitis with choledocholithiasis, repeat in 3 months   [facility] SURGERY  01/2014    Left foot spur removal    LITHOTRIPSY Left 12/29/2015    ESWL EXTRACORPEAL SHOCK WAVE LITHOTRIPSY performed by Maude De La Cruz MD at [place] [address]  11/2015    PILONIDAL CYST EXCISION      UT COLONOSCOPY FLX DX W/COLLJ SPEC WHEN PFRMD N/A 05/08/2017    Dr Evon Goode sided diverticulosis-5 yr recall    UT EGD TRANSORAL BIOPSY SINGLE/MULTIPLE N/A 03/26/2018    Dr Ng-w/dilation over wire-51 Arabic-esophageal stricture-Garces's (+) dysplasia (-)--1 yr recall    SHOULDER SURGERY  2007    right after fall, detached the subclavicle muscle    SPINE SURGERY  1990    Neck  surgery     UPPER GASTROINTESTINAL ENDOSCOPY N/A 02/12/2018    Dr Venita Casey Grade B esophagitis, hiatal hernia, stricture-(-) Марина, (+) Garces's (+) low grade dysplasia, active esophagogastritis, repeat: 3/26/18    UPPER GASTROINTESTINAL ENDOSCOPY  03/26/2018    Dr Ng-w/dilation over wire-51 Dutch-esophageal stricture-Garces's (+) dysplasia (-)--1 yr recall    UPPER GASTROINTESTINAL ENDOSCOPY N/A 05/02/2019    Dr Keyur Jaramillo (+) Garces's, (-) dysplasia         CURRENT MEDICATIONS     Discharge Medication List as of 3/28/2021  1:45 PM      CONTINUE these medications which have NOT CHANGED    Details   aspirin 81 MG EC tablet Take 81 mg by mouth dailyHistorical Med      sotalol (BETAPACE) 120 MG tablet Take 120 mg by mouth 2 times dailyHistorical Med      omeprazole (PRILOSEC) 20 MG delayed release capsule Take 1 capsule by mouth Daily, Disp-90 capsule, R-3Normal      Probiotic Product (PROBIOTIC FORMULA PO) Take 1 tablet by mouth daily Historical Med      Ascorbic Acid (VITAMIN C PO) Take 1 tablet by mouth nightly Historical Med      Multiple Vitamins-Minerals (MULTIVITAMIN PO) Take 1 tablet by mouth daily Historical Med      vitamin B-12 (CYANOCOBALAMIN) 1000 MCG tablet Take 1,000 mcg by mouth daily Historical Med             ALLERGIES     Patient has no known allergies.     FAMILY HISTORY       Family History   Problem Relation Age of Onset    Cancer Mother     Lung Cancer Mother     Heart Disease Father     Heart Disease Sister     Diabetes Sister     Heart Disease Brother     Diabetes Brother     Colon Cancer Neg Hx     Colon Polyps Neg Hx     Esophageal Cancer Neg Hx     Liver Cancer Neg Hx     Liver Disease Neg Hx     Rectal Cancer Neg Hx     Stomach Cancer Neg Hx           SOCIAL HISTORY       Social History     Socioeconomic History    Marital status:      Spouse name: None    Number of children: 2    Years of education: None    Highest education level: None   Occupational History    None   Social Needs    Financial resource strain: None    Food insecurity     Worry: None     Inability: None    Transportation needs     Medical: None     Non-medical: None   Tobacco Use    Smoking status: Never Smoker    Smokeless tobacco: Never Used   Substance and Sexual Activity    Alcohol use: No    Drug use: No    Sexual activity: Yes     Partners: Female   Lifestyle    Physical activity     Days per week: None     Minutes per session: None    Stress: None   Relationships    Social connections     Talks on phone: None     Gets together: None     Attends Buddhist service: None     Active member of club or organization: None     Attends meetings of clubs or organizations: None     Relationship status: None    Intimate partner violence     Fear of current or ex partner: None     Emotionally abused: None     Physically abused: None     Forced sexual activity: None   Other Topics Concern    None   Social History Narrative    None       SCREENINGS    [place] Coma Scale  Eye Opening: Spontaneous  Best Verbal Response: Oriented  Best Motor Response: Obeys commands  [place] Coma Scale Score: 15        PHYSICAL EXAM    (up to 7 for level 4, 8 or more for level 5)     ED Triage Vitals   BP Temp Temp src Pulse Resp SpO2 Height Weight   03/23/21 0007 03/23/21 0007 -- 03/23/21 0007 03/23/21 0007 03/23/21 0007 03/23/21 0024 03/23/21 0024   (!) 151/98 97.8 °F (36.6 °C)  73 16 93 % 6' (1.829 m) 225 lb (102.1 kg)       Physical Exam  Vitals signs and nursing note reviewed. Constitutional:       General: He is not in acute distress. Appearance: Normal appearance. HENT:      Head: Normocephalic and atraumatic. Right Ear: External ear normal.      Left Ear: External ear normal.      Nose: Nose normal.      Mouth/Throat:      Mouth: Mucous membranes are moist.      Pharynx: Oropharynx is clear.  No oropharyngeal exudate. Eyes:      General: No scleral icterus. Conjunctiva/sclera: Conjunctivae normal.      Pupils: Pupils are equal, round, and reactive to light. Neck:      Musculoskeletal: Neck supple. No neck rigidity. Cardiovascular:      Rate and Rhythm: Normal rate and regular rhythm. Pulses: Normal pulses. Heart sounds: Normal heart sounds. Pulmonary:      Effort: Pulmonary effort is normal.      Breath sounds: Normal breath sounds. Abdominal:      General: Bowel sounds are normal.      Palpations: Abdomen is soft. Tenderness: There is abdominal tenderness (Mid upper abdomen and right upper quadrant). There is no right CVA tenderness, left CVA tenderness, guarding or rebound. Musculoskeletal:         General: No tenderness or deformity. Skin:     General: Skin is warm and dry. Capillary Refill: Capillary refill takes less than 2 seconds. Coloration: Skin is not jaundiced. Neurological:      General: No focal deficit present. Mental Status: He is alert and oriented to person, place, and time. Mental status is at baseline. Coordination: Coordination normal.   Psychiatric:         Mood and Affect: Mood normal.         Behavior: Behavior normal.         DIAGNOSTIC RESULTS     EKG: All EKG's areinterpreted by the Emergency Department Physician who either signs or Co-signs this chart in the absence of a cardiologist.    EKG dated 3/23/2021 at 0 1:49 AM: Atrial fibrillation, rate 65. Defect present. RADIOLOGY:  Non-plain film images such as CT, Ultrasound and MRI are read by the radiologist. Plain radiographic images are visualized and preliminarily interpreted bythe emergency physician with the below findings:      CT ABDOMEN PELVIS W IV CONTRAST Additional Contrast? Oral   Final Result   Expected postoperative changes status post cholecystectomy and ERCP. No evidence of biliary leak.    Signed by Dr Eddi Campa on 3/27/2021 3:04 PM      NM HEPATOBILIARY   Final normal limits   CBC - Abnormal; Notable for the following components:    RBC 4.17 (*)     Hemoglobin 13.2 (*)     Hematocrit 39.7 (*)     MCV 95.2 (*)     MCH 31.7 (*)     All other components within normal limits   CBC - Abnormal; Notable for the following components:    RBC 4.22 (*)     Hemoglobin 13.3 (*)     Hematocrit 40.0 (*)     MCV 94.8 (*)     MCH 31.5 (*)     All other components within normal limits   COMPREHENSIVE METABOLIC PANEL - Abnormal; Notable for the following components:    Glucose 166 (*)     Calcium 8.5 (*)     Total Protein 5.9 (*)     Total Bilirubin 3.7 (*)     Alkaline Phosphatase 163 (*)      (*)      (*)     All other components within normal limits   CBC - Abnormal; Notable for the following components:    RBC 3.79 (*)     Hemoglobin 12.0 (*)     Hematocrit 35.8 (*)     MCV 94.5 (*)     MCH 31.7 (*)     All other components within normal limits   COMPREHENSIVE METABOLIC PANEL - Abnormal; Notable for the following components:    Glucose 127 (*)     Calcium 8.4 (*)     Total Protein 5.9 (*)     Total Bilirubin 4.9 (*)      (*)     AST 80 (*)     All other components within normal limits   HEMOGLOBIN - Abnormal; Notable for the following components:    Hemoglobin 12.6 (*)     All other components within normal limits   CBC - Abnormal; Notable for the following components:    RBC 3.46 (*)     Hemoglobin 10.9 (*)     Hematocrit 33.4 (*)     MCV 96.5 (*)     MCH 31.5 (*)     MCHC 32.6 (*)     All other components within normal limits   COMPREHENSIVE METABOLIC PANEL - Abnormal; Notable for the following components:    Glucose 116 (*)     Calcium 8.5 (*)     Total Protein 5.4 (*)     Albumin 3.3 (*)     Total Bilirubin 4.1 (*)      (*)     AST 98 (*)     All other components within normal limits   COMPREHENSIVE METABOLIC PANEL - Abnormal; Notable for the following components:    Glucose 124 (*)     Calcium 8.3 (*)     Total Protein 5.2 (*)     Albumin 3.2 (*) Total Bilirubin 2.5 (*)      (*)     AST 89 (*)     All other components within normal limits   CBC - Abnormal; Notable for the following components:    RBC 3.57 (*)     Hemoglobin 11.3 (*)     Hematocrit 34.5 (*)     MCV 96.6 (*)     MCH 31.7 (*)     MCHC 32.8 (*)     All other components within normal limits   COMPREHENSIVE METABOLIC PANEL - Abnormal; Notable for the following components:    Calcium 8.3 (*)     Total Protein 5.3 (*)     Albumin 3.1 (*)     Total Bilirubin 1.8 (*)     Alkaline Phosphatase 143 (*)      (*)     AST 54 (*)     All other components within normal limits   COVID-19, RAPID   TROPONIN   BRAIN NATRIURETIC PEPTIDE   LIPASE   PROTIME-INR   APTT   TROPONIN   TROPONIN   SURGICAL PATHOLOGY    Narrative:                                          [facility]                                       [address] [facility], [place]   Department of Pathology  FINAL SURGICAL PATHOLOGY REPORT  Patient Name:  Felix Sutton        Accession No:  [id number]   Age Sex:   1950    72 Y M        Pt Type: I     KLORT 01                                             Location:  Account #      [de-identified]                 Collected:     2021  Med Rec #      [id number]                    Received:      2021  Attend Phys:   Arie HAMMER                Completed:     2021  Perform Phys:  Kayode Lopez    FINAL DIAGNOSIS:    Gallbladder, cholecystectomy:  1. Chronic active cholecystitis, severe. 2.  Cholelithiasis. 3.  No histologic evidence of malignancy. CSW/CSW    PREOP DIAGNOSIS:  ACUTE CHOLECYSTITIS    SPECIMEN(S):  GALLBLADDER, GALLBLADDER    GROSS DESCRIPTION:   Submitted in formalin in a container labeled with  the patient's name and date of birth and designated \"gallbladder with  contents\" is a tan to purple-tan gallbladder with external yellow fat,  measuring 8.8 x 3.4 x 2 cm.  A defect is noted in the wall of the  gallbladder. The mucosal surface is tan to light brown and glistening. The wall of the gallbladder is pliable. Multiple friable brown gallstones  are identified, ranging up to 1.2 cm in greatest dimension. Masses are  not seen. Representative sections of the gallbladder, including the  proximal cystic duct margin of surgical resection, are submitted in block  A1. CSW/PJS    MICROSCOPIC DESCRIPTION:  Microscopic examination was performed. CPT: 33631 Christy Goldberg MD  (Electronic Signature)  03/26/2021                                                                     Page 1 of 1   LIPASE    Narrative:     add on   LIPASE   SURGICAL PATHOLOGY       All other labs were within normal range or not returned as of this dictation. EMERGENCY DEPARTMENT COURSE and DIFFERENTIAL DIAGNOSIS/MDM:   Vitals:    Vitals:    03/28/21 0136 03/28/21 0241 03/28/21 0635 03/28/21 1112   BP: (!) 150/76 (!) 150/76 (!) 164/78 (!) 168/90   Pulse: 56 56 59 62   Resp: 17 17 16 16   Temp: 97.8 °F (36.6 °C) 97.8 °F (36.6 °C) 97.6 °F (36.4 °C) 97.4 °F (36.3 °C)   TempSrc: Temporal Temporal  Temporal   SpO2: 92% 92% 91% 94%   Weight:       Height:           MDM  28-year-old male presents with abdominal/chest pain. Lab, EKG, and radiology results reviewed. Await second troponin and ultrasound results. Discussed with Dr. Salima Valentin due to end of shift. CONSULTS:  None    PROCEDURES:  Unless otherwise noted below, none     Procedures    FINAL IMPRESSION      1. Acute cholecystitis    2. Chest pain, unspecified type    3.  Pain of upper abdomen          DISPOSITION/PLAN   DISPOSITION  Admit      (Please note that portions of this note were completed with a voice recognition program.  Efforts were made to edit thedictations but occasionally words are mis-transcribed.)    Prabhu Palomares MD (electronically signed)  Attending Emergency Physician         Prabhu Palomares MD  03/30/21 [place] [address] Amandeep Kumar MD  04/01/21 2285

## 2021-03-24 ENCOUNTER — ANESTHESIA EVENT (OUTPATIENT)
Dept: ENDOSCOPY | Age: 71
DRG: 418 | End: 2021-03-24
Payer: MEDICARE

## 2021-03-24 ENCOUNTER — APPOINTMENT (OUTPATIENT)
Dept: GENERAL RADIOLOGY | Age: 71
DRG: 418 | End: 2021-03-24
Payer: MEDICARE

## 2021-03-24 ENCOUNTER — TELEPHONE (OUTPATIENT)
Dept: GASTROENTEROLOGY | Age: 71
End: 2021-03-24

## 2021-03-24 ENCOUNTER — ANESTHESIA (OUTPATIENT)
Dept: ENDOSCOPY | Age: 71
DRG: 418 | End: 2021-03-24
Payer: MEDICARE

## 2021-03-24 VITALS — TEMPERATURE: 98 F | OXYGEN SATURATION: 98 % | SYSTOLIC BLOOD PRESSURE: 114 MMHG | DIASTOLIC BLOOD PRESSURE: 63 MMHG

## 2021-03-24 LAB
ALBUMIN SERPL-MCNC: 3.9 G/DL (ref 3.5–5.2)
ALP BLD-CCNC: 163 U/L (ref 40–130)
ALT SERPL-CCNC: 253 U/L (ref 5–41)
ANION GAP SERPL CALCULATED.3IONS-SCNC: 13 MMOL/L (ref 7–19)
AST SERPL-CCNC: 167 U/L (ref 5–40)
BILIRUB SERPL-MCNC: 3.7 MG/DL (ref 0.2–1.2)
BUN BLDV-MCNC: 19 MG/DL (ref 8–23)
CALCIUM SERPL-MCNC: 8.5 MG/DL (ref 8.8–10.2)
CHLORIDE BLD-SCNC: 103 MMOL/L (ref 98–111)
CO2: 24 MMOL/L (ref 22–29)
CREAT SERPL-MCNC: 0.9 MG/DL (ref 0.5–1.2)
GFR AFRICAN AMERICAN: >59
GFR NON-AFRICAN AMERICAN: >60
GLUCOSE BLD-MCNC: 166 MG/DL (ref 74–109)
HCT VFR BLD CALC: 40 % (ref 42–52)
HEMOGLOBIN: 13.3 G/DL (ref 14–18)
MCH RBC QN AUTO: 31.5 PG (ref 27–31)
MCHC RBC AUTO-ENTMCNC: 33.3 G/DL (ref 33–37)
MCV RBC AUTO: 94.8 FL (ref 80–94)
PDW BLD-RTO: 14 % (ref 11.5–14.5)
PLATELET # BLD: 198 K/UL (ref 130–400)
PMV BLD AUTO: 11.2 FL (ref 9.4–12.4)
POTASSIUM SERPL-SCNC: 4.4 MMOL/L (ref 3.5–5)
RBC # BLD: 4.22 M/UL (ref 4.7–6.1)
SODIUM BLD-SCNC: 140 MMOL/L (ref 136–145)
TOTAL PROTEIN: 5.9 G/DL (ref 6.6–8.7)
WBC # BLD: 9.2 K/UL (ref 4.8–10.8)

## 2021-03-24 PROCEDURE — 2700000000 HC OXYGEN THERAPY PER DAY

## 2021-03-24 PROCEDURE — G0378 HOSPITAL OBSERVATION PER HR: HCPCS

## 2021-03-24 PROCEDURE — 85027 COMPLETE CBC AUTOMATED: CPT

## 2021-03-24 PROCEDURE — 36415 COLL VENOUS BLD VENIPUNCTURE: CPT

## 2021-03-24 PROCEDURE — 6370000000 HC RX 637 (ALT 250 FOR IP): Performed by: SURGERY

## 2021-03-24 PROCEDURE — 96375 TX/PRO/DX INJ NEW DRUG ADDON: CPT

## 2021-03-24 PROCEDURE — C2625 STENT, NON-COR, TEM W/DEL SY: HCPCS | Performed by: INTERNAL MEDICINE

## 2021-03-24 PROCEDURE — 43264 ERCP REMOVE DUCT CALCULI: CPT | Performed by: INTERNAL MEDICINE

## 2021-03-24 PROCEDURE — 2580000003 HC RX 258: Performed by: NURSE ANESTHETIST, CERTIFIED REGISTERED

## 2021-03-24 PROCEDURE — 3700000001 HC ADD 15 MINUTES (ANESTHESIA): Performed by: INTERNAL MEDICINE

## 2021-03-24 PROCEDURE — 0FC98ZZ EXTIRPATION OF MATTER FROM COMMON BILE DUCT, VIA NATURAL OR ARTIFICIAL OPENING ENDOSCOPIC: ICD-10-PCS | Performed by: INTERNAL MEDICINE

## 2021-03-24 PROCEDURE — BF131ZZ FLUOROSCOPY OF GALLBLADDER AND BILE DUCTS USING LOW OSMOLAR CONTRAST: ICD-10-PCS | Performed by: INTERNAL MEDICINE

## 2021-03-24 PROCEDURE — 3209999900 FLUORO FOR SURGICAL PROCEDURES

## 2021-03-24 PROCEDURE — 99223 1ST HOSP IP/OBS HIGH 75: CPT | Performed by: INTERNAL MEDICINE

## 2021-03-24 PROCEDURE — 0F798DZ DILATION OF COMMON BILE DUCT WITH INTRALUMINAL DEVICE, VIA NATURAL OR ARTIFICIAL OPENING ENDOSCOPIC: ICD-10-PCS | Performed by: INTERNAL MEDICINE

## 2021-03-24 PROCEDURE — 99024 POSTOP FOLLOW-UP VISIT: CPT | Performed by: SURGERY

## 2021-03-24 PROCEDURE — 6360000002 HC RX W HCPCS: Performed by: NURSE ANESTHETIST, CERTIFIED REGISTERED

## 2021-03-24 PROCEDURE — 80053 COMPREHEN METABOLIC PANEL: CPT

## 2021-03-24 PROCEDURE — 43274 ERCP DUCT STENT PLACEMENT: CPT | Performed by: INTERNAL MEDICINE

## 2021-03-24 PROCEDURE — 74328 X-RAY BILE DUCT ENDOSCOPY: CPT

## 2021-03-24 PROCEDURE — 3609018800 HC ERCP DX COLLECTION SPECIMEN BRUSHING/WASHING: Performed by: INTERNAL MEDICINE

## 2021-03-24 PROCEDURE — 2580000003 HC RX 258: Performed by: SURGERY

## 2021-03-24 PROCEDURE — 96376 TX/PRO/DX INJ SAME DRUG ADON: CPT

## 2021-03-24 PROCEDURE — 7100000001 HC PACU RECOVERY - ADDTL 15 MIN: Performed by: INTERNAL MEDICINE

## 2021-03-24 PROCEDURE — 3700000000 HC ANESTHESIA ATTENDED CARE: Performed by: INTERNAL MEDICINE

## 2021-03-24 PROCEDURE — 74328 X-RAY BILE DUCT ENDOSCOPY: CPT | Performed by: INTERNAL MEDICINE

## 2021-03-24 PROCEDURE — 7100000000 HC PACU RECOVERY - FIRST 15 MIN: Performed by: INTERNAL MEDICINE

## 2021-03-24 PROCEDURE — 6360000002 HC RX W HCPCS: Performed by: SURGERY

## 2021-03-24 PROCEDURE — C1769 GUIDE WIRE: HCPCS | Performed by: INTERNAL MEDICINE

## 2021-03-24 PROCEDURE — 2709999900 HC NON-CHARGEABLE SUPPLY: Performed by: INTERNAL MEDICINE

## 2021-03-24 PROCEDURE — 2720000010 HC SURG SUPPLY STERILE: Performed by: INTERNAL MEDICINE

## 2021-03-24 PROCEDURE — 2500000003 HC RX 250 WO HCPCS: Performed by: NURSE ANESTHETIST, CERTIFIED REGISTERED

## 2021-03-24 PROCEDURE — 6370000000 HC RX 637 (ALT 250 FOR IP): Performed by: INTERNAL MEDICINE

## 2021-03-24 DEVICE — BILIARY STENT WITH NAVIFLEXTM RX DELIVERY SYSTEM
Type: IMPLANTABLE DEVICE | Site: BILE DUCT | Status: FUNCTIONAL
Brand: ADVANIX™ BILIARY

## 2021-03-24 RX ORDER — MORPHINE SULFATE 4 MG/ML
2 INJECTION, SOLUTION INTRAMUSCULAR; INTRAVENOUS EVERY 5 MIN PRN
Status: DISCONTINUED | OUTPATIENT
Start: 2021-03-24 | End: 2021-03-24

## 2021-03-24 RX ORDER — DIPHENHYDRAMINE HYDROCHLORIDE 50 MG/ML
12.5 INJECTION INTRAMUSCULAR; INTRAVENOUS
Status: DISCONTINUED | OUTPATIENT
Start: 2021-03-24 | End: 2021-03-24

## 2021-03-24 RX ORDER — HYDRALAZINE HYDROCHLORIDE 20 MG/ML
5 INJECTION INTRAMUSCULAR; INTRAVENOUS EVERY 10 MIN PRN
Status: DISCONTINUED | OUTPATIENT
Start: 2021-03-24 | End: 2021-03-24

## 2021-03-24 RX ORDER — SUCCINYLCHOLINE CHLORIDE 20 MG/ML
INJECTION INTRAMUSCULAR; INTRAVENOUS PRN
Status: DISCONTINUED | OUTPATIENT
Start: 2021-03-24 | End: 2021-03-24 | Stop reason: SDUPTHER

## 2021-03-24 RX ORDER — METOCLOPRAMIDE HYDROCHLORIDE 5 MG/ML
10 INJECTION INTRAMUSCULAR; INTRAVENOUS
Status: DISCONTINUED | OUTPATIENT
Start: 2021-03-24 | End: 2021-03-24

## 2021-03-24 RX ORDER — DEXAMETHASONE SODIUM PHOSPHATE 10 MG/ML
INJECTION, SOLUTION INTRAMUSCULAR; INTRAVENOUS PRN
Status: DISCONTINUED | OUTPATIENT
Start: 2021-03-24 | End: 2021-03-24 | Stop reason: SDUPTHER

## 2021-03-24 RX ORDER — MEPERIDINE HYDROCHLORIDE 50 MG/ML
12.5 INJECTION INTRAMUSCULAR; INTRAVENOUS; SUBCUTANEOUS EVERY 5 MIN PRN
Status: DISCONTINUED | OUTPATIENT
Start: 2021-03-24 | End: 2021-03-24

## 2021-03-24 RX ORDER — EPHEDRINE SULFATE 50 MG/ML
INJECTION, SOLUTION INTRAVENOUS PRN
Status: DISCONTINUED | OUTPATIENT
Start: 2021-03-24 | End: 2021-03-24 | Stop reason: SDUPTHER

## 2021-03-24 RX ORDER — ONDANSETRON 2 MG/ML
INJECTION INTRAMUSCULAR; INTRAVENOUS PRN
Status: DISCONTINUED | OUTPATIENT
Start: 2021-03-24 | End: 2021-03-24 | Stop reason: SDUPTHER

## 2021-03-24 RX ORDER — FENTANYL CITRATE 50 UG/ML
INJECTION, SOLUTION INTRAMUSCULAR; INTRAVENOUS PRN
Status: DISCONTINUED | OUTPATIENT
Start: 2021-03-24 | End: 2021-03-24 | Stop reason: SDUPTHER

## 2021-03-24 RX ORDER — SODIUM CHLORIDE, SODIUM LACTATE, POTASSIUM CHLORIDE, CALCIUM CHLORIDE 600; 310; 30; 20 MG/100ML; MG/100ML; MG/100ML; MG/100ML
INJECTION, SOLUTION INTRAVENOUS CONTINUOUS PRN
Status: DISCONTINUED | OUTPATIENT
Start: 2021-03-24 | End: 2021-03-24 | Stop reason: SDUPTHER

## 2021-03-24 RX ORDER — LIDOCAINE HYDROCHLORIDE 10 MG/ML
INJECTION, SOLUTION EPIDURAL; INFILTRATION; INTRACAUDAL; PERINEURAL PRN
Status: DISCONTINUED | OUTPATIENT
Start: 2021-03-24 | End: 2021-03-24 | Stop reason: SDUPTHER

## 2021-03-24 RX ORDER — LABETALOL HYDROCHLORIDE 5 MG/ML
5 INJECTION, SOLUTION INTRAVENOUS EVERY 10 MIN PRN
Status: DISCONTINUED | OUTPATIENT
Start: 2021-03-24 | End: 2021-03-24

## 2021-03-24 RX ORDER — HYDROMORPHONE HYDROCHLORIDE 1 MG/ML
0.25 INJECTION, SOLUTION INTRAMUSCULAR; INTRAVENOUS; SUBCUTANEOUS EVERY 5 MIN PRN
Status: DISCONTINUED | OUTPATIENT
Start: 2021-03-24 | End: 2021-03-24

## 2021-03-24 RX ORDER — PROMETHAZINE HYDROCHLORIDE 25 MG/ML
6.25 INJECTION, SOLUTION INTRAMUSCULAR; INTRAVENOUS
Status: DISCONTINUED | OUTPATIENT
Start: 2021-03-24 | End: 2021-03-24

## 2021-03-24 RX ORDER — PROPOFOL 10 MG/ML
INJECTION, EMULSION INTRAVENOUS PRN
Status: DISCONTINUED | OUTPATIENT
Start: 2021-03-24 | End: 2021-03-24 | Stop reason: SDUPTHER

## 2021-03-24 RX ORDER — HYDROMORPHONE HYDROCHLORIDE 1 MG/ML
0.5 INJECTION, SOLUTION INTRAMUSCULAR; INTRAVENOUS; SUBCUTANEOUS EVERY 5 MIN PRN
Status: DISCONTINUED | OUTPATIENT
Start: 2021-03-24 | End: 2021-03-24

## 2021-03-24 RX ORDER — OXYCODONE HYDROCHLORIDE AND ACETAMINOPHEN 5; 325 MG/1; MG/1
1 TABLET ORAL EVERY 4 HOURS PRN
Status: DISCONTINUED | OUTPATIENT
Start: 2021-03-24 | End: 2021-03-28 | Stop reason: HOSPADM

## 2021-03-24 RX ORDER — MORPHINE SULFATE 4 MG/ML
4 INJECTION, SOLUTION INTRAMUSCULAR; INTRAVENOUS EVERY 5 MIN PRN
Status: DISCONTINUED | OUTPATIENT
Start: 2021-03-24 | End: 2021-03-24

## 2021-03-24 RX ORDER — ENALAPRILAT 2.5 MG/2ML
1.25 INJECTION INTRAVENOUS
Status: DISCONTINUED | OUTPATIENT
Start: 2021-03-24 | End: 2021-03-24

## 2021-03-24 RX ORDER — OXYCODONE HYDROCHLORIDE AND ACETAMINOPHEN 5; 325 MG/1; MG/1
2 TABLET ORAL EVERY 4 HOURS PRN
Status: DISCONTINUED | OUTPATIENT
Start: 2021-03-24 | End: 2021-03-28 | Stop reason: HOSPADM

## 2021-03-24 RX ADMIN — SODIUM CHLORIDE, PRESERVATIVE FREE 10 ML: 5 INJECTION INTRAVENOUS at 21:43

## 2021-03-24 RX ADMIN — ONDANSETRON HYDROCHLORIDE 4 MG: 2 INJECTION, SOLUTION INTRAMUSCULAR; INTRAVENOUS at 12:37

## 2021-03-24 RX ADMIN — OXYCODONE AND ACETAMINOPHEN 2 TABLET: 5; 325 TABLET ORAL at 23:29

## 2021-03-24 RX ADMIN — HYDROMORPHONE HYDROCHLORIDE 0.5 MG: 1 INJECTION, SOLUTION INTRAMUSCULAR; INTRAVENOUS; SUBCUTANEOUS at 06:17

## 2021-03-24 RX ADMIN — HYDROMORPHONE HYDROCHLORIDE 0.5 MG: 1 INJECTION, SOLUTION INTRAMUSCULAR; INTRAVENOUS; SUBCUTANEOUS at 01:15

## 2021-03-24 RX ADMIN — LIDOCAINE HYDROCHLORIDE 50 MG: 10 INJECTION, SOLUTION EPIDURAL; INFILTRATION; INTRACAUDAL; PERINEURAL at 12:30

## 2021-03-24 RX ADMIN — PROPOFOL 200 MG: 10 INJECTION, EMULSION INTRAVENOUS at 12:30

## 2021-03-24 RX ADMIN — SUCCINYLCHOLINE CHLORIDE 120 MG: 20 INJECTION, SOLUTION INTRAMUSCULAR; INTRAVENOUS at 12:31

## 2021-03-24 RX ADMIN — SODIUM CHLORIDE, SODIUM LACTATE, POTASSIUM CHLORIDE, AND CALCIUM CHLORIDE: 600; 310; 30; 20 INJECTION, SOLUTION INTRAVENOUS at 12:18

## 2021-03-24 RX ADMIN — EPHEDRINE SULFATE 10 MG: 50 INJECTION INTRAMUSCULAR; INTRAVENOUS; SUBCUTANEOUS at 12:51

## 2021-03-24 RX ADMIN — HYDROCODONE BITARTRATE AND ACETAMINOPHEN 1 TABLET: 5; 325 TABLET ORAL at 09:38

## 2021-03-24 RX ADMIN — SOTALOL HYDROCHLORIDE 120 MG: 120 TABLET ORAL at 21:43

## 2021-03-24 RX ADMIN — FENTANYL CITRATE 100 MCG: 50 INJECTION INTRAMUSCULAR; INTRAVENOUS at 12:30

## 2021-03-24 RX ADMIN — DEXAMETHASONE SODIUM PHOSPHATE 4 MG: 10 INJECTION, SOLUTION INTRAMUSCULAR; INTRAVENOUS at 12:37

## 2021-03-24 RX ADMIN — SOTALOL HYDROCHLORIDE 120 MG: 120 TABLET ORAL at 09:38

## 2021-03-24 ASSESSMENT — PAIN SCALES - GENERAL: PAINLEVEL_OUTOF10: 6

## 2021-03-24 NOTE — ANESTHESIA PRE PROCEDURE
Department of Anesthesiology  Preprocedure Note       Name:  Samina Miller   Age:  72 y.o.  :  1950                                          MRN:  [id number]         Date:  3/24/2021      Surgeon: Reena Orr):  Sergio Yen MD    Procedure: Procedure(s):  ERCP DIAGNOSTIC    Medications prior to admission:   Prior to Admission medications    Medication Sig Start Date End Date Taking?  Authorizing Provider   aspirin 81 MG EC tablet Take 81 mg by mouth daily   Yes Historical Provider, MD   sotalol (BETAPACE) 120 MG tablet Take 120 mg by mouth 2 times daily   Yes Historical Provider, MD   omeprazole (PRILOSEC) 20 MG delayed release capsule Take 1 capsule by mouth Daily  Patient taking differently: Take 20 mg by mouth nightly  3/26/18  Yes Jacobo Ng DO   Probiotic Product (PROBIOTIC FORMULA PO) Take 1 tablet by mouth daily    Yes Historical Provider, MD   Ascorbic Acid (VITAMIN C PO) Take 1 tablet by mouth nightly    Yes Historical Provider, MD   Multiple Vitamins-Minerals (MULTIVITAMIN PO) Take 1 tablet by mouth daily    Yes Historical Provider, MD   vitamin B-12 (CYANOCOBALAMIN) 1000 MCG tablet Take 1,000 mcg by mouth daily    Yes Historical Provider, MD   ondansetron (ZOFRAN ODT) 4 MG disintegrating tablet Take 1 tablet by mouth every 8 hours as needed for Nausea or Vomiting 19   Media Countess, APRN       Current medications:    Current Facility-Administered Medications   Medication Dose Route Frequency Provider Last Rate Last Admin    meperidine (DEMEROL) injection 12.5 mg  12.5 mg Intravenous Q5 Min PRN Ange Riser, APRN - CRNA        HYDROmorphone HCl PF (DILAUDID) injection 0.25 mg  0.25 mg Intravenous Q5 Min PRN Ange Riser, APRN - CRNA        HYDROmorphone HCl PF (DILAUDID) injection 0.5 mg  0.5 mg Intravenous Q5 Min PRN Ange Riser, APRN - CRNA        morphine injection 2 mg  2 mg Intravenous Q5 Min PRN Ange Riser, APRN - CRNA        morphine injection 4 mg  4 mg Intravenous Q5 Min PRN [facility], APRN - CRNA        promethazine (PHENERGAN) injection 6.25 mg  6.25 mg Intravenous Once PRN [facility] Solders, APRN - CRNA        metoclopramide (REGLAN) injection 10 mg  10 mg Intravenous Once PRN [facility], APRN - CRNA        diphenhydrAMINE (BENADRYL) injection 12.5 mg  12.5 mg Intravenous Once PRN [facility], APRN - CRNA        labetalol (NORMODYNE;TRANDATE) injection 5 mg  5 mg Intravenous Q10 Min PRN [facility], APRN - CRNA        hydrALAZINE (APRESOLINE) injection 5 mg  5 mg Intravenous Q10 Min PRN [facility], APRN - CRNA        enalaprilat (VASOTEC) injection 1.25 mg  1.25 mg Intravenous Once PRN [facility], APRN - CRNA        acetaminophen (TYLENOL) tablet 650 mg  650 mg Oral Q4H PRN Lisa Sloan MD        0.9 % sodium chloride infusion   Intravenous Continuous Lisa Sloan MD 75 mL/hr at 03/23/21 0630 New Bag at 03/23/21 0630    ondansetron (ZOFRAN) injection 4 mg  4 mg Intravenous Q8H PRN Lisa Sloan MD        pantoprazole (PROTONIX) tablet 40 mg  40 mg Oral QAM AC Lisa Sloan MD        sotalol (BETAPACE) tablet 120 mg  120 mg Oral BID Lisa Sloan MD   120 mg at 03/24/21 2380    lactated ringers infusion   Intravenous Continuous Jadyn Clemente MD        lactated ringers infusion   Intravenous Continuous Jadyn Clemente MD 75 mL/hr at 03/23/21 1955 New Bag at 03/23/21 1955    HYDROmorphone HCl PF (DILAUDID) injection 0.5 mg  0.5 mg Intravenous Q1H PRN Lisa Sloan MD   0.5 mg at 03/24/21 0617    sodium chloride flush 0.9 % injection 10 mL  10 mL Intravenous 2 times per day Lisa Sloan MD   10 mL at 03/23/21 1954    sodium chloride flush 0.9 % injection 10 mL  10 mL Intravenous PRN Lisa Sloan MD        HYDROcodone-acetaminophen [facility]) 5-325 MG per tablet 1 tablet  1 tablet Oral Q4H LUDWIN Sloan MD   1 tablet at 03/24/21 9477    Or    HYDROcodone-acetaminophen (NORCO) 5-325 MG per tablet 2 tablet  2 tablet Oral Q4H LUDWIN Emmanuel MD           Allergies:  No Known Allergies    Problem List:    Patient Active Problem List   Diagnosis Code    Deep vein thrombosis (DVT) (HCC) I82.409    Hyperlipidemia E78.5    Decreased carotid pulse R09.89    Numbness and tingling in right hand R20.0, R20.2    Heart murmur R01.1    Chest pain R07.9    History of colon polyps Z86.010    Nephrolithiasis N20.0    Colon polyps K63.5    Gastrointestinal hemorrhage K92.2    Diverticulosis of large intestine with hemorrhage K57.31    Acute blood loss anemia D62    Hypotension I95.9    Bloody diarrhea R19.7    History of kidney stones Z87.442    Diverticulosis of intestine with bleeding K57.91    Esophageal dysphagia R13.10    Acute pain of right knee M25.561    Hyperlipoproteinemia E78.5    H/O cervical spine surgery Z98.890    Esophageal pain K22.8    Garces's esophagus without dysplasia K22.70    Chronic GERD K21.9    Sinoatrial node dysfunction (HCC) I49.5    Paroxysmal atrial fibrillation (HCC) I48.0    Acute cholecystitis K81.0       Past Medical History:        Diagnosis Date    Arthritis     Atrial fibrillation ([facility]     sees dr. Toshia Caldera Colon polyps     Difficult intubation     pt just had recent cervical fusion with plate 2 months ago    DVT (deep venous thrombosis) ([facility] 2014    left leg    Elbow pain     torn bicep    GERD (gastroesophageal reflux disease)     Hx of blood clots     left leg/ after heel surg/ jan. 2015    Hyperlipidemia     recently able to be taken off cholesterol meds    Kidney stone on left side        Past Surgical History:        Procedure Laterality Date    BACK SURGERY      BICEPS TENDON REPAIR Right 7/16/2020    RIGHT BICEPS TENDON REPAIR performed by Pinky Yarbrough MD at [place] [address]      2015, oct    CHOLECYSTECTOMY, LAPAROSCOPIC N/A 3/23/2021    CHOLECYSTECTOMY LAPAROSCOPIC with IOC performed by Norma Emmanuel MD at [facility] OR    COLONOSCOPY  10/30/2009    Baljit: hyperplastic polyp    COLONOSCOPY  02/2015    hyperplastic polyp (5 yr)    DILATATION, ESOPHAGUS      ENDOSCOPY, COLON, DIAGNOSTIC      EYE SURGERY      FOOT SURGERY  01/2014    Left foot spur removal    LITHOTRIPSY Left 12/29/2015    ESWL EXTRACORPEAL SHOCK WAVE LITHOTRIPSY performed by Clarisa Pérez MD at [place] [address]  11/2015    PILONIDAL CYST EXCISION      MS COLONOSCOPY FLX DX W/COLLJ SPEC WHEN PFRMD N/A 5/8/2017    Dr Radha Quezada sided diverticulosis-5 yr recall    MS EGD TRANSORAL BIOPSY SINGLE/MULTIPLE N/A 3/26/2018    Dr Ng-w/dilation over wire-51 [place]-esophageal stricture-Garces's (+) dysplasia (-)--1 yr recall    SHOULDER SURGERY  2007    right after fall, detached the subclavicle muscle    [facility]    Neck  surgery     UPPER GASTROINTESTINAL ENDOSCOPY N/A 2/12/2018    Dr Mee Pina Grade B esophagitis, hiatal hernia, stricture-(-) Марина, (+) Garces's (+) low grade dysplasia, active esophagogastritis, repeat: 3/26/18    UPPER GASTROINTESTINAL ENDOSCOPY  3/26/2018    Dr Ng-w/dilation over wire-51 German-esophageal stricture-Garces's (+) dysplasia (-)--1 yr recall    UPPER GASTROINTESTINAL ENDOSCOPY N/A 5/2/2019    Dr Zackary Coreas (+) Garces's, (-) dysplasia       Social History:    Social History     Tobacco Use    Smoking status: Never Smoker    Smokeless tobacco: Never Used   Substance Use Topics    Alcohol use:  No                                Counseling given: Not Answered      Vital Signs (Current):   Vitals:    03/23/21 2225 03/24/21 0318 03/24/21 0621 03/24/21 1000   BP: 132/72 116/66 119/63 130/67   Pulse: 95 85 92 78   Resp: 18 16 16 16   Temp: 98.1 °F (36.7 °C) 99.4 °F (37.4 °C) 99.2 °F (37.3 °C) 98.7 °F (37.1 °C)   TempSrc: Temporal Temporal Temporal Temporal   SpO2: 95% 95% 91% 91%   Weight:       Height:                                                  BP Readings from Last 3 Encounters: 03/24/21 130/67   03/24/21 132/67   03/23/21 103/61       NPO Status: Time of last liquid consumption: 0000                        Time of last solid consumption: 0000                        Date of last liquid consumption: 03/22/21                        Date of last solid food consumption: 03/22/21    BMI:   Wt Readings from Last 3 Encounters:   03/23/21 225 lb (102.1 kg)   12/10/20 216 lb (98 kg)   11/23/20 220 lb (99.8 kg)     Body mass index is 30.52 kg/m². CBC:   Lab Results   Component Value Date    WBC 9.2 03/24/2021    RBC 4.22 03/24/2021    HGB 13.3 03/24/2021    HCT 40.0 03/24/2021    MCV 94.8 03/24/2021    RDW 14.0 03/24/2021     03/24/2021       CMP:   Lab Results   Component Value Date     03/24/2021    K 4.4 03/24/2021    K 4.2 01/25/2020     03/24/2021    CO2 24 03/24/2021    BUN 19 03/24/2021    CREATININE 0.9 03/24/2021    GFRAA >59 03/24/2021    LABGLOM >60 03/24/2021    GLUCOSE 166 03/24/2021    PROT 5.9 03/24/2021    CALCIUM 8.5 03/24/2021    BILITOT 3.7 03/24/2021    ALKPHOS 163 03/24/2021     03/24/2021     03/24/2021       POC Tests: No results for input(s): POCGLU, POCNA, POCK, POCCL, POCBUN, POCHEMO, POCHCT in the last 72 hours.     Coags:   Lab Results   Component Value Date    PROTIME 12.6 03/23/2021    INR 0.95 03/23/2021    APTT 29.9 03/23/2021       HCG (If Applicable): No results found for: PREGTESTUR, PREGSERUM, HCG, HCGQUANT     ABGs: No results found for: PHART, PO2ART, UWN6DUT, JCZ4DMY, BEART, L6BXIJLS     Type & Screen (If Applicable):  No results found for: LABABO, LABRH    Drug/Infectious Status (If Applicable):  No results found for: HIV, HEPCAB    COVID-19 Screening (If Applicable):   Lab Results   Component Value Date    COVID19 Not Detected 03/23/2021    COVID19 Not Detected 07/15/2020           Anesthesia Evaluation    Airway: Mallampati: II  TM distance: >3 FB   Neck ROM: limited  Mouth opening: > = 3 FB Dental:          Pulmonary: Cardiovascular:    (+) hyperlipidemia        Rhythm: regular  Rate: normal           Beta Blocker:  Dose within 24 Hrs         Neuro/Psych:               GI/Hepatic/Renal:   (+) GERD:,           Endo/Other:                     Abdominal:           Vascular:                                        Anesthesia Plan      general     ASA 3       Induction: intravenous. Anesthetic plan and risks discussed with patient. Plan discussed with CRNA.                 LUIS Newman - NAVID   3/24/2021

## 2021-03-24 NOTE — CONSULTS
Pt Name: Chuyita Lebron  MRN: [id number]  [id number]  [place]: 1950  Admit Date: 3/23/2021 12:21 AM  Date of evaluation: 3/24/2021  Primary Care Physician: Vel Sandoval MD   [id number]       Requesting Provider:  Maninder Lainez MD    GI Consult    Indication:  Abdominal pain, abnormal cholangiogram    History:  The patient is a 72 y.o. male admitted two days ago for worsening abdominal pain started likely 48 hours prior to admission. Patient actually thought he was having a heart attack. He had epigastric and right upper quadrant pain. He describes the pain as pressure on his abdomen including a basketball pushing down his abdomen. He states this was worse after he ate. There was some associated shortness of breath. He had an episode of vomiting. No GI bleeding. He was admitted with concern for cholecystitis. He was taken by Dr. Shay Mariscal for cholecystectomy yesterday. Intraoperative cholangiogram showed significant dilatation to the common bile duct with significant hesitancy to flow out into the duodenum. Discussion with Dr. Shay Mariscal notes a sense of significant pressure in the biliary tree. He had multiple stones. Postoperatively he is noted to have increase in his bilirubin and continued abdominal pain. He states his abdominal pain is similar to admission has not improved post gallbladder. He states his current symptoms do not seem postoperative. His bilirubin is elevated as well as a significant elevation in his LFTs. Notably has an ultrasound from 3/23/2021 with moderate dilatation of the common bile duct    Pain:   Location:  RUQ/Epigastrium  Duration:  episodice  Radiation:   To back and lower chest  Associated:  nausea  Mitigating factors:  Pain meds  Exacerbating factors:  Po intake    Past Medical History:        Diagnosis Date    Arthritis     Atrial fibrillation [facility])     sees dr. Deandra Morejon Difficult intubation     pt just had recent cervical fusion with plate 2 months ago    DVT (deep venous thrombosis) ([address] 2014    left leg    Elbow pain     torn bicep    GERD (gastroesophageal reflux disease)     Hx of blood clots     left leg/ after heel surg/ jan.  2015    Hyperlipidemia     recently able to be taken off cholesterol meds    Kidney stone on left side      Past Surgical History:        Procedure Laterality Date    BACK SURGERY      BICEPS TENDON REPAIR Right 7/16/2020    RIGHT BICEPS TENDON REPAIR performed by Remigio Brady MD at [place] [address]      2015, oct    CHOLECYSTECTOMY, LAPAROSCOPIC N/A 3/23/2021    CHOLECYSTECTOMY LAPAROSCOPIC with IOC performed by Nakita De Jesus MD at [address] [facility]  10/30/2009    [place]: hyperplastic polyp    COLONOSCOPY  02/2015    hyperplastic polyp (5 yr)    DILATATION, ESOPHAGUS      ENDOSCOPY, COLON, DIAGNOSTIC      EYE SURGERY      FOOT SURGERY  01/2014    Left foot spur removal    LITHOTRIPSY Left 12/29/2015    ESWL EXTRACORPEAL SHOCK WAVE LITHOTRIPSY performed by Jenni Le MD at [place] [address]  11/2015    PILONIDAL CYST EXCISION      PA COLONOSCOPY FLX DX W/COLLJ SPEC WHEN PFRMD N/A 5/8/2017    Dr Lenin Polk sided diverticulosis-5 yr recall    PA EGD TRANSORAL BIOPSY SINGLE/MULTIPLE N/A 3/26/2018    Dr Ng-w/dilation over wire-51 [place]-esophageal stricture-Garces's (+) dysplasia (-)--1 yr recall    SHOULDER SURGERY  2007    right after fall, detached the subclavicle muscle    [facility]    Neck  surgery     UPPER GASTROINTESTINAL ENDOSCOPY N/A 2/12/2018    Dr Jeremiah Primrose Grade B esophagitis, hiatal hernia, stricture-(-) Марина, (+) Garces's (+) low grade dysplasia, active esophagogastritis, repeat: 3/26/18    UPPER GASTROINTESTINAL ENDOSCOPY  3/26/2018    Dr Ng-w/dilation over wire-51 [place]-esophageal stricture-Garces's (+) dysplasia (-)--1 yr recall    UPPER GASTROINTESTINAL ENDOSCOPY N/A 5/2/2019    Dr Romero Thurman (+) Garces's, (-) dysplasia     Allergies:  Patient has no known allergies. Home Meds:  Prior to Admission medications    Medication Sig Start Date End Date Taking?  Authorizing Provider   aspirin 81 MG EC tablet Take 81 mg by mouth daily   Yes Historical Provider, MD   sotalol (BETAPACE) 120 MG tablet Take 120 mg by mouth 2 times daily   Yes Historical Provider, MD   omeprazole (PRILOSEC) 20 MG delayed release capsule Take 1 capsule by mouth Daily  Patient taking differently: Take 20 mg by mouth nightly  3/26/18  Yes Jacobo Ng DO   Probiotic Product (PROBIOTIC FORMULA PO) Take 1 tablet by mouth daily    Yes Historical Provider, MD   Ascorbic Acid (VITAMIN C PO) Take 1 tablet by mouth nightly    Yes Historical Provider, MD   Multiple Vitamins-Minerals (MULTIVITAMIN PO) Take 1 tablet by mouth daily    Yes Historical Provider, MD   vitamin B-12 (CYANOCOBALAMIN) 1000 MCG tablet Take 1,000 mcg by mouth daily    Yes Historical Provider, MD   ondansetron (ZOFRAN ODT) 4 MG disintegrating tablet Take 1 tablet by mouth every 8 hours as needed for Nausea or Vomiting 2/25/19   Yasir April, APRN        Current Meds:       pantoprazole  40 mg Oral QAM AC    sotalol  120 mg Oral BID    sodium chloride flush  10 mL Intravenous 2 times per day        sodium chloride 75 mL/hr at 03/23/21 0630    lactated ringers      lactated ringers 75 mL/hr at 03/23/21 1955       PRN Meds:  meperidine, HYDROmorphone, HYDROmorphone, morphine, morphine, promethazine, metoclopramide, diphenhydrAMINE, labetalol, hydrALAZINE, enalaprilat, acetaminophen, ondansetron, HYDROmorphone, sodium chloride flush, HYDROcodone 5 mg - acetaminophen **OR** HYDROcodone 5 mg - acetaminophen    Social History:   Social History     Socioeconomic History    Marital status:      Spouse name: Not on file    Number of children: 2    Years of education: Not on file    Highest education level: Not on file   Occupational History    Not on file   Social Needs    Financial resource strain: Not on file    Food insecurity     Worry: Not on file     Inability: Not on file    Transportation needs     Medical: Not on file     Non-medical: Not on file   Tobacco Use    Smoking status: Never Smoker    Smokeless tobacco: Never Used   Substance and Sexual Activity    Alcohol use: No    Drug use: No    Sexual activity: Yes     Partners: Female   Lifestyle    Physical activity     Days per week: Not on file     Minutes per session: Not on file    Stress: Not on file   Relationships    Social connections     Talks on phone: Not on file     Gets together: Not on file     Attends Nondenominational service: Not on file     Active member of club or organization: Not on file     Attends meetings of clubs or organizations: Not on file     Relationship status: Not on file    Intimate partner violence     Fear of current or ex partner: Not on file     Emotionally abused: Not on file     Physically abused: Not on file     Forced sexual activity: Not on file   Other Topics Concern    Not on file   Social History Narrative    Not on file       Family History:   Family History   Problem Relation Age of Onset    Cancer Mother     Lung Cancer Mother     Heart Disease Father     Heart Disease Sister     Diabetes Sister     Heart Disease Brother     Diabetes Brother     Colon Cancer Neg Hx     Colon Polyps Neg Hx     Esophageal Cancer Neg Hx     Liver Cancer Neg Hx     Liver Disease Neg Hx     Rectal Cancer Neg Hx     Stomach Cancer Neg Hx        No GI malignancies     ROS:  GENERAL: No fever +chills. NEURO: No headache or seizure. EYES: No diplopia or vision loss. CARDIOVASCULAR: No chest pain or palpitations. RESPIRATORY: No dyspnea or cough. GI: no melena. no hematochezia, + abdominal pain, + nausea/vomiting  : No dysuria or hematuria. MUSCULOSKELETAL: No new arthralgias or myalgias  DERM: No rash or jaundice. ENDOCRINE: No polyuria or polydipsia. TSH:    Lab Results   Component Value Date    TSH 2.220 01/15/2020     Troponin T:   Recent Labs     03/23/21  0024 03/23/21  0145 03/23/21  0325   TROPONINI <0.01 <0.01 <0.01     INR:   Recent Labs     03/23/21  0024   INR 0.95       Recent Labs     03/23/21  0024   LIPASE 19       Radiology:Xr Chest Portable    Result Date: 3/23/2021  No active cardiopulmonary disease. Signed by Dr Dutch Turner on 3/23/2021 6:54 AM    Us Abdomen Limited Specify Organ? Gallbladder, Pancreas, Spleen, Liver    Result Date: 3/23/2021  The cholelithiasis with finding suggesting cholecystitis. Moderate dilatation common bile duct. The etiology is not certain. Nonobstructing left renal calculus. Signed by Dr Dutch Turner on 3/23/2021 7:06 AM      Assessment:    1. Jaundice  2. Abnormal imaging of the biliary tree with dilated CBD on IOC  3. Recurrent abdominal pain   4. Cholelithiasis s/p cholecystectomy yesterday    Given constellation of symptoms, continued and even worsening abdominal pain despite cholecystectomy and worsening jaundice I am suspicious for retained choledocholithiasis or even impacted stone in the ampulla. Patient seems to have worsening pain. Fortunately he has no signs of decompensation or fever chills. Plan:  -I had a long discussion with the patient and his wife regarding the diagnosis and treatment of choledocholithiasis or even underlying cholangitis. We discussed ERCP, its benefits risks and alternatives. We discussed the risk of pancreatitis including 8 to 10% post ERCP.   After discussion, they are agreeable to pursue urgent ERCP later today.    - NPO  - Continue current meds  - trend LFTs

## 2021-03-24 NOTE — OP NOTE
Endoscopic Procedure Note    Patient: Sanju Benson: 1950  Med Rec#: [id number] Acc#: [id number]     Primary Care Provider Ella Akers MD  Referring Provider: Shlomo Morales MD    Endoscopist: Charleen Guzman MD    Date of Procedure:  03/24/21      Procedure:   1. ERCP with stone removal  2. ERCP with stent placement   3. Intra-op direction of biliary fluoroscopy [id number]    Indications:   1. Abnormal cholangiogram  2. Jaundice and abdominal pain    Anesthesia:  General     Estimated Blood Loss: minimal    Procedure:   Prior to the procedure the patient's chart was reviewed and informed consent was obtained. Risk and Benefits (Risks including but not limited to bleeding, perforation, infection, 8 to 10% risk of post ERCP pancreatitis, and even death) were discussed with the patient. They were agreeable to continue. Patient was brought to the operating room, underwent general anesthesia, and placed in the prone position. A side-viewing duodenoscope was advanced from the oropharynx down to the distal duodenum and reduced into a short position opposite the ampulla. A  film was obtained which showed clips in RUQ . The bile duct was then cannulated using a 0.035 x 260 cm straight Dreamwire. A short nose traction sphincterotome was advanced over the wire and the bile duct was deeply cannulated. Contrast was injected. I personally interpreted the bile duct images. The flow of contrast was adequate. Contrast injection showed a dilated CBD with questionable filling defects just above the ampulla. The pancreatic duct was not cannulated nor injected. To help discover objects an approximate 1 cm biliary sphincterotomy was made using a monofilament autotome and electrocautery. There was minimal post sphincterotomy bleeding. The bile duct was swept multiple times starting the bifurcation with a 12mm biliary extraction balloon. A moderate amount of pus as well multiple stones were removed. Stent placement - due to signs of cholangitis, a 10F x 5cm biliary stent was placed under fluoroscopic and endoscopic control. At the end of the procedure the biliary tree was draining well. IMPRESSION:  1. Cholangitis with choledocholithiasis s/p sphincterotomy, balloon sweep and 10F stent placement       RECOMMENDATIONS:    1. Clear liquid diet today with advancing diet tomorrow as tolerated. 2.  Repeat ERCP in 3 months time for removal of indwelling biliary stent  3. Antibiotics x 7 days - can be transitioned to IV once symptoms improved and LFTs improving  4. Follow-up with Dr Felix Verdugo post cholecystectomy. The results were discussed with the patient and family. A copy of the images obtained were given to the patient.      Michael Woodard MD  3/24/2021  12:55 PM

## 2021-03-25 LAB
ALBUMIN SERPL-MCNC: 3.5 G/DL (ref 3.5–5.2)
ALP BLD-CCNC: 127 U/L (ref 40–130)
ALT SERPL-CCNC: 175 U/L (ref 5–41)
ANION GAP SERPL CALCULATED.3IONS-SCNC: 9 MMOL/L (ref 7–19)
AST SERPL-CCNC: 80 U/L (ref 5–40)
BILIRUB SERPL-MCNC: 4.9 MG/DL (ref 0.2–1.2)
BUN BLDV-MCNC: 21 MG/DL (ref 8–23)
CALCIUM SERPL-MCNC: 8.4 MG/DL (ref 8.8–10.2)
CHLORIDE BLD-SCNC: 101 MMOL/L (ref 98–111)
CO2: 27 MMOL/L (ref 22–29)
CREAT SERPL-MCNC: 0.9 MG/DL (ref 0.5–1.2)
GFR AFRICAN AMERICAN: >59
GFR NON-AFRICAN AMERICAN: >60
GLUCOSE BLD-MCNC: 127 MG/DL (ref 74–109)
HCT VFR BLD CALC: 35.8 % (ref 42–52)
HEMOGLOBIN: 12 G/DL (ref 14–18)
HEMOGLOBIN: 12.6 G/DL (ref 14–18)
LIPASE: 15 U/L (ref 13–60)
MCH RBC QN AUTO: 31.7 PG (ref 27–31)
MCHC RBC AUTO-ENTMCNC: 33.5 G/DL (ref 33–37)
MCV RBC AUTO: 94.5 FL (ref 80–94)
PDW BLD-RTO: 14.1 % (ref 11.5–14.5)
PLATELET # BLD: 175 K/UL (ref 130–400)
PMV BLD AUTO: 10.9 FL (ref 9.4–12.4)
POTASSIUM SERPL-SCNC: 4.1 MMOL/L (ref 3.5–5)
RBC # BLD: 3.79 M/UL (ref 4.7–6.1)
SODIUM BLD-SCNC: 137 MMOL/L (ref 136–145)
TOTAL PROTEIN: 5.9 G/DL (ref 6.6–8.7)
WBC # BLD: 8.4 K/UL (ref 4.8–10.8)

## 2021-03-25 PROCEDURE — 99024 POSTOP FOLLOW-UP VISIT: CPT | Performed by: SURGERY

## 2021-03-25 PROCEDURE — 85018 HEMOGLOBIN: CPT

## 2021-03-25 PROCEDURE — 83690 ASSAY OF LIPASE: CPT

## 2021-03-25 PROCEDURE — 96366 THER/PROPH/DIAG IV INF ADDON: CPT

## 2021-03-25 PROCEDURE — 36415 COLL VENOUS BLD VENIPUNCTURE: CPT

## 2021-03-25 PROCEDURE — 85027 COMPLETE CBC AUTOMATED: CPT

## 2021-03-25 PROCEDURE — 2580000003 HC RX 258: Performed by: SURGERY

## 2021-03-25 PROCEDURE — 99024 POSTOP FOLLOW-UP VISIT: CPT | Performed by: INTERNAL MEDICINE

## 2021-03-25 PROCEDURE — 80053 COMPREHEN METABOLIC PANEL: CPT

## 2021-03-25 PROCEDURE — 6360000002 HC RX W HCPCS: Performed by: SURGERY

## 2021-03-25 PROCEDURE — 6370000000 HC RX 637 (ALT 250 FOR IP): Performed by: SURGERY

## 2021-03-25 PROCEDURE — 6370000000 HC RX 637 (ALT 250 FOR IP): Performed by: INTERNAL MEDICINE

## 2021-03-25 PROCEDURE — 1210000000 HC MED SURG R&B

## 2021-03-25 RX ORDER — CIPROFLOXACIN 500 MG/1
500 TABLET, FILM COATED ORAL EVERY 12 HOURS SCHEDULED
Status: DISCONTINUED | OUTPATIENT
Start: 2021-03-25 | End: 2021-03-25

## 2021-03-25 RX ORDER — METRONIDAZOLE 500 MG/1
500 TABLET ORAL 2 TIMES DAILY
Status: DISCONTINUED | OUTPATIENT
Start: 2021-03-25 | End: 2021-03-25

## 2021-03-25 RX ADMIN — SOTALOL HYDROCHLORIDE 120 MG: 120 TABLET ORAL at 20:55

## 2021-03-25 RX ADMIN — OXYCODONE AND ACETAMINOPHEN 2 TABLET: 5; 325 TABLET ORAL at 15:15

## 2021-03-25 RX ADMIN — PIPERACILLIN AND TAZOBACTAM 3375 MG: 3; .375 INJECTION, POWDER, LYOPHILIZED, FOR SOLUTION INTRAVENOUS at 12:46

## 2021-03-25 RX ADMIN — SOTALOL HYDROCHLORIDE 120 MG: 120 TABLET ORAL at 09:33

## 2021-03-25 RX ADMIN — OXYCODONE AND ACETAMINOPHEN 2 TABLET: 5; 325 TABLET ORAL at 03:48

## 2021-03-25 RX ADMIN — OXYCODONE AND ACETAMINOPHEN 2 TABLET: 5; 325 TABLET ORAL at 20:57

## 2021-03-25 RX ADMIN — CIPROFLOXACIN 500 MG: 500 TABLET, FILM COATED ORAL at 09:33

## 2021-03-25 RX ADMIN — METRONIDAZOLE 500 MG: 500 TABLET ORAL at 09:33

## 2021-03-25 RX ADMIN — PANTOPRAZOLE SODIUM 40 MG: 40 TABLET, DELAYED RELEASE ORAL at 05:29

## 2021-03-25 RX ADMIN — OXYCODONE AND ACETAMINOPHEN 2 TABLET: 5; 325 TABLET ORAL at 10:49

## 2021-03-25 RX ADMIN — PIPERACILLIN AND TAZOBACTAM 3375 MG: 3; .375 INJECTION, POWDER, LYOPHILIZED, FOR SOLUTION INTRAVENOUS at 20:55

## 2021-03-25 ASSESSMENT — PAIN SCALES - GENERAL
PAINLEVEL_OUTOF10: 10
PAINLEVEL_OUTOF10: 7
PAINLEVEL_OUTOF10: 3
PAINLEVEL_OUTOF10: 2

## 2021-03-25 ASSESSMENT — PAIN DESCRIPTION - LOCATION: LOCATION: ABDOMEN

## 2021-03-25 NOTE — PROGRESS NOTES
GI  - PROGRESS NOTE    Admit Date: 3/23/2021             Chief Complaint: feels better, axel. Clear liq., C/O tenderness around inscision    Patient being seen for:  Choledocholithiasis,cholangitis   DIET CLEAR LIQUID;                         Bowel movement: no black or bloody stools    Pain:Mild  Nausea:None    Intake/Output Summary (Last 24 hours) at 3/25/2021 1225  Last data filed at 3/25/2021 1035  Gross per 24 hour   Intake 1380 ml   Output 435 ml   Net 945 ml               Lab /Radiology:   Scheduled Meds: Reviewed          -----------------------------------------------------------------          Recent Labs     03/23/21  1146 03/24/21  0755 03/25/21  0413 03/25/21  1047   WBC 6.3 9.2 8.4  --    RBC 4.17* 4.22* 3.79*  --    HGB 13.2* 13.3* 12.0* 12.6*   HCT 39.7* 40.0* 35.8*  --     198 175  --      Recent Labs     03/23/21  1146 03/24/21  0755 03/25/21  0413    140 137   K 4.1 4.4 4.1   ANIONGAP 5* 13 9    103 101   CO2 30* 24 27   BUN 18 19 21   CREATININE 0.8 0.9 0.9   GLUCOSE 105 166* 127*   CALCIUM 8.5* 8.5* 8.4*     Recent Labs     03/23/21  1146 03/24/21  0755 03/25/21  0413   AST 68* 167* 80*   * 253* 175*   BILITOT 0.4 3.7* 4.9*   ALKPHOS 108 163* 127     Recent Labs     03/23/21  0024   LIPASE 19     Troponin T:   Recent Labs     03/23/21  0024 03/23/21  0145 03/23/21  0325   TROPONINI <0.01 <0.01 <0.01     Pro-BNP: No results for input(s): BNP in the last 72 hours. INR:   Recent Labs     03/23/21  0024   INR 0.95             Physical Exam:   Vitals: /61   Pulse 69   Temp 99.4 °F (37.4 °C) (Temporal)   Resp 17   Ht 6' (1.829 m)   Wt 225 lb (102.1 kg)   SpO2 94%   BMI 30.52 kg/m²     HEENT: Pupils equal, round, reactive to light       Neck supple. Trachea midline. No crepitus  Lungs: breath sounds bilaterally. Normal excursion  Heart[de-identified]    RRR without heave or thrill  Abdomen: abnormal findings:  tenderness moderate around lower inscision.   No encarcerated hernia detected. No organomegaly detected  Extremities: no cyanosis or clubbing  Lymphatic: No significant lymph node enlargement papable in the neck , groin or umbilicus  Neurologic: alert. oriented        Impression: 1. Improved post.  lap sheryl and ERCP with balloon sweep and stent for choledocholithiasis and cholangitis     Plan: 1. Cont Clear liq. 2.Trend LFT/Bili   3. Repeat ERCP 3 mo for cholangiogram and potential stent removal    4. Cont current IV antibiotics     Ryan Solo M.D.   Gastroenterology

## 2021-03-25 NOTE — OP NOTE
Operative Report    PATIENT NAME: Natasha RECORD NO. [id number]  SURGEON: Jose Alaniz MD   Primary Care Physician: Patricia Rocha MD  Date: 3/23/2021   PROCEDURE PERFORMED: Laparoscopic Cholecystectomy with IOC  PREOPERATIVE DIAGNOSIS: acute cholecystitis  POSTOPERATIVE DIAGNOSIS: Same, path pending  SURGEON:  Jose Alaniz MD   ANESTHESIA:  General endotracheal anesthesia and local  ESTIMATED BLOOD LOSS: <50 ml  SPECIMEN:  Gallbladder  COMPLICATIONS:  None; patient tolerated the procedure well. FINDINGS: dilated, thickened gallbladder, dilated CBD, stones in cystic duct, tapering of CBD   DISPOSITION: PACU - hemodynamically stable. CONDITION: stable      Indications: The patient is a 72year old male who presented to the ER with a complaint of severe acute abdominal pain. On imaging he was found to have acute cholecystitis as well as a dilated CBD. He is taken at this time for laparoscopic cholecystectomy with IOC. Procedure Details     After the risks and benefits of the procedure were explained, informed consent was obtained, and the patient was taken to the operating room. The patient was placed in supine position and general anesthesia was begun. The abdomen was prepped and draped in normal sterile fashion. Preoperative antibiotics had been given. A time out was performed. Local anesthetic was injected and a 5mm supraumbilical incision was made. The base of the umbilicus was grasped and elevated and the verus needle was inserted. The abdomen was insufflated and the 5mm trochar was inserted. The camera was inserted and three additional ports were placed under direct visualization: an 11 mm subxiphoid port and two 5 mm right quadrant ports. The patient was then placed in position head up and rotated slightly to the left. The gallbladder was aspirated with the aspiration needle and suction  to take some pressure off so that it could be grasped.  The tip of the gallbladder was then grasped and elevated over the edge of the liver. There were adhesions along the length of the gallbladder which were taken down with a combination of blunt dissection and cautery. The peritoneum over the neck of the gallbladder was incised using electrocautery. This was extended medially and laterally along the edges of the liver. The cystic artery and cystic duct were then identified and dissected circumferentially using a combination of electrocautery and blunt dissection. Dissection continued along the posterior edge of the liver, onto the cystic plate. Once these two structures were the only two structures identified entering the gallbladder with the liver visible behind them, it was determined that a critical view of safety had been obtained. A clip was placed at the neck of the gallbladder and a ductotomy was created with scissors. There was back pressure and drainage of bile and choco sludge. The ductotomy was cannulated with cholangiogram catheter and secured with the Mina clamp. The fluoro was brought on to the field. Cholangiogram was performed. There were stones in the proximal cystic duct near the CBD. The CBD was substantially dilated. There were no definite stones in the CBD, but it did taper distally. The cystic artery and cystic duct were then clipped twice proximally and once distally. They were divided using scissors. The cystic duct was reinforced with an endoloop. The gallbladder was then dissected off the liver bed using cautery. The gallbladder was then removed from the abdominal cavity using and endocatch bag. The surgical field was irrigated and suctioned. The clips were inspected and found to be in good position. There was good hemostasis and no evidence of bile leak. A 15 round FIORELLA drain was placed through the most lateral RUQ port site, and sutured in place. The camera was then inserted through one of the right upper quadrant ports and the umbilical port was inspected.  There was no evidence of port placement injury. The epigastric port fascia was then closed using an 0-vicryl and a suture passer. The right upper quadrant ports were removed under direct visualization, there was good hemostasis. The remaining laparoscopic equipment was removed, insufflation was removed, and the skin incisions were closed using 4-0 monocryl subcuticular stitches. Sterile dressings with dermabond were applied. The patient tolerated the procedure well, was extubated, and transferred to the recovery area in stable condition.                   Gerhard Moreno MD   Electronically signed 03/25/21 1:49 PM

## 2021-03-25 NOTE — PROGRESS NOTES
Subjective:  No acute events. Feeling okay, but started having some RUQ pain this morning that made it difficult to walk. Objective:  /61   Pulse 69   Temp 99.4 °F (37.4 °C) (Temporal)   Resp 17   Ht 6' (1.829 m)   Wt 225 lb (102.1 kg)   SpO2 94%   BMI 30.52 kg/m²   General: NAD, AAO- does have some scleral icterus  Chest: Regular, non-labored  Abdomen: Soft, ND, ATTP, incisions C/D/I    CBC:   Lab Results   Component Value Date    WBC 8.4 03/25/2021    RBC 3.79 03/25/2021    HGB 12.6 03/25/2021    HCT 35.8 03/25/2021    MCV 94.5 03/25/2021    MCH 31.7 03/25/2021    MCHC 33.5 03/25/2021    RDW 14.1 03/25/2021     03/25/2021    MPV 10.9 03/25/2021     CMP:    Lab Results   Component Value Date     03/25/2021    K 4.1 03/25/2021    K 4.2 01/25/2020     03/25/2021    CO2 27 03/25/2021    BUN 21 03/25/2021    CREATININE 0.9 03/25/2021    GFRAA >59 03/25/2021    LABGLOM >60 03/25/2021    GLUCOSE 127 03/25/2021    PROT 5.9 03/25/2021    LABALBU 3.5 03/25/2021    CALCIUM 8.4 03/25/2021    BILITOT 4.9 03/25/2021    ALKPHOS 127 03/25/2021    AST 80 03/25/2021     03/25/2021     Assessment and plan:  72year old male s/p lap sheryl for acute cholecystitis and ERCP for choledocholithiasis and cholangitis  1) Doing okay but not feeling great. Will continue with clears for now. Work on pain control  2) Continue antibiotics  3) Drain output serosang, hgb stable.  Will go ahead and dc FIORELLA  4) Recheck labs in am

## 2021-03-26 ENCOUNTER — APPOINTMENT (OUTPATIENT)
Dept: NUCLEAR MEDICINE | Age: 71
DRG: 418 | End: 2021-03-26
Payer: MEDICARE

## 2021-03-26 LAB
ALBUMIN SERPL-MCNC: 3.3 G/DL (ref 3.5–5.2)
ALP BLD-CCNC: 129 U/L (ref 40–130)
ALT SERPL-CCNC: 178 U/L (ref 5–41)
ANION GAP SERPL CALCULATED.3IONS-SCNC: 9 MMOL/L (ref 7–19)
AST SERPL-CCNC: 98 U/L (ref 5–40)
BILIRUB SERPL-MCNC: 4.1 MG/DL (ref 0.2–1.2)
BUN BLDV-MCNC: 16 MG/DL (ref 8–23)
CALCIUM SERPL-MCNC: 8.5 MG/DL (ref 8.8–10.2)
CHLORIDE BLD-SCNC: 100 MMOL/L (ref 98–111)
CO2: 27 MMOL/L (ref 22–29)
CREAT SERPL-MCNC: 0.8 MG/DL (ref 0.5–1.2)
GFR AFRICAN AMERICAN: >59
GFR NON-AFRICAN AMERICAN: >60
GLUCOSE BLD-MCNC: 116 MG/DL (ref 74–109)
HCT VFR BLD CALC: 33.4 % (ref 42–52)
HEMOGLOBIN: 10.9 G/DL (ref 14–18)
MCH RBC QN AUTO: 31.5 PG (ref 27–31)
MCHC RBC AUTO-ENTMCNC: 32.6 G/DL (ref 33–37)
MCV RBC AUTO: 96.5 FL (ref 80–94)
PDW BLD-RTO: 14.5 % (ref 11.5–14.5)
PLATELET # BLD: 163 K/UL (ref 130–400)
PMV BLD AUTO: 10.9 FL (ref 9.4–12.4)
POTASSIUM SERPL-SCNC: 4.1 MMOL/L (ref 3.5–5)
RBC # BLD: 3.46 M/UL (ref 4.7–6.1)
SODIUM BLD-SCNC: 136 MMOL/L (ref 136–145)
TOTAL PROTEIN: 5.4 G/DL (ref 6.6–8.7)
WBC # BLD: 5.7 K/UL (ref 4.8–10.8)

## 2021-03-26 PROCEDURE — 80053 COMPREHEN METABOLIC PANEL: CPT

## 2021-03-26 PROCEDURE — 78226 HEPATOBILIARY SYSTEM IMAGING: CPT

## 2021-03-26 PROCEDURE — 36415 COLL VENOUS BLD VENIPUNCTURE: CPT

## 2021-03-26 PROCEDURE — 99024 POSTOP FOLLOW-UP VISIT: CPT | Performed by: SURGERY

## 2021-03-26 PROCEDURE — 2580000003 HC RX 258: Performed by: SURGERY

## 2021-03-26 PROCEDURE — 6370000000 HC RX 637 (ALT 250 FOR IP): Performed by: SURGERY

## 2021-03-26 PROCEDURE — A9537 TC99M MEBROFENIN: HCPCS | Performed by: SURGERY

## 2021-03-26 PROCEDURE — 3430000000 HC RX DIAGNOSTIC RADIOPHARMACEUTICAL: Performed by: SURGERY

## 2021-03-26 PROCEDURE — 6360000002 HC RX W HCPCS: Performed by: SURGERY

## 2021-03-26 PROCEDURE — 1210000000 HC MED SURG R&B

## 2021-03-26 PROCEDURE — 85027 COMPLETE CBC AUTOMATED: CPT

## 2021-03-26 PROCEDURE — 99024 POSTOP FOLLOW-UP VISIT: CPT | Performed by: INTERNAL MEDICINE

## 2021-03-26 RX ADMIN — Medication 5 MILLICURIE: at 16:45

## 2021-03-26 RX ADMIN — SOTALOL HYDROCHLORIDE 120 MG: 120 TABLET ORAL at 09:52

## 2021-03-26 RX ADMIN — OXYCODONE AND ACETAMINOPHEN 2 TABLET: 5; 325 TABLET ORAL at 23:18

## 2021-03-26 RX ADMIN — OXYCODONE AND ACETAMINOPHEN 2 TABLET: 5; 325 TABLET ORAL at 19:20

## 2021-03-26 RX ADMIN — SOTALOL HYDROCHLORIDE 120 MG: 120 TABLET ORAL at 23:18

## 2021-03-26 RX ADMIN — SODIUM CHLORIDE, PRESERVATIVE FREE 10 ML: 5 INJECTION INTRAVENOUS at 09:52

## 2021-03-26 RX ADMIN — PIPERACILLIN AND TAZOBACTAM 3375 MG: 3; .375 INJECTION, POWDER, LYOPHILIZED, FOR SOLUTION INTRAVENOUS at 04:19

## 2021-03-26 RX ADMIN — OXYCODONE AND ACETAMINOPHEN 2 TABLET: 5; 325 TABLET ORAL at 09:52

## 2021-03-26 RX ADMIN — SODIUM CHLORIDE: 9 INJECTION, SOLUTION INTRAVENOUS at 04:20

## 2021-03-26 RX ADMIN — OXYCODONE HYDROCHLORIDE AND ACETAMINOPHEN 1 TABLET: 5; 325 TABLET ORAL at 04:18

## 2021-03-26 RX ADMIN — PIPERACILLIN AND TAZOBACTAM 3375 MG: 3; .375 INJECTION, POWDER, LYOPHILIZED, FOR SOLUTION INTRAVENOUS at 12:03

## 2021-03-26 RX ADMIN — PIPERACILLIN AND TAZOBACTAM 3375 MG: 3; .375 INJECTION, POWDER, LYOPHILIZED, FOR SOLUTION INTRAVENOUS at 23:18

## 2021-03-26 RX ADMIN — IBUPROFEN 600 MG: 400 TABLET, FILM COATED ORAL at 09:58

## 2021-03-26 RX ADMIN — PANTOPRAZOLE SODIUM 40 MG: 40 TABLET, DELAYED RELEASE ORAL at 06:39

## 2021-03-26 RX ADMIN — OXYCODONE AND ACETAMINOPHEN 2 TABLET: 5; 325 TABLET ORAL at 15:16

## 2021-03-26 RX ADMIN — IBUPROFEN 600 MG: 400 TABLET, FILM COATED ORAL at 17:28

## 2021-03-26 ASSESSMENT — PAIN SCALES - GENERAL
PAINLEVEL_OUTOF10: 8
PAINLEVEL_OUTOF10: 9
PAINLEVEL_OUTOF10: 6

## 2021-03-26 ASSESSMENT — PAIN DESCRIPTION - LOCATION: LOCATION: ABDOMEN

## 2021-03-26 ASSESSMENT — PAIN - FUNCTIONAL ASSESSMENT: PAIN_FUNCTIONAL_ASSESSMENT: PREVENTS OR INTERFERES SOME ACTIVE ACTIVITIES AND ADLS

## 2021-03-26 ASSESSMENT — PAIN DESCRIPTION - DESCRIPTORS: DESCRIPTORS: ACHING

## 2021-03-26 NOTE — PROGRESS NOTES
Subjective:  No acute events or changes. Tolerating clears. Feeling a little better, but still having a lot of pain at epigastric area. Tried to space out his pain meds and has not always been taking two. Objective:  /73   Pulse 62   Temp 96.8 °F (36 °C)   Resp 16   Ht 6' (1.829 m)   Wt 225 lb (102.1 kg)   SpO2 95%   BMI 30.52 kg/m²   General: NAD, AAO- cheeks not as elmer today, but still some icterus  Chest: Regular, non-labored  Abdomen: Soft, ND, TTP at epigastric port site    CBC:   Lab Results   Component Value Date    WBC 5.7 03/26/2021    RBC 3.46 03/26/2021    HGB 10.9 03/26/2021    HCT 33.4 03/26/2021    MCV 96.5 03/26/2021    MCH 31.5 03/26/2021    MCHC 32.6 03/26/2021    RDW 14.5 03/26/2021     03/26/2021    MPV 10.9 03/26/2021     CMP:    Lab Results   Component Value Date     03/26/2021    K 4.1 03/26/2021    K 4.2 01/25/2020     03/26/2021    CO2 27 03/26/2021    BUN 16 03/26/2021    CREATININE 0.8 03/26/2021    GFRAA >59 03/26/2021    LABGLOM >60 03/26/2021    GLUCOSE 116 03/26/2021    PROT 5.4 03/26/2021    LABALBU 3.3 03/26/2021    CALCIUM 8.5 03/26/2021    BILITOT 4.1 03/26/2021    ALKPHOS 129 03/26/2021    AST 98 03/26/2021     03/26/2021     Assessment and plan:  72year old male s/p lap sheryl with IOC for acute cholecystitis, and s/p ERCP with stent for choledocholithiasis and cholangitis  1) Doing some better today.  Bilirubin still elevated but trended down from yesterday  2) Continue IV abx today  3) Advance to soft diet  4) Discussed with him and encouraged him to not try to space pain medication out so much, added ibuprofen

## 2021-03-26 NOTE — PROGRESS NOTES
GI  - PROGRESS NOTE    Admit Date: 3/23/2021             Chief Complaint: still with epi pain. States upper inscision is hurting    Patient being seen for:  Cholangitis, choledocholithiasis. Post ercp/stent     DIET DENTAL SOFT;                         Bowel movement: no black or bloody stools    Pain:Moderate  Nausea:None    Intake/Output Summary (Last 24 hours) at 3/26/2021 1050  Last data filed at 3/26/2021 1000  Gross per 24 hour   Intake 1583.44 ml   Output 10 ml   Net 1573.44 ml               Lab /Radiology:   Scheduled Meds: Reviewed          -----------------------------------------------------------------          Recent Labs     03/24/21 0755 03/25/21 0413 03/25/21  1047 03/26/21  0239   WBC 9.2 8.4  --  5.7   RBC 4.22* 3.79*  --  3.46*   HGB 13.3* 12.0* 12.6* 10.9*   HCT 40.0* 35.8*  --  33.4*    175  --  163     Recent Labs     03/24/21 0755 03/25/21  0413 03/26/21  0239    137 136   K 4.4 4.1 4.1   ANIONGAP 13 9 9    101 100   CO2 24 27 27   BUN 19 21 16   CREATININE 0.9 0.9 0.8   GLUCOSE 166* 127* 116*   CALCIUM 8.5* 8.4* 8.5*     Recent Labs     03/24/21  0755 03/25/21  0413 03/26/21  0239   * 80* 98*   * 175* 178*   BILITOT 3.7* 4.9* 4.1*   ALKPHOS 163* 127 129     Recent Labs     03/25/21  0413   LIPASE 15     Troponin T: No results for input(s): TROPONINI in the last 72 hours. Pro-BNP: No results for input(s): BNP in the last 72 hours. INR: No results for input(s): INR in the last 72 hours. Physical Exam:   Vitals: BP (!) 147/88   Pulse 62   Temp 97.2 °F (36.2 °C) (Temporal)   Resp 16   Ht 6' (1.829 m)   Wt 225 lb (102.1 kg)   SpO2 96%   BMI 30.52 kg/m²     HEENT: Pupils equal, round, reactive to light       Neck supple. Trachea midline. No crepitus  Lungs: breath sounds bilaterally. Normal excursion  Heart[de-identified]    RRR without heave or thrill  Abdomen: abnormal findings:  tenderness moderate in the epigastrium and in the RUQ.   No encarcerated

## 2021-03-26 NOTE — PLAN OF CARE
Problem: Pain:  Goal: Pain level will decrease  Description: Pain level will decrease  3/26/2021 1514 by Eduardo Pickard RN  Outcome: Ongoing  3/26/2021 0320 by Gina Briones RN  Outcome: Ongoing  Goal: Control of acute pain  Description: Control of acute pain  Outcome: Ongoing  Goal: Control of chronic pain  Description: Control of chronic pain  Outcome: Ongoing     Problem: Activity:  Goal: Ability to return to normal activity level will improve  Description: Ability to return to normal activity level will improve  3/26/2021 1514 by Eduardo Pickard RN  Outcome: Ongoing  3/26/2021 0320 by Gina Briones RN  Outcome: Ongoing  Goal: Risk for activity intolerance will decrease  Description: Risk for activity intolerance will decrease  Outcome: Ongoing     Problem:  Bowel/Gastric:  Goal: Gastrointestinal status for postoperative course will improve  Description: Gastrointestinal status for postoperative course will improve  3/26/2021 1514 by Eduardo Pickard RN  Outcome: Ongoing  3/26/2021 0320 by Gina Briones RN  Outcome: Ongoing  Goal: Bowel function will improve  Description: Bowel function will improve  Outcome: Ongoing  Goal: Diagnostic test results will improve  Description: Diagnostic test results will improve  Outcome: Ongoing  Goal: Occurrences of nausea will decrease  Description: Occurrences of nausea will decrease  Outcome: Ongoing  Goal: Occurrences of vomiting will decrease  Description: Occurrences of vomiting will decrease  Outcome: Ongoing     Problem: Health Behavior:  Goal: Identification of resources available to assist in meeting health care needs will improve  Description: Identification of resources available to assist in meeting health care needs will improve  Outcome: Ongoing     Problem: Physical Regulation:  Goal: Postoperative complications will be avoided or minimized  Description: Postoperative complications will be avoided or minimized  3/26/2021 1514 by Eduardo Pickard RN  Outcome: Ongoing  3/26/2021 0320 by Fredericka Boxer, RN  Outcome: Ongoing  Goal: Complications related to the disease process, condition or treatment will be avoided or minimized  Description: Complications related to the disease process, condition or treatment will be avoided or minimized  Outcome: Ongoing  Goal: Ability to maintain clinical measurements within normal limits will improve  Description: Ability to maintain clinical measurements within normal limits will improve  Outcome: Ongoing     Problem: Respiratory:  Goal: Ability to achieve and maintain a regular respiratory rate will improve  Description: Ability to achieve and maintain a regular respiratory rate will improve  Outcome: Ongoing     Problem: Safety:  Goal: Ability to remain free from injury will improve  Description: Ability to remain free from injury will improve  Outcome: Ongoing     Problem: Sensory:  Goal: Pain level will decrease  Description: Pain level will decrease  3/26/2021 1514 by Makenzie Perez RN  Outcome: Ongoing  3/26/2021 0320 by Fredericka Boxer, RN  Outcome: Ongoing  Goal: General experience of comfort will improve  Description: General experience of comfort will improve  Outcome: Ongoing     Problem: Skin Integrity:  Goal: Demonstration of wound healing without infection will improve  Description: Demonstration of wound healing without infection will improve  Outcome: Ongoing     Problem: Nutrition Deficit:  Goal: Ability to achieve adequate nutritional intake will improve  Description: Ability to achieve adequate nutritional intake will improve  3/26/2021 1514 by Makenzie Perez RN  Outcome: Ongoing  3/26/2021 0320 by Fredericka Boxer, RN  Outcome: Ongoing

## 2021-03-27 ENCOUNTER — APPOINTMENT (OUTPATIENT)
Dept: CT IMAGING | Age: 71
DRG: 418 | End: 2021-03-27
Payer: MEDICARE

## 2021-03-27 LAB
ALBUMIN SERPL-MCNC: 3.2 G/DL (ref 3.5–5.2)
ALP BLD-CCNC: 130 U/L (ref 40–130)
ALT SERPL-CCNC: 168 U/L (ref 5–41)
ANION GAP SERPL CALCULATED.3IONS-SCNC: 8 MMOL/L (ref 7–19)
AST SERPL-CCNC: 89 U/L (ref 5–40)
BILIRUB SERPL-MCNC: 2.5 MG/DL (ref 0.2–1.2)
BUN BLDV-MCNC: 14 MG/DL (ref 8–23)
CALCIUM SERPL-MCNC: 8.3 MG/DL (ref 8.8–10.2)
CHLORIDE BLD-SCNC: 102 MMOL/L (ref 98–111)
CO2: 28 MMOL/L (ref 22–29)
CREAT SERPL-MCNC: 0.9 MG/DL (ref 0.5–1.2)
GFR AFRICAN AMERICAN: >59
GFR NON-AFRICAN AMERICAN: >60
GLUCOSE BLD-MCNC: 124 MG/DL (ref 74–109)
HCT VFR BLD CALC: 34.5 % (ref 42–52)
HEMOGLOBIN: 11.3 G/DL (ref 14–18)
LIPASE: 28 U/L (ref 13–60)
MCH RBC QN AUTO: 31.7 PG (ref 27–31)
MCHC RBC AUTO-ENTMCNC: 32.8 G/DL (ref 33–37)
MCV RBC AUTO: 96.6 FL (ref 80–94)
PDW BLD-RTO: 14.2 % (ref 11.5–14.5)
PLATELET # BLD: 175 K/UL (ref 130–400)
PMV BLD AUTO: 10.8 FL (ref 9.4–12.4)
POTASSIUM SERPL-SCNC: 4.3 MMOL/L (ref 3.5–5)
RBC # BLD: 3.57 M/UL (ref 4.7–6.1)
SODIUM BLD-SCNC: 138 MMOL/L (ref 136–145)
TOTAL PROTEIN: 5.2 G/DL (ref 6.6–8.7)
WBC # BLD: 5.2 K/UL (ref 4.8–10.8)

## 2021-03-27 PROCEDURE — 99024 POSTOP FOLLOW-UP VISIT: CPT | Performed by: INTERNAL MEDICINE

## 2021-03-27 PROCEDURE — 2580000003 HC RX 258: Performed by: SURGERY

## 2021-03-27 PROCEDURE — 85027 COMPLETE CBC AUTOMATED: CPT

## 2021-03-27 PROCEDURE — 6360000004 HC RX CONTRAST MEDICATION: Performed by: INTERNAL MEDICINE

## 2021-03-27 PROCEDURE — 83690 ASSAY OF LIPASE: CPT

## 2021-03-27 PROCEDURE — 6370000000 HC RX 637 (ALT 250 FOR IP): Performed by: SURGERY

## 2021-03-27 PROCEDURE — 80053 COMPREHEN METABOLIC PANEL: CPT

## 2021-03-27 PROCEDURE — 1210000000 HC MED SURG R&B

## 2021-03-27 PROCEDURE — 99024 POSTOP FOLLOW-UP VISIT: CPT | Performed by: SURGERY

## 2021-03-27 PROCEDURE — 6360000002 HC RX W HCPCS: Performed by: SURGERY

## 2021-03-27 PROCEDURE — 36415 COLL VENOUS BLD VENIPUNCTURE: CPT

## 2021-03-27 PROCEDURE — 74177 CT ABD & PELVIS W/CONTRAST: CPT

## 2021-03-27 RX ADMIN — SOTALOL HYDROCHLORIDE 120 MG: 120 TABLET ORAL at 21:18

## 2021-03-27 RX ADMIN — OXYCODONE AND ACETAMINOPHEN 2 TABLET: 5; 325 TABLET ORAL at 08:12

## 2021-03-27 RX ADMIN — ONDANSETRON HYDROCHLORIDE 4 MG: 2 SOLUTION INTRAMUSCULAR; INTRAVENOUS at 05:51

## 2021-03-27 RX ADMIN — IOPAMIDOL 90 ML: 755 INJECTION, SOLUTION INTRAVENOUS at 14:26

## 2021-03-27 RX ADMIN — PANTOPRAZOLE SODIUM 40 MG: 40 TABLET, DELAYED RELEASE ORAL at 05:48

## 2021-03-27 RX ADMIN — SODIUM CHLORIDE, PRESERVATIVE FREE 10 ML: 5 INJECTION INTRAVENOUS at 22:16

## 2021-03-27 RX ADMIN — PIPERACILLIN AND TAZOBACTAM 3375 MG: 3; .375 INJECTION, POWDER, LYOPHILIZED, FOR SOLUTION INTRAVENOUS at 13:54

## 2021-03-27 RX ADMIN — SODIUM CHLORIDE, PRESERVATIVE FREE 10 ML: 5 INJECTION INTRAVENOUS at 00:32

## 2021-03-27 RX ADMIN — OXYCODONE AND ACETAMINOPHEN 2 TABLET: 5; 325 TABLET ORAL at 21:17

## 2021-03-27 RX ADMIN — SODIUM CHLORIDE, PRESERVATIVE FREE 10 ML: 5 INJECTION INTRAVENOUS at 08:13

## 2021-03-27 RX ADMIN — PIPERACILLIN AND TAZOBACTAM 3375 MG: 3; .375 INJECTION, POWDER, LYOPHILIZED, FOR SOLUTION INTRAVENOUS at 21:17

## 2021-03-27 RX ADMIN — PIPERACILLIN AND TAZOBACTAM 3375 MG: 3; .375 INJECTION, POWDER, LYOPHILIZED, FOR SOLUTION INTRAVENOUS at 03:40

## 2021-03-27 RX ADMIN — OXYCODONE AND ACETAMINOPHEN 2 TABLET: 5; 325 TABLET ORAL at 03:36

## 2021-03-27 RX ADMIN — SOTALOL HYDROCHLORIDE 120 MG: 120 TABLET ORAL at 08:13

## 2021-03-27 RX ADMIN — IBUPROFEN 600 MG: 400 TABLET, FILM COATED ORAL at 21:18

## 2021-03-27 ASSESSMENT — PAIN DESCRIPTION - PROGRESSION: CLINICAL_PROGRESSION: GRADUALLY WORSENING

## 2021-03-27 ASSESSMENT — PAIN SCALES - GENERAL: PAINLEVEL_OUTOF10: 5

## 2021-03-27 NOTE — PROGRESS NOTES
GI  - PROGRESS NOTE    Admit Date: 3/23/2021             Chief Complaint: still with sig. Pain. Unable to ambulate unless gets pain med. Taking percocet Q 4 hrs. Emesis today. + flatus  Patient being seen for:  cholangitis   DIET DENTAL SOFT;                         Bowel movement: no black or bloody stools    Pain:Severe  Nausea: Intake/Output Summary (Last 24 hours) at 3/27/2021 1233  Last data filed at 3/27/2021 0800  Gross per 24 hour   Intake 810 ml   Output --   Net 810 ml               Lab /Radiology:   Scheduled Meds: Reviewed          -----------------------------------------------------------------          Recent Labs     03/25/21 0413 03/25/21  1047 03/26/21 0239 03/27/21  0252   WBC 8.4  --  5.7 5.2   RBC 3.79*  --  3.46* 3.57*   HGB 12.0* 12.6* 10.9* 11.3*   HCT 35.8*  --  33.4* 34.5*     --  163 175     Recent Labs     03/25/21 0413 03/26/21 0239 03/27/21  0252    136 138   K 4.1 4.1 4.3   ANIONGAP 9 9 8    100 102   CO2 27 27 28   BUN 21 16 14   CREATININE 0.9 0.8 0.9   GLUCOSE 127* 116* 124*   CALCIUM 8.4* 8.5* 8.3*     Recent Labs     03/25/21 0413 03/26/21  0239 03/27/21  0252   AST 80* 98* 89*   * 178* 168*   BILITOT 4.9* 4.1* 2.5*   ALKPHOS 127 129 130     Recent Labs     03/25/21 0413   LIPASE 15     Troponin T: No results for input(s): TROPONINI in the last 72 hours. Pro-BNP: No results for input(s): BNP in the last 72 hours. INR: No results for input(s): INR in the last 72 hours. Physical Exam:   Vitals: BP (!) 153/78   Pulse 60   Temp 97.2 °F (36.2 °C) (Temporal)   Resp 17   Ht 6' (1.829 m)   Wt 225 lb (102.1 kg)   SpO2 90%   BMI 30.52 kg/m²     HEENT: Pupils equal, round, reactive to light       Neck supple. Trachea midline. No crepitus  Lungs: breath sounds bilaterally. Normal excursion  Heart[de-identified]    RRR without heave or thrill  Abdomen: abnormal findings:  tenderness moderate in the epigastrium and in the RUQ.   No encarcerated hernia detected. No organomegaly detected  Extremities: no cyanosis or clubbing  Lymphatic: No significant lymph node enlargement papable in the neck , groin or umbilicus  Neurologic: alert. oriented        Impression:    1. persistant upper abd. Pain post lap sheryl/ERCP. 2.Emesis. ? Due to pain meds       Plan: 1. Check lipase   2. CT abd.today. Bro Tong M.D.   Gastroenterology

## 2021-03-27 NOTE — PROGRESS NOTES
kg)   12/10/20 216 lb (98 kg)   11/23/20 220 lb (99.8 kg)        Body mass index is 30.52 kg/m².      Diet: DIET DENTAL SOFT;    LABS:     CBC:   Recent Labs     03/25/21  0413 03/25/21  1047 03/26/21  0239 03/27/21  0252   WBC 8.4  --  5.7 5.2   RBC 3.79*  --  3.46* 3.57*   HGB 12.0* 12.6* 10.9* 11.3*   HCT 35.8*  --  33.4* 34.5*   MCV 94.5*  --  96.5* 96.6*   MCH 31.7*  --  31.5* 31.7*   MCHC 33.5  --  32.6* 32.8*   RDW 14.1  --  14.5 14.2     --  163 175   MPV 10.9  --  10.9 10.8      Last 3 CMP:   Recent Labs     03/25/21  0413 03/26/21  0239 03/27/21  0252    136 138   K 4.1 4.1 4.3    100 102   CO2 27 27 28   BUN 21 16 14   CREATININE 0.9 0.8 0.9   GLUCOSE 127* 116* 124*   CALCIUM 8.4* 8.5* 8.3*   PROT 5.9* 5.4* 5.2*   LABALBU 3.5 3.3* 3.2*   BILITOT 4.9* 4.1* 2.5*   ALKPHOS 127 129 130   AST 80* 98* 89*   * 178* 168*          PHYSICAL EXAM:     CONSTITUTIONAL: Alert, appropriate, no acute distress  SKIN: warm, dry with no rashes or lesions  EYES: Non icteric  CHEST/LUNGS: CTA bilaterally  CARDIOVASCULAR: RRR    ABDOMEN: soft, ND, appropriately TTP, +BS  Incisions: Clean, dry, and intact with no erythema or induration  NEUROLOGIC: CN II-XI grossly intact, no motor or sensory deficits   EXTREMITIES: warm, well perfused, no edema     ASSESSMENT:       1. HD # 2  Patient Active Problem List   Diagnosis    Deep vein thrombosis (DVT) (HCC)    Hyperlipidemia    Decreased carotid pulse    Numbness and tingling in right hand    Heart murmur    Chest pain    History of colon polyps    Nephrolithiasis    Colon polyps    Gastrointestinal hemorrhage    Diverticulosis of large intestine with hemorrhage    Acute blood loss anemia    Hypotension    Bloody diarrhea    History of kidney stones    Diverticulosis of intestine with bleeding    Esophageal dysphagia    Acute pain of right knee    Hyperlipoproteinemia    H/O cervical spine surgery    Esophageal pain    Garces's esophagus without dysplasia    Chronic GERD    Sinoatrial node dysfunction (HCC)    Paroxysmal atrial fibrillation (HCC)    Acute cholecystitis    Cholangitis    Choledocholithiasis   2. PLAN:     1. Doing very well status post cholecystectomy and ERCP and stent placement. 2. Should not require pain meds  3. Discharge planning per GI  4. Aminah Goode M.D.  FACS  Electronically signed 3/27/2021 at 2:51 PM

## 2021-03-28 VITALS
DIASTOLIC BLOOD PRESSURE: 90 MMHG | TEMPERATURE: 97.4 F | BODY MASS INDEX: 30.48 KG/M2 | RESPIRATION RATE: 16 BRPM | HEART RATE: 62 BPM | SYSTOLIC BLOOD PRESSURE: 168 MMHG | OXYGEN SATURATION: 94 % | HEIGHT: 72 IN | WEIGHT: 225 LBS

## 2021-03-28 LAB
ALBUMIN SERPL-MCNC: 3.1 G/DL (ref 3.5–5.2)
ALP BLD-CCNC: 143 U/L (ref 40–130)
ALT SERPL-CCNC: 134 U/L (ref 5–41)
ANION GAP SERPL CALCULATED.3IONS-SCNC: 10 MMOL/L (ref 7–19)
AST SERPL-CCNC: 54 U/L (ref 5–40)
BILIRUB SERPL-MCNC: 1.8 MG/DL (ref 0.2–1.2)
BUN BLDV-MCNC: 15 MG/DL (ref 8–23)
CALCIUM SERPL-MCNC: 8.3 MG/DL (ref 8.8–10.2)
CHLORIDE BLD-SCNC: 102 MMOL/L (ref 98–111)
CO2: 27 MMOL/L (ref 22–29)
CREAT SERPL-MCNC: 0.8 MG/DL (ref 0.5–1.2)
GFR AFRICAN AMERICAN: >59
GFR NON-AFRICAN AMERICAN: >60
GLUCOSE BLD-MCNC: 108 MG/DL (ref 74–109)
POTASSIUM SERPL-SCNC: 4.1 MMOL/L (ref 3.5–5)
SODIUM BLD-SCNC: 139 MMOL/L (ref 136–145)
TOTAL PROTEIN: 5.3 G/DL (ref 6.6–8.7)

## 2021-03-28 PROCEDURE — 6360000002 HC RX W HCPCS: Performed by: SURGERY

## 2021-03-28 PROCEDURE — 80053 COMPREHEN METABOLIC PANEL: CPT

## 2021-03-28 PROCEDURE — 2580000003 HC RX 258: Performed by: SURGERY

## 2021-03-28 PROCEDURE — 6370000000 HC RX 637 (ALT 250 FOR IP): Performed by: SURGERY

## 2021-03-28 PROCEDURE — 36415 COLL VENOUS BLD VENIPUNCTURE: CPT

## 2021-03-28 RX ORDER — METRONIDAZOLE 500 MG/1
500 TABLET ORAL 3 TIMES DAILY
Qty: 21 TABLET | Refills: 0 | Status: SHIPPED | OUTPATIENT
Start: 2021-03-28 | End: 2021-04-04

## 2021-03-28 RX ORDER — CIPROFLOXACIN 500 MG/1
500 TABLET, FILM COATED ORAL 2 TIMES DAILY
Qty: 14 TABLET | Refills: 0 | Status: SHIPPED | OUTPATIENT
Start: 2021-03-28 | End: 2021-04-04

## 2021-03-28 RX ADMIN — SOTALOL HYDROCHLORIDE 120 MG: 120 TABLET ORAL at 08:33

## 2021-03-28 RX ADMIN — PANTOPRAZOLE SODIUM 40 MG: 40 TABLET, DELAYED RELEASE ORAL at 06:21

## 2021-03-28 RX ADMIN — PIPERACILLIN AND TAZOBACTAM 3375 MG: 3; .375 INJECTION, POWDER, LYOPHILIZED, FOR SOLUTION INTRAVENOUS at 04:38

## 2021-03-28 NOTE — PROGRESS NOTES
At 3286, Dr. Cameron called Ifrah GRAY and said OK to discharge patient home today.     Electronically signed by Rodger Garcia RN on 3/28/2021 at 7:23 AM

## 2021-03-28 NOTE — DISCHARGE INSTR - DIET

## 2021-03-28 NOTE — PROGRESS NOTES
Dr. Darek Baker discharge antibiotics recommendations are:     Cipro 500mg BID x 5 days and Flagyl 500mg BID x 5 days    Electronically signed by Catalino Soria RN on 3/28/2021 at 10:31 AM

## 2021-03-29 ENCOUNTER — CARE COORDINATION (OUTPATIENT)
Dept: CASE MANAGEMENT | Age: 71
End: 2021-03-29

## 2021-03-29 DIAGNOSIS — K81.0 ACUTE CHOLECYSTITIS: Primary | ICD-10-CM

## 2021-03-29 PROCEDURE — 1111F DSCHRG MED/CURRENT MED MERGE: CPT | Performed by: FAMILY MEDICINE

## 2021-03-29 NOTE — CARE COORDINATION
was performed with patient, who verbalizes understanding of administration of home medications. Advised obtaining a 90-day supply of all daily and as-needed medications. Covid Risk Education    Patient has following risk factors of: afib, DVT, Kidney stones. Education provided regarding infection prevention, and signs and symptoms of COVID-19 and when to seek medical attention with patient who verbalized understanding. Discussed exposure protocols and quarantine From CDC: Are you at higher risk for severe illness?   and given an opportunity for questions and concerns. The patient agrees to contact the COVID-19 hotline [phone] or PCP office for questions related to COVID-19. For more information on steps you can take to protect yourself, see CDC's How to Protect Yourself     Was patient discharged with a pulse oximeter? No Discussed and confirmed pulse oximeter discharge instructions and when to notify provider or seek emergency care. Patient/family/caregiver given information for [employer] and agrees to enroll no    Discussed follow-up appointments. If no appointment was previously scheduled, appointment scheduling offered: Yes. Is follow up appointment scheduled within 7 days of discharge? Patient calling today  Non-[facility] follow up appointment(s):     Plan for follow-up call in 5-7 days based on severity of symptoms and risk factors. Plan for next call: appetite, pain, incisions site  CTN provided contact information for future needs.           Care Transitions 24 Hour Call    Do you have any ongoing symptoms?: No  Do you have a copy of your discharge instructions?: Yes  Do you have all of your prescriptions and are they filled?: Yes  Have you been contacted by a [facility]?: No  Were you discharged with any Home Care or Post Acute Services: No  Do you feel like you have everything you need to keep you well at home?: Yes  Care Transitions Interventions         Follow Up : Spoke with patient today for initial Covid call after discharge from [facility]. He has his medications and did review them, 1111F order added. He is calling today for his appointments. Says he has had a good breakfast. No issues with nausea or vomiting. Incision sites good, no redness or drainage. [place] He is sore but moving about. Discussed Covid calls and follow up and he is accepting. Encouraged to seek medical attention if he is experiencing any worsening pain or abnormal symptoms. Encouraged to call with prn needs. Will follow up at a later time.    Future Appointments   Date Time Provider Lexy Sanchez   6/10/2021 10:00 AM DO ANITA Santana [id number]       Donald Rodriguez RN

## 2021-03-30 ENCOUNTER — TELEPHONE (OUTPATIENT)
Dept: FAMILY MEDICINE CLINIC | Age: 71
End: 2021-03-30

## 2021-03-30 NOTE — TELEPHONE ENCOUNTER
[facility] Transitions Initial Follow Up Call    Outreach made within 2 business days of discharge: Yes    Patient: Carter Serrano Patient : 1950   MRN: [id number]  Reason for Admission: There are no discharge diagnoses documented for the most recent discharge. Discharge Date: 3/28/21       Spoke with: patient    Discharge department/facility: [facility]    TCM Interactive Patient Contact:  Was patient able to fill all prescriptions: Yes  Was patient instructed to bring all medications to the follow-up visit: Yes  Is patient taking all medications as directed in the discharge summary?  Yes  Does patient understand their discharge instructions: Yes  Does patient have questions or concerns that need addressed prior to 7-14 day follow up office visit: no    Scheduled appointment with PCP within 7-14 days    Follow Up  Future Appointments   Date Time Provider Lexy Sanchez   2021 10:00 AM Pablito Walters MD [facility]   6/10/2021 10:00 AM DO ANITA Cameron [facility] Diaz

## 2021-04-05 ENCOUNTER — OFFICE VISIT (OUTPATIENT)
Dept: FAMILY MEDICINE CLINIC | Age: 71
End: 2021-04-05
Payer: MEDICARE

## 2021-04-05 ENCOUNTER — CARE COORDINATION (OUTPATIENT)
Dept: CASE MANAGEMENT | Age: 71
End: 2021-04-05

## 2021-04-05 VITALS
BODY MASS INDEX: 28.06 KG/M2 | HEIGHT: 72 IN | WEIGHT: 207.2 LBS | TEMPERATURE: 96.9 F | RESPIRATION RATE: 16 BRPM | OXYGEN SATURATION: 95 % | SYSTOLIC BLOOD PRESSURE: 120 MMHG | HEART RATE: 79 BPM | DIASTOLIC BLOOD PRESSURE: 62 MMHG

## 2021-04-05 DIAGNOSIS — K83.09 CHOLANGITIS: ICD-10-CM

## 2021-04-05 DIAGNOSIS — K22.70 BARRETT'S ESOPHAGUS WITHOUT DYSPLASIA: ICD-10-CM

## 2021-04-05 DIAGNOSIS — K81.9 CHOLECYSTITIS: ICD-10-CM

## 2021-04-05 DIAGNOSIS — R94.5 ABNORMAL RESULTS OF LIVER FUNCTION STUDIES: ICD-10-CM

## 2021-04-05 DIAGNOSIS — R93.3 ABNORMAL ENDOSCOPIC RETROGRADE CHOLANGIOPANCREATOGRAPHY (ERCP): ICD-10-CM

## 2021-04-05 DIAGNOSIS — I48.0 PAROXYSMAL ATRIAL FIBRILLATION (HCC): ICD-10-CM

## 2021-04-05 DIAGNOSIS — D72.829 LEUKOCYTOSIS, UNSPECIFIED TYPE: ICD-10-CM

## 2021-04-05 DIAGNOSIS — Z98.890 HISTORY OF BILIARY DUCT STENT PLACEMENT: ICD-10-CM

## 2021-04-05 DIAGNOSIS — K80.50 CHOLEDOCHOLITHIASIS: ICD-10-CM

## 2021-04-05 LAB
ALBUMIN SERPL-MCNC: 3.9 G/DL (ref 3.5–5.2)
ALP BLD-CCNC: 186 U/L (ref 40–130)
ALT SERPL-CCNC: 79 U/L (ref 5–41)
AMYLASE: 65 U/L (ref 28–100)
ANION GAP SERPL CALCULATED.3IONS-SCNC: 11 MMOL/L (ref 7–19)
AST SERPL-CCNC: 35 U/L (ref 5–40)
BILIRUB SERPL-MCNC: 0.6 MG/DL (ref 0.2–1.2)
BILIRUBIN DIRECT: 0.3 MG/DL (ref 0–0.3)
BILIRUBIN, INDIRECT: 0.3 MG/DL (ref 0.1–1)
BUN BLDV-MCNC: 25 MG/DL (ref 8–23)
CALCIUM SERPL-MCNC: 9.7 MG/DL (ref 8.8–10.2)
CHLORIDE BLD-SCNC: 103 MMOL/L (ref 98–111)
CO2: 25 MMOL/L (ref 22–29)
CREAT SERPL-MCNC: 0.9 MG/DL (ref 0.5–1.2)
GFR AFRICAN AMERICAN: >59
GFR NON-AFRICAN AMERICAN: >60
GLUCOSE BLD-MCNC: 103 MG/DL (ref 74–109)
HCT VFR BLD CALC: 41 % (ref 42–52)
HEMOGLOBIN: 13.1 G/DL (ref 14–18)
LIPASE: 27 U/L (ref 13–60)
MCH RBC QN AUTO: 31.3 PG (ref 27–31)
MCHC RBC AUTO-ENTMCNC: 32 G/DL (ref 33–37)
MCV RBC AUTO: 97.9 FL (ref 80–94)
PDW BLD-RTO: 14.8 % (ref 11.5–14.5)
PLATELET # BLD: 584 K/UL (ref 130–400)
PMV BLD AUTO: 9.8 FL (ref 9.4–12.4)
POTASSIUM SERPL-SCNC: 4.7 MMOL/L (ref 3.5–5)
RBC # BLD: 4.19 M/UL (ref 4.7–6.1)
SODIUM BLD-SCNC: 139 MMOL/L (ref 136–145)
TOTAL PROTEIN: 7.2 G/DL (ref 6.6–8.7)
WBC # BLD: 5.3 K/UL (ref 4.8–10.8)

## 2021-04-05 PROCEDURE — 1111F DSCHRG MED/CURRENT MED MERGE: CPT | Performed by: FAMILY MEDICINE

## 2021-04-05 PROCEDURE — 99495 TRANSJ CARE MGMT MOD F2F 14D: CPT | Performed by: FAMILY MEDICINE

## 2021-04-05 ASSESSMENT — ENCOUNTER SYMPTOMS
CHEST TIGHTNESS: 0
NAUSEA: 0
CONSTIPATION: 0
WHEEZING: 0
SHORTNESS OF BREATH: 0
EYES NEGATIVE: 1
BLOOD IN STOOL: 0
DIARRHEA: 0
ABDOMINAL PAIN: 1
VOMITING: 0
RESPIRATORY NEGATIVE: 1
COUGH: 0

## 2021-04-05 ASSESSMENT — PATIENT HEALTH QUESTIONNAIRE - PHQ9: 1. LITTLE INTEREST OR PLEASURE IN DOING THINGS: 0

## 2021-04-05 NOTE — PROGRESS NOTES
Subjective:      Patient ID: Denver Mandujano is a 80 y.o. male. HPI  This patient is seen here intermittently by the undersigned for management of chronic disease states. Additionally he is seen when new problems arise. He is here today regarding a TCM evaluation since patient recently was hospitalized at [facility].  My understanding is that he was admitted to [facility] on 3/2 to 3/20/2021 where he remained until date of discharge on 3/28/21. The patient was in his usual state of health and he had gone to [facility] to  a car with his wife. Coming back from [facility] he began to have some discomfort in his abdomen epigastric to right upper quadrant area. When he got home this had gotten a good bit worse. As it progressively worsened he had his wife take him to the emergency room. In the emergency room, he was evaluated and felt to be having acute cholecystitis. He later had some diagnostic studies obtained and was felt to have acute cholecystitis and later was diagnosed as having choledocholithiasis and cholangitis. It was noted that he had a very dilated common bile duct and had considerable concern raised for common bile duct stones. GI was consulted after his gallbladder was removed. He had vomited the morning after gallbladder surgery and actually expressed bilious content from the vomitus. Dr. Caryn Greene of GI saw him and felt as though he was in need of ERCP and at that time was able to identify some evidence of stones and purulence coming out from the common bile duct. Dr. Fred Fitzpatrick was able to successfully put in place a bile duct stent to facilitate drainage. The patient received significant course of IV antibiotics and gradually slowly then improved. Pain control reportedly gradually improved and liver enzymes improved as well. It had been noted by the patient that his bilirubin became quite elevated when he was involved in this process.   On 3/28/2021 he was felt to be able to be discharged home. The patient had surgery done by Dr. Naomy Castro. It was noted that Dr. Pema Garrison did ERCP on him. Josephine Trevino did tell me today that in addition to bilirubin being significantly elevated during the course of his illness, his white blood count was quite elevated as well. Josephine Belinda reported that the stent was to remain in place for either 3 weeks or 3 months and he was not sure which. He does have an appointment scheduled with Dr. Baltazar Garcia of the cardiovascular disease department, Dr. Pema Garrison of GI is set for 4/11/2021 and he does have a follow-up with the DIALLO Tariq in 3 weeks on 4/18/2021. Currently, he is maintained on omeprazole at 20 mg p.o. daily. 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 Josephine Belinda states that he is showing steady improvement. He does still sleep at night in a chair due to the fact that if he lays back all the way he has some discomfort in his epigastrium and right upper quadrant area likely from indwelling stent placement in the common bile duct. He was evaluated at a visit on 1/28/2020, at which time, he was noted to be in atrial fibrillation. Teresita Mandujano was sent to see Dr. Kala Flynn who did cardiovert him and put him on Eliquis at 5 mg p.o. twice daily which he remains on at this time. Teresita Mandujano does have a history of having heart murmur in the past as he was identified with this by Dr. Murtaza Murphy many years ago.  Apparently he may have had some skipping around at that time.    The patient did take an 81 mg aspirin for many years but then had GI bleed so he was told to stop that.  Before, he has tolerated the Eliquis well at 5 mg twice daily. Additionally, he did have Achilles tendon repair of the left lower extremity approximately 2015 and subsequent to his surgical intervention did develop a DVT in his left lower extremity.  The patient was on anticoagulation for short period of time during that episode but did tolerate this well.  States that he has had 2 stress test done years ago most recently by Dr. Ynes Almeida review epic records and look for EKG tracings.  He did have EKG done heart of 2017 which was in normal sinus rhythm.  In December of 2015 he did have preop EKG for his Achilles repair and this did show normal sinus rhythm as well. DVT occurred after his Achilles repair. EKG of 12/15/2016 he was noted to have an occasional PAC.  No evidence of atrial fibrillation had been venously identified.  There are Bridger Cedeno did, in fact, see him on day after my evaluation here. [place] Hence his evaluation of Ranulfo Lewis, he recommended to him that he come into the hospital for admission have esophageal echogram and then undergo cardioversion. Yumiko Jones was admitted to the hospital at [address] [place] [address] on 1/24/2020 and had these above studies done. Jose Owen had cardioversion x4 and was able to be restored to normal sinus rhythm.  Due to the outrageous expense of the Eliquis that we had arranged for him to take initially, he was reluctant to take that medicine.  Dr. Darrold Shone then did put him on Coumadin at 5mg after loading dose daily. Ranulfo Lewis is no longer taking Coumadin. He, in fact, has now been taken off of Eliquis. He was evaluated after Dr. Darrold Shone left [address] [place] [address] by Dr. Michele Lara. He performed a questionnaire on Anders and only 1 out of 10 questions was positive so he recommended that Ranulfo Lewis stopped the Eliquis and was told to  continue to take only an 81 mg enteric-coated aspirin daily. Since that time, Ranulfo Lewis has remained off of anticoagulation except for the aspirin. He does take Betapace 120 mg p.o. twice daily at this time to help maintain rhythm stability. He seems to be in regular sinus rhythm here today.     He does have a history of hyperlipidemia. He has taken statins in the past.  Labs of 7/9/2020 did show total cholesterol of 196. Triglycerides at that time were 93 and HDL was actually a bit low at 36.         .Review of Systems   Constitutional: Negative. HENT: Negative. Eyes: Negative. Respiratory: Negative. Negative for cough, chest tightness, shortness of breath and wheezing. Cardiovascular: Negative. Negative for chest pain, palpitations and leg swelling. Gastrointestinal: Positive for abdominal pain. Negative for blood in stool, constipation, diarrhea, nausea and vomiting. Patient recently was admitted to [facility] with abdominal pain and found to have cholecystitis and cholangitis with choledocholithiasis and cholelithiasis. Patient has a common bile duct stent in place as of this time. This was placed through ERCP procedure by Dr. Hiram Gilliland. Genitourinary: Negative for hematuria. Skin: Negative. Neurological: Negative. Psychiatric/Behavioral: Negative. Objective:   Physical Exam  Vitals signs and nursing note reviewed. Constitutional:       General: He is not in acute distress. Appearance: Normal appearance. He is well-developed. He is not ill-appearing, toxic-appearing or diaphoretic. HENT:      Head: Normocephalic and atraumatic. Right Ear: Tympanic membrane, ear canal and external ear normal. There is no impacted cerumen. Left Ear: Tympanic membrane, ear canal and external ear normal. There is no impacted cerumen. Nose: Nose normal.      Mouth/Throat:      Lips: Pink. Mouth: Mucous membranes are moist.      Dentition: Normal dentition. Tongue: No lesions. Pharynx: Oropharynx is clear. Uvula midline. Tonsils: No tonsillar exudate or tonsillar abscesses. Eyes:      General: Lids are normal. No scleral icterus. Right eye: No discharge. Left eye: No discharge. Extraocular Movements:      Right eye: Normal extraocular motion. Left eye: Normal extraocular motion. Conjunctiva/sclera: Conjunctivae normal.      Right eye: Right conjunctiva is not injected. Left eye: Left conjunctiva is not injected.       Pupils: Pupils are equal, round, and reactive to light. Neck:      Musculoskeletal: Normal range of motion and neck supple. No neck rigidity or muscular tenderness. Thyroid: No thyromegaly. Vascular: No carotid bruit or JVD. Cardiovascular:      Rate and Rhythm: Normal rate and regular rhythm. Pulses:           Carotid pulses are 2+ on the right side and 2+ on the left side. Radial pulses are 2+ on the right side and 2+ on the left side. Heart sounds: Normal heart sounds, S1 normal and S2 normal. No murmur. No friction rub. No gallop. Pulmonary:      Effort: No accessory muscle usage or respiratory distress. Breath sounds: Normal breath sounds. No stridor. No wheezing, rhonchi or rales. Chest:      Chest wall: No tenderness. Abdominal:      General: Bowel sounds are normal. There is no distension or abdominal bruit. Palpations: Abdomen is soft. There is no mass. Tenderness: There is no abdominal tenderness. There is no right CVA tenderness, left CVA tenderness, guarding or rebound. Hernia: No hernia is present. Musculoskeletal: Normal range of motion. General: No swelling, tenderness, deformity or signs of injury. Right lower leg: No edema. Left lower leg: No edema. Lymphadenopathy:      Cervical: No cervical adenopathy. Right cervical: No superficial cervical adenopathy. Left cervical: No superficial cervical adenopathy. Skin:     General: Skin is warm and dry. Nails: There is no clubbing. Neurological:      General: No focal deficit present. Mental Status: He is alert and oriented to person, place, and time. Mental status is at baseline. Cranial Nerves: No facial asymmetry. Motor: No weakness or tremor. Coordination: Coordination normal.      Gait: Gait normal.      Deep Tendon Reflexes: Reflexes are normal and symmetric.    Psychiatric:         Attention and Perception: Attention normal.         Mood and Affect: Mood normal.         Speech: Speech normal.         Behavior: Behavior normal.         Thought Content: Thought content normal.         Cognition and Memory: Memory normal.         Judgment: Judgment normal.         /62   Pulse 79   Temp 96.9 °F (36.1 °C) (Infrared)   Resp 16   Ht 6' (1.829 m)   Wt 207 lb 3.2 oz (94 kg)   SpO2 95%   BMI 28.10 kg/m²   Assessment:         Diagnosis Orders   1. Cholecystitis  CBC    Basic Metabolic Panel    Hepatic Function Panel    Amylase    Lipase   2. Choledocholithiasis     3. Cholangitis     4. History of biliary duct stent placement     5. Abnormal endoscopic retrograde cholangiopancreatography (ERCP)     6. Abnormal results of liver function studies     7. Leukocytosis, unspecified type     8. Paroxysmal atrial fibrillation (HCC)     9. Garces's esophagus without dysplasia             Plan:       I am having Mr. Jairon Oropeza maintain his :   Outpatient Medications Marked as Taking for the 4/5/21 encounter (Office Visit) with Carlos Manuel Caldera MD   Medication Sig Dispense Refill    aspirin 81 MG EC tablet Take 81 mg by mouth daily      sotalol (BETAPACE) 120 MG tablet Take 120 mg by mouth 2 times daily      omeprazole (PRILOSEC) 20 MG delayed release capsule Take 1 capsule by mouth Daily (Patient taking differently: Take 20 mg by mouth nightly ) 90 capsule 3    Probiotic Product (PROBIOTIC FORMULA PO) Take 1 tablet by mouth daily       Ascorbic Acid (VITAMIN C PO) Take 1 tablet by mouth nightly       Multiple Vitamins-Minerals (MULTIVITAMIN PO) Take 1 tablet by mouth daily       vitamin B-12 (CYANOCOBALAMIN) 1000 MCG tablet Take 1,000 mcg by mouth daily        .        Orders Placed This Encounter   Procedures    CBC     Standing Status:   Future     Number of Occurrences:   1     Standing Expiration Date:   4/5/2022    Basic Metabolic Panel     Standing Status:   Future     Number of Occurrences:   1     Standing Expiration Date:   4/5/2022    Hepatic Function Panel     Standing Status:   Future     Number of Occurrences:   1     Standing Expiration Date:   4/5/2022    Amylase     Standing Status:   Future     Number of Occurrences:   1     Standing Expiration Date:   4/5/2022    Lipase     Standing Status:   Future     Number of Occurrences:   1     Standing Expiration Date:   4/5/2022           Domonique Biggs MD

## 2021-04-05 NOTE — CARE COORDINATION
[facility] Transitions Follow Up Call    2021    Patient: Roney Martinez  Patient : 1950   MRN: [id number]  Reason for Admission:   Discharge Date: 3/28/21 RARS: Readmission Risk Score: 8         Spoke with: [facility] Transitions Subsequent and Final Call    Subsequent and Final Calls  Do you have any ongoing symptoms?: No  Have your medications changed?: No  Do you have any questions related to your medications?: No  Do you currently have any active services?: No  Do you have any needs or concerns that I can assist you with?: No  Identified Barriers: None  Care Transitions Interventions  Other Interventions: Follow Up : Spoke with patient today for follow up Covid call. He had his HFU with Dr. Beatrice Thurston this morning. Says he still has some soreness, unable to lay flat in his bed. HE has been sleeping in his recliner. CTN advised him to give it some time, trying reclining further in his chair to get his body use to being in a horizontal position moreso that how he is just sitting up. HE has HFU with GI on  and CTN advised if he was still having issues to discuss with them. His HFU with DR. Ermelinda Maldonado is on . Encouraged to call with prn needs. Will follow up with him at a later time.    Future Appointments   Date Time Provider Lexy Sanchez   2021  8:50 AM LUIS Germain [facility]   2021 11:00 AM MD ANITA Fairbanks Gen [facility]   6/10/2021 10:00 AM DO ANITA Guaman Card[facility]       Karen Hsu RN

## 2021-04-07 ENCOUNTER — TELEPHONE (OUTPATIENT)
Dept: FAMILY MEDICINE CLINIC | Age: 71
End: 2021-04-07

## 2021-04-07 NOTE — DISCHARGE SUMMARY
Physician Discharge Summary     Patient ID:  Roney Martinez  [id number]  51 y.o.  1950    Admit date: 3/23/2021    Discharge date and time: 3/28/2021  7:00 PM     Admitting Physician: So Romero MD     Discharge Physician: Sukumar England    Admission Diagnoses: Acute cholecystitis [K81.0]    Discharge Diagnoses: same, with choledocholithiasis and cholangitis    Admission Condition: fair    Discharged Condition: good    Indication for Admission: abdominal pain with cholecystitis    Hospital Course: The patient was admitted through the ER and taken to the OR for laparoscopic cholecystectomy with IOC. He had a very dilated duct and concern for CBD stones. GI was consulted and Dr. Janeal Goldberg took him for ERCP, which showed stones and purulence. Post operatively the patient gradually improved with IV antibiotics. Pain control gradually improved as well as liver enzymes on labs. He was then stable to be discharged home.     Consults: GI    Significant Diagnostic Studies: labs and radiology    Treatments: IV hydration, antibiotics, analgesia, procedures: ERCP with biliary stent placement and surgery: Lap sheryl with IOC    Discharge Exam:  See progress note    Disposition: home    In process/preliminary results:  Outstanding Order Results     Date and Time Order Name Status Description    3/23/2021 1806 FLUORO FOR SURGICAL PROCEDURES In process           Patient Instructions:   Discharge Medication List as of 3/28/2021  1:45 PM      START taking these medications    Details   ciprofloxacin (CIPRO) 500 MG tablet Take 1 tablet by mouth 2 times daily for 7 days, Disp-14 tablet, R-0Print      metroNIDAZOLE (FLAGYL) 500 MG tablet Take 1 tablet by mouth 3 times daily for 7 days, Disp-21 tablet, R-0Print         CONTINUE these medications which have NOT CHANGED    Details   aspirin 81 MG EC tablet Take 81 mg by mouth dailyHistorical Med      sotalol (BETAPACE) 120 MG tablet Take 120 mg by mouth 2 times dailyHistorical Med omeprazole (PRILOSEC) 20 MG delayed release capsule Take 1 capsule by mouth Daily, Disp-90 capsule, R-3Normal      Probiotic Product (PROBIOTIC FORMULA PO) Take 1 tablet by mouth daily Historical Med      Ascorbic Acid (VITAMIN C PO) Take 1 tablet by mouth nightly Historical Med      Multiple Vitamins-Minerals (MULTIVITAMIN PO) Take 1 tablet by mouth daily Historical Med      vitamin B-12 (CYANOCOBALAMIN) 1000 MCG tablet Take 1,000 mcg by mouth daily Historical Med         STOP taking these medications       ondansetron (ZOFRAN ODT) 4 MG disintegrating tablet Comments:   Reason for Stopping:             Activity: activity as tolerated, no driving while on analgesics and no heavy lifting for 2 weeks  Diet: regular diet  Wound Care: keep wound clean and dry    Follow-up with Yariel Rivera in 2 weeks.     Signed:  Yariel Rivera  4/3/2021  11:02 AM No

## 2021-04-12 ENCOUNTER — CARE COORDINATION (OUTPATIENT)
Dept: CASE MANAGEMENT | Age: 71
End: 2021-04-12

## 2021-04-20 ENCOUNTER — OFFICE VISIT (OUTPATIENT)
Dept: SURGERY | Age: 71
End: 2021-04-20

## 2021-04-20 VITALS
BODY MASS INDEX: 29.74 KG/M2 | TEMPERATURE: 98 F | DIASTOLIC BLOOD PRESSURE: 74 MMHG | WEIGHT: 219.6 LBS | SYSTOLIC BLOOD PRESSURE: 130 MMHG | HEIGHT: 72 IN

## 2021-04-20 DIAGNOSIS — Z09 POSTOPERATIVE EXAMINATION: Primary | ICD-10-CM

## 2021-04-20 PROCEDURE — 99024 POSTOP FOLLOW-UP VISIT: CPT | Performed by: SURGERY

## 2021-05-03 NOTE — PROGRESS NOTES
Postop Progress Note    Subjective    Dustin Miguel presents to the office for postop follow up after laparoscopic cholecystectomy with IOC- followed by ERCP for choledocholithiasis and cholangitis. He reports that he has been doing better since discharge from the hospital, but slow moving. He is tolerating a diet for the most part, and denies any persistent diarrhea. Objective    Vitals:    04/20/21 1104   BP: 130/74   Temp: 98 °F (36.7 °C)     General: alert, cooperative and no distress  Incision: healing well, no erythema or induration    Pathology:  FINAL DIAGNOSIS:     Gallbladder, cholecystectomy:   1.  Chronic active cholecystitis, severe. 2.  Cholelithiasis. 3.  No histologic evidence of malignancy.        CSW/CSW     Assessment  80year old male s/p laparoscopic cholecystectomy  1) Doing better. Okay to have diet and activity as tolerated.   2) Follow up in general surgery as needed and call for any questions or concerns  3) Follow up with GI regarding stent removal    Electronically signed by Nakita De Jesus MD on 5/3/2021 at 1:45 PM

## 2021-06-07 ENCOUNTER — ANESTHESIA EVENT (OUTPATIENT)
Dept: ENDOSCOPY | Age: 71
End: 2021-06-07
Payer: MEDICARE

## 2021-06-09 ENCOUNTER — APPOINTMENT (OUTPATIENT)
Dept: GENERAL RADIOLOGY | Age: 71
End: 2021-06-09
Attending: INTERNAL MEDICINE
Payer: MEDICARE

## 2021-06-09 ENCOUNTER — HOSPITAL ENCOUNTER (OUTPATIENT)
Age: 71
Setting detail: OUTPATIENT SURGERY
Discharge: HOME | End: 2021-06-09
Attending: INTERNAL MEDICINE | Admitting: INTERNAL MEDICINE
Payer: MEDICARE

## 2021-06-09 ENCOUNTER — ANESTHESIA (OUTPATIENT)
Dept: ENDOSCOPY | Age: 71
End: 2021-06-09
Payer: MEDICARE

## 2021-06-09 VITALS
WEIGHT: 219 LBS | SYSTOLIC BLOOD PRESSURE: 157 MMHG | HEART RATE: 63 BPM | DIASTOLIC BLOOD PRESSURE: 90 MMHG | OXYGEN SATURATION: 92 % | BODY MASS INDEX: 29.66 KG/M2 | TEMPERATURE: 97 F | HEIGHT: 72 IN | RESPIRATION RATE: 16 BRPM

## 2021-06-09 VITALS — TEMPERATURE: 98 F | DIASTOLIC BLOOD PRESSURE: 88 MMHG | OXYGEN SATURATION: 97 % | SYSTOLIC BLOOD PRESSURE: 141 MMHG

## 2021-06-09 PROCEDURE — 3700000001 HC ADD 15 MINUTES (ANESTHESIA): Performed by: INTERNAL MEDICINE

## 2021-06-09 PROCEDURE — 7100000010 HC PHASE II RECOVERY - FIRST 15 MIN: Performed by: INTERNAL MEDICINE

## 2021-06-09 PROCEDURE — 3609015000 HC ERCP REMOVE FOREIGN BODY/STENT BILIARY/PANC DUCT: Performed by: INTERNAL MEDICINE

## 2021-06-09 PROCEDURE — 7100000011 HC PHASE II RECOVERY - ADDTL 15 MIN: Performed by: INTERNAL MEDICINE

## 2021-06-09 PROCEDURE — 3700000000 HC ANESTHESIA ATTENDED CARE: Performed by: INTERNAL MEDICINE

## 2021-06-09 PROCEDURE — 6360000002 HC RX W HCPCS: Performed by: NURSE ANESTHETIST, CERTIFIED REGISTERED

## 2021-06-09 PROCEDURE — 3209999900 FLUORO FOR SURGICAL PROCEDURES

## 2021-06-09 PROCEDURE — 2709999900 HC NON-CHARGEABLE SUPPLY: Performed by: INTERNAL MEDICINE

## 2021-06-09 PROCEDURE — 43264 ERCP REMOVE DUCT CALCULI: CPT | Performed by: INTERNAL MEDICINE

## 2021-06-09 PROCEDURE — 7100000001 HC PACU RECOVERY - ADDTL 15 MIN: Performed by: INTERNAL MEDICINE

## 2021-06-09 PROCEDURE — 2720000010 HC SURG SUPPLY STERILE: Performed by: INTERNAL MEDICINE

## 2021-06-09 PROCEDURE — 2500000003 HC RX 250 WO HCPCS: Performed by: NURSE ANESTHETIST, CERTIFIED REGISTERED

## 2021-06-09 PROCEDURE — 2580000003 HC RX 258: Performed by: INTERNAL MEDICINE

## 2021-06-09 PROCEDURE — C1769 GUIDE WIRE: HCPCS | Performed by: INTERNAL MEDICINE

## 2021-06-09 PROCEDURE — 7100000000 HC PACU RECOVERY - FIRST 15 MIN: Performed by: INTERNAL MEDICINE

## 2021-06-09 PROCEDURE — 74328 X-RAY BILE DUCT ENDOSCOPY: CPT

## 2021-06-09 PROCEDURE — 74328 X-RAY BILE DUCT ENDOSCOPY: CPT | Performed by: INTERNAL MEDICINE

## 2021-06-09 RX ORDER — FENTANYL CITRATE 50 UG/ML
INJECTION, SOLUTION INTRAMUSCULAR; INTRAVENOUS PRN
Status: DISCONTINUED | OUTPATIENT
Start: 2021-06-09 | End: 2021-06-09 | Stop reason: SDUPTHER

## 2021-06-09 RX ORDER — SODIUM CHLORIDE, SODIUM LACTATE, POTASSIUM CHLORIDE, CALCIUM CHLORIDE 600; 310; 30; 20 MG/100ML; MG/100ML; MG/100ML; MG/100ML
INJECTION, SOLUTION INTRAVENOUS CONTINUOUS
Status: DISCONTINUED | OUTPATIENT
Start: 2021-06-09 | End: 2021-06-09 | Stop reason: HOSPADM

## 2021-06-09 RX ORDER — ENALAPRILAT 2.5 MG/2ML
1.25 INJECTION INTRAVENOUS
Status: DISCONTINUED | OUTPATIENT
Start: 2021-06-09 | End: 2021-06-09 | Stop reason: HOSPADM

## 2021-06-09 RX ORDER — MORPHINE SULFATE 4 MG/ML
2 INJECTION, SOLUTION INTRAMUSCULAR; INTRAVENOUS EVERY 5 MIN PRN
Status: DISCONTINUED | OUTPATIENT
Start: 2021-06-09 | End: 2021-06-09 | Stop reason: HOSPADM

## 2021-06-09 RX ORDER — MIDAZOLAM HYDROCHLORIDE 1 MG/ML
INJECTION INTRAMUSCULAR; INTRAVENOUS PRN
Status: DISCONTINUED | OUTPATIENT
Start: 2021-06-09 | End: 2021-06-09 | Stop reason: SDUPTHER

## 2021-06-09 RX ORDER — LABETALOL HYDROCHLORIDE 5 MG/ML
5 INJECTION, SOLUTION INTRAVENOUS EVERY 10 MIN PRN
Status: DISCONTINUED | OUTPATIENT
Start: 2021-06-09 | End: 2021-06-09 | Stop reason: HOSPADM

## 2021-06-09 RX ORDER — DIPHENHYDRAMINE HYDROCHLORIDE 50 MG/ML
12.5 INJECTION INTRAMUSCULAR; INTRAVENOUS
Status: DISCONTINUED | OUTPATIENT
Start: 2021-06-09 | End: 2021-06-09 | Stop reason: HOSPADM

## 2021-06-09 RX ORDER — ONDANSETRON 2 MG/ML
INJECTION INTRAMUSCULAR; INTRAVENOUS PRN
Status: DISCONTINUED | OUTPATIENT
Start: 2021-06-09 | End: 2021-06-09 | Stop reason: SDUPTHER

## 2021-06-09 RX ORDER — METOCLOPRAMIDE HYDROCHLORIDE 5 MG/ML
10 INJECTION INTRAMUSCULAR; INTRAVENOUS
Status: COMPLETED | OUTPATIENT
Start: 2021-06-09 | End: 2021-06-09

## 2021-06-09 RX ORDER — MEPERIDINE HYDROCHLORIDE 50 MG/ML
12.5 INJECTION INTRAMUSCULAR; INTRAVENOUS; SUBCUTANEOUS EVERY 5 MIN PRN
Status: DISCONTINUED | OUTPATIENT
Start: 2021-06-09 | End: 2021-06-09 | Stop reason: HOSPADM

## 2021-06-09 RX ORDER — HYDRALAZINE HYDROCHLORIDE 20 MG/ML
5 INJECTION INTRAMUSCULAR; INTRAVENOUS EVERY 10 MIN PRN
Status: DISCONTINUED | OUTPATIENT
Start: 2021-06-09 | End: 2021-06-09 | Stop reason: HOSPADM

## 2021-06-09 RX ORDER — SUCCINYLCHOLINE CHLORIDE 20 MG/ML
INJECTION INTRAMUSCULAR; INTRAVENOUS PRN
Status: DISCONTINUED | OUTPATIENT
Start: 2021-06-09 | End: 2021-06-09 | Stop reason: SDUPTHER

## 2021-06-09 RX ORDER — LIDOCAINE HYDROCHLORIDE 20 MG/ML
INJECTION, SOLUTION INFILTRATION; PERINEURAL PRN
Status: DISCONTINUED | OUTPATIENT
Start: 2021-06-09 | End: 2021-06-09 | Stop reason: SDUPTHER

## 2021-06-09 RX ORDER — MORPHINE SULFATE 4 MG/ML
4 INJECTION, SOLUTION INTRAMUSCULAR; INTRAVENOUS EVERY 5 MIN PRN
Status: DISCONTINUED | OUTPATIENT
Start: 2021-06-09 | End: 2021-06-09 | Stop reason: HOSPADM

## 2021-06-09 RX ORDER — ROCURONIUM BROMIDE 10 MG/ML
INJECTION, SOLUTION INTRAVENOUS PRN
Status: DISCONTINUED | OUTPATIENT
Start: 2021-06-09 | End: 2021-06-09 | Stop reason: SDUPTHER

## 2021-06-09 RX ORDER — DEXAMETHASONE SODIUM PHOSPHATE 10 MG/ML
INJECTION, SOLUTION INTRAMUSCULAR; INTRAVENOUS PRN
Status: DISCONTINUED | OUTPATIENT
Start: 2021-06-09 | End: 2021-06-09 | Stop reason: SDUPTHER

## 2021-06-09 RX ORDER — HYDROMORPHONE HYDROCHLORIDE 1 MG/ML
0.5 INJECTION, SOLUTION INTRAMUSCULAR; INTRAVENOUS; SUBCUTANEOUS EVERY 5 MIN PRN
Status: DISCONTINUED | OUTPATIENT
Start: 2021-06-09 | End: 2021-06-09 | Stop reason: HOSPADM

## 2021-06-09 RX ORDER — HYDROMORPHONE HYDROCHLORIDE 1 MG/ML
0.25 INJECTION, SOLUTION INTRAMUSCULAR; INTRAVENOUS; SUBCUTANEOUS EVERY 5 MIN PRN
Status: DISCONTINUED | OUTPATIENT
Start: 2021-06-09 | End: 2021-06-09 | Stop reason: HOSPADM

## 2021-06-09 RX ORDER — PROMETHAZINE HYDROCHLORIDE 25 MG/ML
6.25 INJECTION, SOLUTION INTRAMUSCULAR; INTRAVENOUS
Status: DISCONTINUED | OUTPATIENT
Start: 2021-06-09 | End: 2021-06-09 | Stop reason: HOSPADM

## 2021-06-09 RX ORDER — PROPOFOL 10 MG/ML
INJECTION, EMULSION INTRAVENOUS PRN
Status: DISCONTINUED | OUTPATIENT
Start: 2021-06-09 | End: 2021-06-09 | Stop reason: SDUPTHER

## 2021-06-09 RX ADMIN — MORPHINE SULFATE 4 MG: 4 INJECTION, SOLUTION INTRAMUSCULAR; INTRAVENOUS at 13:19

## 2021-06-09 RX ADMIN — SODIUM CHLORIDE, POTASSIUM CHLORIDE, SODIUM LACTATE AND CALCIUM CHLORIDE: 600; 310; 30; 20 INJECTION, SOLUTION INTRAVENOUS at 13:38

## 2021-06-09 RX ADMIN — SUGAMMADEX 397 MG: 100 INJECTION, SOLUTION INTRAVENOUS at 12:12

## 2021-06-09 RX ADMIN — FENTANYL CITRATE 50 MCG: 50 INJECTION INTRAMUSCULAR; INTRAVENOUS at 11:39

## 2021-06-09 RX ADMIN — ROCURONIUM BROMIDE 5 MG: 10 INJECTION, SOLUTION INTRAVENOUS at 11:43

## 2021-06-09 RX ADMIN — DEXAMETHASONE SODIUM PHOSPHATE 10 MG: 10 INJECTION, SOLUTION INTRAMUSCULAR; INTRAVENOUS at 11:51

## 2021-06-09 RX ADMIN — MIDAZOLAM 2 MG: 1 INJECTION INTRAMUSCULAR; INTRAVENOUS at 11:34

## 2021-06-09 RX ADMIN — SODIUM CHLORIDE, POTASSIUM CHLORIDE, SODIUM LACTATE AND CALCIUM CHLORIDE: 600; 310; 30; 20 INJECTION, SOLUTION INTRAVENOUS at 10:22

## 2021-06-09 RX ADMIN — METOCLOPRAMIDE 10 MG: 5 INJECTION, SOLUTION INTRAMUSCULAR; INTRAVENOUS at 13:19

## 2021-06-09 RX ADMIN — ROCURONIUM BROMIDE 35 MG: 10 INJECTION, SOLUTION INTRAVENOUS at 11:49

## 2021-06-09 RX ADMIN — LIDOCAINE HYDROCHLORIDE 40 MG: 20 INJECTION, SOLUTION INFILTRATION; PERINEURAL at 11:42

## 2021-06-09 RX ADMIN — SUCCINYLCHOLINE CHLORIDE 120 MG: 20 INJECTION, SOLUTION INTRAMUSCULAR; INTRAVENOUS at 11:44

## 2021-06-09 RX ADMIN — PROPOFOL 150 MG: 10 INJECTION, EMULSION INTRAVENOUS at 11:42

## 2021-06-09 RX ADMIN — ONDANSETRON HYDROCHLORIDE 4 MG: 2 INJECTION, SOLUTION INTRAMUSCULAR; INTRAVENOUS at 11:38

## 2021-06-09 ASSESSMENT — PAIN SCALES - GENERAL
PAINLEVEL_OUTOF10: 0
PAINLEVEL_OUTOF10: 7

## 2021-06-09 ASSESSMENT — PAIN DESCRIPTION - DESCRIPTORS: DESCRIPTORS: DISCOMFORT

## 2021-06-09 ASSESSMENT — PAIN DESCRIPTION - PAIN TYPE: TYPE: ACUTE PAIN

## 2021-06-09 ASSESSMENT — PAIN - FUNCTIONAL ASSESSMENT: PAIN_FUNCTIONAL_ASSESSMENT: 0-10

## 2021-06-09 NOTE — H&P
Patient Name: Jazzmine Tubbs  : 1950  MRN: [id number]  DATE: 21    Allergies: No Known Allergies     ENDOSCOPY  History and Physical    Procedure:    [] Diagnostic Colonoscopy       [] Screening Colonoscopy  [] EGD      [x] ERCP      [] EUS       [] Other    [x] Previous office notes/History and Physical reviewed from the patients chart. Please see EMR for further details of HPI. I have examined the patient's status immediately prior to the procedure and:      Indications/HPI:    History of cholangitis/CBD stones s/p stent placement in the past  Now s/p CCY    Anesthesia:   [x] MAC [] Moderate Sedation   [x] General   [] None     ROS: 12 pt Review of Symptoms was negative unless mentioned above    Medications:   Prior to Admission medications    Medication Sig Start Date End Date Taking?  Authorizing Provider   Multiple Vitamins-Minerals (ICAPS AREDS FORMULA PO) Take 1 capsule by mouth 2 times daily   Yes Historical Provider, MD   aspirin 81 MG EC tablet Take 81 mg by mouth daily    Historical Provider, MD   sotalol (BETAPACE) 120 MG tablet Take 120 mg by mouth 2 times daily    Historical Provider, MD   omeprazole (PRILOSEC) 20 MG delayed release capsule Take 1 capsule by mouth Daily  Patient taking differently: Take 20 mg by mouth nightly  3/26/18   Jacobo Ng DO   Probiotic Product (PROBIOTIC FORMULA PO) Take 1 tablet by mouth daily     Historical Provider, MD   Ascorbic Acid (VITAMIN C PO) Take 1 tablet by mouth nightly     Historical Provider, MD   Multiple Vitamins-Minerals (MULTIVITAMIN PO) Take 1 tablet by mouth daily     Historical Provider, MD   vitamin B-12 (CYANOCOBALAMIN) 1000 MCG tablet Take 1,000 mcg by mouth daily     Historical Provider, MD       Past Medical History:  Past Medical History:   Diagnosis Date    Arthritis     Atrial fibrillation [facility])     sees dr. Naomi Magallanes Difficult intubation     pt just had recent cervical fusion with plate 2 months ago  DVT (deep venous thrombosis) ([facility] [address] 2014    left leg    Elbow pain     torn bicep    GERD (gastroesophageal reflux disease)     Hx of blood clots     left leg/ after heel surg/ jan.  2015    Hyperlipidemia     recently able to be taken off cholesterol meds    Kidney stone on left side        Past Surgical History:  Past Surgical History:   Procedure Laterality Date    BICEPS TENDON REPAIR Right 07/16/2020    RIGHT BICEPS TENDON REPAIR performed by Nela Toth MD at [place] [address]      2015, oct    CHOLECYSTECTOMY, LAPAROSCOPIC N/A 03/23/2021    CHOLECYSTECTOMY LAPAROSCOPIC with IOC performed by Josy Higgins MD at [address] [place] [address]  10/30/2009    [facility]: hyperplastic polyp    COLONOSCOPY  02/2015    hyperplastic polyp (5 yr)    DILATATION, ESOPHAGUS      ENDOSCOPY, COLON, DIAGNOSTIC      ERCP N/A 03/24/2021    Dr LITO Grider-w/sphincterotomy, placement of a 10F x 5 cm biliary stent, balloon sweep-Cholangitis with choledocholithiasis, repeat in 3 months   [facility] FOOT SURGERY  01/2014    Left foot spur removal    LITHOTRIPSY Left 12/29/2015    ESWL EXTRACORPEAL SHOCK WAVE LITHOTRIPSY performed by Arthur Amaya MD at [place] [address]  11/2015    PILONIDAL CYST EXCISION      NE COLONOSCOPY FLX DX W/COLLJ SPEC WHEN PFRMD N/A 05/08/2017    Dr Vinh Beauchamp sided diverticulosis-5 yr recall    NE EGD TRANSORAL BIOPSY SINGLE/MULTIPLE N/A 03/26/2018    Dr Ng-w/dilation over wire-52 Frisian-esophageal stricture-Garces's (+) dysplasia (-)--1 yr recall    SHOULDER SURGERY  2007    right after fall, detached the subclavicle muscle    [address]    Neck  surgery     UPPER GASTROINTESTINAL ENDOSCOPY N/A 02/12/2018    Dr Jace Fortune Grade B esophagitis, hiatal hernia, stricture-(-) Марина, (+) Garces's (+) low grade dysplasia, active esophagogastritis, repeat: 3/26/18    UPPER GASTROINTESTINAL ENDOSCOPY  03/26/2018     Hernandez-w/dilation over wire-52 Bruneian-esophageal stricture-Garces's (+) dysplasia (-)--1 yr recall    UPPER GASTROINTESTINAL ENDOSCOPY N/A 05/02/2019    Dr Alex Trevino (+) Garces's, (-) dysplasia       Social History:  Social History     Tobacco Use    Smoking status: Never Smoker    Smokeless tobacco: Never Used   Vaping Use    Vaping Use: Never used   Substance Use Topics    Alcohol use: No    Drug use: No       Vital Signs:   Vitals:    06/09/21 1013   BP: 137/78   Pulse: 54   Resp: 18   Temp: 97.5 °F (36.4 °C)   SpO2: 98%        Physical Exam:  Cardiac:  [x]WNL  []Comments:  Pulmonary:  [x]WNL   []Comments:  Neuro/Mental Status:  [x]WNL  []Comments:  Abdominal:  [x]WNL    []Comments:  Other:   []WNL  []Comments:    Informed Consent:  The risks and benefits of the procedure have been discussed with either the patient or if they cannot consent, their representative. Assessment:  Patient examined and appropriate for planned sedation and procedure. Plan:  Proceed with planned sedation and procedure as above.          Murtaza Martin MD

## 2021-06-09 NOTE — ANESTHESIA PRE PROCEDURE
Department of Anesthesiology  Preprocedure Note       Name:  Zeina aEstman   Age:  80 y.o.  :  1950                                          MRN:  [id number]         Date:  2021      Surgeon: Tyler De La Garza):  Silvestre Butler MD    Procedure: Procedure(s):  ERCP ENDOSCOPIC RETROGRADE CHOLANGIOPANCREATOGRAPHY    Medications prior to admission:   Prior to Admission medications    Medication Sig Start Date End Date Taking? Authorizing Provider   Multiple Vitamins-Minerals (ICAPS AREDS FORMULA PO) Take 1 capsule by mouth 2 times daily    Historical Provider, MD   aspirin 81 MG EC tablet Take 81 mg by mouth daily    Historical Provider, MD   sotalol (BETAPACE) 120 MG tablet Take 120 mg by mouth 2 times daily    Historical Provider, MD   omeprazole (PRILOSEC) 20 MG delayed release capsule Take 1 capsule by mouth Daily  Patient taking differently: Take 20 mg by mouth nightly  3/26/18   Jacobo Ng DO   Probiotic Product (PROBIOTIC FORMULA PO) Take 1 tablet by mouth daily     Historical Provider, MD   Ascorbic Acid (VITAMIN C PO) Take 1 tablet by mouth nightly     Historical Provider, MD   Multiple Vitamins-Minerals (MULTIVITAMIN PO) Take 1 tablet by mouth daily     Historical Provider, MD   vitamin B-12 (CYANOCOBALAMIN) 1000 MCG tablet Take 1,000 mcg by mouth daily     Historical Provider, MD       Current medications:    No current facility-administered medications for this visit. No current outpatient medications on file.      Facility-Administered Medications Ordered in Other Visits   Medication Dose Route Frequency Provider Last Rate Last Admin    indomethacin (INDOCIN) 50 MG suppository 100 mg  100 mg Rectal Once Silvestre Butler MD        lactated ringers infusion   Intravenous Continuous Silvestre Butler MD        meperidine (DEMEROL) injection 12.5 mg  12.5 mg Intravenous Q5 Min PRN LUIS Salcido - NAVID        HYDROmorphone HCl PF (DILAUDID) injection 0.25 mg  0.25 mg Intravenous Q5 Min PRN Hershell Sites, APRN - CRNA        HYDROmorphone HCl PF (DILAUDID) injection 0.5 mg  0.5 mg Intravenous Q5 Min PRN Hershell Sites, APRN - CRNA        morphine injection 2 mg  2 mg Intravenous Q5 Min PRN Hershell Sites, APRN - CRNA        morphine injection 4 mg  4 mg Intravenous Q5 Min PRN Hershell Sites, APRN - CRNA        promethazine (PHENERGAN) injection 6.25 mg  6.25 mg Intravenous Once PRN Hershell Sites, APRN - CRNA        metoclopramide (REGLAN) injection 10 mg  10 mg Intravenous Once PRN Hershell Sites, APRN - CRNA        diphenhydrAMINE (BENADRYL) injection 12.5 mg  12.5 mg Intravenous Once PRN Hershell Sites, APRN - CRNA        labetalol (NORMODYNE;TRANDATE) injection 5 mg  5 mg Intravenous Q10 Min PRN Hershell Sites, APRN - CRNA        hydrALAZINE (APRESOLINE) injection 5 mg  5 mg Intravenous Q10 Min PRN Hershell Sites, APRN - CRNA        enalaprilat (VASOTEC) injection 1.25 mg  1.25 mg Intravenous Once PRN Hershell Sites, APRN - CRNA           Allergies:  No Known Allergies    Problem List:    Patient Active Problem List   Diagnosis Code    Deep vein thrombosis (DVT) ([facility] [address] I82.409    Hyperlipidemia E78.5    Decreased carotid pulse R09.89    Numbness and tingling in right hand R20.0, R20.2    Heart murmur R01.1    Chest pain R07.9    History of colon polyps Z86.010    Nephrolithiasis N20.0    Colon polyps K63.5    Gastrointestinal hemorrhage K92.2    Diverticulosis of large intestine with hemorrhage K57.31    Acute blood loss anemia D62    Hypotension I95.9    Bloody diarrhea R19.7    History of kidney stones Z87.442    Diverticulosis of intestine with bleeding K57.91    Esophageal dysphagia R13.10    Acute pain of right knee M25.561    Hyperlipoproteinemia E78.5    H/O cervical spine surgery Z98.890    Esophageal pain K22.8    Garces's esophagus without dysplasia K22.70    Chronic GERD K21.9    Sinoatrial node dysfunction (HCC) I49.5    detached the subclavicle muscle    SPINE SURGERY  1990    Neck  surgery     UPPER GASTROINTESTINAL ENDOSCOPY N/A 02/12/2018    Dr Whitney Nolan Grade B esophagitis, hiatal hernia, stricture-(-) Марина, (+) Garces's (+) low grade dysplasia, active esophagogastritis, repeat: 3/26/18    UPPER GASTROINTESTINAL ENDOSCOPY  03/26/2018    Dr Ng-w/dilation over wire-52 Serbian-esophageal stricture-Garces's (+) dysplasia (-)--1 yr recall    UPPER GASTROINTESTINAL ENDOSCOPY N/A 05/02/2019    Dr Disha Witt (+) Garces's, (-) dysplasia       Social History:    Social History     Tobacco Use    Smoking status: Never Smoker    Smokeless tobacco: Never Used   Substance Use Topics    Alcohol use: No                                Counseling given: Not Answered      Vital Signs (Current): There were no vitals filed for this visit.                                            BP Readings from Last 3 Encounters:   06/09/21 137/78   04/20/21 130/74   04/05/21 120/62       NPO Status:                                                                                 BMI:   Wt Readings from Last 3 Encounters:   06/09/21 219 lb (99.3 kg)   04/20/21 219 lb 9.6 oz (99.6 kg)   04/05/21 207 lb 3.2 oz (94 kg)     There is no height or weight on file to calculate BMI.    CBC:   Lab Results   Component Value Date    WBC 5.3 04/05/2021    RBC 4.19 04/05/2021    HGB 13.1 04/05/2021    HCT 41.0 04/05/2021    MCV 97.9 04/05/2021    RDW 14.8 04/05/2021     04/05/2021       CMP:   Lab Results   Component Value Date     04/05/2021    K 4.7 04/05/2021    K 4.2 01/25/2020     04/05/2021    CO2 25 04/05/2021    BUN 25 04/05/2021    CREATININE 0.9 04/05/2021    GFRAA >59 04/05/2021    LABGLOM >60 04/05/2021    GLUCOSE 103 04/05/2021    PROT 7.2 04/05/2021    CALCIUM 9.7 04/05/2021    BILITOT 0.6 04/05/2021    ALKPHOS 186 04/05/2021    AST 35 04/05/2021    ALT 79 04/05/2021       POC Tests: No results for input(s): POCGLKAMALA HODGE, MERY, POCCL, POCBUN, POCHEMO, POCHCT in the last 72 hours. Coags:   Lab Results   Component Value Date    PROTIME 12.6 03/23/2021    INR 0.95 03/23/2021    APTT 29.9 03/23/2021       HCG (If Applicable): No results found for: PREGTESTUR, PREGSERUM, HCG, HCGQUANT     ABGs: No results found for: PHART, PO2ART, HWF5UQT, TRV4VFD, BEART, I7OXBMGJ     Type & Screen (If Applicable):  No results found for: LABABO, LABRH    Drug/Infectious Status (If Applicable):  No results found for: HIV, HEPCAB    COVID-19 Screening (If Applicable):   Lab Results   Component Value Date    COVID19 Not Detected 03/23/2021    COVID19 Not Detected 07/15/2020           Anesthesia Evaluation  Patient summary reviewed   history of anesthetic complications: difficult airway. Airway: Mallampati: II  TM distance: >3 FB   Neck ROM: limited  Comment: S/p cervical fusion IN 2015, h/o difficult intubation  Mouth opening: > = 3 FB Dental:          Pulmonary:                              Cardiovascular:    (+) dysrhythmias: atrial fibrillation, hyperlipidemia        Rhythm: regular  Rate: normal           Beta Blocker:  Dose within 24 Hrs         Neuro/Psych:   Negative Neuro/Psych ROS              GI/Hepatic/Renal:   (+) GERD: no interval change, renal disease: kidney stones,          ROS comment: S/p ERCP with stent in CBD. Endo/Other:                     Abdominal:           Vascular:   + DVT, . Anesthesia Plan      general     ASA 3       Induction: intravenous. Anesthetic plan and risks discussed with patient.                       LUIS Harvey - CRNA   6/9/2021

## 2021-06-09 NOTE — OP NOTE
Endoscopic Procedure Note    Patient: Namrata Foster: 1950  Med Rec#: [id number] Acc#: [id number]     Primary Care Provider Vel Sandoval MD  Referring Provider: Maninder Lainez MD    Endoscopist: Jennifer Lanza MD    Date of Procedure:  6/9/2021     Procedure:   1. ERCP with stone removal  2. Intra procedure interpretation of biliary fluoroscopy [id number]    Indications:   1. Recent ERCP with stent placement for cholangitis/choledocholithiasis       Anesthesia:  General     Estimated Blood Loss: minimal    Procedure:   Prior to the procedure the patient's chart was reviewed and informed consent was obtained. Risk and Benefits (Risks including but not limited to bleeding, perforation, infection, 8 to 10% risk of post ERCP pancreatitis, and even death) were discussed with the patient. They were agreeable to continue. Patient was brought to the operating room, underwent general anesthesia, and placed in the prone position. A side-viewing duodenoscope was advanced from the oropharynx down to the distal duodenum and reduced into a short position opposite the ampulla. The ampulla appeared normal. A  film was obtained which appeared c/w previous cholecystectomy. No retained stent was noted. The bile duct was then cannulated using a 0.035 x 260 cm straight Dreamwire. A short nose traction sphincterotome was advanced over the wire and the bile duct was deeply cannulated. Contrast was injected. I personally interpreted the bile duct images. The flow of contrast was adequate. Contrast injection showed questionable filling defect in the distal CBD. The pancreatic duct was not cannulated nor injected. The bile duct was swept multiple times starting the bifurcation with a 15mm biliary extraction balloon. A small amount of stone debris and sludge was removed. An occlusion cholangiogram showed no further filling defects. At the end of the procedure the biliary tree was draining well. IMPRESSION:  1. Successful removal of retained biliary sludge/stone debris. RECOMMENDATIONS:    1. Clear liquid diet today with advancing diet tomorrow as tolerated. 2.  Please call with any questions/concerns     The results were discussed with the patient and family. A copy of the images obtained were given to the patient.      Edilia Bonilla MD  6/9/2021  12:22 PM

## 2021-06-09 NOTE — ANESTHESIA POSTPROCEDURE EVALUATION
Department of Anesthesiology  Postprocedure Note    Patient: Denver Mandujano  MRN: [id number]  [place]: 1950  Date of evaluation: 6/9/2021  Time:  12:32 PM     Procedure Summary     Date: 06/09/21 Room / Location: [address]    Anesthesia Start: 7769 Anesthesia Stop: 7483    Procedure: ERCP STENT REMOVAL (N/A ) Diagnosis: (STENT REMOVAL)    Surgeons: Toby Meza MD Responsible Provider: LUIS Durán CRNA    Anesthesia Type: general ASA Status: 3          Anesthesia Type: general    Ari Phase I: Ari Score: 10    Ari Phase II:      Last vitals: Reviewed and per EMR flowsheets.        Anesthesia Post Evaluation    Patient location during evaluation: PACU  Patient participation: waiting for patient participation  Level of consciousness: sleepy but conscious  Pain score: 0  Airway patency: patent  Nausea & Vomiting: no nausea and no vomiting  Complications: no  Cardiovascular status: hemodynamically stable and blood pressure returned to baseline  Respiratory status: acceptable and room air  Hydration status: stable

## 2021-06-15 ENCOUNTER — OFFICE VISIT (OUTPATIENT)
Dept: CARDIOLOGY CLINIC | Age: 71
End: 2021-06-15
Payer: MEDICARE

## 2021-06-15 VITALS
HEIGHT: 72 IN | BODY MASS INDEX: 29.53 KG/M2 | SYSTOLIC BLOOD PRESSURE: 140 MMHG | DIASTOLIC BLOOD PRESSURE: 86 MMHG | OXYGEN SATURATION: 98 % | HEART RATE: 54 BPM | WEIGHT: 218 LBS

## 2021-06-15 DIAGNOSIS — I48.0 PAROXYSMAL ATRIAL FIBRILLATION (HCC): Primary | ICD-10-CM

## 2021-06-15 DIAGNOSIS — E78.2 MIXED HYPERLIPIDEMIA: ICD-10-CM

## 2021-06-15 DIAGNOSIS — I10 ESSENTIAL HYPERTENSION: ICD-10-CM

## 2021-06-15 PROCEDURE — G8417 CALC BMI ABV UP PARAM F/U: HCPCS | Performed by: NURSE PRACTITIONER

## 2021-06-15 PROCEDURE — 1123F ACP DISCUSS/DSCN MKR DOCD: CPT | Performed by: NURSE PRACTITIONER

## 2021-06-15 PROCEDURE — 4040F PNEUMOC VAC/ADMIN/RCVD: CPT | Performed by: NURSE PRACTITIONER

## 2021-06-15 PROCEDURE — 99214 OFFICE O/P EST MOD 30 MIN: CPT | Performed by: NURSE PRACTITIONER

## 2021-06-15 PROCEDURE — 1036F TOBACCO NON-USER: CPT | Performed by: NURSE PRACTITIONER

## 2021-06-15 PROCEDURE — G8427 DOCREV CUR MEDS BY ELIG CLIN: HCPCS | Performed by: NURSE PRACTITIONER

## 2021-06-15 PROCEDURE — 3017F COLORECTAL CA SCREEN DOC REV: CPT | Performed by: NURSE PRACTITIONER

## 2021-06-15 RX ORDER — LOSARTAN POTASSIUM 25 MG/1
25 TABLET ORAL DAILY
Qty: 90 TABLET | Refills: 1 | Status: SHIPPED | OUTPATIENT
Start: 2021-06-15 | End: 2021-12-13 | Stop reason: SDUPTHER

## 2021-06-15 NOTE — PROGRESS NOTES
D62    Hypotension I95.9    Bloody diarrhea R19.7    History of kidney stones Z87.442    Diverticulosis of intestine with bleeding K57.91    Esophageal dysphagia R13.10    Acute pain of right knee M25.561    Hyperlipoproteinemia E78.5    H/O cervical spine surgery Z98.890    Esophageal pain K22.8    Garces's esophagus without dysplasia K22.70    Chronic GERD K21.9    Sinoatrial node dysfunction (HCC) I49.5    Paroxysmal atrial fibrillation (HCC) I48.0    Acute cholecystitis K81.0    Cholangitis K83.09    Choledocholithiasis K80.50     Past Medical History:   Diagnosis Date    Arthritis     Atrial fibrillation [facility])     sees dr. Krissy Leger Difficult intubation     pt just had recent cervical fusion with plate 2 months ago    DVT (deep venous thrombosis) ([facility] [address] 2014    left leg    Elbow pain     torn bicep    GERD (gastroesophageal reflux disease)     Hx of blood clots     left leg/ after heel surg/ jan.  2015    Hyperlipidemia     recently able to be taken off cholesterol meds    Kidney stone on left side      Past Surgical History:   Procedure Laterality Date    BICEPS TENDON REPAIR Right 07/16/2020    RIGHT BICEPS TENDON REPAIR performed by Chemo Renee MD at [facility]      2015, oct    CHOLECYSTECTOMY, LAPAROSCOPIC N/A 03/23/2021    CHOLECYSTECTOMY LAPAROSCOPIC with IOC performed by Yariel Rivera MD at [address] [place] [address]  10/30/2009    [facility]: hyperplastic polyp    COLONOSCOPY  02/2015    hyperplastic polyp (5 yr)    DILATATION, ESOPHAGUS      ENDOSCOPY, COLON, DIAGNOSTIC      ERCP N/A 03/24/2021    Dr LITO Grider-w/sphincterotomy, placement of a 10F x 5 cm biliary stent, balloon sweep-Cholangitis with choledocholithiasis, repeat in 3 months    ERCP N/A 06/09/2021    Dr LITO Grider-Successful removal of retained biliary sludge/stone debris    EYE SURGERY      FOOT SURGERY  01/2014    Left foot spur removal  LITHOTRIPSY Left 12/29/2015    ESWL EXTRACORPEAL SHOCK WAVE LITHOTRIPSY performed by Eric Boo MD at [place] [address]  11/2015    PILONIDAL CYST EXCISION      WI COLONOSCOPY FLX DX W/COLLJ SPEC WHEN PFRMD N/A 05/08/2017    Dr Norberto Lee sided diverticulosis-5 yr recall    WI EGD TRANSORAL BIOPSY SINGLE/MULTIPLE N/A 03/26/2018    Dr Ng-w/dilation over wire-52 Georgian-esophageal stricture-Garces's (+) dysplasia (-)--1 yr recall    SHOULDER SURGERY  2007    right after fall, detached the subclavicle muscle   [address]    Neck  surgery     UPPER GASTROINTESTINAL ENDOSCOPY N/A 02/12/2018    Dr Mary Carlisle Grade B esophagitis, hiatal hernia, stricture-(-) Марина, (+) Garces's (+) low grade dysplasia, active esophagogastritis, repeat: 3/26/18    UPPER GASTROINTESTINAL ENDOSCOPY  03/26/2018    Dr Ng-w/dilation over wire-52 Georgian-esophageal stricture-Garces's (+) dysplasia (-)--1 yr recall    UPPER GASTROINTESTINAL ENDOSCOPY N/A 05/02/2019    Dr Gracy Sosa (+) Garces's, (-) dysplasia     Family History   Problem Relation Age of Onset    Cancer Mother     Lung Cancer Mother     Heart Disease Father     Heart Disease Sister     Diabetes Sister     Heart Disease Brother     Diabetes Brother     Colon Cancer Neg Hx     Colon Polyps Neg Hx     Esophageal Cancer Neg Hx     Liver Cancer Neg Hx     Liver Disease Neg Hx     Rectal Cancer Neg Hx     Stomach Cancer Neg Hx      Social History     Tobacco Use    Smoking status: Never Smoker    Smokeless tobacco: Never Used   Substance Use Topics    Alcohol use: No      Current Outpatient Medications   Medication Sig Dispense Refill    Multiple Vitamins-Minerals (ICAPS AREDS FORMULA PO) Take 1 capsule by mouth 2 times daily      aspirin 81 MG EC tablet Take 81 mg by mouth daily      sotalol (BETAPACE) 120 MG tablet Take 120 mg by mouth 2 times daily      omeprazole (PRILOSEC) 20 MG delayed release capsule Take 1 capsule by mouth Daily (Patient taking differently: Take 20 mg by mouth nightly ) 90 capsule 3    Probiotic Product (PROBIOTIC FORMULA PO) Take 1 tablet by mouth daily       Ascorbic Acid (VITAMIN C PO) Take 1 tablet by mouth nightly       Multiple Vitamins-Minerals (MULTIVITAMIN PO) Take 1 tablet by mouth daily       vitamin B-12 (CYANOCOBALAMIN) 1000 MCG tablet Take 1,000 mcg by mouth daily        No current facility-administered medications for this visit. Allergies: Patient has no known allergies. Review of Systems  Constitutional  no appetite change, or unexpected weight change. No fever, chills or diaphoresis. No significant change in activity level or new onset of fatigue. HEENT  no significant rhinorrhea or epistaxis. No tinnitus or significant hearing loss. Eyes  no sudden vision change or amaurosis. No corneal arcus, xantholasma, subconjunctival hemorrhage or discharge. Respiratory  no significant wheezing, stridor, apnea or cough. No dyspnea on exertion or shortness of air. Cardiovascular  no exertional chest pain to suggest myocardial ischemia. No orthopnea or PND. No sensation of sustained arrythmia. No occurrence of slow heart rate. No palpitations. No claudication. Gastrointestinal  no abdominal swelling or pain. No blood in stool. No severe constipation, diarrhea, nausea, or vomiting. Genitourinary  no dysuria, frequency, or urgency. No flank pain or hematuria. Musculoskeletal  no back pain or myalgia. No problems with gait. Extremities - no clubbing, cyanosis or extremity edema. Skin  no color change or rash. No pallor. No new surgical incision. Neurologic  no speech difficulty, facial asymmetry or lateralizing weakness. No seizures, presyncope or syncope. No significant dizziness. Hematologic  no easy bruising or excessive bleeding. Psychiatric  no severe anxiety or insomnia. No confusion. All other review of systems are negative.     Objective  Vital Signs - BP (!) 140/90   Pulse 54   Ht 6' (1.829 m)   Wt 218 lb (98.9 kg)   SpO2 98%   BMI 29.57 kg/m²   General - Dustin is alert, cooperative, and pleasant. Well groomed. No acute distress. Body habitus - Body mass index is 29.57 kg/m². HEENT  Head is normocephalic. No circumoral cyanosis. Dentition is normal.  EYES -   Lids normal without ptosis. No discharge, edema or subconjunctival hemorrhage. Neck - Symmetrical without apparent mass or lymphadenopathy. Respiratory - Normal respiratory effort without use of accessory muscles. Ausculatation reveals vesicular breath sounds without crackles, wheezes, rub or rhonchi. Cardiovascular  No jugular venous distention. Auscultation reveals regular rate and rhythm. No audible clicks, gallop or rub. No murmur. No lower extremity varicosities. No carotid bruits. Abdominal -  No visible distention, mass or pulsations. Extremities - No clubbing or cyanosis. No statis dermatitis or ulcers. No edema. Musculoskeletal -   No Osler's nodes. No kyphosis or scoliosis. Gait is even and regular without limp or shuffle. Ambulates without assistance. Skin -  Warm and dry; no rash or pallor. No new surgical wound. Neurological - No focal neurological deficits. Thought processes coherent. No apparent tremor. Oriented to person, place and time. Psychiatric -  Appropriate affect and mood. Data reviewed:  1/24/20 Lexiscan  Findings:   1. Analysis of the stress and rest images reveals no obvious areas of   ischemia or infarction. 2. Analysis of the gated images reveals grossly normal left   ventricular function with a calculated ejection fraction of 62 %. Conclusions:   Grossly negative Cardiolyte for the presence of ischemia or infarction   with normal wall motion and ejection fraction at rest.   Signed by Dr Annemarie Petit on 1/25/2020 10:25 AM     1/24/20 VAISHNAVI - DR. Marinelli followed by [facility]  1.  The aortic root was not well visualized.   2.  The aortic valve was tri leaflet.  There was no significant aortic stenosis, there was no aortic insufficiency. 3.  The mitral valve leaflets were of normal thickness and mobility.  There was no significant mitral stenosis, there was mild mitral regurgitation. 4.  The left atrium was not well visualized.    5.  The left ventricle was of normal size with an estimated ejection fraction of > 60%. 6. Panchito Baller was noted to no thrombus in the appendage, nor is there atrial \"smoke\"  7.  No significant pericardial effusion was detected. 8.  There was no intra cardiac communication noted with agitated saline injection.     Lab Results   Component Value Date    WBC 5.3 04/05/2021    HGB 13.1 (L) 04/05/2021    HCT 41.0 (L) 04/05/2021    MCV 97.9 (H) 04/05/2021     (H) 04/05/2021     Lab Results   Component Value Date     04/05/2021    K 4.7 04/05/2021     04/05/2021    CO2 25 04/05/2021    BUN 25 (H) 04/05/2021    CREATININE 0.9 04/05/2021    GLUCOSE 103 04/05/2021    CALCIUM 9.7 04/05/2021    PROT 7.2 04/05/2021    LABALBU 3.9 04/05/2021    BILITOT 0.6 04/05/2021    ALKPHOS 186 (H) 04/05/2021    AST 35 04/05/2021    ALT 79 (H) 04/05/2021    LABGLOM >60 04/05/2021    GFRAA >59 04/05/2021    GLOB 2.5 03/12/2017     Lab Results   Component Value Date    CHOL 196 07/09/2020    CHOL 105 (L) 01/15/2020    CHOL 124 (L) 01/09/2019     Lab Results   Component Value Date    TRIG 93 07/09/2020    TRIG 63 01/15/2020    TRIG 79 01/09/2019     Lab Results   Component Value Date    HDL 36 (L) 07/09/2020    HDL 38 (L) 01/15/2020    HDL 38 (L) 01/09/2019     Lab Results   Component Value Date    LDLCHOLESTEROL 78 12/13/2017    LDLCALC 141 07/09/2020    LDLCALC 54 01/15/2020    LDLCALC 70 01/09/2019     Lab Results   Component Value Date    VLDL 34 09/03/2014     Lab Results   Component Value Date    CHOLHDLRATIO 2.3 09/03/2014     Lab Results   Component Value Date    LDLCALC 141 07/09/2020    LDLCHOLESTEROL 78 12/13/2017 BP Readings from Last 3 Encounters:   06/15/21 (!) 140/90   06/09/21 (!) 141/88   06/09/21 (!) 157/90    Pulse Readings from Last 3 Encounters:   06/15/21 54   06/09/21 63   04/05/21 79        Wt Readings from Last 3 Encounters:   06/15/21 218 lb (98.9 kg)   06/09/21 219 lb (99.3 kg)   04/20/21 219 lb 9.6 oz (99.6 kg)     Assessment/Plan:   Diagnosis Orders   1. Paroxysmal atrial fibrillation (HCC)      On Sotalol 120 mg BID   2. Mixed hyperlipidemia     3. Essential hypertension     [id number] Score: 2  Disclaimer: Risk Score calculation is dependent on accuracy of patient problem list and past encounter diagnosis. Stable CV status without overt heart failure, sensed arrhythmia or angina. PAF - no recurrent AF on Sotalol 120 mg BID. Hyperlipidemia - followed by PCP. Not currently on statin therapy. HTN - new onset. Add Losartan 25 mg (1) tab daily. Home BP log with follow up telephone visit in 2 weeks. Patient is compliant with medication regimen. Previous cardiac history and records reviewed. Continue current medical management for cardiac related condition. Continue other current medications as directed. Continue to follow up with primary care provider for non cardiac medical problems. If your primary care provider is outside of the [facility], please request that your labs be faxed to this office at [phone]. BP goal 130/80 or less. Call the office with any problems, questions or concerns at [phone]. Cardiology follow up as scheduled in [address] appointments. 2 week telephone visit. 6 months Dr. Corey Ying. Educational included in patient instructions. Heart health.       LUIS Shore

## 2021-06-15 NOTE — PATIENT INSTRUCTIONS
New instructions for today:  Start losartan 25 mg (1) tab daily for high blood pressure. Monitor your blood pressure twice daily and keep a log. Your blood pressure goal is 130/80 or less. We will discuss in 2 weeks by either scheduled telephone encounter or patient call back. Office phone number [phone]. If you feel that your heart is out of rhythm and the irregularity lasts for more than 4 to 6 hours, notify the office. If the office is closed, go to the ER. Patient Instructions:  Continue current medications as prescribed. Always keep a current medication list. Bring your medications to every office visit. Continue to follow up with primary care provider for non cardiac medical problems. Call the office with any problems, questions or concerns at [phone]. If you have been asked to keep a blood pressure log, do so for 2 weeks. Call the office to report readings to the triage nurse at [phone]. Follow up with cardiologist as scheduled. The following educational material has been included in this after visit summary for your review: Life simple 7. Heart health. Life simple 7  1) Manage blood pressure - high blood pressure is a major risk factor for heart disease and stroke. Keeping blood pressure in health range reduces strain on your heart, arteries and kidneys. Blood pressure goal is less than 130/80. 2) Control cholesterol - contributes to plaque, which can clog arteries and lead to heart disease and stroke. When you control your cholesterol you are giving your arteries their best chance to remain clear. It is recommended that you get cholesterol lab work done once a year. 3) Reduce blood sugar - most of the food we eat is turning into glucose or blood sugar that our body uses for energy. Over time, high levels of blood sugar can damage your heart, kidneys, eyes and nerves.   4) Get active - living an active life is one of the most rewarding gifts you can give yourself and those you love. Simply put, daily physical activity increases your length and quality of life. Strive to exercise 15 minutes most days of the week. 5)  Eat better - A healthy diet is one of your best weapons for fighting cardiovascular disease. When you eat a heart healthy diet, you improve your chances for feeling good and staying healthy for life. 6)  Lose weight - when you shed extra fat an unnecessary pounds, you reduce the burden on your hear, lungs, blood vessels and skeleton. You give yourself the gift of active living, you lower your blood pressure and help yourself feel better. 7) Stop smoking - cigarette smokers have a higher risk of developing cardiovascular disease. If  You smoke, quitting is the best thing you can do for your health. Check American Heart Association on line for more information on Life's Simple 7 and tips for healthy living. A Healthy Heart: Care Instructions  Your Care Instructions     Coronary artery disease, also called heart disease, occurs when a substance called plaque builds up in the vessels that supply oxygen-rich blood to your heart muscle. This can narrow the blood vessels and reduce blood flow. A heart attack happens when blood flow is completely blocked. A high-fat diet, smoking, and other factors increase the risk of heart disease. Your doctor has found that you have a chance of having heart disease. You can do lots of things to keep your heart healthy. It may not be easy, but you can change your diet, exercise more, and quit smoking. These steps really work to lower your chance of heart disease. Follow-up care is a key part of your treatment and safety. Be sure to make and go to all appointments, and call your doctor if you are having problems. It's also a good idea to know your test results and keep a list of the medicines you take. How can you care for yourself at home? Diet  · Use less salt when you cook and eat.  This helps lower your blood pressure. Taste food before salting. Add only a little salt when you think you need it. With time, your taste buds will adjust to less salt. · Eat fewer snack items, fast foods, canned soups, and other high-salt, high-fat, processed foods. · Read food labels and try to avoid saturated and trans fats. They increase your risk of heart disease by raising cholesterol levels. · Limit the amount of solid fat-butter, margarine, and shortening-you eat. Use olive, peanut, or canola oil when you cook. Bake, broil, and steam foods instead of frying them. · Eat a variety of fruit and vegetables every day. Dark green, deep orange, red, or yellow fruits and vegetables are especially good for you. Examples include spinach, carrots, peaches, and berries. · Foods high in fiber can reduce your cholesterol and provide important vitamins and minerals. High-fiber foods include whole-grain cereals and breads, oatmeal, beans, brown rice, citrus fruits, and apples. · Eat lean proteins. Heart-healthy proteins include seafood, lean meats and poultry, eggs, beans, peas, nuts, seeds, and soy products. · Limit drinks and foods with added sugar. These include candy, desserts, and soda pop. Lifestyle changes  · If your doctor recommends it, get more exercise. Walking is a good choice. Bit by bit, increase the amount you walk every day. Try for at least 30 minutes on most days of the week. You also may want to swim, bike, or do other activities. · Do not smoke. If you need help quitting, talk to your doctor about stop-smoking programs and medicines. These can increase your chances of quitting for good. Quitting smoking may be the most important step you can take to protect your heart. It is never too late to quit. · Limit alcohol to 2 drinks a day for men and 1 drink a day for women. Too much alcohol can cause health problems. · Manage other health problems such as diabetes, high blood pressure, and high cholesterol.  If you think you may have a problem with alcohol or drug use, talk to your doctor. Medicines  · Take your medicines exactly as prescribed. Call your doctor if you think you are having a problem with your medicine. · If your doctor recommends aspirin, take the amount directed each day. Make sure you take aspirin and not another kind of pain reliever, such as acetaminophen (Tylenol). When should you call for help? [id number] if you have symptoms of a heart attack. These may include:  · Chest pain or pressure, or a strange feeling in the chest.  · Sweating. · Shortness of breath. · Pain, pressure, or a strange feeling in the back, neck, jaw, or upper belly or in one or both shoulders or arms. · Lightheadedness or sudden weakness. · A fast or irregular heartbeat. After you call 911, the  may tell you to chew 1 adult-strength or 2 to 4 low-dose aspirin. Wait for an ambulance. Do not try to drive yourself. Watch closely for changes in your health, and be sure to contact your doctor if you have any problems. Where can you learn more? Go to https://Ludi.[email or web address] org and sign in to your [employer]ce account. Enter P371 in the Standing Cloud box to learn more about \"A Healthy Heart: Care Instructions. \"     If you do not have an account, please click on the \"Sign Up Now\" link. Current as of: December 16, 2019               Content Version: 12.5  © 7603-2510 [employer], Incorporated. Care instructions adapted under license by [facility] ([facility]). If you have questions about a medical condition or this instruction, always ask your healthcare professional. [place] [address] any warranty or liability for your use of this information.

## 2021-06-17 DIAGNOSIS — E78.5 HYPERLIPOPROTEINEMIA: Primary | ICD-10-CM

## 2021-06-17 DIAGNOSIS — D72.829 LEUKOCYTOSIS, UNSPECIFIED TYPE: ICD-10-CM

## 2021-06-17 DIAGNOSIS — E55.9 VITAMIN D DEFICIENCY: ICD-10-CM

## 2021-06-17 DIAGNOSIS — R94.5 ABNORMAL RESULTS OF LIVER FUNCTION STUDIES: ICD-10-CM

## 2021-06-17 DIAGNOSIS — E78.5 HYPERLIPOPROTEINEMIA: ICD-10-CM

## 2021-06-17 DIAGNOSIS — N40.0 BENIGN PROSTATIC HYPERPLASIA WITHOUT LOWER URINARY TRACT SYMPTOMS: ICD-10-CM

## 2021-06-17 LAB
ALBUMIN SERPL-MCNC: 4.1 G/DL (ref 3.5–5.2)
ALP BLD-CCNC: 110 U/L (ref 40–130)
ALT SERPL-CCNC: 29 U/L (ref 5–41)
ANION GAP SERPL CALCULATED.3IONS-SCNC: 10 MMOL/L (ref 7–19)
AST SERPL-CCNC: 22 U/L (ref 5–40)
BILIRUB SERPL-MCNC: 0.4 MG/DL (ref 0.2–1.2)
BILIRUBIN DIRECT: 0.1 MG/DL (ref 0–0.3)
BILIRUBIN URINE: NEGATIVE
BILIRUBIN, INDIRECT: 0.3 MG/DL (ref 0.1–1)
BLOOD, URINE: NEGATIVE
BUN BLDV-MCNC: 21 MG/DL (ref 8–23)
CALCIUM SERPL-MCNC: 9.4 MG/DL (ref 8.8–10.2)
CHLORIDE BLD-SCNC: 101 MMOL/L (ref 98–111)
CHOLESTEROL, TOTAL: 189 MG/DL (ref 160–199)
CLARITY: CLEAR
CO2: 23 MMOL/L (ref 22–29)
COLOR: YELLOW
CREAT SERPL-MCNC: 0.8 MG/DL (ref 0.5–1.2)
GFR AFRICAN AMERICAN: >59
GFR NON-AFRICAN AMERICAN: >60
GLUCOSE BLD-MCNC: 115 MG/DL (ref 74–109)
GLUCOSE URINE: NEGATIVE MG/DL
HCT VFR BLD CALC: 43.5 % (ref 42–52)
HDLC SERPL-MCNC: 30 MG/DL (ref 55–121)
HEMOGLOBIN: 14.4 G/DL (ref 14–18)
KETONES, URINE: NEGATIVE MG/DL
LDL CHOLESTEROL CALCULATED: 125 MG/DL
LEUKOCYTE ESTERASE, URINE: NEGATIVE
MCH RBC QN AUTO: 31.3 PG (ref 27–31)
MCHC RBC AUTO-ENTMCNC: 33.1 G/DL (ref 33–37)
MCV RBC AUTO: 94.6 FL (ref 80–94)
NITRITE, URINE: NEGATIVE
PDW BLD-RTO: 13.9 % (ref 11.5–14.5)
PH UA: 6.5 (ref 5–8)
PLATELET # BLD: 222 K/UL (ref 130–400)
PMV BLD AUTO: 11.1 FL (ref 9.4–12.4)
POTASSIUM SERPL-SCNC: 4.4 MMOL/L (ref 3.5–5)
PROTEIN UA: NEGATIVE MG/DL
RBC # BLD: 4.6 M/UL (ref 4.7–6.1)
SODIUM BLD-SCNC: 134 MMOL/L (ref 136–145)
SPECIFIC GRAVITY UA: 1.02 (ref 1–1.03)
T4 FREE: 1.14 NG/DL (ref 0.93–1.7)
TOTAL PROTEIN: 7.3 G/DL (ref 6.6–8.7)
TRIGL SERPL-MCNC: 170 MG/DL (ref 0–149)
TSH SERPL DL<=0.05 MIU/L-ACNC: 1.59 UIU/ML (ref 0.27–4.2)
UROBILINOGEN, URINE: 0.2 E.U./DL
VITAMIN D 25-HYDROXY: 38.8 NG/ML
WBC # BLD: 4.3 K/UL (ref 4.8–10.8)

## 2021-06-22 ENCOUNTER — OFFICE VISIT (OUTPATIENT)
Dept: FAMILY MEDICINE CLINIC | Age: 71
End: 2021-06-22
Payer: MEDICARE

## 2021-06-22 VITALS
OXYGEN SATURATION: 97 % | WEIGHT: 219.4 LBS | BODY MASS INDEX: 29.72 KG/M2 | HEART RATE: 67 BPM | HEIGHT: 72 IN | DIASTOLIC BLOOD PRESSURE: 60 MMHG | RESPIRATION RATE: 16 BRPM | SYSTOLIC BLOOD PRESSURE: 138 MMHG | TEMPERATURE: 97.3 F

## 2021-06-22 VITALS
WEIGHT: 219.4 LBS | RESPIRATION RATE: 16 BRPM | SYSTOLIC BLOOD PRESSURE: 138 MMHG | BODY MASS INDEX: 29.72 KG/M2 | DIASTOLIC BLOOD PRESSURE: 60 MMHG | TEMPERATURE: 97.3 F | OXYGEN SATURATION: 97 % | HEIGHT: 72 IN | HEART RATE: 67 BPM

## 2021-06-22 DIAGNOSIS — I48.0 PAROXYSMAL ATRIAL FIBRILLATION (HCC): ICD-10-CM

## 2021-06-22 DIAGNOSIS — K81.0 ACUTE CHOLECYSTITIS: ICD-10-CM

## 2021-06-22 DIAGNOSIS — K22.70 BARRETT'S ESOPHAGUS WITHOUT DYSPLASIA: ICD-10-CM

## 2021-06-22 DIAGNOSIS — K21.9 CHRONIC GERD: ICD-10-CM

## 2021-06-22 DIAGNOSIS — K83.09 CHOLANGITIS: ICD-10-CM

## 2021-06-22 DIAGNOSIS — Z90.49 STATUS POST CHOLECYSTECTOMY: ICD-10-CM

## 2021-06-22 DIAGNOSIS — Z00.00 ROUTINE GENERAL MEDICAL EXAMINATION AT A HEALTH CARE FACILITY: Primary | ICD-10-CM

## 2021-06-22 DIAGNOSIS — K80.50 CHOLEDOCHOLITHIASIS: ICD-10-CM

## 2021-06-22 DIAGNOSIS — I82.5Z9 CHRONIC DEEP VEIN THROMBOSIS (DVT) OF DISTAL VEIN OF LOWER EXTREMITY, UNSPECIFIED LATERALITY (HCC): ICD-10-CM

## 2021-06-22 PROCEDURE — G0439 PPPS, SUBSEQ VISIT: HCPCS | Performed by: FAMILY MEDICINE

## 2021-06-22 PROCEDURE — 99214 OFFICE O/P EST MOD 30 MIN: CPT | Performed by: FAMILY MEDICINE

## 2021-06-22 PROCEDURE — 1036F TOBACCO NON-USER: CPT | Performed by: FAMILY MEDICINE

## 2021-06-22 PROCEDURE — G8427 DOCREV CUR MEDS BY ELIG CLIN: HCPCS | Performed by: FAMILY MEDICINE

## 2021-06-22 PROCEDURE — G8417 CALC BMI ABV UP PARAM F/U: HCPCS | Performed by: FAMILY MEDICINE

## 2021-06-22 PROCEDURE — 3017F COLORECTAL CA SCREEN DOC REV: CPT | Performed by: FAMILY MEDICINE

## 2021-06-22 PROCEDURE — 1123F ACP DISCUSS/DSCN MKR DOCD: CPT | Performed by: FAMILY MEDICINE

## 2021-06-22 PROCEDURE — 4040F PNEUMOC VAC/ADMIN/RCVD: CPT | Performed by: FAMILY MEDICINE

## 2021-06-22 RX ORDER — M-VIT,TX,IRON,MINS/CALC/FOLIC 27MG-0.4MG
1 TABLET ORAL DAILY
COMMUNITY

## 2021-06-22 ASSESSMENT — ENCOUNTER SYMPTOMS
SHORTNESS OF BREATH: 0
CONSTIPATION: 0
BLOOD IN STOOL: 0
BACK PAIN: 1
CHEST TIGHTNESS: 0
WHEEZING: 0
COUGH: 0
NAUSEA: 0
EYES NEGATIVE: 1
VOMITING: 0
DIARRHEA: 0

## 2021-06-22 ASSESSMENT — PATIENT HEALTH QUESTIONNAIRE - PHQ9
SUM OF ALL RESPONSES TO PHQ QUESTIONS 1-9: 0
1. LITTLE INTEREST OR PLEASURE IN DOING THINGS: 0
2. FEELING DOWN, DEPRESSED OR HOPELESS: 0
SUM OF ALL RESPONSES TO PHQ QUESTIONS 1-9: 0
SUM OF ALL RESPONSES TO PHQ QUESTIONS 1-9: 0
SUM OF ALL RESPONSES TO PHQ9 QUESTIONS 1 & 2: 0

## 2021-06-22 ASSESSMENT — LIFESTYLE VARIABLES: HOW OFTEN DO YOU HAVE A DRINK CONTAINING ALCOHOL: 0

## 2021-06-22 NOTE — PROGRESS NOTES
Subjective:      Patient ID: Chiara Davila is a 80 y.o. male. HPI  This patient is seen here intermittently by the undersigned for management of chronic disease states. He is here today in follow-up of his chronic issues. Once again, we did review the occurrence regarding his gallbladder disease and urgent surgery. It is noteworthy that his liver enzymes which were quite elevated at the time that he had choledocholithiasis have now normalized completely. He was last seen here regarding a TCM evaluation since patient recently was hospitalized at [facility].  My understanding is that he was admitted to [facility] on 3/2 to 3/20/2021 where he remained until date of discharge on 3/28/21. The patient was in his usual state of health and he had gone to [facility] to  a car with his wife. Coming back from [facility] he began to have some discomfort in his abdomen epigastric to right upper quadrant area. When he got home this had gotten a good bit worse. As it progressively worsened he had his wife take him to the emergency room. In the emergency room, he was evaluated and felt to be having acute cholecystitis. He later had some diagnostic studies obtained and was felt to have acute cholecystitis and later was diagnosed as having choledocholithiasis and cholangitis. It was noted that he had a very dilated common bile duct and had considerable concern raised for common bile duct stones. GI was consulted after his gallbladder was removed. He had vomited the morning after gallbladder surgery and actually expressed bilious content from the vomitus. Dr. Ave Bergman of GI saw him and felt as though he was in need of ERCP and at that time was able to identify some evidence of stones and purulence coming out from the common bile duct. Dr. Cullen Dorado was able to successfully put in place a bile duct stent to facilitate drainage.   The patient received significant course of IV antibiotics and gradually slowly then improved. Pain control reportedly gradually improved and liver enzymes improved as well. It had been noted by the patient that his bilirubin became quite elevated when he was involved in this process. On 3/28/2021 he was felt to be able to be discharged home. The patient had surgery done by Dr. Tim Braun. It was noted that Dr. Mono Javier did ERCP on him. Ranulfo Lewis did tell me today that in addition to bilirubin being significantly elevated during the course of his illness, his white blood count was quite elevated as well. Ranulfo Lewis reported that the stent was to remain in place for either 3 weeks or 3 months and he was not sure which. He does have an appointment scheduled with Dr. Van Lowery of the cardiovascular disease department, Dr. Mono Javier of GI is set for 4/11/2021 and he does have a follow-up with the DIALLO Xiao in 3 weeks on 4/18/2021. Currently, he is maintained on omeprazole at 20 mg p.o. daily. 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 Ranulfo Lewis states that now he believes he is completely recovered from his gallbladder disease and postop event.     He was evaluated at a visit on 1/28/2020, at which time, he was noted to be in atrial fibrillation. Yumiko Jones was sent to see Dr. Darrold Shone who did cardiovert him and put him on Eliquis at 5 mg p.o. twice daily which has subsequently been illuminated from his medicine regimen. He did follow-up with Chris Gibson of cardiology fairly recently and was noted to have blood pressure that was slightly to elevated in the 90s. For this she did add Cozaar at low-dose 25 mg p.o. daily. The patient is noted today to have adequate blood pressure of 138/60. I am not changing any of these medicines at this time. He does remain on the sotalol at 120 mg taken twice daily which helps blood pressure but also helps keep his rhythm stable and out of A. fib. Mckenzie Rodriguez does have a history of having heart murmur in the past as he was identified with this by Dr. Liam Espinoza many years ago.  Apparently he may have had some skipping around at that time.    The patient did take an 81 mg aspirin for many years but then had GI bleed so he was told to stop that. He did have Achilles tendon repair of the left lower extremity approximately 2015 and subsequent to his surgical intervention did develop a DVT in his left lower extremity.  The patient was on anticoagulation for short period of time during that episode but did tolerate this well.  States that he has had 2 stress test done years ago most recently by Dr. Carter Walters review epic records and look for EKG tracings.  He did have EKG done heart of 2017 which was in normal sinus rhythm.  In December of 2015 he did have preop EKG for his Achilles repair and this did show normal sinus rhythm as well. DVT occurred after his Achilles repair. EKG of 12/15/2016 he was noted to have an occasional PAC.  No evidence of atrial fibrillation has been identified currently  .  Plan all of the very serious issues and any went through when he had his gallbladder and evidence of choledocholithiasis as well as cholangitis, he is doing well at this time. Lab tests were done and reviewed today. These were resulted on 6/17/2021. All of his liver function studies have now normalized which is outstanding. It was noted that blood glucose was very slightly elevated at 115. He does have a history of hyperlipidemia. He has taken statins in the past.  As of 6/17/2021 showed total cholesterol of 189. Triglycerides were very slightly elevated again at 170. HDL remains low at 30 which is likely genetic. The patient was noted to have blood sugar of 115. He was advised to watch sweets, fried foods, and fatty foods and then continue walking. Noted that sodium was slightly low at 134. Potassium was normal.  Renal function was normal.  CBC showed white count of 4300 which is very slightly low.   Hemoglobin was normal as were platelets analysis was normal as well. Review of Systems   Constitutional: Negative. HENT: Negative. Eyes: Negative. Respiratory: Negative for cough, chest tightness, shortness of breath and wheezing. Cardiovascular: Negative for chest pain, palpitations and leg swelling. Gastrointestinal: Negative for blood in stool, constipation, diarrhea, nausea and vomiting. Genitourinary: Negative for hematuria. Musculoskeletal: Positive for arthralgias and back pain. Skin: Negative. Neurological: Negative. Psychiatric/Behavioral: Negative. Objective:   Physical Exam  Vitals and nursing note reviewed. Constitutional:       General: He is not in acute distress. Appearance: Normal appearance. He is well-developed. He is not ill-appearing, toxic-appearing or diaphoretic. HENT:      Head: Normocephalic and atraumatic. Right Ear: Tympanic membrane, ear canal and external ear normal. There is no impacted cerumen. Left Ear: Tympanic membrane, ear canal and external ear normal. There is no impacted cerumen. Nose: Nose normal.      Mouth/Throat:      Lips: Pink. Mouth: Mucous membranes are moist.      Dentition: Normal dentition. Tongue: No lesions. Pharynx: Oropharynx is clear. Uvula midline. Tonsils: No tonsillar exudate or tonsillar abscesses. Eyes:      General: Lids are normal. No scleral icterus. Right eye: No discharge. Left eye: No discharge. Extraocular Movements:      Right eye: Normal extraocular motion. Left eye: Normal extraocular motion. Conjunctiva/sclera: Conjunctivae normal.      Right eye: Right conjunctiva is not injected. Left eye: Left conjunctiva is not injected. Pupils: Pupils are equal, round, and reactive to light. Neck:      Thyroid: No thyromegaly. Vascular: No carotid bruit or JVD. Cardiovascular:      Rate and Rhythm: Normal rate and regular rhythm.       Pulses: Judgment normal.         /60 (Site: Left Upper Arm, Position: Sitting, Cuff Size: Large Adult)   Pulse 67   Temp 97.3 °F (36.3 °C) (Infrared)   Resp 16   Ht 6' (1.829 m)   Wt 219 lb 6.4 oz (99.5 kg)   SpO2 97%   BMI 29.76 kg/m²   Assessment:        Diagnosis Orders   1. Paroxysmal atrial fibrillation (HCC)     2. Chronic deep vein thrombosis (DVT) of distal vein of lower extremity, unspecified laterality (HCC)     3. Garces's esophagus without dysplasia     4. Chronic GERD     5. Choledocholithiasis     6. Status post cholecystectomy     7. Acute cholecystitis     8.  Cholangitis             Plan:      I am having Mr. Jah Urias maintain his :   Outpatient Medications Marked as Taking for the 6/22/21 encounter (Office Visit) with Ella Akers MD   Medication Sig Dispense Refill    Multiple Vitamins-Minerals (THERAPEUTIC MULTIVITAMIN-MINERALS) tablet Take 1 tablet by mouth daily      losartan (COZAAR) 25 MG tablet Take 1 tablet by mouth daily 90 tablet 1    Multiple Vitamins-Minerals (ICAPS AREDS FORMULA PO) Take 1 capsule by mouth 2 times daily      aspirin 81 MG EC tablet Take 81 mg by mouth daily      sotalol (BETAPACE) 120 MG tablet Take 120 mg by mouth 2 times daily      omeprazole (PRILOSEC) 20 MG delayed release capsule Take 1 capsule by mouth Daily (Patient taking differently: Take 20 mg by mouth nightly ) 90 capsule 3    Probiotic Product (PROBIOTIC FORMULA PO) Take 1 tablet by mouth daily       Ascorbic Acid (VITAMIN C PO) Take 1 tablet by mouth nightly       vitamin B-12 (CYANOCOBALAMIN) 1000 MCG tablet Take 1,000 mcg by mouth daily      ,Patient states they are no longer utilizing these medication:   Medications Discontinued During This Encounter   Medication Reason    Multiple Vitamins-Minerals (MULTIVITAMIN PO)     I have refilled the following medication today:           Ella Akers MD

## 2021-06-22 NOTE — PROGRESS NOTES
Medicare Annual Wellness Visit  Name: Kristel End Date: 2021   MRN: [id number] Sex: Male   Age: 80 y.o. Ethnicity: Non-/Non    : 1950 Race: Iza Cherry is here for Medicare AWV    Screenings for behavioral, psychosocial and functional/safety risks, and cognitive dysfunction are all negative except as indicated below. These results, as well as other patient data from the [address] form, are documented in Flowsheets linked to this Encounter. No Known Allergies    Prior to Visit Medications    Medication Sig Taking?  Authorizing Provider   Multiple Vitamins-Minerals (THERAPEUTIC MULTIVITAMIN-MINERALS) tablet Take 1 tablet by mouth daily  Historical Provider, MD   losartan (COZAAR) 25 MG tablet Take 1 tablet by mouth daily  LUIS Brown   Multiple Vitamins-Minerals (ICAPS AREDS FORMULA PO) Take 1 capsule by mouth 2 times daily  Historical Provider, MD   aspirin 81 MG EC tablet Take 81 mg by mouth daily  Historical Provider, MD   sotalol (BETAPACE) 120 MG tablet Take 120 mg by mouth 2 times daily  Historical Provider, MD   omeprazole (PRILOSEC) 20 MG delayed release capsule Take 1 capsule by mouth Daily  Patient taking differently: Take 20 mg by mouth nightly   Jacobo Ng DO   Probiotic Product (PROBIOTIC FORMULA PO) Take 1 tablet by mouth daily   Historical Provider, MD   Ascorbic Acid (VITAMIN C PO) Take 1 tablet by mouth nightly   Historical Provider, MD   vitamin B-12 (CYANOCOBALAMIN) 1000 MCG tablet Take 1,000 mcg by mouth daily   Historical Provider, MD       Past Medical History:   Diagnosis Date    Arthritis     Atrial fibrillation ([facility]     sees dr. Chauncey Haley Colon polyps     Difficult intubation     pt just had recent cervical fusion with plate 2 months ago    DVT (deep venous thrombosis) ([facility]     left leg    Elbow pain     torn bicep    GERD (gastroesophageal reflux disease)     Hx of blood clots     left leg/ after heel surg/ jan.  2015    Hyperlipidemia     recently able to be taken off cholesterol meds    Kidney stone on left side        Past Surgical History:   Procedure Laterality Date    BICEPS TENDON REPAIR Right 07/16/2020    RIGHT BICEPS TENDON REPAIR performed by Nela Toth MD at [place] [address]      2015, oct    CHOLECYSTECTOMY, LAPAROSCOPIC N/A 03/23/2021    CHOLECYSTECTOMY LAPAROSCOPIC with IOC performed by Josy Hgigins MD at [address] [place] [address]  10/30/2009    [facility]    COLONOSCOPY  02/2015    hyperplastic polyp (5 yr)    DILATATION, ESOPHAGUS      ENDOSCOPY, COLON, DIAGNOSTIC      ERCP N/A 03/24/2021    Dr LITO Grider-w/sphincterotomy, placement of a 10F x 5 cm biliary stent, balloon sweep-Cholangitis with choledocholithiasis, repeat in 3 months    ERCP N/A 06/09/2021    Dr LITO Grider-Successful removal of retained biliary sludge/stone debris    EYE SURGERY      FOOT SURGERY  01/2014    Left foot spur removal    LITHOTRIPSY Left 12/29/2015    ESWL EXTRACORPEAL SHOCK WAVE LITHOTRIPSY performed by Arthur Amaya MD at [place] [address]  11/2015    PILONIDAL CYST EXCISION      IN COLONOSCOPY FLX DX W/COLLJ SPEC WHEN PFRMD N/A 05/08/2017    Dr Vinh Beauchamp sided diverticulosis-5 yr recall    IN EGD TRANSORAL BIOPSY SINGLE/MULTIPLE N/A 03/26/2018    Dr Ng-w/dilation over wire-52 [place]-esophageal stricture-Garces's (+) dysplasia (-)--1 yr recall    SHOULDER SURGERY  2007    right after fall, detached the subclavicle muscle    [address] [facility]    Neck  surgery     UPPER GASTROINTESTINAL ENDOSCOPY N/A 02/12/2018    Dr Jace Fortune Grade B esophagitis, hiatal hernia, stricture-(-) Марина, (+) Garces's (+) low grade dysplasia, active esophagogastritis, repeat: 3/26/18    UPPER GASTROINTESTINAL ENDOSCOPY  03/26/2018    Dr Ng-w/dilation over wire-51 [place]-esophageal stricture-Garces's (+) dysplasia (-)--1 yr recall    UPPER GASTROINTESTINAL ENDOSCOPY N/A 05/02/2019    Dr Polo Cline (+) Garces's, (-) dysplasia       Family History   Problem Relation Age of Onset    Cancer Mother     Lung Cancer Mother     Heart Disease Father     Heart Disease Sister     Diabetes Sister     Heart Disease Brother     Diabetes Brother     Colon Cancer Neg Hx     Colon Polyps Neg Hx     Esophageal Cancer Neg Hx     Liver Cancer Neg Hx     Liver Disease Neg Hx     Rectal Cancer Neg Hx     Stomach Cancer Neg Hx        CareTeam (Including outside providers/suppliers regularly involved in providing care):   Patient Care Team:  Patricia Rocha MD as PCP - General (Family Medicine)  Patricia Rocha MD as PCP - [facility] Empaneled Provider  LUIS St as Nurse Practitioner (Certified Nurse Practitioner)  Cornel Del Real DO as Consulting Physician (Cardiology)    Wt Readings from Last 3 Encounters:   06/22/21 219 lb 6.4 oz (99.5 kg)   06/22/21 219 lb 6.4 oz (99.5 kg)   06/15/21 218 lb (98.9 kg)     Vitals:    06/22/21 0727   BP: 138/60   Site: Left Upper Arm   Position: Sitting   Cuff Size: Large Adult   Pulse: 67   Resp: 16   Temp: 97.3 °F (36.3 °C)   TempSrc: Infrared   SpO2: 97%   Weight: 219 lb 6.4 oz (99.5 kg)   Height: 6' (1.829 m)     Body mass index is 29.76 kg/m². Based upon direct observation of the patient, evaluation of cognition reveals recent and remote memory intact. Patient's complete Health Risk Assessment and screening values have been reviewed and are found in Flowsheets. The following problems were reviewed today and where indicated follow up appointments were made and/or referrals ordered. Positive Risk Factor Screenings with Interventions:          General Health and ACP:  General  In general, how would you say your health is?: Very Good  In the past 7 days, have you experienced any of the following?  New or Increased Pain, New or Increased Fatigue, Loneliness, Social Isolation, Stress or Anger?: schedule for the next 5-10 years is provided to the patient in written form: see Patient Instructions/AVS.    Caryle Munroe, RN, 6/22/2021, performed the documented evaluation under the direct supervision of the attending physician.

## 2021-06-29 ENCOUNTER — VIRTUAL VISIT (OUTPATIENT)
Dept: CARDIOLOGY CLINIC | Age: 71
End: 2021-06-29
Payer: MEDICARE

## 2021-06-29 DIAGNOSIS — I10 ESSENTIAL HYPERTENSION: Primary | ICD-10-CM

## 2021-06-29 DIAGNOSIS — I48.0 PAROXYSMAL ATRIAL FIBRILLATION (HCC): ICD-10-CM

## 2021-06-29 DIAGNOSIS — I49.5 SINOATRIAL NODE DYSFUNCTION (HCC): ICD-10-CM

## 2021-06-29 PROCEDURE — 99441 PR PHYS/QHP TELEPHONE EVALUATION 5-10 MIN: CPT | Performed by: NURSE PRACTITIONER

## 2021-06-29 NOTE — PATIENT INSTRUCTIONS
New instructions for today:  If you feel that your heart is out of rhythm and the irregularity lasts for more than 4 to 6 hours, notify the office. If the office is closed, go to the ER. Patient Instructions:  Continue current medications as prescribed. Always keep a current medication list. Bring your medications to every office visit. Continue to follow up with primary care provider for non cardiac medical problems. Call the office with any problems, questions or concerns at [phone]. If you have been asked to keep a blood pressure log, do so for 2 weeks. Call the office to report readings to the triage nurse at [phone]. Follow up with cardiologist as scheduled. The following educational material has been included in this after visit summary for your review: Life simple 7. Heart health. Life simple 7  1) Manage blood pressure - high blood pressure is a major risk factor for heart disease and stroke. Keeping blood pressure in health range reduces strain on your heart, arteries and kidneys. Blood pressure goal is less than 130/80. 2) Control cholesterol - contributes to plaque, which can clog arteries and lead to heart disease and stroke. When you control your cholesterol you are giving your arteries their best chance to remain clear. It is recommended that you get cholesterol lab work done once a year. 3) Reduce blood sugar - most of the food we eat is turning into glucose or blood sugar that our body uses for energy. Over time, high levels of blood sugar can damage your heart, kidneys, eyes and nerves. 4) Get active - living an active life is one of the most rewarding gifts you can give yourself and those you love. Simply put, daily physical activity increases your length and quality of life. Strive to exercise 15 minutes most days of the week. 5)  Eat better - A healthy diet is one of your best weapons for fighting cardiovascular disease.   When you eat a heart healthy diet, you improve your chances for feeling good and staying healthy for life. 6)  Lose weight - when you shed extra fat an unnecessary pounds, you reduce the burden on your hear, lungs, blood vessels and skeleton. You give yourself the gift of active living, you lower your blood pressure and help yourself feel better. 7) Stop smoking - cigarette smokers have a higher risk of developing cardiovascular disease. If  You smoke, quitting is the best thing you can do for your health. Check American Heart Association on line for more information on Life's Simple 7 and tips for healthy living. A Healthy Heart: Care Instructions  Your Care Instructions     Coronary artery disease, also called heart disease, occurs when a substance called plaque builds up in the vessels that supply oxygen-rich blood to your heart muscle. This can narrow the blood vessels and reduce blood flow. A heart attack happens when blood flow is completely blocked. A high-fat diet, smoking, and other factors increase the risk of heart disease. Your doctor has found that you have a chance of having heart disease. You can do lots of things to keep your heart healthy. It may not be easy, but you can change your diet, exercise more, and quit smoking. These steps really work to lower your chance of heart disease. Follow-up care is a key part of your treatment and safety. Be sure to make and go to all appointments, and call your doctor if you are having problems. It's also a good idea to know your test results and keep a list of the medicines you take. How can you care for yourself at home? Diet  · Use less salt when you cook and eat. This helps lower your blood pressure. Taste food before salting. Add only a little salt when you think you need it. With time, your taste buds will adjust to less salt. · Eat fewer snack items, fast foods, canned soups, and other high-salt, high-fat, processed foods.   · Read food labels and try to avoid saturated and trans fats. They increase your risk of heart disease by raising cholesterol levels. · Limit the amount of solid fat-butter, margarine, and shortening-you eat. Use olive, peanut, or canola oil when you cook. Bake, broil, and steam foods instead of frying them. · Eat a variety of fruit and vegetables every day. Dark green, deep orange, red, or yellow fruits and vegetables are especially good for you. Examples include spinach, carrots, peaches, and berries. · Foods high in fiber can reduce your cholesterol and provide important vitamins and minerals. High-fiber foods include whole-grain cereals and breads, oatmeal, beans, brown rice, citrus fruits, and apples. · Eat lean proteins. Heart-healthy proteins include seafood, lean meats and poultry, eggs, beans, peas, nuts, seeds, and soy products. · Limit drinks and foods with added sugar. These include candy, desserts, and soda pop. Lifestyle changes  · If your doctor recommends it, get more exercise. Walking is a good choice. Bit by bit, increase the amount you walk every day. Try for at least 30 minutes on most days of the week. You also may want to swim, bike, or do other activities. · Do not smoke. If you need help quitting, talk to your doctor about stop-smoking programs and medicines. These can increase your chances of quitting for good. Quitting smoking may be the most important step you can take to protect your heart. It is never too late to quit. · Limit alcohol to 2 drinks a day for men and 1 drink a day for women. Too much alcohol can cause health problems. · Manage other health problems such as diabetes, high blood pressure, and high cholesterol. If you think you may have a problem with alcohol or drug use, talk to your doctor. Medicines  · Take your medicines exactly as prescribed. Call your doctor if you think you are having a problem with your medicine. · If your doctor recommends aspirin, take the amount directed each day.  Make sure you take aspirin and not another kind of pain reliever, such as acetaminophen (Tylenol). When should you call for help? [id number] if you have symptoms of a heart attack. These may include:  · Chest pain or pressure, or a strange feeling in the chest.  · Sweating. · Shortness of breath. · Pain, pressure, or a strange feeling in the back, neck, jaw, or upper belly or in one or both shoulders or arms. · Lightheadedness or sudden weakness. · A fast or irregular heartbeat. After you call 911, the  may tell you to chew 1 adult-strength or 2 to 4 low-dose aspirin. Wait for an ambulance. Do not try to drive yourself. Watch closely for changes in your health, and be sure to contact your doctor if you have any problems. Where can you learn more? Go to https://Qorus SoftwarepeEventcheq.PandaDoc. org and sign in to your enVerid account. Enter T223 in the Provigent box to learn more about \"A Healthy Heart: Care Instructions. \"     If you do not have an account, please click on the \"Sign Up Now\" link. Current as of: December 16, 2019               Content Version: 12.5  © 5048-1090 [employer]. Care instructions adapted under license by [facility] ([facility]). If you have questions about a medical condition or this instruction, always ask your healthcare professional. [place] [address] any warranty or liability for your use of this information. New instructions for today:      Patient Instructions:  Continue current medications as prescribed. Always keep a current medication list. Bring your medications to every office visit. Continue to follow up with primary care provider for non cardiac medical problems. Call the office with any problems, questions or concerns at [phone]. If you have been asked to keep a blood pressure log, do so for 2 weeks. Call the office to report readings to the triage nurse at [phone]. Follow up with cardiologist as scheduled.   The following educational material has been included in this after visit summary for your review: Life simple 7. Heart health. Life simple 7  1) Manage blood pressure - high blood pressure is a major risk factor for heart disease and stroke. Keeping blood pressure in health range reduces strain on your heart, arteries and kidneys. Blood pressure goal is less than 130/80. 2) Control cholesterol - contributes to plaque, which can clog arteries and lead to heart disease and stroke. When you control your cholesterol you are giving your arteries their best chance to remain clear. It is recommended that you get cholesterol lab work done once a year. 3) Reduce blood sugar - most of the food we eat is turning into glucose or blood sugar that our body uses for energy. Over time, high levels of blood sugar can damage your heart, kidneys, eyes and nerves. 4) Get active - living an active life is one of the most rewarding gifts you can give yourself and those you love. Simply put, daily physical activity increases your length and quality of life. Strive to exercise 15 minutes most days of the week. 5)  Eat better - A healthy diet is one of your best weapons for fighting cardiovascular disease. When you eat a heart healthy diet, you improve your chances for feeling good and staying healthy for life. 6)  Lose weight - when you shed extra fat an unnecessary pounds, you reduce the burden on your hear, lungs, blood vessels and skeleton. You give yourself the gift of active living, you lower your blood pressure and help yourself feel better. 7) Stop smoking - cigarette smokers have a higher risk of developing cardiovascular disease. If  You smoke, quitting is the best thing you can do for your health. Check American Heart Association on line for more information on Life's Simple 7 and tips for healthy living.      A Healthy Heart: Care Instructions  Your Care Instructions     Coronary artery disease, also called heart disease, occurs when a substance called plaque builds up in the vessels that supply oxygen-rich blood to your heart muscle. This can narrow the blood vessels and reduce blood flow. A heart attack happens when blood flow is completely blocked. A high-fat diet, smoking, and other factors increase the risk of heart disease. Your doctor has found that you have a chance of having heart disease. You can do lots of things to keep your heart healthy. It may not be easy, but you can change your diet, exercise more, and quit smoking. These steps really work to lower your chance of heart disease. Follow-up care is a key part of your treatment and safety. Be sure to make and go to all appointments, and call your doctor if you are having problems. It's also a good idea to know your test results and keep a list of the medicines you take. How can you care for yourself at home? Diet  · Use less salt when you cook and eat. This helps lower your blood pressure. Taste food before salting. Add only a little salt when you think you need it. With time, your taste buds will adjust to less salt. · Eat fewer snack items, fast foods, canned soups, and other high-salt, high-fat, processed foods. · Read food labels and try to avoid saturated and trans fats. They increase your risk of heart disease by raising cholesterol levels. · Limit the amount of solid fat-butter, margarine, and shortening-you eat. Use olive, peanut, or canola oil when you cook. Bake, broil, and steam foods instead of frying them. · Eat a variety of fruit and vegetables every day. Dark green, deep orange, red, or yellow fruits and vegetables are especially good for you. Examples include spinach, carrots, peaches, and berries. · Foods high in fiber can reduce your cholesterol and provide important vitamins and minerals. High-fiber foods include whole-grain cereals and breads, oatmeal, beans, brown rice, citrus fruits, and apples. · Eat lean proteins.  Heart-healthy proteins include seafood, lean meats and poultry, eggs, beans, peas, nuts, seeds, and soy products. · Limit drinks and foods with added sugar. These include candy, desserts, and soda pop. Lifestyle changes  · If your doctor recommends it, get more exercise. Walking is a good choice. Bit by bit, increase the amount you walk every day. Try for at least 30 minutes on most days of the week. You also may want to swim, bike, or do other activities. · Do not smoke. If you need help quitting, talk to your doctor about stop-smoking programs and medicines. These can increase your chances of quitting for good. Quitting smoking may be the most important step you can take to protect your heart. It is never too late to quit. · Limit alcohol to 2 drinks a day for men and 1 drink a day for women. Too much alcohol can cause health problems. · Manage other health problems such as diabetes, high blood pressure, and high cholesterol. If you think you may have a problem with alcohol or drug use, talk to your doctor. Medicines  · Take your medicines exactly as prescribed. Call your doctor if you think you are having a problem with your medicine. · If your doctor recommends aspirin, take the amount directed each day. Make sure you take aspirin and not another kind of pain reliever, such as acetaminophen (Tylenol). When should you call for help? [id number] if you have symptoms of a heart attack. These may include:  · Chest pain or pressure, or a strange feeling in the chest.  · Sweating. · Shortness of breath. · Pain, pressure, or a strange feeling in the back, neck, jaw, or upper belly or in one or both shoulders or arms. · Lightheadedness or sudden weakness. · A fast or irregular heartbeat. After you call 911, the  may tell you to chew 1 adult-strength or 2 to 4 low-dose aspirin. Wait for an ambulance. Do not try to drive yourself.   Watch closely for changes in your health, and be sure to contact your doctor if you have any problems. Where can you learn more? Go to https://chpepiceweb.healthAngie's List. org and sign in to your ZettaCore account. Enter I321 in the Microtask box to learn more about \"A Healthy Heart: Care Instructions. \"     If you do not have an account, please click on the \"Sign Up Now\" link. Current as of: December 16, 2019               Content Version: 12.5  © 8159-5512 [employer]. Care instructions adapted under license by [facility] ([facility]). If you have questions about a medical condition or this instruction, always ask your healthcare professional. [address] any warranty or liability for your use of this information.

## 2021-06-29 NOTE — PROGRESS NOTES
Denver Mandujano is a 80 y.o. male evaluated via telephone on 6/29/2021. Consent:  He and/or health care decision maker is aware that that he may receive a bill for this telephone service, depending on his insurance coverage, and has provided verbal consent to proceed: Yes    Documentation:  I communicated with the patient and/or health care decision maker about HTN. Details of this discussion including any medical advice provided: Heart health. I affirm this is a Patient Initiated Episode with an Established Patient who has not had a related appointment within my department in the past 7 days or scheduled within the next 24 hours. Total Time: minutes: 5-10 minutes    Note: not billable if this call serves to triage the patient into an appointment for the relevant concern    LUIS Gamble      6/29/2021    Audio Patient Encouter(During JRKYI-02 public health emergency)  The telephone encourter was conducted with patient in their residence from [address] with LUIS Lundberg; assistance by Nguyễn Cabral MA.    HPI:   Diagnosis Orders   1. Essential hypertension     2. Paroxysmal atrial fibrillation (HCC)     3. Sinoatrial node dysfunction (HCC)       The patient presents today for audio evaluation regarding HTN. Losartan 25 mg (1) daily was added a few weeks ago. The patient reports now BP running 120-130/70-80. He is tolerating Losartan well. The patient denies symptoms to suggest myocardial ischemia, heart failure or arrhythmia. BP is well controlled on current regimen. The patient's PCP monitors cholesterol. SUBJECTIVE:  Dustin denies exertional chest pain, shortness of breath, orthopnea, paroxysmal nocturnal dyspnea, syncope, presyncope, sensed arrhythmia, edema and fatigue. The patient denies numbness or weakness to suggest cerebrovascular accident or transient ischemic attack. Review of Systems    Prior to Visit Medications    Medication Sig Taking? Authorizing Provider   Multiple Vitamins-Minerals (THERAPEUTIC MULTIVITAMIN-MINERALS) tablet Take 1 tablet by mouth daily Yes Historical Provider, MD   losartan (COZAAR) 25 MG tablet Take 1 tablet by mouth daily Yes LUIS Miramontes   Multiple Vitamins-Minerals (ICAPS AREDS FORMULA PO) Take 1 capsule by mouth 2 times daily Yes Historical Provider, MD   aspirin 81 MG EC tablet Take 81 mg by mouth daily Yes Historical Provider, MD   sotalol (BETAPACE) 120 MG tablet Take 120 mg by mouth 2 times daily Yes Historical Provider, MD   omeprazole (PRILOSEC) 20 MG delayed release capsule Take 1 capsule by mouth Daily  Patient taking differently: Take 20 mg by mouth nightly  Yes Jacobo Ng DO   Probiotic Product (PROBIOTIC FORMULA PO) Take 1 tablet by mouth daily  Yes Historical Provider, MD   Ascorbic Acid (VITAMIN C PO) Take 1 tablet by mouth nightly  Yes Historical Provider, MD   vitamin B-12 (CYANOCOBALAMIN) 1000 MCG tablet Take 1,000 mcg by mouth daily  Yes Historical Provider, MD       Social History     Tobacco Use    Smoking status: Never Smoker    Smokeless tobacco: Never Used   Vaping Use    Vaping Use: Never used   Substance Use Topics    Alcohol use: No    Drug use: No        REVIEW OF SYSTEMS:  Constitutional  no appetite change, or unexpected weight change. No fever, chills or diaphoresis. No significant change in activity level or new onset of fatigue. HEENT  no significant rhinorrhea or epistaxis. No tinnitus or significant hearing loss. Eyes  no sudden vision change or amaurosis. No corneal arcus, xantholasma, subconjunctival hemorrhage or discharge. Respiratory  no significant wheezing, stridor, apnea or cough. No dyspnea on exertion or shortness of air. Cardiovascular  no exertional chest pain to suggest myocardial ischemia. No orthopnea or PND. No sensation of sustained arrythmia. No occurrence of slow heart rate. No palpitations. No claudication.     Gastrointestinal  no 06/17/2021    [facility] 141 07/09/2020    LDLCALC 54 01/15/2020     Lab Results   Component Value Date    VLDL 34 09/03/2014     Lab Results   Component Value Date    CHOLHDLRATIO 2.3 09/03/2014     Lab Results   Component Value Date    TSH 1.590 06/17/2021     ASSESSMENT/PlAN:   Diagnosis Orders   1. Essential hypertension     2. Paroxysmal atrial fibrillation (HCC)     3. Sinoatrial node dysfunction (HCC)     [id number] Score: 2  Disclaimer: Risk Score calculation is dependent on accuracy of patient problem list and past encounter diagnosis. Stable cardiac status with no overt heart failure, angina or sensed arrhythmia. HTN - normotensive on current regimen. PAF - no recurrent AF on Sotalol. Patient compliant with medication regimen. Continue current medical management for cardiac condition. Continue current medications as prescribed. BP goal is 130/80 or less. If your primary care provider is outside of the [facility], please request your labs be faxed to this office at [phone]. Continue to follow up with primary care provider for non cardiac medical problems. Call the office with any problems, questions or concerns at [phone]. Follow up with cardiologist as scheduled in [address] [place] [address] Rd. Dr. Frederick Lynch as scheduled. The following educational material has been included in this after visit summary for patient review:  Heart healthAlex Diaz is a 80 y.o. male being evaluated by a telephone encounter to address concerns as mentioned above. A caregiver was present when appropriate. Due to this being a TeleHealth encounter (During MODAJ-84 public health emergency).   Pursuant to the emergency declaration under the [facility], [address] [place] [address] authority and the [employer] Act, this Virtual Visit was conducted with patient's (and/or legal guardian's) consent, to reduce the patient's risk of exposure to COVID-19 and provide necessary medical care. The patient (and/or legal guardian) has also been advised to contact this office for worsening conditions or problems, and seek emergency medical treatment and/or call 911 if deemed necessary. Services were provided through a telephone discussion virtually to substitute for in-person clinic visit. Patient and provider were located at their individual homes. --LUIS Gamble on 6/29/2021 at 9:21 AM    An electronic signature was used to authenticate this note.

## 2021-12-13 ENCOUNTER — OFFICE VISIT (OUTPATIENT)
Dept: CARDIOLOGY CLINIC | Age: 71
End: 2021-12-13
Payer: MEDICARE

## 2021-12-13 VITALS
SYSTOLIC BLOOD PRESSURE: 126 MMHG | HEART RATE: 56 BPM | OXYGEN SATURATION: 97 % | BODY MASS INDEX: 30.34 KG/M2 | WEIGHT: 224 LBS | HEIGHT: 72 IN | DIASTOLIC BLOOD PRESSURE: 62 MMHG

## 2021-12-13 DIAGNOSIS — E78.2 MIXED HYPERLIPIDEMIA: ICD-10-CM

## 2021-12-13 DIAGNOSIS — I48.0 PAROXYSMAL ATRIAL FIBRILLATION (HCC): Primary | ICD-10-CM

## 2021-12-13 DIAGNOSIS — I10 ESSENTIAL HYPERTENSION: ICD-10-CM

## 2021-12-13 PROCEDURE — 99214 OFFICE O/P EST MOD 30 MIN: CPT | Performed by: INTERNAL MEDICINE

## 2021-12-13 PROCEDURE — G8427 DOCREV CUR MEDS BY ELIG CLIN: HCPCS | Performed by: INTERNAL MEDICINE

## 2021-12-13 PROCEDURE — 1123F ACP DISCUSS/DSCN MKR DOCD: CPT | Performed by: INTERNAL MEDICINE

## 2021-12-13 PROCEDURE — 3017F COLORECTAL CA SCREEN DOC REV: CPT | Performed by: INTERNAL MEDICINE

## 2021-12-13 PROCEDURE — 1036F TOBACCO NON-USER: CPT | Performed by: INTERNAL MEDICINE

## 2021-12-13 PROCEDURE — G8484 FLU IMMUNIZE NO ADMIN: HCPCS | Performed by: INTERNAL MEDICINE

## 2021-12-13 PROCEDURE — 4040F PNEUMOC VAC/ADMIN/RCVD: CPT | Performed by: INTERNAL MEDICINE

## 2021-12-13 PROCEDURE — 93000 ELECTROCARDIOGRAM COMPLETE: CPT | Performed by: INTERNAL MEDICINE

## 2021-12-13 PROCEDURE — G8417 CALC BMI ABV UP PARAM F/U: HCPCS | Performed by: INTERNAL MEDICINE

## 2021-12-13 RX ORDER — GABAPENTIN 100 MG/1
100 CAPSULE ORAL NIGHTLY
COMMUNITY
Start: 2021-12-06 | End: 2022-03-29

## 2021-12-13 RX ORDER — LOSARTAN POTASSIUM 25 MG/1
25 TABLET ORAL DAILY
Qty: 90 TABLET | Refills: 3 | Status: SHIPPED | OUTPATIENT
Start: 2021-12-13

## 2021-12-13 RX ORDER — SOTALOL HYDROCHLORIDE 120 MG/1
120 TABLET ORAL 2 TIMES DAILY
Qty: 180 TABLET | Refills: 3 | Status: SHIPPED | OUTPATIENT
Start: 2021-12-13

## 2021-12-13 ASSESSMENT — ENCOUNTER SYMPTOMS
SHORTNESS OF BREATH: 0
COUGH: 0
ANAL BLEEDING: 0
BLOOD IN STOOL: 0

## 2021-12-13 NOTE — PROGRESS NOTES
Office Visit  Yfn Hernandez is a 80 y.o. male; who present today for 6 Month Follow-Up (NO Cardiac Sx)      HPI  I am seeing this 69-year-old white male in routine follow-up who has been followed for paroxysmal atrial fibrillation, had prior DC cardioversion and has been in sinus rhythm on sotalol. Additionally, he was diagnosed with hypertension about 6 months ago and was placed on losartan. His blood pressures been doing well at home. He remains active with no chest pain or dyspnea, no palpitations no presyncope or syncope. He does have a home cardiac monitor, the Kardia device and this was something that I discussed with him a year ago. He cannot afford Eliquis, his insurance will not cover it. He has been on aspirin. He has been using his Kardia device about weekly and he states there were several times that it said possible atrial fibrillation, 1 of which he brought to me and it was incorrect and actually he was in sinus rhythm. He has not had any GI bleed or melena, no neurologic episodes such as loss of vision, motor weakness or slurred speech. Current Outpatient Medications   Medication Sig Dispense Refill    gabapentin (NEURONTIN) 100 MG capsule Take 100 mg by mouth nightly.        losartan (COZAAR) 25 MG tablet Take 1 tablet by mouth daily 90 tablet 3    sotalol (BETAPACE) 120 MG tablet Take 1 tablet by mouth 2 times daily 180 tablet 3    Multiple Vitamins-Minerals (THERAPEUTIC MULTIVITAMIN-MINERALS) tablet Take 1 tablet by mouth daily      Multiple Vitamins-Minerals (ICAPS AREDS FORMULA PO) Take 1 capsule by mouth 2 times daily      aspirin 81 MG EC tablet Take 81 mg by mouth daily      omeprazole (PRILOSEC) 20 MG delayed release capsule Take 1 capsule by mouth Daily (Patient taking differently: Take 20 mg by mouth nightly ) 90 capsule 3    Probiotic Product (PROBIOTIC FORMULA PO) Take 1 tablet by mouth daily       Ascorbic Acid (VITAMIN C PO) Take 1 tablet by mouth nightly       vitamin B-12 (CYANOCOBALAMIN) 1000 MCG tablet Take 1,000 mcg by mouth daily        No current facility-administered medications for this visit. Medications Discontinued During This Encounter   Medication Reason    sotalol (BETAPACE) 120 MG tablet REORDER    losartan (COZAAR) 25 MG tablet REORDER        No Known Allergies    Past Medical History:   Diagnosis Date    Arthritis     Atrial fibrillation [facility])     sees dr. Deondre Henderson Difficult intubation     pt just had recent cervical fusion with plate 2 months ago    DVT (deep venous thrombosis) ([address] 2014    left leg    Elbow pain     torn bicep    Essential hypertension 12/13/2021    GERD (gastroesophageal reflux disease)     Hx of blood clots     left leg/ after heel surg/ jan. 2015    Hyperlipidemia     recently able to be taken off cholesterol meds    Kidney stone on left side      Negative - Past Medical History for  Past Medical History Pertinent Negatives:   Diagnosis Date Noted    Asthma 01/24/2020    CAD (coronary artery disease) 01/24/2020    Cancer ([facility] 01/24/2020    Cerebral artery occlusion with cerebral infarction ([facility] 01/24/2020    CHF (congestive heart failure) ([address] 01/24/2020    COPD (chronic obstructive pulmonary disease) ([address] 01/24/2020    Diabetes mellitus ([facility] 01/24/2020    Hemodialysis patient ([facility] 01/24/2020    History of blood transfusion 01/24/2020    Thyroid disease 01/24/2020       Past Surgical History:   Procedure Laterality Date    BICEPS TENDON REPAIR Right 07/16/2020    RIGHT BICEPS TENDON REPAIR performed by Cristobal Bowman MD at [place] [address]      2015, oct    CHOLECYSTECTOMY, LAPAROSCOPIC N/A 03/23/2021    CHOLECYSTECTOMY LAPAROSCOPIC with IOC performed by Wagner Hernandez MD at [address] [place] [address]  10/30/2009    [place]: hyperplastic polyp    COLONOSCOPY  02/2015    hyperplastic polyp (5 yr)    DILATATION, ESOPHAGUS      ENDOSCOPY, COLON, DIAGNOSTIC      ERCP N/A 03/24/2021    Dr LITO Grider-w/sphincterotomy, placement of a 10F x 5 cm biliary stent, balloon sweep-Cholangitis with choledocholithiasis, repeat in 3 months    ERCP N/A 06/09/2021    Dr LITO Grider-Successful removal of retained biliary sludge/stone debris    EYE SURGERY      FOOT SURGERY  01/2014    Left foot spur removal    LITHOTRIPSY Left 12/29/2015    ESWL EXTRACORPEAL SHOCK WAVE LITHOTRIPSY performed by Dhaval Neri MD at [place] [address]  11/2015    PILONIDAL CYST EXCISION      ID COLONOSCOPY FLX DX W/COLLJ SPEC WHEN PFRMD N/A 05/08/2017    Dr Constantino Amen sided diverticulosis-5 yr recall    ID EGD TRANSORAL BIOPSY SINGLE/MULTIPLE N/A 03/26/2018    Dr Ng-w/dilation over wire-51 [place]-esophageal stricture-Garces's (+) dysplasia (-)--1 yr recall   [place]  2007    right after fall, detached the subclavicle muscle    [facility]    Neck  surgery     UPPER GASTROINTESTINAL ENDOSCOPY N/A 02/12/2018    Dr Marta White Grade B esophagitis, hiatal hernia, stricture-(-) Марина, (+) Garces's (+) low grade dysplasia, active esophagogastritis, repeat: 3/26/18    UPPER GASTROINTESTINAL ENDOSCOPY  03/26/2018    Dr Ng-w/dilation over wire-51 [place]-esophageal stricture-Garces's (+) dysplasia (-)--1 yr recall    UPPER GASTROINTESTINAL ENDOSCOPY N/A 05/02/2019    Dr Chiquita Feliz (+) Garces's, (-) dysplasia     Social History     Occupational History    Not on file   Tobacco Use    Smoking status: Never Smoker    Smokeless tobacco: Never Used   Vaping Use    Vaping Use: Never used   Substance and Sexual Activity    Alcohol use: No    Drug use: No    Sexual activity: Yes     Partners: Female        Family History   Problem Relation Age of Onset    Cancer Mother     Lung Cancer Mother     Heart Disease Father     Heart Disease Sister     Diabetes Sister     Heart Disease Brother     Diabetes Brother     Colon Cancer Neg Hx     Colon Polyps Neg Hx     Esophageal Cancer Neg Hx     Liver Cancer Neg Hx     Liver Disease Neg Hx     Rectal Cancer Neg Hx     Stomach Cancer Neg Hx        Review of Systems  Review of Systems   Constitutional: Negative for fatigue and fever. Respiratory: Negative for cough and shortness of breath. Cardiovascular: Negative for chest pain, palpitations and leg swelling. Gastrointestinal: Negative for anal bleeding and blood in stool. Neurological: Negative for syncope, facial asymmetry and speech difficulty. Physical Exam  /62   Pulse 56   Ht 6' (1.829 m)   Wt 224 lb (101.6 kg)   SpO2 97%   BMI 30.38 kg/m²    Physical Exam  Constitutional:       Appearance: Normal appearance. He is normal weight. Cardiovascular:      Rate and Rhythm: Normal rate and regular rhythm. Heart sounds: Murmur heard. Systolic (Early, base of the heart) murmur is present with a grade of 2/6. No gallop. Pulmonary:      Effort: Pulmonary effort is normal.      Breath sounds: Normal breath sounds. Abdominal:      General: Abdomen is flat. Bowel sounds are normal.      Palpations: Abdomen is soft. Musculoskeletal:         General: No swelling. Skin:     General: Skin is warm and dry. Neurological:      Mental Status: He is alert. Assessment/Plan    EKG Findings:  Sinus rhythm, 56 bpm, otherwise basically normal    Problem List Items Addressed This Visit        Cardiology Problems    Mixed hyperlipidemia    Relevant Medications    losartan (COZAAR) 25 MG tablet    sotalol (BETAPACE) 120 MG tablet    Paroxysmal atrial fibrillation (HCC) - Primary    Relevant Orders    EKG 12 lead (Completed)    Essential hypertension           Diagnosis Orders   1. Paroxysmal atrial fibrillation (HCC)  EKG 12 lead    Presently sinus rhythm on sotalol   2. Essential hypertension     3.  Mixed hyperlipidemia         Recommendations:   Diet: Low-sodium, low-fat  Activity: Normal activities  Medication Changes: Continue same medications    I spent quite a bit of time talking to this patient about the fact that his chads vas score is now 2 whereas when I saw him a year ago it was 1, having been diagnosed with hypertension about 6 months ago. Technically, this places him in somewhat of a higher risk for stroke but to was a borderline score. Most importantly, the patient cannot afford Eliquis and would prefer checking Kardia device on a daily basis. I told him it is extremely important that he check it daily and if his monitor is indicating possible or probable atrial fibrillation he is to report this to us and I recommended that he check it every hour to see how long he stays out of rhythm if he goes out of rhythm at all. He states he will do so. Orders Placed This Encounter   Medications    losartan (COZAAR) 25 MG tablet     Sig: Take 1 tablet by mouth daily     Dispense:  90 tablet     Refill:  3    sotalol (BETAPACE) 120 MG tablet     Sig: Take 1 tablet by mouth 2 times daily     Dispense:  180 tablet     Refill:  3     Orders Placed This Encounter   Procedures    EKG 12 lead     Order Specific Question:   Reason for Exam?     Answer:   Irregular heart rate       Return in about 6 months (around 6/13/2022) for Ed Herrera, routine.

## 2021-12-14 ENCOUNTER — HOSPITAL ENCOUNTER (OUTPATIENT)
Dept: PAIN MANAGEMENT | Age: 71
Discharge: HOME | End: 2021-12-14
Payer: MEDICARE

## 2021-12-14 VITALS
DIASTOLIC BLOOD PRESSURE: 91 MMHG | SYSTOLIC BLOOD PRESSURE: 179 MMHG | TEMPERATURE: 97.6 F | HEART RATE: 56 BPM | RESPIRATION RATE: 18 BRPM | OXYGEN SATURATION: 96 %

## 2021-12-14 DIAGNOSIS — R52 PAIN MANAGEMENT: ICD-10-CM

## 2021-12-14 PROCEDURE — 3209999900 FLUORO FOR SURGICAL PROCEDURES

## 2021-12-14 PROCEDURE — 2500000003 HC RX 250 WO HCPCS

## 2021-12-14 PROCEDURE — 6360000002 HC RX W HCPCS

## 2021-12-14 PROCEDURE — 62323 NJX INTERLAMINAR LMBR/SAC: CPT

## 2021-12-14 PROCEDURE — 2580000003 HC RX 258

## 2021-12-14 RX ORDER — LIDOCAINE HYDROCHLORIDE 10 MG/ML
5 INJECTION, SOLUTION EPIDURAL; INFILTRATION; INTRACAUDAL; PERINEURAL ONCE
Status: DISCONTINUED | OUTPATIENT
Start: 2021-12-14 | End: 2021-12-16 | Stop reason: HOSPADM

## 2021-12-14 RX ORDER — METHYLPREDNISOLONE ACETATE 80 MG/ML
80 INJECTION, SUSPENSION INTRA-ARTICULAR; INTRALESIONAL; INTRAMUSCULAR; SOFT TISSUE ONCE
Status: DISCONTINUED | OUTPATIENT
Start: 2021-12-14 | End: 2021-12-16 | Stop reason: HOSPADM

## 2021-12-14 RX ORDER — SODIUM CHLORIDE 9 MG/ML
5 INJECTION INTRAVENOUS ONCE
Status: DISCONTINUED | OUTPATIENT
Start: 2021-12-14 | End: 2021-12-16 | Stop reason: HOSPADM

## 2021-12-14 ASSESSMENT — PAIN DESCRIPTION - FREQUENCY: FREQUENCY: INTERMITTENT

## 2021-12-14 ASSESSMENT — PAIN DESCRIPTION - PAIN TYPE: TYPE: CHRONIC PAIN

## 2021-12-14 ASSESSMENT — PAIN DESCRIPTION - LOCATION: LOCATION: BACK

## 2021-12-14 ASSESSMENT — PAIN DESCRIPTION - DESCRIPTORS: DESCRIPTORS: DISCOMFORT;ACHING;NAGGING

## 2021-12-14 ASSESSMENT — PAIN SCALES - GENERAL: PAINLEVEL_OUTOF10: 5

## 2021-12-14 ASSESSMENT — PAIN - FUNCTIONAL ASSESSMENT
PAIN_FUNCTIONAL_ASSESSMENT: PREVENTS OR INTERFERES SOME ACTIVE ACTIVITIES AND ADLS
PAIN_FUNCTIONAL_ASSESSMENT: 0-10

## 2021-12-14 ASSESSMENT — PAIN DESCRIPTION - ORIENTATION: ORIENTATION: LOWER

## 2021-12-14 ASSESSMENT — PAIN DESCRIPTION - DIRECTION: RADIATING_TOWARDS: RADIATES DOWN LEG

## 2021-12-14 NOTE — INTERVAL H&P NOTE
Update History & Physical    The patient's History and Physical  was reviewed with the patient and I examined the patient. There was  NO CHANGE:[id number]}. The surgical site was confirmed by the patient and me. Plan: The risks, benefits, expected outcome, and alternative to the recommended procedure have been discussed with the patient. Patient understands and wants to proceed with the procedure.      Electronically signed by Daryle Held, MD on 12/14/2021 at 8:33 AM

## 2022-03-29 ENCOUNTER — HOSPITAL ENCOUNTER (EMERGENCY)
Age: 72
Discharge: HOME | End: 2022-03-29
Attending: EMERGENCY MEDICINE
Payer: MEDICARE

## 2022-03-29 ENCOUNTER — APPOINTMENT (OUTPATIENT)
Dept: CT IMAGING | Age: 72
End: 2022-03-29
Payer: MEDICARE

## 2022-03-29 VITALS
HEIGHT: 72 IN | DIASTOLIC BLOOD PRESSURE: 90 MMHG | HEART RATE: 64 BPM | OXYGEN SATURATION: 94 % | BODY MASS INDEX: 31.15 KG/M2 | SYSTOLIC BLOOD PRESSURE: 153 MMHG | TEMPERATURE: 98.4 F | RESPIRATION RATE: 16 BRPM | WEIGHT: 230 LBS

## 2022-03-29 DIAGNOSIS — N13.2 HYDRONEPHROSIS WITH URINARY OBSTRUCTION DUE TO URETERAL CALCULUS: Primary | ICD-10-CM

## 2022-03-29 LAB
ALBUMIN SERPL-MCNC: 4.8 G/DL (ref 3.5–5.2)
ALP BLD-CCNC: 114 U/L (ref 40–130)
ALT SERPL-CCNC: 25 U/L (ref 5–41)
ANION GAP SERPL CALCULATED.3IONS-SCNC: 11 MMOL/L (ref 7–19)
AST SERPL-CCNC: 22 U/L (ref 5–40)
BACTERIA: NEGATIVE /HPF
BASOPHILS ABSOLUTE: 0.1 K/UL (ref 0–0.2)
BASOPHILS RELATIVE PERCENT: 0.8 % (ref 0–1)
BILIRUB SERPL-MCNC: 0.5 MG/DL (ref 0.2–1.2)
BILIRUBIN URINE: NEGATIVE
BLOOD, URINE: ABNORMAL
BUN BLDV-MCNC: 23 MG/DL (ref 8–23)
CALCIUM SERPL-MCNC: 9.6 MG/DL (ref 8.8–10.2)
CHLORIDE BLD-SCNC: 102 MMOL/L (ref 98–111)
CLARITY: CLEAR
CO2: 26 MMOL/L (ref 22–29)
COLOR: YELLOW
CREAT SERPL-MCNC: 0.8 MG/DL (ref 0.5–1.2)
CRYSTALS, UA: ABNORMAL /HPF
EOSINOPHILS ABSOLUTE: 0.1 K/UL (ref 0–0.6)
EOSINOPHILS RELATIVE PERCENT: 0.6 % (ref 0–5)
EPITHELIAL CELLS, UA: 1 /HPF (ref 0–5)
GFR AFRICAN AMERICAN: >59
GFR NON-AFRICAN AMERICAN: >60
GLUCOSE BLD-MCNC: 145 MG/DL (ref 74–109)
GLUCOSE URINE: NEGATIVE MG/DL
HCT VFR BLD CALC: 45.8 % (ref 42–52)
HEMOGLOBIN: 14.9 G/DL (ref 14–18)
HYALINE CASTS: 2 /HPF (ref 0–8)
IMMATURE GRANULOCYTES #: 0 K/UL
KETONES, URINE: NEGATIVE MG/DL
LEUKOCYTE ESTERASE, URINE: NEGATIVE
LIPASE: 21 U/L (ref 13–60)
LYMPHOCYTES ABSOLUTE: 1.4 K/UL (ref 1.1–4.5)
LYMPHOCYTES RELATIVE PERCENT: 15.8 % (ref 20–40)
MCH RBC QN AUTO: 31 PG (ref 27–31)
MCHC RBC AUTO-ENTMCNC: 32.5 G/DL (ref 33–37)
MCV RBC AUTO: 95.2 FL (ref 80–94)
MONOCYTES ABSOLUTE: 0.3 K/UL (ref 0–0.9)
MONOCYTES RELATIVE PERCENT: 3.9 % (ref 0–10)
NEUTROPHILS ABSOLUTE: 6.7 K/UL (ref 1.5–7.5)
NEUTROPHILS RELATIVE PERCENT: 78.5 % (ref 50–65)
NITRITE, URINE: NEGATIVE
PDW BLD-RTO: 13.5 % (ref 11.5–14.5)
PH UA: 7.5 (ref 5–8)
PLATELET # BLD: 266 K/UL (ref 130–400)
PMV BLD AUTO: 10.7 FL (ref 9.4–12.4)
POTASSIUM SERPL-SCNC: 4.9 MMOL/L (ref 3.5–5)
PROTEIN UA: ABNORMAL MG/DL
RBC # BLD: 4.81 M/UL (ref 4.7–6.1)
RBC UA: 158 /HPF (ref 0–4)
SODIUM BLD-SCNC: 139 MMOL/L (ref 136–145)
SPECIFIC GRAVITY UA: 1.02 (ref 1–1.03)
TOTAL PROTEIN: 7.4 G/DL (ref 6.6–8.7)
UROBILINOGEN, URINE: 0.2 E.U./DL
WBC # BLD: 8.5 K/UL (ref 4.8–10.8)
WBC UA: 2 /HPF (ref 0–5)

## 2022-03-29 PROCEDURE — 96374 THER/PROPH/DIAG INJ IV PUSH: CPT

## 2022-03-29 PROCEDURE — 80053 COMPREHEN METABOLIC PANEL: CPT

## 2022-03-29 PROCEDURE — 83690 ASSAY OF LIPASE: CPT

## 2022-03-29 PROCEDURE — 85025 COMPLETE CBC W/AUTO DIFF WBC: CPT

## 2022-03-29 PROCEDURE — 36415 COLL VENOUS BLD VENIPUNCTURE: CPT

## 2022-03-29 PROCEDURE — 74150 CT ABDOMEN W/O CONTRAST: CPT

## 2022-03-29 PROCEDURE — 96375 TX/PRO/DX INJ NEW DRUG ADDON: CPT

## 2022-03-29 PROCEDURE — 99284 EMERGENCY DEPT VISIT MOD MDM: CPT

## 2022-03-29 PROCEDURE — 81001 URINALYSIS AUTO W/SCOPE: CPT

## 2022-03-29 PROCEDURE — 6360000002 HC RX W HCPCS: Performed by: EMERGENCY MEDICINE

## 2022-03-29 RX ORDER — ONDANSETRON 4 MG/1
4 TABLET, ORALLY DISINTEGRATING ORAL EVERY 8 HOURS PRN
Qty: 30 TABLET | Refills: 0 | Status: SHIPPED | OUTPATIENT
Start: 2022-03-29

## 2022-03-29 RX ORDER — KETOROLAC TROMETHAMINE 30 MG/ML
15 INJECTION, SOLUTION INTRAMUSCULAR; INTRAVENOUS ONCE
Status: COMPLETED | OUTPATIENT
Start: 2022-03-29 | End: 2022-03-29

## 2022-03-29 RX ORDER — HYDROMORPHONE HYDROCHLORIDE 1 MG/ML
1 INJECTION, SOLUTION INTRAMUSCULAR; INTRAVENOUS; SUBCUTANEOUS ONCE
Status: COMPLETED | OUTPATIENT
Start: 2022-03-29 | End: 2022-03-29

## 2022-03-29 RX ORDER — ONDANSETRON 2 MG/ML
4 INJECTION INTRAMUSCULAR; INTRAVENOUS ONCE
Status: COMPLETED | OUTPATIENT
Start: 2022-03-29 | End: 2022-03-29

## 2022-03-29 RX ORDER — TAMSULOSIN HYDROCHLORIDE 0.4 MG/1
0.4 CAPSULE ORAL DAILY
Qty: 30 CAPSULE | Refills: 0 | Status: SHIPPED | OUTPATIENT
Start: 2022-03-29 | End: 2022-06-13

## 2022-03-29 RX ADMIN — KETOROLAC TROMETHAMINE 15 MG: 30 INJECTION, SOLUTION INTRAMUSCULAR at 10:00

## 2022-03-29 RX ADMIN — ONDANSETRON 4 MG: 2 INJECTION INTRAMUSCULAR; INTRAVENOUS at 08:48

## 2022-03-29 RX ADMIN — HYDROMORPHONE HYDROCHLORIDE 1 MG: 1 INJECTION, SOLUTION INTRAMUSCULAR; INTRAVENOUS; SUBCUTANEOUS at 08:48

## 2022-03-29 ASSESSMENT — PAIN SCALES - GENERAL
PAINLEVEL_OUTOF10: 5
PAINLEVEL_OUTOF10: 8
PAINLEVEL_OUTOF10: 10

## 2022-03-29 ASSESSMENT — PAIN DESCRIPTION - ORIENTATION: ORIENTATION: LEFT

## 2022-03-29 ASSESSMENT — PAIN DESCRIPTION - LOCATION: LOCATION: ABDOMEN;FLANK

## 2022-03-29 NOTE — ED PROVIDER NOTES
[facility]  eMERGENCY dEPARTMENT eNCOUnter      Pt Name: Radhika Zhang  MRN: [id number]  [place] 1950  Date of evaluation: 3/29/2022  Provider: Efra Currie MD    [address] [place] [address]       Chief Complaint   Patient presents with    Abdominal Pain    Flank Pain         HISTORY OF PRESENT ILLNESS   (Location/Symptom, Timing/Onset,Context/Setting, Quality, Duration, Modifying Factors, Severity)  Note limiting factors. Radhika Zhang is a 80 y.o. male who presents to the emergency department with severe sharp L sided pain  \"It is my old kidney stones. \"    Was in [place] and threw up and hit him hard. Last night came back with pain severe on L. First time had 2 bouts of pain like this. Been to er off and on over years, last time 2 years. No fever. No dysuria and no blood in urine. Not taken anything for pain, pain is 7/10 currently, moving is worse. L mid abd and radiates from there. The history is provided by the patient and the spouse. NursingNotes were reviewed. REVIEW OF SYSTEMS    (2-9 systems for level 4, 10 or more for level 5)     Review of Systems   Constitutional: Negative for fever. Genitourinary: Positive for flank pain. A complete review of systems was performed and is negative except as noted above in the HPI. PAST MEDICAL HISTORY     Past Medical History:   Diagnosis Date    Arthritis     Atrial fibrillation [facility])     sees dr. Cruzito Gordon Difficult intubation     pt just had recent cervical fusion with plate 2 months ago    DVT (deep venous thrombosis) ([facility] 2014    left leg    Elbow pain     torn bicep    Essential hypertension 12/13/2021    GERD (gastroesophageal reflux disease)     Hx of blood clots     left leg/ after heel surg/ jan.  2015    Hyperlipidemia     recently able to be taken off cholesterol meds    Kidney stone on left side          SURGICAL HISTORY       Past Surgical History: Procedure Laterality Date    BICEPS TENDON REPAIR Right 07/16/2020    RIGHT BICEPS TENDON REPAIR performed by Ivelisse Nunn MD at [place] [address]      2015, oct    CHOLECYSTECTOMY, LAPAROSCOPIC N/A 03/23/2021    CHOLECYSTECTOMY LAPAROSCOPIC with IOC performed by Cj Guzman MD at [facility], [address]  10/30/2009    [place]: hyperplastic polyp    COLONOSCOPY  02/2015    hyperplastic polyp (5 yr)    DILATATION, ESOPHAGUS      ENDOSCOPY, COLON, DIAGNOSTIC      ERCP N/A 03/24/2021    Dr LITO Grider-w/sphincterotomy, placement of a 10F x 5 cm biliary stent, balloon sweep-Cholangitis with choledocholithiasis, repeat in 3 months    ERCP N/A 06/09/2021    Dr LITO Grider-Successful removal of retained biliary sludge/stone debris    EYE SURGERY      FOOT SURGERY  01/2014    Left foot spur removal    LITHOTRIPSY Left 12/29/2015    ESWL EXTRACORPEAL SHOCK WAVE LITHOTRIPSY performed by Stephane Perez MD at [address]  11/2015    PILONIDAL CYST EXCISION      MA COLONOSCOPY FLX DX W/COLLJ SPEC WHEN PFRMD N/A 05/08/2017    Dr Bettie Bliss sided diverticulosis-5 yr recall    MA EGD TRANSORAL BIOPSY SINGLE/MULTIPLE N/A 03/26/2018    Dr Ng-w/dilation over wire-51 [place]-esophageal stricture-Garces's (+) dysplasia (-)--1 yr recall    SHOULDER SURGERY  2007    right after fall, detached the subclavicle muscle    [facility]    Neck  surgery     UPPER GASTROINTESTINAL ENDOSCOPY N/A 02/12/2018    Dr Aakash Voss Grade B esophagitis, hiatal hernia, stricture-(-) Марина, (+) Garces's (+) low grade dysplasia, active esophagogastritis, repeat: 3/26/18    UPPER GASTROINTESTINAL ENDOSCOPY  03/26/2018    Dr Ng-w/dilation over wire-51 [place]-esophageal stricture-Garces's (+) dysplasia (-)--1 yr recall    UPPER GASTROINTESTINAL ENDOSCOPY N/A 05/02/2019    Dr Machelle Pratt (+) Garces's, (-) dysplasia         CURRENT MEDICATIONS       Previous Medications ASCORBIC ACID (VITAMIN C PO)    Take 1 tablet by mouth nightly     ASPIRIN 81 MG EC TABLET    Take 81 mg by mouth daily    LOSARTAN (COZAAR) 25 MG TABLET    Take 1 tablet by mouth daily    MULTIPLE VITAMINS-MINERALS (ICAPS AREDS FORMULA PO)    Take 1 capsule by mouth 2 times daily    MULTIPLE VITAMINS-MINERALS (THERAPEUTIC MULTIVITAMIN-MINERALS) TABLET    Take 1 tablet by mouth daily    OMEPRAZOLE (PRILOSEC) 20 MG DELAYED RELEASE CAPSULE    Take 1 capsule by mouth Daily    PROBIOTIC PRODUCT (PROBIOTIC FORMULA PO)    Take 1 tablet by mouth daily     SOTALOL (BETAPACE) 120 MG TABLET    Take 1 tablet by mouth 2 times daily    VITAMIN B-12 (CYANOCOBALAMIN) 1000 MCG TABLET    Take 1,000 mcg by mouth daily        ALLERGIES     Patient has no known allergies.     FAMILY HISTORY       Family History   Problem Relation Age of Onset    Cancer Mother     Lung Cancer Mother     Heart Disease Father     Heart Disease Sister     Diabetes Sister     Heart Disease Brother     Diabetes Brother     Colon Cancer Neg Hx     Colon Polyps Neg Hx     Esophageal Cancer Neg Hx     Liver Cancer Neg Hx     Liver Disease Neg Hx     Rectal Cancer Neg Hx     Stomach Cancer Neg Hx           SOCIAL HISTORY       Social History     Socioeconomic History    Marital status:      Spouse name: None    Number of children: 2    Years of education: None    Highest education level: None   Occupational History    None   Tobacco Use    Smoking status: Never Smoker    Smokeless tobacco: Never Used   Vaping Use    Vaping Use: Never used   Substance and Sexual Activity    Alcohol use: No    Drug use: No    Sexual activity: Yes     Partners: Female   Other Topics Concern    None   Social History Narrative    None     Social Determinants of Health     Financial Resource Strain:     Difficulty of Paying Living Expenses: Not on file   Food Insecurity:     Worried About Running Out of Food in the Last Year: Not on file    Ricarda of Food in the Last Year: Not on file   Transportation Needs:     Lack of Transportation (Medical): Not on file    Lack of Transportation (Non-Medical): Not on file   Physical Activity:     Days of Exercise per Week: Not on file    Minutes of Exercise per Session: Not on file   Stress:     Feeling of Stress : Not on file   Social Connections:     Frequency of Communication with Friends and Family: Not on file    Frequency of Social Gatherings with Friends and Family: Not on file    Attends Gnosticism Services: Not on file    Active Member of [address] [place] [address] [employer] or Organizations: Not on file    Attends Club or Organization Meetings: Not on file    Marital Status: Not on file   Intimate Partner Violence:     Fear of Current or Ex-Partner: Not on file    Emotionally Abused: Not on file    Physically Abused: Not on file    Sexually Abused: Not on file   Housing Stability:     Unable to Pay for Housing in the Last Year: Not on file    Number of [place] in the Last Year: Not on file    Unstable Housing in the Last Year: Not on file       SCREENINGS    [place] Coma Scale  Eye Opening: Spontaneous  Best Verbal Response: Oriented  Best Motor Response: Obeys commands  Jermaine Coma Scale Score: 15        PHYSICAL EXAM    (up to 7 for level 4, 8 or more for level 5)     ED Triage Vitals [03/29/22 0826]   BP Temp Temp src Pulse Resp SpO2 Height Weight   (!) 153/90 98.4 °F (36.9 °C) -- 64 16 94 % 6' (1.829 m) 230 lb (104.3 kg)       Physical Exam  Vitals and nursing note reviewed. Constitutional:       Comments: In pain   HENT:      Head: Normocephalic and atraumatic. Eyes:      Extraocular Movements: Extraocular movements intact. Pupils: Pupils are equal, round, and reactive to light. Cardiovascular:      Rate and Rhythm: Normal rate. Pulmonary:      Effort: Pulmonary effort is normal. No respiratory distress. Abdominal:      General: Abdomen is flat. Palpations: Abdomen is soft.       Tenderness: There is left CVA tenderness. Skin:     General: Skin is warm and dry. Neurological:      General: No focal deficit present. Mental Status: He is alert and oriented to person, place, and time. Psychiatric:         Mood and Affect: Mood normal.         Behavior: Behavior normal.         DIAGNOSTIC RESULTS     EKG: All EKG's are interpreted by the Emergency Department Physician who either signs or Co-signs this chart in the absence of a cardiologist.        RADIOLOGY:   Non-plain film images such as CT, Ultrasound and MRI are read by the radiologist. [place] images are visualized and preliminarily interpreted by the emergency physician with the below findings:        Interpretation per the Radiologist below, if available at the time of this note:    CT KIDNEY WO CONTRAST   Final Result   1.  5 mm calculus at the LEFT ureterovesical junction causing moderate   LEFT hydroureteronephrosis. Multiple nonobstructing bilateral renal   calculi are also present. 2.  Colonic diverticulosis without evidence of diverticulitis. 3.  Mild prominence of the common bile duct status post   cholecystectomy, favored to be reservoir phenomenon.    Signed by Dr Ginger Zuniga            ED BEDSIDE ULTRASOUND:   Performed by ED Physician - none    LABS:  Labs Reviewed   COMPREHENSIVE METABOLIC PANEL - Abnormal; Notable for the following components:       Result Value    Glucose 145 (*)     All other components within normal limits   CBC WITH AUTO DIFFERENTIAL - Abnormal; Notable for the following components:    MCV 95.2 (*)     MCHC 32.5 (*)     Neutrophils % 78.5 (*)     Lymphocytes % 15.8 (*)     All other components within normal limits   URINALYSIS WITH REFLEX TO CULTURE - Abnormal; Notable for the following components:    Blood, Urine MODERATE (*)     Protein, UA TRACE (*)     All other components within normal limits   MICROSCOPIC URINALYSIS - Abnormal; Notable for the following components:    Bacteria, UA NEGATIVE (*)     Crystals, UA NEG (*)     RBC,  (*)     All other components within normal limits   LIPASE       All other labs were within normal range or not returned as of this dictation. EMERGENCY DEPARTMENT COURSE and DIFFERENTIALDIAGNOSIS/MDM:   Vitals:    Vitals:    03/29/22 0826   BP: (!) 153/90   Pulse: 64   Resp: 16   Temp: 98.4 °F (36.9 °C)   SpO2: 94%   Weight: 230 lb (104.3 kg)   Height: 6' (1.829 m)       MDM  Number of Diagnoses or Management Options  Hydronephrosis with urinary obstruction due to ureteral calculus  Diagnosis management comments: 9:59 AM  Pt much improved    Pain gone    No sign infection        10:29 AM CDT  Patient pain-free in no distress will give him some more Zofran for home start him on Flomax he has pain medication at home he tells me. At the distal stone I gave him some Toradol. He will call urology has been to Dr. Jay Soto in the past.  Otherwise stable to try expectant therapy at home. Return precautions discussed for not limited to fever intractable nausea vomiting or pain. No harry    Pt tolerating po    Stable for dc and uro outpt follow up        Amount and/or Complexity of Data Reviewed  Clinical lab tests: ordered and reviewed  Tests in the radiology section of CPT®: ordered and reviewed  Obtain history from someone other than the patient: yes    Patient Progress  Patient progress: improved        CONSULTS:  None    PROCEDURES:  Unless otherwise notedbelow, none     Procedures    FINAL IMPRESSION     1.  Hydronephrosis with urinary obstruction due to ureteral calculus          DISPOSITION/PLAN   DISPOSITION        PATIENT REFERRED TO:  Daniele Dougherty MD  [address] [place] [address]  [phone]    Schedule an appointment as soon as possible for a visit in 2 days  if not better      DISCHARGE MEDICATIONS:  New Prescriptions    ONDANSETRON (ZOFRAN ODT) 4 MG DISINTEGRATING TABLET    Take 1 tablet by mouth every 8 hours as needed for Nausea or Vomiting    TAMSULOSIN (FLOMAX) 0.4 MG CAPSULE    Take 1 capsule by mouth daily          (Please note that portions of this note were completed with a voice recognition program.  Efforts were made to edit the dictations butoccasionally words are mis-transcribed.)    Demetrio Day MD (electronically signed)  Thompson Olivier MD  03/29/22 [address], [place]  04/15/22 9914

## 2022-04-26 ENCOUNTER — HOSPITAL ENCOUNTER (OUTPATIENT)
Dept: PAIN MANAGEMENT | Age: 72
Discharge: HOME | End: 2022-04-26
Payer: MEDICARE

## 2022-04-26 VITALS
TEMPERATURE: 96.6 F | OXYGEN SATURATION: 99 % | HEART RATE: 55 BPM | SYSTOLIC BLOOD PRESSURE: 156 MMHG | DIASTOLIC BLOOD PRESSURE: 81 MMHG | RESPIRATION RATE: 16 BRPM

## 2022-04-26 DIAGNOSIS — R52 PAIN MANAGEMENT: ICD-10-CM

## 2022-04-26 PROCEDURE — 62323 NJX INTERLAMINAR LMBR/SAC: CPT

## 2022-04-26 PROCEDURE — 2580000003 HC RX 258

## 2022-04-26 PROCEDURE — A4216 STERILE WATER/SALINE, 10 ML: HCPCS

## 2022-04-26 PROCEDURE — 3209999900 FLUORO FOR SURGICAL PROCEDURES

## 2022-04-26 PROCEDURE — 6360000002 HC RX W HCPCS

## 2022-04-26 PROCEDURE — 2500000003 HC RX 250 WO HCPCS

## 2022-04-26 RX ORDER — SODIUM CHLORIDE 9 MG/ML
5 INJECTION INTRAVENOUS ONCE
Status: DISCONTINUED | OUTPATIENT
Start: 2022-04-26 | End: 2022-04-28 | Stop reason: HOSPADM

## 2022-04-26 RX ORDER — METHYLPREDNISOLONE ACETATE 40 MG/ML
80 INJECTION, SUSPENSION INTRA-ARTICULAR; INTRALESIONAL; INTRAMUSCULAR; SOFT TISSUE ONCE
Status: DISCONTINUED | OUTPATIENT
Start: 2022-04-26 | End: 2022-04-28 | Stop reason: HOSPADM

## 2022-04-26 RX ORDER — LIDOCAINE HYDROCHLORIDE 10 MG/ML
5 INJECTION, SOLUTION EPIDURAL; INFILTRATION; INTRACAUDAL; PERINEURAL ONCE
Status: DISCONTINUED | OUTPATIENT
Start: 2022-04-26 | End: 2022-04-28 | Stop reason: HOSPADM

## 2022-04-26 ASSESSMENT — PAIN DESCRIPTION - LOCATION: LOCATION: BACK

## 2022-04-26 ASSESSMENT — PAIN SCALES - GENERAL: PAINLEVEL_OUTOF10: 3

## 2022-04-26 ASSESSMENT — PAIN - FUNCTIONAL ASSESSMENT: PAIN_FUNCTIONAL_ASSESSMENT: 0-10

## 2022-04-26 ASSESSMENT — PAIN DESCRIPTION - ORIENTATION: ORIENTATION: LOWER

## 2022-04-26 NOTE — INTERVAL H&P NOTE
Update History & Physical    The patient's History and Physical  was reviewed with the patient and I examined the patient. There was  NO CHANGE:[id number]}. The surgical site was confirmed by the patient and me. Plan: The risks, benefits, expected outcome, and alternative to the recommended procedure have been discussed with the patient. Patient understands and wants to proceed with the procedure.      Electronically signed by Philip White MD on 4/26/2022 at 8:31 AM

## 2022-05-31 ENCOUNTER — TELEPHONE (OUTPATIENT)
Dept: PRIMARY CARE CLINIC | Age: 72
End: 2022-05-31

## 2022-05-31 DIAGNOSIS — Z12.5 PROSTATE CANCER SCREENING: ICD-10-CM

## 2022-05-31 DIAGNOSIS — I10 ESSENTIAL HYPERTENSION: ICD-10-CM

## 2022-05-31 DIAGNOSIS — I95.9 HYPOTENSION, UNSPECIFIED HYPOTENSION TYPE: ICD-10-CM

## 2022-05-31 DIAGNOSIS — Z00.00 ROUTINE GENERAL MEDICAL EXAMINATION AT A HEALTH CARE FACILITY: ICD-10-CM

## 2022-05-31 DIAGNOSIS — E78.2 MIXED HYPERLIPIDEMIA: Primary | ICD-10-CM

## 2022-06-03 DIAGNOSIS — Z00.00 ROUTINE GENERAL MEDICAL EXAMINATION AT A HEALTH CARE FACILITY: ICD-10-CM

## 2022-06-03 DIAGNOSIS — Z12.5 PROSTATE CANCER SCREENING: ICD-10-CM

## 2022-06-03 DIAGNOSIS — I10 ESSENTIAL HYPERTENSION: ICD-10-CM

## 2022-06-03 DIAGNOSIS — E78.2 MIXED HYPERLIPIDEMIA: ICD-10-CM

## 2022-06-03 LAB
ALBUMIN SERPL-MCNC: 4.2 G/DL (ref 3.5–5.2)
ALP BLD-CCNC: 113 U/L (ref 40–130)
ALT SERPL-CCNC: 32 U/L (ref 5–41)
ANION GAP SERPL CALCULATED.3IONS-SCNC: 10 MMOL/L (ref 7–19)
AST SERPL-CCNC: 23 U/L (ref 5–40)
BASOPHILS ABSOLUTE: 0.1 K/UL (ref 0–0.2)
BASOPHILS RELATIVE PERCENT: 1.1 % (ref 0–1)
BILIRUB SERPL-MCNC: 0.3 MG/DL (ref 0.2–1.2)
BUN BLDV-MCNC: 21 MG/DL (ref 8–23)
CALCIUM SERPL-MCNC: 9.3 MG/DL (ref 8.8–10.2)
CHLORIDE BLD-SCNC: 102 MMOL/L (ref 98–111)
CHOLESTEROL, FASTING: 177 MG/DL (ref 160–199)
CO2: 25 MMOL/L (ref 22–29)
CREAT SERPL-MCNC: 0.7 MG/DL (ref 0.5–1.2)
CREATININE URINE: 220.3 MG/DL (ref 4.2–622)
EOSINOPHILS ABSOLUTE: 0.2 K/UL (ref 0–0.6)
EOSINOPHILS RELATIVE PERCENT: 4.1 % (ref 0–5)
GFR AFRICAN AMERICAN: >59
GFR NON-AFRICAN AMERICAN: >60
GLUCOSE BLD-MCNC: 111 MG/DL (ref 74–109)
HCT VFR BLD CALC: 42.6 % (ref 42–52)
HDLC SERPL-MCNC: 34 MG/DL (ref 55–121)
HEMOGLOBIN: 13.5 G/DL (ref 14–18)
IMMATURE GRANULOCYTES #: 0 K/UL
LDL CHOLESTEROL CALCULATED: 116 MG/DL
LYMPHOCYTES ABSOLUTE: 1.7 K/UL (ref 1.1–4.5)
LYMPHOCYTES RELATIVE PERCENT: 37.3 % (ref 20–40)
MCH RBC QN AUTO: 31 PG (ref 27–31)
MCHC RBC AUTO-ENTMCNC: 31.7 G/DL (ref 33–37)
MCV RBC AUTO: 97.7 FL (ref 80–94)
MICROALBUMIN UR-MCNC: <1.2 MG/DL (ref 0–19)
MICROALBUMIN/CREAT UR-RTO: NORMAL MG/G
MONOCYTES ABSOLUTE: 0.5 K/UL (ref 0–0.9)
MONOCYTES RELATIVE PERCENT: 10.4 % (ref 0–10)
NEUTROPHILS ABSOLUTE: 2.2 K/UL (ref 1.5–7.5)
NEUTROPHILS RELATIVE PERCENT: 46.9 % (ref 50–65)
PDW BLD-RTO: 14.2 % (ref 11.5–14.5)
PLATELET # BLD: 225 K/UL (ref 130–400)
PMV BLD AUTO: 11.2 FL (ref 9.4–12.4)
POTASSIUM SERPL-SCNC: 4.5 MMOL/L (ref 3.5–5)
PROSTATE SPECIFIC ANTIGEN: 0.61 NG/ML (ref 0–4)
RBC # BLD: 4.36 M/UL (ref 4.7–6.1)
SODIUM BLD-SCNC: 137 MMOL/L (ref 136–145)
T4 FREE: 1.11 NG/DL (ref 0.93–1.7)
TOTAL PROTEIN: 7 G/DL (ref 6.6–8.7)
TRIGLYCERIDE, FASTING: 135 MG/DL (ref 0–149)
TSH SERPL DL<=0.05 MIU/L-ACNC: 2.46 UIU/ML (ref 0.27–4.2)
WBC # BLD: 4.6 K/UL (ref 4.8–10.8)

## 2022-06-06 ENCOUNTER — TELEPHONE (OUTPATIENT)
Dept: PRIMARY CARE CLINIC | Age: 72
End: 2022-06-06

## 2022-06-06 NOTE — TELEPHONE ENCOUNTER
Pt wants to hold off on meds right now. He wants to adjust diet and exercise      Called patient, spoke with: Patient regarding the results of the patients most recent labs. I advised Patient of Dr. Barroso Pulse recommendations.    Patient did voice understanding

## 2022-06-06 NOTE — TELEPHONE ENCOUNTER
----- Message from [address] [facility],[address], DO sent at 6/3/2022  5:08 PM CDT -----  PSA is normal.Thyroid values normal.There is no significant protein excretion in the urine. Lipids are elevated. Recommend rosuvastatin 20 mg nightly, dispense #30 with 11 refills. Recheck lipids and ALT in 6 weeks. Your metabolic profile is normal.  This includes kidney and liver functions as well as electrolytes. CBC shows a mild anemia consistent with previous values. No other significant abnormalities noted

## 2022-06-13 ENCOUNTER — OFFICE VISIT (OUTPATIENT)
Dept: CARDIOLOGY CLINIC | Age: 72
End: 2022-06-13
Payer: MEDICARE

## 2022-06-13 VITALS
OXYGEN SATURATION: 96 % | SYSTOLIC BLOOD PRESSURE: 132 MMHG | DIASTOLIC BLOOD PRESSURE: 70 MMHG | BODY MASS INDEX: 29.53 KG/M2 | WEIGHT: 218 LBS | HEART RATE: 56 BPM | HEIGHT: 72 IN

## 2022-06-13 DIAGNOSIS — I10 ESSENTIAL HYPERTENSION: ICD-10-CM

## 2022-06-13 DIAGNOSIS — E78.2 MIXED HYPERLIPIDEMIA: ICD-10-CM

## 2022-06-13 DIAGNOSIS — I48.0 PAROXYSMAL ATRIAL FIBRILLATION (HCC): Primary | ICD-10-CM

## 2022-06-13 PROCEDURE — G8417 CALC BMI ABV UP PARAM F/U: HCPCS | Performed by: NURSE PRACTITIONER

## 2022-06-13 PROCEDURE — 1123F ACP DISCUSS/DSCN MKR DOCD: CPT | Performed by: NURSE PRACTITIONER

## 2022-06-13 PROCEDURE — 1036F TOBACCO NON-USER: CPT | Performed by: NURSE PRACTITIONER

## 2022-06-13 PROCEDURE — 3017F COLORECTAL CA SCREEN DOC REV: CPT | Performed by: NURSE PRACTITIONER

## 2022-06-13 PROCEDURE — 99214 OFFICE O/P EST MOD 30 MIN: CPT | Performed by: NURSE PRACTITIONER

## 2022-06-13 PROCEDURE — G8427 DOCREV CUR MEDS BY ELIG CLIN: HCPCS | Performed by: NURSE PRACTITIONER

## 2022-06-13 NOTE — PATIENT INSTRUCTIONS
New instructions for today:  If you feel that your heart is out of rhythm and the irregularity lasts for more than 4 to 6 hours, notify the office. If the office is closed, go to the ER. Patient Instructions:  Continue current medications as prescribed. Always keep a current medication list. Bring your medications to every office visit. Continue to follow up with primary care provider for non cardiac medical problems. Call the office with any problems, questions or concerns at [phone]. If you have been asked to keep a blood pressure log, do so for 2 weeks. Call the office to report readings to the triage nurse at [phone]. Follow up with cardiologist as scheduled. The following educational material has been included in this after visit summary for your review: Life simple 7. Heart health. Life simple 7  1) Manage blood pressure - high blood pressure is a major risk factor for heart disease and stroke. Keeping blood pressure in health range reduces strain on your heart, arteries and kidneys. Blood pressure goal is less than 130/80. 2) Control cholesterol - contributes to plaque, which can clog arteries and lead to heart disease and stroke. When you control your cholesterol you are giving your arteries their best chance to remain clear. It is recommended that you get cholesterol lab work done once a year. 3) Reduce blood sugar - most of the food we eat is turning into glucose or blood sugar that our body uses for energy. Over time, high levels of blood sugar can damage your heart, kidneys, eyes and nerves. 4) Get active - living an active life is one of the most rewarding gifts you can give yourself and those you love. Simply put, daily physical activity increases your length and quality of life. Strive to exercise 15 minutes most days of the week. 5)  Eat better - A healthy diet is one of your best weapons for fighting cardiovascular disease.   When you eat a heart healthy diet, you improve your chances for feeling good and staying healthy for life. 6)  Lose weight - when you shed extra fat an unnecessary pounds, you reduce the burden on your hear, lungs, blood vessels and skeleton. You give yourself the gift of active living, you lower your blood pressure and help yourself feel better. 7) Stop smoking - cigarette smokers have a higher risk of developing cardiovascular disease. If  You smoke, quitting is the best thing you can do for your health. Check American Heart Association on line for more information on Life's Simple 7 and tips for healthy living. A Healthy Heart: Care Instructions  Your Care Instructions     Coronary artery disease, also called heart disease, occurs when a substance called plaque builds up in the vessels that supply oxygen-rich blood to your heart muscle. This can narrow the blood vessels and reduce blood flow. A heart attack happens when blood flow is completely blocked. A high-fat diet, smoking, and other factors increase the risk of heart disease. Your doctor has found that you have a chance of having heart disease. You can do lots of things to keep your heart healthy. It may not be easy, but you can change your diet, exercise more, and quit smoking. These steps really work to lower your chance of heart disease. Follow-up care is a key part of your treatment and safety. Be sure to make and go to all appointments, and call your doctor if you are having problems. It's also a good idea to know your test results and keep a list of the medicines you take. How can you care for yourself at home? Diet  · Use less salt when you cook and eat. This helps lower your blood pressure. Taste food before salting. Add only a little salt when you think you need it. With time, your taste buds will adjust to less salt. · Eat fewer snack items, fast foods, canned soups, and other high-salt, high-fat, processed foods.   · Read food labels and try to avoid saturated and trans fats. They increase your risk of heart disease by raising cholesterol levels. · Limit the amount of solid fat-butter, margarine, and shortening-you eat. Use olive, peanut, or canola oil when you cook. Bake, broil, and steam foods instead of frying them. · Eat a variety of fruit and vegetables every day. Dark green, deep orange, red, or yellow fruits and vegetables are especially good for you. Examples include spinach, carrots, peaches, and berries. · Foods high in fiber can reduce your cholesterol and provide important vitamins and minerals. High-fiber foods include whole-grain cereals and breads, oatmeal, beans, brown rice, citrus fruits, and apples. · Eat lean proteins. Heart-healthy proteins include seafood, lean meats and poultry, eggs, beans, peas, nuts, seeds, and soy products. · Limit drinks and foods with added sugar. These include candy, desserts, and soda pop. Lifestyle changes  · If your doctor recommends it, get more exercise. Walking is a good choice. Bit by bit, increase the amount you walk every day. Try for at least 30 minutes on most days of the week. You also may want to swim, bike, or do other activities. · Do not smoke. If you need help quitting, talk to your doctor about stop-smoking programs and medicines. These can increase your chances of quitting for good. Quitting smoking may be the most important step you can take to protect your heart. It is never too late to quit. · Limit alcohol to 2 drinks a day for men and 1 drink a day for women. Too much alcohol can cause health problems. · Manage other health problems such as diabetes, high blood pressure, and high cholesterol. If you think you may have a problem with alcohol or drug use, talk to your doctor. Medicines  · Take your medicines exactly as prescribed. Call your doctor if you think you are having a problem with your medicine. · If your doctor recommends aspirin, take the amount directed each day.  Make sure you take aspirin and not another kind of pain reliever, such as acetaminophen (Tylenol). When should you call for help? [id number] if you have symptoms of a heart attack. These may include:  · Chest pain or pressure, or a strange feeling in the chest.  · Sweating. · Shortness of breath. · Pain, pressure, or a strange feeling in the back, neck, jaw, or upper belly or in one or both shoulders or arms. · Lightheadedness or sudden weakness. · A fast or irregular heartbeat. After you call 911, the  may tell you to chew 1 adult-strength or 2 to 4 low-dose aspirin. Wait for an ambulance. Do not try to drive yourself. Watch closely for changes in your health, and be sure to contact your doctor if you have any problems. Where can you learn more? Go to https://[employer]ias[employer]rum.[facility]. org and sign in to your "[employer] account. Enter D710 in the [employer] box to learn more about \"A Healthy Heart: Care Instructions. \"     If you do not have an account, please click on the \"Sign Up Now\" link. Current as of: December 16, 2019               Content Version: 12.5  © 8792-9199 [employer]wise, [employer]. Care instructions adapted under license by [facility] ([facility]). If you have questions about a medical condition or this instruction, always ask your healthcare professional. [place] [address] any warranty or liability for your use of this information.

## 2022-06-13 NOTE — PROGRESS NOTES
[facility] [place], [place]. [address] [facility] [address] [facility]  [phone] office  [phone] fax      OFFICE VISIT:  2022    Braydon Tarango - : 1950  Reason For Visit:  Justine Sanchez is a 71 y.o. male who is here for 6 Month Follow-Up (Patient states he has no cardiac symptoms or complaints. ) and Atrial Fibrillation    History:   Diagnosis Orders   1. Paroxysmal atrial fibrillation (HCC)     2. Essential hypertension     3. Mixed hyperlipidemia       The patient presents today for cardiology follow up. The patient remains active with no chest or decline in activity tolerance. He continues on Sotalol 120 mg BID and ASA. He has not sensed recurrent AF. The patient denies symptoms to suggest myocardial ischemia, heart failure or arrhythmia. BP is well controlled on current regimen. The patient's PCP monitors cholesterol. Subjective  Dustin denies exertional chest pain, shortness of breath, orthopnea, paroxysmal nocturnal dyspnea, syncope, presyncope, sensed arrhythmia, edema and fatigue. The patient denies numbness or weakness to suggest cerebrovascular accident or transient ischemic attack.       Braydon Tarango has the following history as recorded in [facility]:  Patient Active Problem List   Diagnosis Code    Deep vein thrombosis (DVT) ([facility] [address] I82.409    Mixed hyperlipidemia E78.2    Decreased carotid pulse R09.89    Numbness and tingling in right hand R20.0, R20.2    Heart murmur R01.1    Chest pain R07.9    History of colon polyps Z86.010    Nephrolithiasis N20.0    Colon polyps K63.5    Gastrointestinal hemorrhage K92.2    Diverticulosis of large intestine with hemorrhage K57.31    Acute blood loss anemia D62    Hypotension I95.9    Bloody diarrhea R19.7    History of kidney stones Z87.442    Diverticulosis of intestine with bleeding K57.91    Esophageal dysphagia R13.19    Acute pain of right knee M25.561    Hyperlipoproteinemia E78.5    H/O cervical spine surgery Z98.890    Esophageal pain K22.89    Garces's esophagus without dysplasia K22.70    Chronic GERD K21.9    Sinoatrial node dysfunction (HCC) I49.5    Paroxysmal atrial fibrillation (HCC) I48.0    Acute cholecystitis K81.0    Cholangitis K83.09    Choledocholithiasis K80.50    Essential hypertension I10     Past Medical History:   Diagnosis Date    Arthritis     Atrial fibrillation [facility])     sees dr. Jozef Aly Difficult intubation     pt just had recent cervical fusion with plate 2 months ago    DVT (deep venous thrombosis) ([facility] [address] 2014    left leg    Elbow pain     torn bicep    Essential hypertension 12/13/2021    GERD (gastroesophageal reflux disease)     Hx of blood clots     left leg/ after heel surg/ jan. 2015    Hyperlipidemia     recently able to be taken off cholesterol meds    Kidney stone on left side      Past Surgical History:   Procedure Laterality Date    BICEPS TENDON REPAIR Right 07/16/2020    RIGHT BICEPS TENDON REPAIR performed by Magui Ryder MD at [place] [address]      2015, oct    CHOLECYSTECTOMY, LAPAROSCOPIC N/A 03/23/2021    CHOLECYSTECTOMY LAPAROSCOPIC with IOC performed by Wade Mtz MD at [address] [place] [address]  10/30/2009    [facility]: hyperplastic polyp    COLONOSCOPY  02/2015    hyperplastic polyp (5 yr)    DILATATION, ESOPHAGUS      ENDOSCOPY, COLON, DIAGNOSTIC      ERCP N/A 03/24/2021    Dr LITO Grider-w/sphincterotomy, placement of a 10F x 5 cm biliary stent, balloon sweep-Cholangitis with choledocholithiasis, repeat in 3 months    ERCP N/A 06/09/2021    Dr LITO Grider-Successful removal of retained biliary sludge/stone debris    EYE SURGERY      FOOT SURGERY  01/2014    Left foot spur removal    LITHOTRIPSY Left 12/29/2015    ESWL EXTRACORPEAL SHOCK WAVE LITHOTRIPSY performed by Heidy Hirsch MD at [place] [address] SURGERY  11/2015    PILONIDAL CYST EXCISION      DE COLONOSCOPY FLX DX W/COLLJ SPEC WHEN PFRMD N/A 05/08/2017    Dr Samano Windy sided diverticulosis-5 yr recall    DE EGD TRANSORAL BIOPSY SINGLE/MULTIPLE N/A 03/26/2018    Dr Ng-w/dilation over wire-51 French-esophageal stricture-Garces's (+) dysplasia (-)--1 yr recall    SHOULDER SURGERY  2007    right after fall, detached the subclavicle muscle    [address] [facility]    Neck  surgery     UPPER GASTROINTESTINAL ENDOSCOPY N/A 02/12/2018    Dr Asia Olvera Grade B esophagitis, hiatal hernia, stricture-(-) Марина, (+) Garces's (+) low grade dysplasia, active esophagogastritis, repeat: 3/26/18    UPPER GASTROINTESTINAL ENDOSCOPY  03/26/2018    Dr Ng-w/dilation over wire-51 French-esophageal stricture-Garces's (+) dysplasia (-)--1 yr recall    UPPER GASTROINTESTINAL ENDOSCOPY N/A 05/02/2019    Dr Temo Flynn (+) Graces's, (-) dysplasia     Family History   Problem Relation Age of Onset    Cancer Mother     Lung Cancer Mother     Heart Disease Father     Heart Disease Sister     Diabetes Sister     Heart Disease Brother     Diabetes Brother     Colon Cancer Neg Hx     Colon Polyps Neg Hx     Esophageal Cancer Neg Hx     Liver Cancer Neg Hx     Liver Disease Neg Hx     Rectal Cancer Neg Hx     Stomach Cancer Neg Hx      Social History     Tobacco Use    Smoking status: Never Smoker    Smokeless tobacco: Never Used   Substance Use Topics    Alcohol use: No      Current Outpatient Medications   Medication Sig Dispense Refill    ondansetron (ZOFRAN ODT) 4 MG disintegrating tablet Take 1 tablet by mouth every 8 hours as needed for Nausea or Vomiting 30 tablet 0    losartan (COZAAR) 25 MG tablet Take 1 tablet by mouth daily 90 tablet 3    sotalol (BETAPACE) 120 MG tablet Take 1 tablet by mouth 2 times daily 180 tablet 3    Multiple Vitamins-Minerals (THERAPEUTIC MULTIVITAMIN-MINERALS) tablet Take 1 tablet by mouth daily      Multiple Vitamins-Minerals (ICAPS AREDS FORMULA PO) Take 1 capsule by mouth 2 times daily      aspirin 81 MG EC tablet Take 81 mg by mouth daily      Probiotic Product (PROBIOTIC FORMULA PO) Take 1 tablet by mouth daily       Ascorbic Acid (VITAMIN C PO) Take 1 tablet by mouth nightly       vitamin B-12 (CYANOCOBALAMIN) 1000 MCG tablet Take 1,000 mcg by mouth daily        No current facility-administered medications for this visit. Allergies: Patient has no known allergies. Review of Systems  Constitutional - no appetite change, or unexpected weight change. No fever, chills or diaphoresis. No significant change in activity level or new onset of fatigue. HEENT - no significant rhinorrhea or epistaxis. No tinnitus or significant hearing loss. Eyes - no sudden vision change or amaurosis. No corneal arcus, xantholasma, subconjunctival hemorrhage or discharge. Respiratory - no significant wheezing, stridor, apnea or cough. No dyspnea on exertion or shortness of air. Cardiovascular - no exertional chest pain to suggest myocardial ischemia. No orthopnea or PND. No sensation of sustained arrythmia. No occurrence of slow heart rate. No palpitations. No claudication. Gastrointestinal - no abdominal swelling or pain. No blood in stool. No severe constipation, diarrhea, nausea, or vomiting. Genitourinary - no dysuria, frequency, or urgency. No flank pain or hematuria. Musculoskeletal - no back pain or myalgia. No problems with gait. Extremities - no clubbing, cyanosis or extremity edema. Skin - no color change or rash. No pallor. No new surgical incision. Neurologic - no speech difficulty, facial asymmetry or lateralizing weakness. No seizures, presyncope or syncope. No significant dizziness. Hematologic - no easy bruising or excessive bleeding. Psychiatric - no severe anxiety or insomnia. No confusion. All other review of systems are negative.     Objective  Vital Signs - /70 Pulse 56   Ht 6' (1.829 m)   Wt 218 lb (98.9 kg)   SpO2 96%   BMI 29.57 kg/m²   General - Dustin is alert, cooperative, and pleasant. Well groomed. No acute distress. Body habitus - Body mass index is 29.57 kg/m². HEENT - Head is normocephalic. No circumoral cyanosis. Dentition is normal.  EYES -   Lids normal without ptosis. No discharge, edema or subconjunctival hemorrhage. Neck - Symmetrical without apparent mass or lymphadenopathy. Respiratory - Normal respiratory effort without use of accessory muscles. Ausculatation reveals vesicular breath sounds without crackles, wheezes, rub or rhonchi. Cardiovascular - No jugular venous distention. Auscultation reveals regular rate and rhythm. No audible clicks, gallop or rub. No murmur. No lower extremity varicosities. No carotid bruits. Abdominal -  No visible distention, mass or pulsations. Extremities - No clubbing or cyanosis. No statis dermatitis or ulcers. No edema. Musculoskeletal -   No Osler's nodes. No kyphosis or scoliosis. Gait is even and regular without limp or shuffle. Ambulates without assistance. Skin -  Warm and dry; no rash or pallor. No new surgical wound. Neurological - No focal neurological deficits. Thought processes coherent. No apparent tremor. Oriented to person, place and time. Psychiatric -  Appropriate affect and mood. Data reviewed:  1/25/20 Lexiscan  Findings:   1. Analysis of the stress and rest images reveals no obvious areas of   ischemia or infarction. 2. Analysis of the gated images reveals grossly normal left   ventricular function with a calculated ejection fraction of 62 %.    Conclusions:   Grossly negative Cardiolyte for the presence of ischemia or infarction   with normal wall motion and ejection fraction at rest.   Signed by Dr Crystal Zapien on 1/25/2020 10:25 AM     Lab Results   Component Value Date    WBC 4.6 (L) 06/03/2022    HGB 13.5 (L) 06/03/2022    HCT 42.6 06/03/2022 MCV 97.7 (H) 06/03/2022     06/03/2022     Lab Results   Component Value Date     06/03/2022    K 4.5 06/03/2022     06/03/2022    CO2 25 06/03/2022    BUN 21 06/03/2022    CREATININE 0.7 06/03/2022    GLUCOSE 111 (H) 06/03/2022    CALCIUM 9.3 06/03/2022    PROT 7.0 06/03/2022    LABALBU 4.2 06/03/2022    BILITOT 0.3 06/03/2022    ALKPHOS 113 06/03/2022    AST 23 06/03/2022    ALT 32 06/03/2022    LABGLOM >60 06/03/2022    GFRAA >59 06/03/2022    GLOB 2.5 03/12/2017       Lab Results   Component Value Date    CHOL 189 06/17/2021    CHOL 196 07/09/2020    CHOL 105 (L) 01/15/2020     Lab Results   Component Value Date    TRIG 170 (H) 06/17/2021    TRIG 93 07/09/2020    TRIG 63 01/15/2020     Lab Results   Component Value Date    HDL 34 (L) 06/03/2022    HDL 30 (L) 06/17/2021    HDL 36 (L) 07/09/2020     Lab Results   Component Value Date    LDLCHOLESTEROL 78 12/13/2017    LDLCALC 116 06/03/2022    LDLCALC 125 06/17/2021    LDLCALC 141 07/09/2020     Lab Results   Component Value Date    VLDL 34 09/03/2014     Lab Results   Component Value Date    CHOLHDLRATIO 2.3 09/03/2014       BP Readings from Last 3 Encounters:   06/13/22 132/70   04/26/22 (!) 156/81   03/29/22 (!) 153/90    Pulse Readings from Last 3 Encounters:   06/13/22 56   04/26/22 55   03/29/22 64        Wt Readings from Last 3 Encounters:   06/13/22 218 lb (98.9 kg)   03/29/22 230 lb (104.3 kg)   12/13/21 224 lb (101.6 kg)     Assessment/Plan:   Diagnosis Orders   1. Paroxysmal atrial fibrillation (HCC)     2. Essential hypertension     3. Mixed hyperlipidemia       [id number] Score: 2  Disclaimer: Risk Score calculation is dependent on accuracy of patient problem list and past encounter diagnosis. Stable CV status without overt heart failure, sensed arrhythmia or angina. PAF - no symptomology to suggest recurrent AF. Continue Sotalol 120 mg BID. HTN - normotensive on current regimen. BP goal less than 130/80.  Continue same.    Hyperlipidemia - monitored and managed by PCP. LDL 78. Patient is compliant with medication regimen. Previous cardiac history and records reviewed. Continue current medical management for cardiac related condition. Continue other current medications as directed. Continue to follow up with primary care provider for non cardiac medical problems. If your primary care provider is outside of the [facility], please request that your labs be faxed to this office at [phone]. BP goal 130/80 or less. Call the office with any problems, questions or concerns at [phone]. Cardiology follow up as scheduled in [address] appointments. Educational included in patient instructions. Heart health.       Ruthann Gao, APRN

## 2022-06-14 SDOH — HEALTH STABILITY: PHYSICAL HEALTH: ON AVERAGE, HOW MANY DAYS PER WEEK DO YOU ENGAGE IN MODERATE TO STRENUOUS EXERCISE (LIKE A BRISK WALK)?: 7 DAYS

## 2022-06-14 SDOH — HEALTH STABILITY: PHYSICAL HEALTH: ON AVERAGE, HOW MANY MINUTES DO YOU ENGAGE IN EXERCISE AT THIS LEVEL?: 110 MIN

## 2022-06-23 SDOH — HEALTH STABILITY: PHYSICAL HEALTH: ON AVERAGE, HOW MANY DAYS PER WEEK DO YOU ENGAGE IN MODERATE TO STRENUOUS EXERCISE (LIKE A BRISK WALK)?: 6 DAYS

## 2022-06-23 SDOH — HEALTH STABILITY: PHYSICAL HEALTH: ON AVERAGE, HOW MANY MINUTES DO YOU ENGAGE IN EXERCISE AT THIS LEVEL?: 150+ MIN

## 2022-06-23 ASSESSMENT — PATIENT HEALTH QUESTIONNAIRE - PHQ9
1. LITTLE INTEREST OR PLEASURE IN DOING THINGS: 0
SUM OF ALL RESPONSES TO PHQ QUESTIONS 1-9: 0
2. FEELING DOWN, DEPRESSED OR HOPELESS: 0
SUM OF ALL RESPONSES TO PHQ9 QUESTIONS 1 & 2: 0
SUM OF ALL RESPONSES TO PHQ QUESTIONS 1-9: 0

## 2022-06-23 ASSESSMENT — LIFESTYLE VARIABLES
HOW OFTEN DO YOU HAVE A DRINK CONTAINING ALCOHOL: NEVER
HOW OFTEN DO YOU HAVE A DRINK CONTAINING ALCOHOL: 1

## 2022-06-24 ENCOUNTER — OFFICE VISIT (OUTPATIENT)
Dept: PRIMARY CARE CLINIC | Age: 72
End: 2022-06-24
Payer: MEDICARE

## 2022-06-24 VITALS
DIASTOLIC BLOOD PRESSURE: 80 MMHG | BODY MASS INDEX: 29.8 KG/M2 | HEIGHT: 72 IN | OXYGEN SATURATION: 99 % | SYSTOLIC BLOOD PRESSURE: 130 MMHG | TEMPERATURE: 97 F | WEIGHT: 220 LBS | HEART RATE: 65 BPM

## 2022-06-24 DIAGNOSIS — E78.2 MIXED HYPERLIPIDEMIA: ICD-10-CM

## 2022-06-24 DIAGNOSIS — N20.0 RENAL LITHIASIS: ICD-10-CM

## 2022-06-24 DIAGNOSIS — K83.8 COMMON BILE DUCT DILATATION: ICD-10-CM

## 2022-06-24 DIAGNOSIS — I10 PRIMARY HYPERTENSION: Primary | ICD-10-CM

## 2022-06-24 DIAGNOSIS — I82.5Z9 CHRONIC DEEP VEIN THROMBOSIS (DVT) OF DISTAL VEIN OF LOWER EXTREMITY, UNSPECIFIED LATERALITY (HCC): ICD-10-CM

## 2022-06-24 DIAGNOSIS — Z00.00 MEDICARE ANNUAL WELLNESS VISIT, SUBSEQUENT: ICD-10-CM

## 2022-06-24 DIAGNOSIS — R10.13 EPIGASTRIC PAIN: ICD-10-CM

## 2022-06-24 DIAGNOSIS — I48.0 PAROXYSMAL ATRIAL FIBRILLATION (HCC): ICD-10-CM

## 2022-06-24 DIAGNOSIS — K21.01 GASTROESOPHAGEAL REFLUX DISEASE WITH ESOPHAGITIS AND HEMORRHAGE: ICD-10-CM

## 2022-06-24 DIAGNOSIS — N13.2 HYDRONEPHROSIS WITH URINARY OBSTRUCTION DUE TO RENAL CALCULUS: ICD-10-CM

## 2022-06-24 PROCEDURE — 1123F ACP DISCUSS/DSCN MKR DOCD: CPT | Performed by: PEDIATRICS

## 2022-06-24 PROCEDURE — 1036F TOBACCO NON-USER: CPT | Performed by: PEDIATRICS

## 2022-06-24 PROCEDURE — G0439 PPPS, SUBSEQ VISIT: HCPCS | Performed by: PEDIATRICS

## 2022-06-24 PROCEDURE — 3017F COLORECTAL CA SCREEN DOC REV: CPT | Performed by: PEDIATRICS

## 2022-06-24 PROCEDURE — 99204 OFFICE O/P NEW MOD 45 MIN: CPT | Performed by: PEDIATRICS

## 2022-06-24 PROCEDURE — G8427 DOCREV CUR MEDS BY ELIG CLIN: HCPCS | Performed by: PEDIATRICS

## 2022-06-24 PROCEDURE — G8417 CALC BMI ABV UP PARAM F/U: HCPCS | Performed by: PEDIATRICS

## 2022-06-24 RX ORDER — SIMVASTATIN 10 MG
10 TABLET ORAL NIGHTLY
Qty: 90 TABLET | Refills: 3 | Status: SHIPPED | OUTPATIENT
Start: 2022-06-24

## 2022-06-24 ASSESSMENT — ENCOUNTER SYMPTOMS
RESPIRATORY NEGATIVE: 1
ALLERGIC/IMMUNOLOGIC NEGATIVE: 1
CONSTIPATION: 1
EYES NEGATIVE: 1

## 2022-06-24 NOTE — PATIENT INSTRUCTIONS
Personalized Preventive Plan for Maribel Aguiar - 6/24/2022  Medicare offers a range of preventive health benefits. Some of the tests and screenings are paid in full while other may be subject to a deductible, co-insurance, and/or copay. Some of these benefits include a comprehensive review of your medical history including lifestyle, illnesses that may run in your family, and various assessments and screenings as appropriate. After reviewing your medical record and screening and assessments performed today your provider may have ordered immunizations, labs, imaging, and/or referrals for you. A list of these orders (if applicable) as well as your Preventive Care list are included within your After Visit Summary for your review. Other Preventive Recommendations:    · A preventive eye exam performed by an eye specialist is recommended every 1-2 years to screen for glaucoma; cataracts, macular degeneration, and other eye disorders. · A preventive dental visit is recommended every 6 months. · Try to get at least 150 minutes of exercise per week or 10,000 steps per day on a pedometer . · Order or download the FREE \"Exercise & Physical Activity: Your Everyday Guide\" from [employer]. Call 8-893.296.1992 or search [employer] online. · You need 2811-9616 mg of calcium and 2645-2802 IU of vitamin D per day. It is possible to meet your calcium requirement with diet alone, but a vitamin D supplement is usually necessary to meet this goal.  · When exposed to the sun, use a sunscreen that protects against both UVA and UVB radiation with an SPF of 30 or greater. Reapply every 2 to 3 hours or after sweating, drying off with a towel, or swimming. · Always wear a seat belt when traveling in a car. Always wear a helmet when riding a bicycle or motorcycle. Patient Education        Well Visit, Over 72: Care Instructions  Overview     Well visits can help you stay healthy. Your doctor has checked your overall health and may have suggested ways to take good care of yourself. Your doctor also may have recommended tests. At home, you can help prevent illness withhealthy eating, regular exercise, and other steps. Follow-up care is a key part of your treatment and safety. Be sure to make and go to all appointments, and call your doctor if you are having problems. It's also a good idea to know your test results and keep alist of the medicines you take. How can you care for yourself at home? Get screening tests that you and your doctor decide on. Screening helps find diseases before any symptoms appear. Eat healthy foods. Choose fruits, vegetables, whole grains, protein, and low-fat dairy foods. Limit fat, especially saturated fat. Reduce salt in your diet. Limit alcohol. If you are a man, have no more than 2 drinks a day or 14 drinks a week. If you are a woman, have no more than 1 drink a day or 7 drinks a week. Since alcohol affects older adults differently, you may want to limit alcohol even more. Or you may not want to drink at all. Get at least 30 minutes of exercise on most days of the week. Walking is a good choice. You also may want to do other activities, such as running, swimming, cycling, or playing tennis or team sports. Reach and stay at a healthy weight. This will lower your risk for many problems, such as obesity, diabetes, heart disease, and high blood pressure. Do not smoke. Smoking can make health problems worse. If you need help quitting, talk to your doctor about stop-smoking programs and medicines. These can increase your chances of quitting for good. Care for your mental health. It is easy to get weighed down by worry and stress. Learn strategies to manage stress, like deep breathing and mindfulness, and stay connected with your family and community. If you find you often feel sad or hopeless, talk with your doctor. Treatment can help.   Talk to your doctor about whether you have any risk factors for sexually transmitted infections (STIs). You can help prevent STIs if you wait to have sex with a new partner (or partners) until you've each been tested for STIs. It also helps if you use condoms (male or female condoms) and if you limit your sex partners to one person who only has sex with you. Vaccines are available for some STIs. If you think you may have a problem with alcohol or drug use, talk to your doctor. This includes prescription medicines (such as amphetamines and opioids) and illegal drugs (such as cocaine and methamphetamine). Your doctor can help you figure out what type of treatment is best for you. Protect your skin from too much sun. When you're outdoors from 10 a.m. to 4 p.m., stay in the shade or cover up with clothing and a hat with a wide brim. Wear sunglasses that block UV rays. Even when it's cloudy, put broad-spectrum sunscreen (SPF 30 or higher) on any exposed skin. See a dentist one or two times a year for checkups and to have your teeth cleaned. Wear a seat belt in the car. When should you call for help? Watch closely for changes in your health, and be sure to contact your doctor if you have any problems or symptoms that concern you. Where can you learn more? Go to https://[employer].health-partners. org and sign in to your [employer] account. Enter R753 in the [facility] box to learn more about \"Well Visit, Over 65: Care Instructions. \"     If you do not have an account, please click on the \"Sign Up Now\" link. Current as of: October 6, 2021               Content Version: 13.3  © 2006-2022 Healthwise, Incorporated. Care instructions adapted under license by [facility] ([facility]). If you have questions about a medical condition or this instruction, always ask your healthcare professional. [address] any warranty or liability for your use of this information.          Patient Education        Taking Direct Oral Anticoagulants Safely: Care Instructions  Your Care Instructions     Direct oral anticoagulants (DOACs) are medicines that help prevent blood clots. They also help treat problems caused by blood clots. These medicines are alsocalled blood thinners. Blood thinners don't really thin the blood. They slow down the time it takes for a blood clot to form. They also keep existing blood clots from getting bigger. Blood thinners can help prevent a stroke caused by a heart rhythm problem (atrial fibrillation). This heart rhythm problem can form clots in the heart that can then go to the brain. Blood thinners can also help prevent ortreat blood clots in the legs or lungs. Examples of DOACs include:  Apixaban (Eliquis). Dabigatran (Pradaxa). Edoxaban (Savaysa). Rivaroxaban (Xarelto). Blood thinners can help save lives. But they can also cause problems. They can make you more likely to bleed. It's important to take them right and doeverything you can to keep yourself safe. Follow-up care is a key part of your treatment and safety. Be sure to make and go to all appointments, and call your doctor if you are having problems. It's also a good idea to know your test results and keep alist of the medicines you take. How can you care for yourself at home? Take your anticoagulant (blood thinner) exactly as your doctor prescribed them. If you miss a dose, don't take an extra dose to make up for it. Your doctor can tell you exactly what to do so you don't take too much or too little. Ask your pharmacist if you should take your blood thinner with food or without food. Take your blood thinner at the same time every day. Be careful not to run out of them. Ask your pharmacist how to store your blood thinner. Don't take other medicines before talking to your doctor or pharmacist first. This includes prescription medicines, over-the-counter medicines, vitamins, and herbal products.  It also includes aspirin and other pain relievers, such as ibuprofen (Motrin). Tell your doctors, dentist, and pharmacist that you are taking a blood thinner. Wear medical alert jewelry. This lets others know that you take a blood thinner. You can buy it at most drugstores. If you are pregnant, breastfeeding, or trying to get pregnant, tell your doctor. You and your doctor will decide what medicines are safe. If you are not planning on getting pregnant, talk to your doctor about how you can prevent pregnancy. Try to avoid injuries. For example, be careful when you are exercising or playing sports. Make your home safe to reduce your risk of falling. When should you call for help? Call 911 anytime you think you may need emergency care. For example, call if:    You have a sudden, severe headache that is different from past headaches. Call your doctor now or seek immediate medical care if:    You have any abnormal bleeding, such as:  Nosebleeds. Vaginal bleeding that is different (heavier, more frequent, at a different time of the month) than what you are used to. Bloody or black stools, or rectal bleeding. Bloody or pink urine. Watch closely for changes in your health, and be sure to contact your doctor ifyou have any problems. Where can you learn more? Go to https://[employer]. org and sign in to your [employer] account. Enter Z861 in the [employer] box to learn more about \"Taking Direct Oral Anticoagulants Safely: Care Instructions. \"     If you do not have an account, please click on the \"Sign Up Now\" link. Current as of: January 10, 2022               Content Version: 13.3  © 2451-6326 Healthwise, Incorporated. Care instructions adapted under license by [facility] "[employer] [facility] ([facility]). If you have questions about a medical condition or this instruction, always ask your healthcare professional. [address] any warranty or liability for your use of this information.

## 2022-07-06 ENCOUNTER — TELEPHONE (OUTPATIENT)
Dept: PRIMARY CARE CLINIC | Age: 72
End: 2022-07-06

## 2022-07-06 ENCOUNTER — HOSPITAL ENCOUNTER (OUTPATIENT)
Dept: GENERAL RADIOLOGY | Age: 72
Discharge: HOME | End: 2022-07-06
Payer: MEDICARE

## 2022-07-06 DIAGNOSIS — R10.13 EPIGASTRIC PAIN: ICD-10-CM

## 2022-07-06 DIAGNOSIS — K83.8 COMMON BILE DUCT DILATATION: ICD-10-CM

## 2022-07-06 DIAGNOSIS — N20.0 RENAL LITHIASIS: ICD-10-CM

## 2022-07-06 DIAGNOSIS — N13.2 HYDRONEPHROSIS WITH URINARY OBSTRUCTION DUE TO RENAL CALCULUS: ICD-10-CM

## 2022-07-06 PROCEDURE — 74176 CT ABD & PELVIS W/O CONTRAST: CPT | Performed by: RADIOLOGY

## 2022-07-06 PROCEDURE — 74176 CT ABD & PELVIS W/O CONTRAST: CPT

## 2022-07-06 PROCEDURE — G1010 CDSM STANSON: HCPCS | Performed by: RADIOLOGY

## 2022-07-06 NOTE — TELEPHONE ENCOUNTER
Patient:Dustin Mcgregor  : 1950  Referring Provider: Sekou Jordan  Referral Type:  Imaging     Procedures:  YNS114 - CT ABDOMEN PELVIS WO CONTRAST  Date Service Ordered 2022        We were unable to reach Shweta Singletary to schedule test ordered by your office after 3 call attempts. Please call your patient to explain significance of ordered test and direct patient to call Central Scheduling to re-schedule test at their earliest convenience.     Please complete one of the following actions from Quick Actions buttons:     Route to Provider:  Route message to ordering provider to seek next steps in care plan.     Telephone Encounter:  Telephone encounter will open. Call patient to explain significance of ordered test and direct patient to call Central Scheduling to schedule test then Document details of call.     Open Referral:  Review referral notes or details if needed.     Close Referral:  Referral will open. Document in Notes section of referral why the referral is being closed. Examples of referral closure:  Patient had test done outside of [facility] ([facility]) (list location), Patient refuses test, Patient no longer having symptoms, Unable to reach patient.   Only close the referral if you are sure the test will not proceed.     Thank you     Central Scheduling

## 2022-07-07 ENCOUNTER — TELEPHONE (OUTPATIENT)
Dept: PRIMARY CARE CLINIC | Age: 72
End: 2022-07-07

## 2022-07-07 DIAGNOSIS — K76.9 LESION OF LIVER: Primary | ICD-10-CM

## 2022-07-07 NOTE — TELEPHONE ENCOUNTER
----- Message from LUIS Cain sent at 7/7/2022  8:05 AM CDT -----  Please call patient and let them know results. CAT scan abdomen pelvis shows some nonobstructive bilateral kidney stones. There is also a well-defined hypodense lesion in the liver. Radiologist recommends ultrasound.   Please obtain ultrasound for further evaluation  Otherwise negative CT of the abdomen and pelvis

## 2022-07-07 NOTE — TELEPHONE ENCOUNTER
Called patient, spoke with: Patient regarding the results of the patients most recent ct scan. I advised Patient of Nic Crystal recommendations.    Patient did voice understanding

## 2022-07-13 ENCOUNTER — HOSPITAL ENCOUNTER (OUTPATIENT)
Dept: GENERAL RADIOLOGY | Age: 72
Discharge: HOME | End: 2022-07-13
Payer: MEDICARE

## 2022-07-13 ENCOUNTER — TELEPHONE (OUTPATIENT)
Dept: PRIMARY CARE CLINIC | Age: 72
End: 2022-07-13

## 2022-07-13 DIAGNOSIS — K76.9 LESION OF LIVER: ICD-10-CM

## 2022-07-13 PROCEDURE — 76705 ECHO EXAM OF ABDOMEN: CPT | Performed by: RADIOLOGY

## 2022-07-13 PROCEDURE — 76705 ECHO EXAM OF ABDOMEN: CPT

## 2022-07-13 NOTE — TELEPHONE ENCOUNTER
----- Message from LUIS Contreras sent at 7/13/2022  1:21 PM CDT -----  Please call patient and let them know results.    Ultrasound shows a cyst on liver otherwise negative Yes

## 2022-08-02 ENCOUNTER — HOSPITAL ENCOUNTER (OUTPATIENT)
Dept: PAIN MANAGEMENT | Age: 72
Discharge: HOME | End: 2022-08-02
Payer: MEDICARE

## 2022-08-02 VITALS
TEMPERATURE: 96.4 F | RESPIRATION RATE: 16 BRPM | DIASTOLIC BLOOD PRESSURE: 69 MMHG | SYSTOLIC BLOOD PRESSURE: 145 MMHG | HEART RATE: 66 BPM | OXYGEN SATURATION: 96 %

## 2022-08-02 DIAGNOSIS — R52 PAIN MANAGEMENT: ICD-10-CM

## 2022-08-02 PROCEDURE — 2500000003 HC RX 250 WO HCPCS

## 2022-08-02 PROCEDURE — 6360000002 HC RX W HCPCS

## 2022-08-02 PROCEDURE — 3209999900 FLUORO FOR SURGICAL PROCEDURES

## 2022-08-02 PROCEDURE — A4216 STERILE WATER/SALINE, 10 ML: HCPCS

## 2022-08-02 PROCEDURE — 2580000003 HC RX 258

## 2022-08-02 PROCEDURE — 62323 NJX INTERLAMINAR LMBR/SAC: CPT

## 2022-08-02 RX ORDER — LIDOCAINE HYDROCHLORIDE 10 MG/ML
5 INJECTION, SOLUTION EPIDURAL; INFILTRATION; INTRACAUDAL; PERINEURAL ONCE
Status: DISCONTINUED | OUTPATIENT
Start: 2022-08-02 | End: 2022-08-04 | Stop reason: HOSPADM

## 2022-08-02 RX ORDER — SODIUM CHLORIDE 9 MG/ML
5 INJECTION INTRAVENOUS ONCE
Status: DISCONTINUED | OUTPATIENT
Start: 2022-08-02 | End: 2022-08-04 | Stop reason: HOSPADM

## 2022-08-02 RX ORDER — METHYLPREDNISOLONE ACETATE 40 MG/ML
80 INJECTION, SUSPENSION INTRA-ARTICULAR; INTRALESIONAL; INTRAMUSCULAR; SOFT TISSUE ONCE
Status: DISCONTINUED | OUTPATIENT
Start: 2022-08-02 | End: 2022-08-04 | Stop reason: HOSPADM

## 2022-08-02 ASSESSMENT — PAIN - FUNCTIONAL ASSESSMENT
PAIN_FUNCTIONAL_ASSESSMENT: 0-10
PAIN_FUNCTIONAL_ASSESSMENT: 0-10

## 2022-08-02 ASSESSMENT — PAIN DESCRIPTION - LOCATION: LOCATION: BACK

## 2022-08-02 ASSESSMENT — PAIN DESCRIPTION - ORIENTATION: ORIENTATION: LOWER

## 2022-08-02 ASSESSMENT — PAIN SCALES - GENERAL: PAINLEVEL_OUTOF10: 8

## 2022-08-02 NOTE — INTERVAL H&P NOTE
Update History & Physical    The patient's History and Physical  was reviewed with the patient and I examined the patient. There was no change. The surgical site was confirmed by the patient and me. Plan: The risks, benefits, expected outcome, and alternative to the recommended procedure have been discussed with the patient. Patient understands and wants to proceed with the procedure.      Electronically signed by Nima Dash MD on 8/2/2022 at 9:55 AM

## 2022-11-15 ENCOUNTER — HOSPITAL ENCOUNTER (OUTPATIENT)
Dept: PAIN MANAGEMENT | Age: 72
Discharge: HOME | End: 2022-11-15
Payer: MEDICARE

## 2022-11-15 VITALS
OXYGEN SATURATION: 95 % | DIASTOLIC BLOOD PRESSURE: 89 MMHG | RESPIRATION RATE: 18 BRPM | HEART RATE: 59 BPM | TEMPERATURE: 96.7 F | SYSTOLIC BLOOD PRESSURE: 160 MMHG

## 2022-11-15 DIAGNOSIS — R52 PAIN MANAGEMENT: ICD-10-CM

## 2022-11-15 PROCEDURE — 6360000002 HC RX W HCPCS

## 2022-11-15 PROCEDURE — 2500000003 HC RX 250 WO HCPCS

## 2022-11-15 PROCEDURE — 2580000003 HC RX 258

## 2022-11-15 PROCEDURE — A4216 STERILE WATER/SALINE, 10 ML: HCPCS

## 2022-11-15 PROCEDURE — 62323 NJX INTERLAMINAR LMBR/SAC: CPT

## 2022-11-15 RX ORDER — SODIUM CHLORIDE 9 MG/ML
5 INJECTION INTRAVENOUS ONCE
Status: DISCONTINUED | OUTPATIENT
Start: 2022-11-15 | End: 2022-11-17 | Stop reason: HOSPADM

## 2022-11-15 RX ORDER — LIDOCAINE HYDROCHLORIDE 10 MG/ML
5 INJECTION, SOLUTION EPIDURAL; INFILTRATION; INTRACAUDAL; PERINEURAL ONCE
Status: DISCONTINUED | OUTPATIENT
Start: 2022-11-15 | End: 2022-11-17 | Stop reason: HOSPADM

## 2022-11-15 RX ORDER — METHYLPREDNISOLONE ACETATE 40 MG/ML
80 INJECTION, SUSPENSION INTRA-ARTICULAR; INTRALESIONAL; INTRAMUSCULAR; SOFT TISSUE ONCE
Status: DISCONTINUED | OUTPATIENT
Start: 2022-11-15 | End: 2022-11-17 | Stop reason: HOSPADM

## 2022-11-15 RX ORDER — CELECOXIB 200 MG/1
CAPSULE ORAL
COMMUNITY
Start: 2022-11-04

## 2022-11-15 ASSESSMENT — PAIN DESCRIPTION - ORIENTATION: ORIENTATION: LOWER;LEFT

## 2022-11-15 ASSESSMENT — PAIN DESCRIPTION - DIRECTION: RADIATING_TOWARDS: LOWER BACK PAIN THAT RADIATES DOWN THE LEFT LEG.

## 2022-11-15 ASSESSMENT — PAIN - FUNCTIONAL ASSESSMENT: PAIN_FUNCTIONAL_ASSESSMENT: 0-10

## 2022-11-15 ASSESSMENT — PAIN DESCRIPTION - LOCATION: LOCATION: BACK;LEG

## 2022-11-15 NOTE — INTERVAL H&P NOTE
Update History & Physical    The patient's History and Physical   was reviewed with the patient and I examined the patient. There was no change. The surgical site was confirmed by the patient and me. Plan: The risks, benefits, expected outcome, and alternative to the recommended procedure have been discussed with the patient. Patient understands and wants to proceed with the procedure.      Electronically signed by Pk Waite MD on 11/15/2022 at 9:53 AM

## 2022-11-15 NOTE — PROGRESS NOTES
Procedure:  Level of Consciousness: [x]Alert [x]Oriented []Disoriented []Lethargic  Anxiety Level: [x]Calm []Anxious []Depressed []Other  Skin: [x]Warm [x]Dry []Cool []Moist []Intact []Other  Cardiovascular: [x]Palpitations: []Never []Occasionally []Frequently  Chest Pain: [x]No []Yes  Respiratory:  [x]Unlabored []Labored []Cough ([] Productive []Unproductive)  HCG Required: [x]No []Yes   Results: []Negative []Positive  Knowledge Level:        [x]Patient/Other verbalized understanding of pre-procedure instructions. [x]Assessment of post-op care needs (transportation, responsible caregiver)        [x]Able to discuss health care problems and how to deal with it.   Factors that Affect Teaching:        Language Barrier: [x]No []Yes - why:        Hearing Loss:        [x]No []Yes            Corrective Device:  []Yes [x]No        Vision Loss:           []No [x]Yes            Corrective Device:  [x]Yes []No        Memory Loss:       [x]No []Yes            []Short Term []Long Term  Motivational Level:  [x]Asks Questions                  []Extremely Anxious       [x]Seems Interested               []Seems Uninterested                  []Denies need for Education  Risk for Injury:  [x]Patient oriented to person, place and time  []History of frequent falls/loss of balance  Nutritional:  []Change in appetite   []Weight Gain   []Weight Loss  Functional:  []Requires assistance with ADL's

## 2022-11-15 NOTE — INTERVAL H&P NOTE
Update History & Physical    The patient's History and Physical  was reviewed with the patient and I examined the patient. There was no change. The surgical site was confirmed by the patient and me. Plan: The risks, benefits, expected outcome, and alternative to the recommended procedure have been discussed with the patient. Patient understands and wants to proceed with the procedure.      Electronically signed by Alex Oropeza MD on 11/15/2022 at 9:49 AM

## 2022-11-29 ENCOUNTER — HOSPITAL ENCOUNTER (OUTPATIENT)
Dept: PAIN MANAGEMENT | Age: 72
Discharge: HOME | End: 2022-11-29
Payer: MEDICARE

## 2022-11-29 VITALS
HEART RATE: 64 BPM | OXYGEN SATURATION: 95 % | SYSTOLIC BLOOD PRESSURE: 145 MMHG | RESPIRATION RATE: 18 BRPM | TEMPERATURE: 96.4 F | DIASTOLIC BLOOD PRESSURE: 94 MMHG

## 2022-11-29 DIAGNOSIS — R52 PAIN MANAGEMENT: ICD-10-CM

## 2022-11-29 PROCEDURE — 64494 INJ PARAVERT F JNT L/S 2 LEV: CPT

## 2022-11-29 PROCEDURE — 6360000002 HC RX W HCPCS

## 2022-11-29 PROCEDURE — 2500000003 HC RX 250 WO HCPCS

## 2022-11-29 PROCEDURE — 64493 INJ PARAVERT F JNT L/S 1 LEV: CPT

## 2022-11-29 RX ORDER — LIDOCAINE HYDROCHLORIDE 10 MG/ML
19.5 INJECTION, SOLUTION EPIDURAL; INFILTRATION; INTRACAUDAL; PERINEURAL ONCE
Status: DISCONTINUED | OUTPATIENT
Start: 2022-11-29 | End: 2022-12-01 | Stop reason: HOSPADM

## 2022-11-29 RX ORDER — METHYLPREDNISOLONE ACETATE 40 MG/ML
80 INJECTION, SUSPENSION INTRA-ARTICULAR; INTRALESIONAL; INTRAMUSCULAR; SOFT TISSUE ONCE
Status: DISCONTINUED | OUTPATIENT
Start: 2022-11-29 | End: 2022-12-01 | Stop reason: HOSPADM

## 2022-11-29 RX ORDER — BUPIVACAINE HYDROCHLORIDE 5 MG/ML
4.5 INJECTION, SOLUTION EPIDURAL; INTRACAUDAL ONCE
Status: DISCONTINUED | OUTPATIENT
Start: 2022-11-29 | End: 2022-12-01 | Stop reason: HOSPADM

## 2022-11-29 ASSESSMENT — PAIN - FUNCTIONAL ASSESSMENT: PAIN_FUNCTIONAL_ASSESSMENT: NONE - DENIES PAIN

## 2022-11-29 NOTE — INTERVAL H&P NOTE
Update History & Physical    The patient's History and Physical was reviewed with the patient and I examined the patient. There was no change. The surgical site was confirmed by the patient and me. Plan: The risks, benefits, expected outcome, and alternative to the recommended procedure have been discussed with the patient. Patient understands and wants to proceed with the procedure.      Electronically signed by William Alvarado MD on 11/29/2022 at 9:24 AM

## 2022-12-05 RX ORDER — LOSARTAN POTASSIUM 25 MG/1
25 TABLET ORAL DAILY
Qty: 90 TABLET | Refills: 3 | Status: SHIPPED | OUTPATIENT
Start: 2022-12-05

## 2022-12-29 DIAGNOSIS — E78.2 MIXED HYPERLIPIDEMIA: ICD-10-CM

## 2022-12-29 LAB
ALT SERPL-CCNC: 24 U/L (ref 5–41)
CHOLESTEROL, TOTAL: 129 MG/DL (ref 160–199)
HDLC SERPL-MCNC: 33 MG/DL (ref 55–121)
LDL CHOLESTEROL CALCULATED: 69 MG/DL
TRIGL SERPL-MCNC: 133 MG/DL (ref 0–149)

## 2022-12-30 ENCOUNTER — TELEPHONE (OUTPATIENT)
Dept: PRIMARY CARE CLINIC | Age: 72
End: 2022-12-30

## 2022-12-30 NOTE — TELEPHONE ENCOUNTER
----- Message from [address] [facility],[address], DO sent at 12/29/2022  8:12 PM CST -----  Lipids are now excellently controlled.

## 2022-12-30 NOTE — TELEPHONE ENCOUNTER
Called patient, spoke with: Patient regarding the results of the patients most recent Labs. I advised Patient of Dr. Deangelo Oakes recommendations.    Patient did voice understanding

## 2023-01-04 ENCOUNTER — TELEPHONE (OUTPATIENT)
Dept: GASTROENTEROLOGY | Age: 73
End: 2023-01-04

## 2023-01-04 NOTE — TELEPHONE ENCOUNTER
Dustin requests a call back please, patient advised that he received a recall letter to schedule EGD & CLN, I could not see a recall letter in the chart. Thank you.

## 2023-01-05 RX ORDER — SOTALOL HYDROCHLORIDE 120 MG/1
120 TABLET ORAL 2 TIMES DAILY
Qty: 180 TABLET | Refills: 3 | Status: SHIPPED | OUTPATIENT
Start: 2023-01-05

## 2023-01-06 ENCOUNTER — OFFICE VISIT (OUTPATIENT)
Dept: GASTROENTEROLOGY | Age: 73
End: 2023-01-06
Payer: MEDICARE

## 2023-01-06 VITALS
DIASTOLIC BLOOD PRESSURE: 80 MMHG | SYSTOLIC BLOOD PRESSURE: 121 MMHG | HEART RATE: 78 BPM | OXYGEN SATURATION: 98 % | WEIGHT: 220 LBS | BODY MASS INDEX: 29.8 KG/M2 | HEIGHT: 72 IN

## 2023-01-06 DIAGNOSIS — Z86.010 HISTORY OF COLON POLYPS: ICD-10-CM

## 2023-01-06 DIAGNOSIS — K22.70 BARRETT'S ESOPHAGUS WITHOUT DYSPLASIA: Primary | ICD-10-CM

## 2023-01-06 PROCEDURE — 1036F TOBACCO NON-USER: CPT | Performed by: NURSE PRACTITIONER

## 2023-01-06 PROCEDURE — 3079F DIAST BP 80-89 MM HG: CPT | Performed by: NURSE PRACTITIONER

## 2023-01-06 PROCEDURE — 3074F SYST BP LT 130 MM HG: CPT | Performed by: NURSE PRACTITIONER

## 2023-01-06 PROCEDURE — G8427 DOCREV CUR MEDS BY ELIG CLIN: HCPCS | Performed by: NURSE PRACTITIONER

## 2023-01-06 PROCEDURE — 3017F COLORECTAL CA SCREEN DOC REV: CPT | Performed by: NURSE PRACTITIONER

## 2023-01-06 PROCEDURE — G8417 CALC BMI ABV UP PARAM F/U: HCPCS | Performed by: NURSE PRACTITIONER

## 2023-01-06 PROCEDURE — G8484 FLU IMMUNIZE NO ADMIN: HCPCS | Performed by: NURSE PRACTITIONER

## 2023-01-06 PROCEDURE — 99214 OFFICE O/P EST MOD 30 MIN: CPT | Performed by: NURSE PRACTITIONER

## 2023-01-06 PROCEDURE — 1123F ACP DISCUSS/DSCN MKR DOCD: CPT | Performed by: NURSE PRACTITIONER

## 2023-01-06 RX ORDER — ACETAMINOPHEN 160 MG
2000 TABLET,DISINTEGRATING ORAL DAILY
COMMUNITY

## 2023-01-06 ASSESSMENT — ENCOUNTER SYMPTOMS
RECTAL PAIN: 0
ANAL BLEEDING: 0
NAUSEA: 0
TROUBLE SWALLOWING: 0
ABDOMINAL PAIN: 0
CHOKING: 0
CONSTIPATION: 0
BLOOD IN STOOL: 0
VOMITING: 0
ABDOMINAL DISTENTION: 0
DIARRHEA: 0
SHORTNESS OF BREATH: 0
COUGH: 0

## 2023-01-06 NOTE — PROGRESS NOTES
Subjective:     Patient ID: Mahendra Moore is a 71 y.o. male  PCP: Diaz Corona DO  Referring Provider: No ref. provider found    HPI  Patient presents to the office today with the following complaints: Endoscopy and Colonoscopy  Patient seen in the office today to schedule EGD and Colonoscopy due to gaston's and history of polyps. Reports he has not had any issues with is stomach, swallowing or bowels. Reports he is taking omeprazole daily OTC. Assessment:     1. Garces's esophagus without dysplasia  2. History of colon polyps         Plan:   Schedule Colonoscopy and EGD  Instruct on bowel prep. Nothing to eat or drink after midnight the day of the exam.  Unable to drive for 24 hours after the procedure. No aspirin or nonsteroidal anti-inflammatories for 5 days before procedure. I have discussed the benefits, alternatives, and risks (including bleeding, perforation and death)  for pursuing Endoscopy (EGD/Colonscopy/EUS/ERCP) with the patient and they are willing to continue. We also discussed the need for anesthesia, IV access, proper dietary changes, medication changes if necessary, and need for bowel prep (if ordered) prior to their Endoscopic procedure. They are aware they must have someone accompany them to their scheduled procedure to drive them home - they agree to the above and are willing to continue. Orders  No orders of the defined types were placed in this encounter. Medications  No orders of the defined types were placed in this encounter.         Patient History:     Past Medical History:   Diagnosis Date    Arthritis     Atrial fibrillation [facility])     sees dr. Berline Boeck polyps     Difficult intubation     pt just had recent cervical fusion with plate 2 months ago    DVT (deep venous thrombosis) ([facility] [address] 2014    left leg    Elbow pain     torn bicep    Essential hypertension 12/13/2021    GERD (gastroesophageal reflux disease)     Hx of blood clots     left leg/ after heel surg/ jan.  2015    Hyperlipidemia     recently able to be taken off cholesterol meds    Kidney stone on left side        Past Surgical History:   Procedure Laterality Date    BICEPS TENDON REPAIR Right 07/16/2020    RIGHT BICEPS TENDON REPAIR performed by Chiquita Ayala MD at [address]      2015, oct    CHOLECYSTECTOMY, LAPAROSCOPIC N/A 03/23/2021    CHOLECYSTECTOMY LAPAROSCOPIC with IOC performed by Neale Runner, MD at [address]  10/30/2009    [place]: hyperplastic polyp    COLONOSCOPY  02/2015    hyperplastic polyp (5 yr)    DILATATION, ESOPHAGUS      ERCP N/A 03/24/2021    Dr LITO Grider-w/sphincterotomy, placement of a 10F x 5 cm biliary stent, balloon sweep-Cholangitis with choledocholithiasis, repeat in 3 months    ERCP N/A 06/09/2021    Dr LITO Grider-Successful removal of retained biliary sludge/stone debris    EYE SURGERY      FOOT SURGERY  01/2014    Left foot spur removal    LITHOTRIPSY Left 12/29/2015    ESWL EXTRACORPEAL SHOCK WAVE LITHOTRIPSY performed by Oh Rodriguez MD at [address]  11/2015    PILONIDAL CYST EXCISION      IL COLONOSCOPY FLX DX W/COLLJ SPEC WHEN PFRMD N/A 05/08/2017    Dr Colindres Boralejandro sided diverticulosis-5 yr recall    IL EGD TRANSORAL BIOPSY SINGLE/MULTIPLE N/A 03/26/2018    Dr Ng-w/dilation over wire-51 [place]-esophageal stricture-Garces's (+) dysplasia (-)--1 yr recall    SHOULDER SURGERY  2007    right after fall, detached the subclavicle muscle    [address] [facility]    Neck  surgery     UPPER GASTROINTESTINAL ENDOSCOPY N/A 02/12/2018    Dr General Ruano Grade B esophagitis, hiatal hernia, stricture-(-) Марина, (+) Garces's (+) low grade dysplasia, active esophagogastritis, repeat: 3/26/18    UPPER GASTROINTESTINAL ENDOSCOPY  03/26/2018    Dr Ng-w/dilation over wire-51 [place]-esophageal stricture-Garces's (+) dysplasia (-)--1 yr recall    UPPER GASTROINTESTINAL ENDOSCOPY N/A 05/02/2019 Dr Modena Dandy (+) Garces's, (-) dysplasia       Family History   Problem Relation Age of Onset    Cancer Mother     Lung Cancer Mother     Heart Disease Father     Heart Disease Sister     Diabetes Sister     Heart Disease Brother     Diabetes Brother     Colon Cancer Neg Hx     Colon Polyps Neg Hx     Esophageal Cancer Neg Hx     Liver Cancer Neg Hx     Liver Disease Neg Hx     Rectal Cancer Neg Hx     Stomach Cancer Neg Hx        Social History     Socioeconomic History    Marital status:     Number of children: 2   Tobacco Use    Smoking status: Never    Smokeless tobacco: Never   Vaping Use    Vaping Use: Never used   Substance and Sexual Activity    Alcohol use: No    Drug use: No    Sexual activity: Yes     Partners: Female     Social Determinants of Health     Financial Resource Strain: Low Risk     Difficulty of Paying Living Expenses: Not hard at all   Food Insecurity: No Food Insecurity    Worried About Running Out of Food in the Last Year: Never true    Ran Out of Food in the Last Year: Never true   Physical Activity: Sufficiently Active    Days of Exercise per Week: 6 days    Minutes of Exercise per Session: 150+ min   Intimate Partner Violence: Not At Risk    Fear of Current or Ex-Partner: No    Emotionally Abused: No    Physically Abused: No    Sexually Abused: No       Current Outpatient Medications   Medication Sig Dispense Refill    Cholecalciferol (VITAMIN D3) 50 MCG (2000 UT) CAPS Take 2,000 Units by mouth daily      ZINC PO Take 1 tablet by mouth daily      sotalol (BETAPACE) 120 MG tablet Take 1 tablet by mouth 2 times daily 180 tablet 3    losartan (COZAAR) 25 MG tablet Take 1 tablet by mouth daily 90 tablet 3    simvastatin (ZOCOR) 10 MG tablet Take 1 tablet by mouth nightly 90 tablet 3    ondansetron (ZOFRAN ODT) 4 MG disintegrating tablet Take 1 tablet by mouth every 8 hours as needed for Nausea or Vomiting 30 tablet 0    Multiple Vitamins-Minerals (THERAPEUTIC MULTIVITAMIN-MINERALS) tablet Take 1 tablet by mouth daily      Multiple Vitamins-Minerals (ICAPS AREDS FORMULA PO) Take 1 capsule by mouth 2 times daily      aspirin 81 MG EC tablet Take 81 mg by mouth daily      Probiotic Product (PROBIOTIC FORMULA PO) Take 1 tablet by mouth daily       Ascorbic Acid (VITAMIN C PO) Take 1 tablet by mouth nightly       vitamin B-12 (CYANOCOBALAMIN) 1000 MCG tablet Take 1,000 mcg by mouth daily       celecoxib (CELEBREX) 200 MG capsule TAKE 1 CAPSULE BY MOUTH TWICE DAILY (Patient not taking: Reported on 1/6/2023)       No current facility-administered medications for this visit. No Known Allergies    Review of Systems   Constitutional:  Negative for activity change, appetite change, fatigue, fever and unexpected weight change. HENT:  Negative for trouble swallowing. Respiratory:  Negative for cough, choking and shortness of breath. Cardiovascular:  Negative for chest pain. Gastrointestinal:  Negative for abdominal distention, abdominal pain, anal bleeding, blood in stool, constipation, diarrhea, nausea, rectal pain and vomiting. Allergic/Immunologic: Negative for food allergies. All other systems reviewed and are negative. Objective:     /80 (Site: Left Upper Arm)   Pulse 78   Ht 6' (1.829 m)   Wt 220 lb (99.8 kg)   SpO2 98%   BMI 29.84 kg/m²     Physical Exam  Vitals reviewed. Constitutional:       General: He is not in acute distress. Appearance: He is well-developed. HENT:      Head: Normocephalic and atraumatic. Right Ear: External ear normal.      Left Ear: External ear normal.      Nose: Nose normal.   Eyes:      General: No scleral icterus. Right eye: No discharge. Left eye: No discharge. Conjunctiva/sclera: Conjunctivae normal.      Pupils: Pupils are equal, round, and reactive to light. Cardiovascular:      Rate and Rhythm: Normal rate and regular rhythm. Heart sounds: Normal heart sounds. No murmur heard.   Pulmonary: Effort: Pulmonary effort is normal. No respiratory distress. Breath sounds: Normal breath sounds. No wheezing or rales. Abdominal:      General: Bowel sounds are normal. There is no distension. Palpations: Abdomen is soft. There is no mass. Tenderness: There is no abdominal tenderness. There is no guarding or rebound. Musculoskeletal:         General: Normal range of motion. Cervical back: Normal range of motion and neck supple. Skin:     General: Skin is warm and dry. Coloration: Skin is not pale. Neurological:      Mental Status: He is alert and oriented to person, place, and time.    Psychiatric:         Behavior: Behavior normal.

## 2023-01-06 NOTE — PATIENT INSTRUCTIONS
Schedule colonoscopy and endoscopy. Do not eat or drink after midnight the day of the procedure. Allowed medications can be taken with a small sip of water. Please review your prep instructions for allowed medications. You will not be able to drive for 24 hours after the procedure due to sedation. Must have a responsible adult, 18 years or older, accompany you to drive you home the day of your procedure. If you are on blood thinners, clearance from the prescribing physician will be obtained before your procedure is scheduled. If it is determined it is not safe to hold these medications for a short time an alternative procedure for evaluation may be recommended. No aspirin, ibuprofen, naproxen, fish oil or vitamin E for 5 days before procedure. Risks of colonoscopy and endoscopy include, but are not limited to, perforation, bleeding, and infection, Risk of perforation and bleeding are increased if there is a polyp removed or dilation completed. Anesthesia risks will be reviewed with you before the procedure by a member of the anesthesia department. Your physician may also schedule a follow up appointment with the nurse practitioner to discuss pathology, symptoms or to check if you have had any problems related to your procedure. If you prefer not to return to the office after your procedure please discuss this with your physician on the day of your colonoscopy. The physician will talk with you and/or your family after the procedure is completed. Final recommendations are based on the pathologist report if biopsies or specimens are taken. If polyps are removed during the procedure they will be sent to a pathologist for analysis. Unless you have a follow up appointment scheduled, you will be notified by mail of the pathology results within 4 weeks. If you have not received results after 4 weeks you may call the office to obtain this information. For Colonoscopy:   You will be given specific directions regarding restrictions to diet and bowel prep instructions including laxatives. Please read these instructions one week prior to your scheduled procedure to ensure that you are prepared. If you have any questions regarding these instructions please call our office Mon through Fri from 8:00 am to 4:00 pm.     Follow prep instructions provided for bowel prep. Take all of the bowel prep as directed. If you are having problems with nausea, stop your prep for 30-45 min to allow the nausea to subside before resuming your prep. It is important to drink plenty of fluids throughout the day before taking your laxatives. This will help to protect your kidneys, prevent dehydration and maximize the effect of the bowel prep. Your diet before a colonoscopy bowel preparation is very important to ensure a successful colon exam. It is recommended to consider certain changes to your diet three to four days prior to the procedure. Remember that your bowels need to be completely empty for the exam.    What foods are good to eat? Cut down on heavy solid foods three to four days before the procedure and start introducing lighter meals to your diet. The following food suggestions are a good part of your diet before a colonoscopy bowel preparation. Light meat that is easily digestible such as chicken (without the skin)   Potatoes without skin   Cheese   Eggs   A light meal of steamed white fish   Light clear soups    Foods and drinks to avoid  Avoid foods that contain too much fiber. Stay clear of dark colored beverages. They can stick to the walls of the digestive tract and make it difficult to differentiate from blood.  Some of these foods are:  Red meat, rice, nuts and vegetables   Milk, other milk based fluids and cream   Most fruit and puddings   Whole grain pasta   Cereals, bran and seeds   Colored beverages, especially those that are red or purple in color   Red colored Jell-O   On the day before the colonoscopy, continue to drink plenty of clear fluids. It is important   to keep yourself hydrated before the exam.     Please follow all instructions as provided for cleansing the bowel. Failure to have an adequately prepped colon may cause you to have incomplete exam with further testing required.      [email or web address]

## 2023-01-10 ENCOUNTER — OFFICE VISIT (OUTPATIENT)
Dept: PRIMARY CARE CLINIC | Age: 73
End: 2023-01-10
Payer: MEDICARE

## 2023-01-10 VITALS
HEIGHT: 72 IN | HEART RATE: 59 BPM | OXYGEN SATURATION: 98 % | DIASTOLIC BLOOD PRESSURE: 78 MMHG | BODY MASS INDEX: 29.8 KG/M2 | SYSTOLIC BLOOD PRESSURE: 120 MMHG | WEIGHT: 220 LBS | TEMPERATURE: 98.2 F

## 2023-01-10 DIAGNOSIS — E78.2 MIXED HYPERLIPIDEMIA: ICD-10-CM

## 2023-01-10 DIAGNOSIS — I82.5Z9 CHRONIC DEEP VEIN THROMBOSIS (DVT) OF DISTAL VEIN OF LOWER EXTREMITY, UNSPECIFIED LATERALITY (HCC): ICD-10-CM

## 2023-01-10 DIAGNOSIS — K21.9 GASTROESOPHAGEAL REFLUX DISEASE, UNSPECIFIED WHETHER ESOPHAGITIS PRESENT: ICD-10-CM

## 2023-01-10 DIAGNOSIS — I48.0 PAROXYSMAL ATRIAL FIBRILLATION (HCC): ICD-10-CM

## 2023-01-10 DIAGNOSIS — I10 ESSENTIAL HYPERTENSION: Primary | ICD-10-CM

## 2023-01-10 DIAGNOSIS — K22.70 BARRETT'S ESOPHAGUS WITHOUT DYSPLASIA: ICD-10-CM

## 2023-01-10 PROCEDURE — G8484 FLU IMMUNIZE NO ADMIN: HCPCS | Performed by: PEDIATRICS

## 2023-01-10 PROCEDURE — G8417 CALC BMI ABV UP PARAM F/U: HCPCS | Performed by: PEDIATRICS

## 2023-01-10 PROCEDURE — 3074F SYST BP LT 130 MM HG: CPT | Performed by: PEDIATRICS

## 2023-01-10 PROCEDURE — 99214 OFFICE O/P EST MOD 30 MIN: CPT | Performed by: PEDIATRICS

## 2023-01-10 PROCEDURE — 3017F COLORECTAL CA SCREEN DOC REV: CPT | Performed by: PEDIATRICS

## 2023-01-10 PROCEDURE — 1036F TOBACCO NON-USER: CPT | Performed by: PEDIATRICS

## 2023-01-10 PROCEDURE — G8427 DOCREV CUR MEDS BY ELIG CLIN: HCPCS | Performed by: PEDIATRICS

## 2023-01-10 PROCEDURE — 3078F DIAST BP <80 MM HG: CPT | Performed by: PEDIATRICS

## 2023-01-10 PROCEDURE — 1123F ACP DISCUSS/DSCN MKR DOCD: CPT | Performed by: PEDIATRICS

## 2023-01-10 ASSESSMENT — ENCOUNTER SYMPTOMS
EYES NEGATIVE: 1
CONSTIPATION: 1
ALLERGIC/IMMUNOLOGIC NEGATIVE: 1
RESPIRATORY NEGATIVE: 1

## 2023-01-10 ASSESSMENT — PATIENT HEALTH QUESTIONNAIRE - PHQ9
SUM OF ALL RESPONSES TO PHQ QUESTIONS 1-9: 0
SUM OF ALL RESPONSES TO PHQ QUESTIONS 1-9: 0
1. LITTLE INTEREST OR PLEASURE IN DOING THINGS: 0
SUM OF ALL RESPONSES TO PHQ QUESTIONS 1-9: 0
SUM OF ALL RESPONSES TO PHQ QUESTIONS 1-9: 0
2. FEELING DOWN, DEPRESSED OR HOPELESS: 0
SUM OF ALL RESPONSES TO PHQ9 QUESTIONS 1 & 2: 0

## 2023-01-10 NOTE — PROGRESS NOTES
[facility], [address]  Phone [phone]   Fax [phone]      OFFICE VISIT: 1/10/2023    Nelson Pineda-: 1950      HPI  Reason For Visit:  Veronique Pace is a 71 y.o.     3 Month Follow-Up (No new concerns, follow up on hyperlipidemia. Labs drawn 22 to discuss. ) and Health Maintenance (Colon scheduled 23)    Patient presents on follow-up for multiple health issues. Present concerns:  No new concerns. He did have labs drawn 2 weeks ago. Those are listed in the chart below. We did discuss these laboratory values in the office today. He is also scheduled for colonoscopy and EGD approximately a month from now. Hypertension:   BP today was   BP Readings from Last 1 Encounters:   01/10/23 120/78      Recent BP readings:    BP Readings from Last 3 Encounters:   01/10/23 120/78   23 121/80   22 (!) 145/94     Medication   Losartan 25 mg daily  Medication compliance:  compliant most of the time  Home blood pressure monitoring: Yes -well-controlled. He  is somewhat  adherent to a low sodium diet. Symptoms: None  Laboratory:  Lab Results   Component Value Date    BUN 21 2022    CREATININE 0.7 2022       Hyperlipidemia:   Medication:   simvastatin (Zocor) 10 mg nightly  Low Fat, Low Choleterol Diet:  yes -he tries  Myalgias or GI upset: no  The patient exercises intermittently.   Laboratory:    Lab Results   Component Value Date    CHOL 129 (L) 2022    TRIG 133 2022    HDL 33 (L) 2022    LDLCALC 69 2022      Lab Results   Component Value Date    ALT 24 2022    AST 23 2022       Paroxysmal Atrial Fibrillation:  Medications:              Rate control:                           None              Anticoagulation:                      Aspirin 81 mg daily (no additional medication)              Rhythm management:            Sotalol 120 mg twice daily  Symptoms: no recent symptoms of Atrial fibrillation. He does follow with cardiology at [facility]  He does not sense when he is in Atrial fibrillation. He has been cardioverted. He was on eliquis for a while, but he states that he was told that if he would take an asa, he could stop it. This was a cost issue for him. [id number] 2 is a moderate risk score of 1 equating to a 2.8% risk of stroke without treatment. History of DVT  This was postoperatively        History of renal lithiasis:  Most recent CT scan from 3/29/2022 did show a 5 mm calculus with obstruction. Multiple nonobstructing bilateral renal calculi are present in that study  He is not following with urology, but he passed the stone from clinical relief   Encouraged water consumption. GERD with a history of gastrointestinal hemorrhage  He also has a history of Barretts Esophagus. Medication beautiful  Need to do anything              Omeprazole 20mg daily  Symptoms: well controlled on this regimen       height is 6' (1.829 m) and weight is 220 lb (99.8 kg). His temporal temperature is 98.2 °F (36.8 °C). His blood pressure is 120/78 and his pulse is 59. His oxygen saturation is 98%. Body mass index is 29.84 kg/m². I have reviewed the following with the Mr. Scarlett Rivera   Lab Review  Orders Only on 12/29/2022   Component Date Value    ALT 12/29/2022 24     Cholesterol, Total 12/29/2022 129 (A)     Triglycerides 12/29/2022 133     HDL 12/29/2022 33 (A)     LDL Calculated 12/29/2022 69      Copies of these are in the chart.     Current Outpatient Medications   Medication Sig Dispense Refill    Cholecalciferol (VITAMIN D3) 50 MCG (2000 UT) CAPS Take 2,000 Units by mouth daily      ZINC PO Take 1 tablet by mouth daily      sotalol (BETAPACE) 120 MG tablet Take 1 tablet by mouth 2 times daily 180 tablet 3    losartan (COZAAR) 25 MG tablet Take 1 tablet by mouth daily 90 tablet 3    celecoxib (CELEBREX) 200 MG capsule       simvastatin (ZOCOR) 10 MG tablet Take 1 tablet by mouth nightly 90 tablet 3    ondansetron (ZOFRAN ODT) 4 MG disintegrating tablet Take 1 tablet by mouth every 8 hours as needed for Nausea or Vomiting 30 tablet 0    Multiple Vitamins-Minerals (THERAPEUTIC MULTIVITAMIN-MINERALS) tablet Take 1 tablet by mouth daily      Multiple Vitamins-Minerals (ICAPS AREDS FORMULA PO) Take 1 capsule by mouth 2 times daily      aspirin 81 MG EC tablet Take 81 mg by mouth daily      Probiotic Product (PROBIOTIC FORMULA PO) Take 1 tablet by mouth daily       Ascorbic Acid (VITAMIN C PO) Take 1 tablet by mouth nightly       vitamin B-12 (CYANOCOBALAMIN) 1000 MCG tablet Take 1,000 mcg by mouth daily        No current facility-administered medications for this visit. Allergies: Patient has no known allergies. Past Medical History:   Diagnosis Date    Arthritis     Atrial fibrillation [facility])     sees dr. Luis Clifton polyps     Difficult intubation     pt just had recent cervical fusion with plate 2 months ago    DVT (deep venous thrombosis) ([facility] [address] 2014    left leg    Elbow pain     torn bicep    Essential hypertension 12/13/2021    GERD (gastroesophageal reflux disease)     Hx of blood clots     left leg/ after heel surg/ jan. 2015    Hyperlipidemia     recently able to be taken off cholesterol meds    Kidney stone on left side        Family History   Problem Relation Age of Onset    Cancer Mother     Lung Cancer Mother     Heart Disease Father     Heart Disease Sister     Diabetes Sister     Heart Disease Brother     Diabetes Brother     Colon Cancer Neg Hx     Colon Polyps Neg Hx     Esophageal Cancer Neg Hx     Liver Cancer Neg Hx     Liver Disease Neg Hx     Rectal Cancer Neg Hx     Stomach Cancer Neg Hx        Past Surgical History:   Procedure Laterality Date    BICEPS TENDON REPAIR Right 07/16/2020    RIGHT BICEPS TENDON REPAIR performed by Daniel Calderon MD at [address]      2015, oct    CHOLECYSTECTOMY, LAPAROSCOPIC N/A 03/23/2021    CHOLECYSTECTOMY LAPAROSCOPIC with IOC performed by Lenin Hamilton MD at [address]  10/30/2009    [place]: hyperplastic polyp    COLONOSCOPY  02/2015    hyperplastic polyp (5 yr)    DILATATION, ESOPHAGUS      ERCP N/A 03/24/2021    Dr LITO Grider-w/sphincterotomy, placement of a 10F x 5 cm biliary stent, balloon sweep-Cholangitis with choledocholithiasis, repeat in 3 months    ERCP N/A 06/09/2021    Dr LITO Grider-Successful removal of retained biliary sludge/stone debris    EYE SURGERY      FOOT SURGERY  01/2014    Left foot spur removal    LITHOTRIPSY Left 12/29/2015    ESWL EXTRACORPEAL SHOCK WAVE LITHOTRIPSY performed by Mary Jane Larose MD at [address]  11/2015    PILONIDAL CYST EXCISION      SD COLONOSCOPY FLX DX W/COLLJ SPEC WHEN PFRMD N/A 05/08/2017    Dr Marlon Tavares sided diverticulosis-5 yr recall    SD EGD TRANSORAL BIOPSY SINGLE/MULTIPLE N/A 03/26/2018    Dr Ng-w/dilation over wire-51 [place]-esophageal stricture-Garces's (+) dysplasia (-)--1 yr recall    SHOULDER SURGERY  2007    right after fall, detached the subclavicle muscle    [facility]    Neck  surgery     UPPER GASTROINTESTINAL ENDOSCOPY N/A 02/12/2018    Dr Keli Hubbard Grade B esophagitis, hiatal hernia, stricture-(-) Марина, (+) Garces's (+) low grade dysplasia, active esophagogastritis, repeat: 3/26/18    UPPER GASTROINTESTINAL ENDOSCOPY  03/26/2018    Dr Ng-w/dilation over wire-51 [place]-esophageal stricture-Garces's (+) dysplasia (-)--1 yr recall    UPPER GASTROINTESTINAL ENDOSCOPY N/A 05/02/2019    Dr Lesly Shen (+) Garces's, (-) dysplasia       Social History     Tobacco Use    Smoking status: Never    Smokeless tobacco: Never   Substance Use Topics    Alcohol use: No        Review of Systems   Constitutional: Negative. HENT: Negative. Eyes: Negative. Respiratory: Negative. Cardiovascular: Negative. Gastrointestinal:  Positive for constipation (some). Endocrine: Negative. Genitourinary: Negative. Musculoskeletal:  Positive for arthralgias and myalgias. Allergic/Immunologic: Negative. Neurological: Negative. Hematological: Negative. Psychiatric/Behavioral: Negative. Physical Exam  Constitutional:       Appearance: Normal appearance. He is normal weight. HENT:      Head: Normocephalic and atraumatic. Right Ear: Tympanic membrane, ear canal and external ear normal.      Left Ear: Tympanic membrane, ear canal and external ear normal.      Nose: Nose normal.      Mouth/Throat:      Mouth: Mucous membranes are moist.      Pharynx: Oropharynx is clear. Eyes:      Extraocular Movements: Extraocular movements intact. Conjunctiva/sclera: Conjunctivae normal.      Pupils: Pupils are equal, round, and reactive to light. Cardiovascular:      Rate and Rhythm: Normal rate and regular rhythm. Pulses: Normal pulses. Heart sounds: Normal heart sounds. No murmur heard. Pulmonary:      Effort: Pulmonary effort is normal.      Breath sounds: Normal breath sounds. Abdominal:      General: Abdomen is flat. There is no distension. Palpations: Abdomen is soft. Tenderness: There is no abdominal tenderness. Musculoskeletal:         General: Normal range of motion. Cervical back: Normal range of motion and neck supple. Comments: heberden's and tashi's nodes   Skin:     General: Skin is warm and dry. Capillary Refill: Capillary refill takes less than 2 seconds. Neurological:      General: No focal deficit present. Mental Status: He is alert and oriented to person, place, and time. Psychiatric:         Mood and Affect: Mood normal.         Behavior: Behavior normal.         ASSESSMENT      ICD-10-CM    1. Essential hypertension  I10       2. Paroxysmal atrial fibrillation (HCC)  I48.0       3. Mixed hyperlipidemia  E78.2       4.  Chronic deep vein thrombosis (DVT) of distal vein of lower extremity, unspecified laterality ([facility]  I82.5Z9       5. Garces's esophagus without dysplasia  K22.70       6. Gastroesophageal reflux disease, unspecified whether esophagitis present  K21.9             PLAN    1. Essential hypertension  Blood pressure is excellently controlled on present medication regimen  Continue the same. We will check labs at his annual wellness visit    2. Paroxysmal atrial fibrillation (HCC)  No episodes of atrial fibrillation in the recent past.  He does use a portable monitor to assess for atrial fibrillation. He does this on a daily basis. No reported episodes    3. Mixed hyperlipidemia  Lipids are now excellently controlled. Continue same    4. Chronic deep vein thrombosis (DVT) of distal vein of lower extremity, unspecified laterality (HCC)  This was postoperatively related    5. Garces's esophagus without dysplasia  Pending EGD and appropriately managed GERD symptoms    6. Gastroesophageal reflux disease, unspecified whether esophagitis present  Symptoms are appropriately treated with present medication regimen    No orders of the defined types were placed in this encounter. Return in about 6 months (around 7/10/2023) for 30. This was an in-house visit.

## 2023-01-27 ENCOUNTER — APPOINTMENT (OUTPATIENT)
Dept: GENERAL RADIOLOGY | Age: 73
DRG: 310 | End: 2023-01-27
Payer: MEDICARE

## 2023-01-27 ENCOUNTER — HOSPITAL ENCOUNTER (INPATIENT)
Age: 73
LOS: 2 days | Discharge: HOME | DRG: 310 | End: 2023-01-30
Attending: EMERGENCY MEDICINE | Admitting: HOSPITALIST
Payer: MEDICARE

## 2023-01-27 ENCOUNTER — TELEPHONE (OUTPATIENT)
Dept: CARDIOLOGY CLINIC | Age: 73
End: 2023-01-27

## 2023-01-27 DIAGNOSIS — R06.00 DYSPNEA, UNSPECIFIED TYPE: ICD-10-CM

## 2023-01-27 DIAGNOSIS — I48.0 PAROXYSMAL ATRIAL FIBRILLATION (HCC): Primary | ICD-10-CM

## 2023-01-27 PROBLEM — I48.91 ATRIAL FIBRILLATION WITH CONTROLLED VENTRICULAR RATE (HCC): Status: ACTIVE | Noted: 2023-01-27

## 2023-01-27 LAB
ALBUMIN SERPL-MCNC: 4.1 G/DL (ref 3.5–5.2)
ALP BLD-CCNC: 121 U/L (ref 40–130)
ALT SERPL-CCNC: 27 U/L (ref 5–41)
ANION GAP SERPL CALCULATED.3IONS-SCNC: 7 MMOL/L (ref 7–19)
APTT: 31.5 SEC (ref 26–36.2)
AST SERPL-CCNC: 23 U/L (ref 5–40)
BASOPHILS ABSOLUTE: 0.1 K/UL (ref 0–0.2)
BASOPHILS RELATIVE PERCENT: 0.9 % (ref 0–1)
BILIRUB SERPL-MCNC: <0.2 MG/DL (ref 0.2–1.2)
BUN BLDV-MCNC: 17 MG/DL (ref 8–23)
CALCIUM SERPL-MCNC: 9.2 MG/DL (ref 8.8–10.2)
CHLORIDE BLD-SCNC: 106 MMOL/L (ref 98–111)
CO2: 27 MMOL/L (ref 22–29)
CREAT SERPL-MCNC: 0.9 MG/DL (ref 0.5–1.2)
EOSINOPHILS ABSOLUTE: 0.2 K/UL (ref 0–0.6)
EOSINOPHILS RELATIVE PERCENT: 3.6 % (ref 0–5)
GFR SERPL CREATININE-BSD FRML MDRD: >60 ML/MIN/{1.73_M2}
GLUCOSE BLD-MCNC: 132 MG/DL (ref 74–109)
HCT VFR BLD CALC: 39.8 % (ref 42–52)
HEMOGLOBIN: 13.3 G/DL (ref 14–18)
IMMATURE GRANULOCYTES #: 0 K/UL
INR BLD: 0.93 (ref 0.88–1.18)
LYMPHOCYTES ABSOLUTE: 1.9 K/UL (ref 1.1–4.5)
LYMPHOCYTES RELATIVE PERCENT: 34.1 % (ref 20–40)
MAGNESIUM: 1.9 MG/DL (ref 1.6–2.4)
MCH RBC QN AUTO: 31.2 PG (ref 27–31)
MCHC RBC AUTO-ENTMCNC: 33.4 G/DL (ref 33–37)
MCV RBC AUTO: 93.4 FL (ref 80–94)
MONOCYTES ABSOLUTE: 0.5 K/UL (ref 0–0.9)
MONOCYTES RELATIVE PERCENT: 8.8 % (ref 0–10)
NEUTROPHILS ABSOLUTE: 2.9 K/UL (ref 1.5–7.5)
NEUTROPHILS RELATIVE PERCENT: 52.1 % (ref 50–65)
PDW BLD-RTO: 14.3 % (ref 11.5–14.5)
PLATELET # BLD: 249 K/UL (ref 130–400)
PMV BLD AUTO: 10 FL (ref 9.4–12.4)
POTASSIUM SERPL-SCNC: 4.2 MMOL/L (ref 3.5–5)
PRO-BNP: 575 PG/ML (ref 0–900)
PROTHROMBIN TIME: 12.3 SEC (ref 12–14.6)
RBC # BLD: 4.26 M/UL (ref 4.7–6.1)
SARS-COV-2, NAAT: NOT DETECTED
SODIUM BLD-SCNC: 140 MMOL/L (ref 136–145)
TOTAL PROTEIN: 6.9 G/DL (ref 6.6–8.7)
TROPONIN: <0.01 NG/ML (ref 0–0.03)
TROPONIN: <0.01 NG/ML (ref 0–0.03)
WBC # BLD: 5.6 K/UL (ref 4.8–10.8)

## 2023-01-27 PROCEDURE — G0378 HOSPITAL OBSERVATION PER HR: HCPCS

## 2023-01-27 PROCEDURE — 83735 ASSAY OF MAGNESIUM: CPT

## 2023-01-27 PROCEDURE — 85610 PROTHROMBIN TIME: CPT

## 2023-01-27 PROCEDURE — 83880 ASSAY OF NATRIURETIC PEPTIDE: CPT

## 2023-01-27 PROCEDURE — 2580000003 HC RX 258: Performed by: NURSE PRACTITIONER

## 2023-01-27 PROCEDURE — 84484 ASSAY OF TROPONIN QUANT: CPT

## 2023-01-27 PROCEDURE — 6360000002 HC RX W HCPCS: Performed by: EMERGENCY MEDICINE

## 2023-01-27 PROCEDURE — 6370000000 HC RX 637 (ALT 250 FOR IP): Performed by: INTERNAL MEDICINE

## 2023-01-27 PROCEDURE — 36415 COLL VENOUS BLD VENIPUNCTURE: CPT

## 2023-01-27 PROCEDURE — 99222 1ST HOSP IP/OBS MODERATE 55: CPT | Performed by: INTERNAL MEDICINE

## 2023-01-27 PROCEDURE — 80053 COMPREHEN METABOLIC PANEL: CPT

## 2023-01-27 PROCEDURE — 93005 ELECTROCARDIOGRAM TRACING: CPT | Performed by: EMERGENCY MEDICINE

## 2023-01-27 PROCEDURE — 87635 SARS-COV-2 COVID-19 AMP PRB: CPT

## 2023-01-27 PROCEDURE — 99285 EMERGENCY DEPT VISIT HI MDM: CPT

## 2023-01-27 PROCEDURE — 85025 COMPLETE CBC W/AUTO DIFF WBC: CPT

## 2023-01-27 PROCEDURE — 71045 X-RAY EXAM CHEST 1 VIEW: CPT | Performed by: RADIOLOGY

## 2023-01-27 PROCEDURE — 96374 THER/PROPH/DIAG INJ IV PUSH: CPT

## 2023-01-27 PROCEDURE — 85730 THROMBOPLASTIN TIME PARTIAL: CPT

## 2023-01-27 PROCEDURE — 6370000000 HC RX 637 (ALT 250 FOR IP): Performed by: HOSPITALIST

## 2023-01-27 PROCEDURE — 71045 X-RAY EXAM CHEST 1 VIEW: CPT

## 2023-01-27 RX ORDER — VITAMIN B COMPLEX
2000 TABLET ORAL DAILY
Status: DISCONTINUED | OUTPATIENT
Start: 2023-01-28 | End: 2023-01-30 | Stop reason: HOSPADM

## 2023-01-27 RX ORDER — SOTALOL HYDROCHLORIDE 80 MG/1
120 TABLET ORAL 2 TIMES DAILY
Status: DISCONTINUED | OUTPATIENT
Start: 2023-01-27 | End: 2023-01-27

## 2023-01-27 RX ORDER — ATORVASTATIN CALCIUM 10 MG/1
10 TABLET, FILM COATED ORAL DAILY
Status: DISCONTINUED | OUTPATIENT
Start: 2023-01-28 | End: 2023-01-27

## 2023-01-27 RX ORDER — POLYETHYLENE GLYCOL 3350 17 G/17G
17 POWDER, FOR SOLUTION ORAL DAILY PRN
Status: DISCONTINUED | OUTPATIENT
Start: 2023-01-27 | End: 2023-01-30 | Stop reason: HOSPADM

## 2023-01-27 RX ORDER — SODIUM CHLORIDE 0.9 % (FLUSH) 0.9 %
5-40 SYRINGE (ML) INJECTION PRN
Status: DISCONTINUED | OUTPATIENT
Start: 2023-01-27 | End: 2023-01-30

## 2023-01-27 RX ORDER — HEPARIN SODIUM 1000 [USP'U]/ML
4000 INJECTION, SOLUTION INTRAVENOUS; SUBCUTANEOUS ONCE
Status: COMPLETED | OUTPATIENT
Start: 2023-01-27 | End: 2023-01-27

## 2023-01-27 RX ORDER — HEPARIN SODIUM 1000 [USP'U]/ML
60 INJECTION, SOLUTION INTRAVENOUS; SUBCUTANEOUS ONCE
Status: DISCONTINUED | OUTPATIENT
Start: 2023-01-27 | End: 2023-01-27 | Stop reason: SDUPTHER

## 2023-01-27 RX ORDER — WARFARIN SODIUM 7.5 MG/1
7.5 TABLET ORAL DAILY
Status: DISCONTINUED | OUTPATIENT
Start: 2023-01-27 | End: 2023-01-27 | Stop reason: DRUGHIGH

## 2023-01-27 RX ORDER — SODIUM CHLORIDE 0.9 % (FLUSH) 0.9 %
5-40 SYRINGE (ML) INJECTION EVERY 12 HOURS SCHEDULED
Status: DISCONTINUED | OUTPATIENT
Start: 2023-01-27 | End: 2023-01-30

## 2023-01-27 RX ORDER — HEPARIN SODIUM 1000 [USP'U]/ML
2000 INJECTION, SOLUTION INTRAVENOUS; SUBCUTANEOUS PRN
Status: DISCONTINUED | OUTPATIENT
Start: 2023-01-27 | End: 2023-01-27 | Stop reason: SDUPTHER

## 2023-01-27 RX ORDER — ACETAMINOPHEN 325 MG/1
650 TABLET ORAL EVERY 6 HOURS PRN
Status: DISCONTINUED | OUTPATIENT
Start: 2023-01-27 | End: 2023-01-30 | Stop reason: HOSPADM

## 2023-01-27 RX ORDER — LOSARTAN POTASSIUM 25 MG/1
25 TABLET ORAL DAILY
Status: DISCONTINUED | OUTPATIENT
Start: 2023-01-27 | End: 2023-01-30 | Stop reason: HOSPADM

## 2023-01-27 RX ORDER — HEPARIN SODIUM 10000 [USP'U]/100ML
5-30 INJECTION, SOLUTION INTRAVENOUS CONTINUOUS
Status: DISCONTINUED | OUTPATIENT
Start: 2023-01-27 | End: 2023-01-27 | Stop reason: SDUPTHER

## 2023-01-27 RX ORDER — HEPARIN SODIUM 1000 [USP'U]/ML
4000 INJECTION, SOLUTION INTRAVENOUS; SUBCUTANEOUS PRN
Status: DISCONTINUED | OUTPATIENT
Start: 2023-01-27 | End: 2023-01-27 | Stop reason: SDUPTHER

## 2023-01-27 RX ORDER — ATORVASTATIN CALCIUM 10 MG/1
10 TABLET, FILM COATED ORAL NIGHTLY
Status: DISCONTINUED | OUTPATIENT
Start: 2023-01-27 | End: 2023-01-30 | Stop reason: HOSPADM

## 2023-01-27 RX ORDER — ASPIRIN 81 MG/1
81 TABLET ORAL DAILY
Status: DISCONTINUED | OUTPATIENT
Start: 2023-01-28 | End: 2023-01-30 | Stop reason: HOSPADM

## 2023-01-27 RX ORDER — VIT A/VIT C/VIT E/ZINC/COPPER 7160-113
1 TABLET, DELAYED RELEASE (ENTERIC COATED) ORAL 2 TIMES DAILY
Status: DISCONTINUED | OUTPATIENT
Start: 2023-01-27 | End: 2023-01-27

## 2023-01-27 RX ORDER — M-VIT,TX,IRON,MINS/CALC/FOLIC 27MG-0.4MG
1 TABLET ORAL DAILY
Status: DISCONTINUED | OUTPATIENT
Start: 2023-01-28 | End: 2023-01-30 | Stop reason: HOSPADM

## 2023-01-27 RX ORDER — LACTOBACILLUS RHAMNOSUS GG 10B CELL
1 CAPSULE ORAL DAILY
Status: DISCONTINUED | OUTPATIENT
Start: 2023-01-28 | End: 2023-01-30 | Stop reason: HOSPADM

## 2023-01-27 RX ORDER — ACETAMINOPHEN 650 MG/1
650 SUPPOSITORY RECTAL EVERY 6 HOURS PRN
Status: DISCONTINUED | OUTPATIENT
Start: 2023-01-27 | End: 2023-01-30 | Stop reason: HOSPADM

## 2023-01-27 RX ORDER — CHOLECALCIFEROL (VITAMIN D3) 125 MCG
1000 CAPSULE ORAL DAILY
Status: DISCONTINUED | OUTPATIENT
Start: 2023-01-28 | End: 2023-01-30 | Stop reason: HOSPADM

## 2023-01-27 RX ORDER — SODIUM CHLORIDE 9 MG/ML
INJECTION, SOLUTION INTRAVENOUS PRN
Status: DISCONTINUED | OUTPATIENT
Start: 2023-01-27 | End: 2023-01-30

## 2023-01-27 RX ORDER — HEPARIN SODIUM 1000 [USP'U]/ML
4000 INJECTION, SOLUTION INTRAVENOUS; SUBCUTANEOUS PRN
Status: DISCONTINUED | OUTPATIENT
Start: 2023-01-27 | End: 2023-01-30 | Stop reason: ALTCHOICE

## 2023-01-27 RX ORDER — WARFARIN SODIUM 5 MG/1
5 TABLET ORAL
Status: COMPLETED | OUTPATIENT
Start: 2023-01-27 | End: 2023-01-27

## 2023-01-27 RX ORDER — HEPARIN SODIUM 10000 [USP'U]/100ML
5-30 INJECTION, SOLUTION INTRAVENOUS CONTINUOUS
Status: DISCONTINUED | OUTPATIENT
Start: 2023-01-27 | End: 2023-01-30 | Stop reason: ALTCHOICE

## 2023-01-27 RX ORDER — HEPARIN SODIUM 1000 [USP'U]/ML
2000 INJECTION, SOLUTION INTRAVENOUS; SUBCUTANEOUS PRN
Status: DISCONTINUED | OUTPATIENT
Start: 2023-01-27 | End: 2023-01-30 | Stop reason: ALTCHOICE

## 2023-01-27 RX ORDER — SOTALOL HYDROCHLORIDE 80 MG/1
160 TABLET ORAL 2 TIMES DAILY
Status: DISCONTINUED | OUTPATIENT
Start: 2023-01-27 | End: 2023-01-30 | Stop reason: HOSPADM

## 2023-01-27 RX ADMIN — WARFARIN SODIUM 5 MG: 5 TABLET ORAL at 22:33

## 2023-01-27 RX ADMIN — SOTALOL HYDROCHLORIDE 160 MG: 80 TABLET ORAL at 20:53

## 2023-01-27 RX ADMIN — HEPARIN SODIUM 4000 UNITS: 1000 INJECTION INTRAVENOUS; SUBCUTANEOUS at 17:00

## 2023-01-27 RX ADMIN — ATORVASTATIN CALCIUM 10 MG: 10 TABLET, FILM COATED ORAL at 20:53

## 2023-01-27 RX ADMIN — HEPARIN SODIUM 9 UNITS/KG/HR: 10000 INJECTION, SOLUTION INTRAVENOUS at 17:02

## 2023-01-27 RX ADMIN — SODIUM CHLORIDE, PRESERVATIVE FREE 10 ML: 5 INJECTION INTRAVENOUS at 20:54

## 2023-01-27 ASSESSMENT — ENCOUNTER SYMPTOMS
COLOR CHANGE: 0
ABDOMINAL PAIN: 0
DIARRHEA: 0
ABDOMINAL DISTENTION: 0
CONSTIPATION: 0
SHORTNESS OF BREATH: 1
WHEEZING: 0
VOMITING: 0
SHORTNESS OF BREATH: 0
BLOOD IN STOOL: 0
NAUSEA: 0
COUGH: 0
BACK PAIN: 0

## 2023-01-27 ASSESSMENT — PAIN - FUNCTIONAL ASSESSMENT: PAIN_FUNCTIONAL_ASSESSMENT: NONE - DENIES PAIN

## 2023-01-27 NOTE — TELEPHONE ENCOUNTER
If he feels he is rhythm and symptomatic and not on a blood thinner then he should go to the emergency room for EKG and get started on anticoagulation.   If it is intermittent and not persistent and not symptomatic make appointment for Monday

## 2023-01-27 NOTE — TELEPHONE ENCOUNTER
Received a call from patient advising that his cartia device has shown that he's out of rhythm and has been for about a week. Patient advised he is unable to tell when he goes out so he relies on the monitor which has been accurate in the past.  Patient is taking sotalol but is not on a blood thinner. Patient unsure of what to do from here.

## 2023-01-27 NOTE — ED PROVIDER NOTES
[facility] EMERGENCY DEPT  eMERGENCY dEPARTMENT eNCOUnter      Pt Name: Viji Cruz  MRN: [id number]  [place] 1950  Date of evaluation: 1/27/2023  Provider: Anthony Malave MD    CHIEF COMPLAINT       Chief Complaint   Patient presents with    Irregular Heart Beat     Feels SOB when his heart is out of rhythm     Shortness of Breath         HISTORY OF PRESENT ILLNESS   (Location/Symptom, Timing/Onset,Context/Setting, Quality, Duration, Modifying Factors, Severity)  Note limiting factors. Viji Cruz is a 71 y.o. male who presents to the emergency department for evaluation regarding feelings of irregular heartbeat. Patient states that for the past couple of nights, when he lays down to go to sleep he has been feeling increased shortness of breath. He checked his heart rate today and felt like his beat was irregular and he was out of rhythm. He denies any palpitations or actual syncopal events. He stated he did some work around the house this morning and felt like he was a little bit more fatigued than usual.  Patient does have a known prior history of atrial fibrillation with previous cardiology evaluation. He is currently maintained on sotalol and has been taking these medications as directed. He has not had any recent illnesses, fever, chills, vomiting or diarrhea. He is not currently maintained on any oral anticoagulant medications other than daily 81 mg aspirin therapy. No prior history of coronary stents. He has underwent previous cardioversion several years ago. HPI    NursingNotes were reviewed. REVIEW OF SYSTEMS    (2-9 systems for level 4, 10 or more for level 5)     Review of Systems   Constitutional:  Negative for chills and fever. Respiratory:  Positive for shortness of breath. Cardiovascular:  Positive for palpitations. Negative for leg swelling. Gastrointestinal:  Negative for abdominal distention, abdominal pain, diarrhea, nausea and vomiting.    Neurological:  Negative for dizziness and syncope. All other systems reviewed and are negative. PAST MEDICALHISTORY     Past Medical History:   Diagnosis Date    Arthritis     Atrial fibrillation [facility])     sees dr. Cruz Fruits polyps     Difficult intubation     pt just had recent cervical fusion with plate 2 months ago    DVT (deep venous thrombosis) ([address] 2014    left leg    Elbow pain     torn bicep    Essential hypertension 12/13/2021    GERD (gastroesophageal reflux disease)     Hx of blood clots     left leg/ after heel surg/ jan. 2015    Hyperlipidemia     recently able to be taken off cholesterol meds    Kidney stone on left side          SURGICAL HISTORY       Past Surgical History:   Procedure Laterality Date    BICEPS TENDON REPAIR Right 07/16/2020    RIGHT BICEPS TENDON REPAIR performed by Henrik Boggs MD at [address]      2015, oct    CHOLECYSTECTOMY, LAPAROSCOPIC N/A 03/23/2021    CHOLECYSTECTOMY LAPAROSCOPIC with IOC [facility]  10/30/2009    [facility]: hyperplastic polyp    COLONOSCOPY  02/2015    hyperplastic polyp (5 yr)    DILATATION, ESOPHAGUS      ERCP N/A 03/24/2021    Dr LITO Grider-w/sphincterotomy, placement of a 10F x 5 cm biliary stent, balloon sweep-Cholangitis with choledocholithiasis, repeat in 3 months    ERCP N/A 06/09/2021    Dr LITO Grider-Successful removal of retained biliary sludge/stone debris    EYE SURGERY      FOOT SURGERY  01/2014    Left foot spur removal    LITHOTRIPSY Left 12/29/2015    ESWL EXTRACORPEAL SHOCK WAVE LITHOTRIPSY performed by Norma Orosco MD at [address]  11/2015    PILONIDAL CYST EXCISION      NM COLONOSCOPY FLX DX W/COLLJ SPEC WHEN PFRMD N/A 05/08/2017    Dr Croft Jewels sided diverticulosis-5 yr recall    NM EGD TRANSORAL BIOPSY SINGLE/MULTIPLE N/A 03/26/2018    Dr Ng-w/dilation over wire-51 German-esophageal stricture-Garces's (+) dysplasia (-)--1 yr recall    SHOULDER SURGERY  2007    right after fall, detached the subclavicle muscle    [address] [facility]    Neck  surgery     UPPER GASTROINTESTINAL ENDOSCOPY N/A 02/12/2018    Dr Minerva Lee Grade B esophagitis, hiatal hernia, stricture-(-) Марина, (+) Garces's (+) low grade dysplasia, active esophagogastritis, repeat: 3/26/18    UPPER GASTROINTESTINAL ENDOSCOPY  03/26/2018    Dr Ng-w/dilation over wire-51 French-esophageal stricture-Garces's (+) dysplasia (-)--1 yr recall    UPPER GASTROINTESTINAL ENDOSCOPY N/A 05/02/2019    Dr Efraín Ansari (+) Garces's, (-) dysplasia         CURRENT MEDICATIONS     Previous Medications    ASCORBIC ACID (VITAMIN C PO)    Take 1 tablet by mouth nightly     ASPIRIN 81 MG EC TABLET    Take 81 mg by mouth daily    CELECOXIB (CELEBREX) 200 MG CAPSULE        CHOLECALCIFEROL (VITAMIN D3) 50 MCG (2000 UT) CAPS    Take 2,000 Units by mouth daily    LOSARTAN (COZAAR) 25 MG TABLET    Take 1 tablet by mouth daily    MULTIPLE VITAMINS-MINERALS (ICAPS AREDS FORMULA PO)    Take 1 capsule by mouth 2 times daily    MULTIPLE VITAMINS-MINERALS (THERAPEUTIC MULTIVITAMIN-MINERALS) TABLET    Take 1 tablet by mouth daily    ONDANSETRON (ZOFRAN ODT) 4 MG DISINTEGRATING TABLET    Take 1 tablet by mouth every 8 hours as needed for Nausea or Vomiting    PROBIOTIC PRODUCT (PROBIOTIC FORMULA PO)    Take 1 tablet by mouth daily     SIMVASTATIN (ZOCOR) 10 MG TABLET    Take 1 tablet by mouth nightly    SOTALOL (BETAPACE) 120 MG TABLET    Take 1 tablet by mouth 2 times daily    VITAMIN B-12 (CYANOCOBALAMIN) 1000 MCG TABLET    Take 1,000 mcg by mouth daily     ZINC PO    Take 1 tablet by mouth daily       ALLERGIES     Patient has no known allergies.     FAMILY HISTORY       Family History   Problem Relation Age of Onset    Cancer Mother     Lung Cancer Mother     Heart Disease Father     Heart Disease Sister     Diabetes Sister     Heart Disease Brother     Diabetes Brother     Colon Cancer Neg Hx     Colon Polyps Neg Hx     Esophageal Cancer Neg Hx     Liver Cancer Neg Hx     Liver Disease Neg Hx     Rectal Cancer Neg Hx     Stomach Cancer Neg Hx           SOCIAL HISTORY       Social History     Socioeconomic History    Marital status:      Spouse name: None    Number of children: 2    Years of education: None    Highest education level: None   Tobacco Use    Smoking status: Never    Smokeless tobacco: Never   Vaping Use    Vaping Use: Never used   Substance and Sexual Activity    Alcohol use: No    Drug use: No    Sexual activity: Yes     Partners: Female     Social Determinants of Health     Financial Resource Strain: Low Risk     Difficulty of Paying Living Expenses: Not hard at all   Food Insecurity: No Food Insecurity    Worried About Running Out of Food in the Last Year: Never true    Ran Out of Food in the Last Year: Never true   Physical Activity: Sufficiently Active    Days of Exercise per Week: 6 days    Minutes of Exercise per Session: 150+ min   Intimate Partner Violence: Not At Risk    Fear of Current or Ex-Partner: No    Emotionally Abused: No    Physically Abused: No    Sexually Abused: No       SCREENINGS    Tavernier Coma Scale  Eye Opening: Spontaneous  Best Verbal Response: Oriented  Best Motor Response: Obeys commands  Tavernier Coma Scale Score: 15        PHYSICAL EXAM    (up to 7 for level 4, 8 or more for level 5)     ED Triage Vitals [01/27/23 1445]   BP Temp Temp src Heart Rate Resp SpO2 Height Weight   (!) 139/93 98 °F (36.7 °C) -- 73 18 96 % 6' (1.829 m) 230 lb (104.3 kg)       Physical Exam  Vitals and nursing note reviewed. HENT:      Head: Atraumatic. Mouth/Throat:      Mouth: Mucous membranes are moist. Mucous membranes are not dry. Eyes:      General: No scleral icterus. Pupils: Pupils are equal, round, and reactive to light. Neck:      Trachea: No tracheal deviation. Cardiovascular:      Rate and Rhythm: Normal rate. Rhythm irregular. Pulses: Normal pulses. Heart sounds: Normal heart sounds. No murmur heard. Pulmonary:      Effort: Pulmonary effort is normal. No respiratory distress. Breath sounds: Normal breath sounds. No stridor. Abdominal:      General: There is no distension. Palpations: Abdomen is soft. Tenderness: There is no abdominal tenderness. There is no guarding. Musculoskeletal:      Right lower leg: No edema. Left lower leg: No edema. Skin:     Capillary Refill: Capillary refill takes less than 2 seconds. Coloration: Skin is not pale. Findings: No rash. Neurological:      Mental Status: He is alert and oriented to person, place, and time. Psychiatric:         Behavior: Behavior is cooperative. DIAGNOSTIC RESULTS     EKG: All EKG's areinterpreted by the Emergency Department Physician who either signs or Co-signs this chart in the absence of a cardiologist.    1450: Irregular appearing rhythm that appears consistent with atrial fibrillation. QTc: 473 MS. No evidence of acute ST elevation identified. RADIOLOGY:  Non-plain film images such as CT, Ultrasound and MRI are read by the radiologist. Plain radiographic images are visualized and preliminarily interpreted bythe emergency physician with the below findings:      XR CHEST PORTABLE   Final Result   No acute abnormality. LABS:  Labs Reviewed   COMPREHENSIVE METABOLIC PANEL - Abnormal; Notable for the following components:       Result Value    Glucose 132 (*)     All other components within normal limits   CBC WITH AUTO DIFFERENTIAL - Abnormal; Notable for the following components:    RBC 4.26 (*)     Hemoglobin 13.3 (*)     Hematocrit 39.8 (*)     MCH 31.2 (*)     All other components within normal limits   COVID-19, RAPID   MAGNESIUM   BRAIN NATRIURETIC PEPTIDE   TROPONIN   APTT   APTT   APTT       All other labs were within normal range or not returned as of this dictation.     EMERGENCY DEPARTMENT COURSE and DIFFERENTIAL DIAGNOSIS/MDM: Vitals:    Vitals:    01/27/23 1445   BP: (!) 139/93   Pulse: 73   Resp: 18   Temp: 98 °F (36.7 °C)   SpO2: 96%   Weight: 230 lb (104.3 kg)   Height: 6' (1.829 m)       MDM     Amount and/or Complexity of Data Reviewed  Clinical lab tests: ordered and reviewed  Tests in the radiology section of CPT®: ordered and reviewed  Discuss the patient with other providers: yes  Independent visualization of images, tracings, or specimens: yes    Patient presents to the ED for evaluation regarding shortness of breath and irregular heart rhythm. He has a known prior history of A. fib and is maintained on sotalol. EKG reveals irregular cardiac rhythm with rates in the 70s consistent with atrial fibrillation. His serum electrolytes look okay. Kidney function looks normal with a creatinine of 0.9. His hemoglobin is normal at 13.3. Cardiac biomarkers are negative and his BNP is normal.  I have discussed patient's case with Dr. Tarah Reyna regarding cardiology consultation. He is offered outpatient evaluation course, starting on oral anticoagulants and following up in the office versus admission with plans for VAISHNAVI and cardioversion. Patient states he would prefer to be admitted and get his symptoms taken care of at this time. CONSULTS:  IP CONSULT TO CARDIOLOGY    Case was discussed with Dr. Tarah Reyna regarding cardiology consult. Case was discussed with Kylee Galindo regarding admission to the hospitalist service. PROCEDURES:  Unless otherwise noted below, none     Procedures    FINAL IMPRESSION      1. Paroxysmal atrial fibrillation (HCC)    2.  Dyspnea, unspecified type          DISPOSITION/PLAN   DISPOSITION Decision To Admit 01/27/2023 04:36:59 PM      (Please note that portions of this note were completed with a voice recognition program.  Efforts were made to edit thedictations but occasionally words are mis-transcribed.)    Jhonathan Polo MD (electronically signed)  Attending Emergency Physician         Jhonathan Polo MD  01/27/23 1631

## 2023-01-27 NOTE — H&P
[address] [facility]      Hospitalist - History & Physical      PCP: Mikie Ramirez DO    Date of Admission: 1/27/2023    Date of Service: 1/27/2023    Chief Complaint:  Irregular HR/SOB    History Of Present Illness: The patient is a 71 y.o. male with a PMH of HTN, HLD, paroxysmal atrial fibrillation and previous cardioversion who presented to [address] [place] [address] ED on 1/27/2023 complaining of irregular heart rate and shortness of breath. He states for approximately 2 weeks he has felt increasingly short of breath, increased fatigue with activities and decreased stamina. He states that he was additionally having some orthopnea. He checked his heart rate and it felt irregular and he thought he was out of rhythm. He is currently maintained on sotalol, however, he is not currently on any anticoagulation. He denies any CAD or cardiac stents. He denies overt chest pain. Denies nausea, vomiting, syncope, dizziness, cough or recent illnesses. Denies any recent weight gain. Denies palpitations or leg swelling. Further ED work-up revealed chemistries within normal limits with the exception of hyperglycemia. proBNP 575, initial troponin negative. WBC 5.6, H&H 13.3/39.8 and a platelet count 285. EKG showed atrial fibrillation with controlled rate no acute ST elevation. Patient will be admitted to hospital medicine for atrial fibrillation and dyspnea cardiology consult    Past Medical History:        Diagnosis Date    Arthritis     Atrial fibrillation [facility])     sees dr. Prem Payton polyps     Difficult intubation     pt just had recent cervical fusion with plate 2 months ago    DVT (deep venous thrombosis) ([facility] [address] 2014    left leg    Elbow pain     torn bicep    Essential hypertension 12/13/2021    GERD (gastroesophageal reflux disease)     Hx of blood clots     left leg/ after heel surg/ jan.  2015    Hyperlipidemia     recently able to be taken off cholesterol meds    Kidney stone on left side        Past Surgical History:        Procedure Laterality Date    BICEPS TENDON REPAIR Right 07/16/2020    RIGHT BICEPS TENDON REPAIR performed by Katherine Rosales MD at [address]      2015, oct    CHOLECYSTECTOMY, LAPAROSCOPIC N/A 03/23/2021    CHOLECYSTECTOMY LAPAROSCOPIC with IOC performed by Mariel Nunn MD at [address]  10/30/2009    Baljit: hyperplastic polyp    COLONOSCOPY  02/2015    hyperplastic polyp (5 yr)    DILATATION, ESOPHAGUS      ERCP N/A 03/24/2021    Dr LITO Grider-w/sphincterotomy, placement of a 10F x 5 cm biliary stent, balloon sweep-Cholangitis with choledocholithiasis, repeat in 3 months    ERCP N/A 06/09/2021    Dr LITO Grider-Successful removal of retained biliary sludge/stone debris    EYE SURGERY      FOOT SURGERY  01/2014    Left foot spur removal    LITHOTRIPSY Left 12/29/2015    ESWL EXTRACORPEAL SHOCK WAVE LITHOTRIPSY performed by Eh Alford MD at [address]  11/2015    PILONIDAL CYST EXCISION      TN COLONOSCOPY FLX DX W/COLLJ SPEC WHEN PFRMD N/A 05/08/2017    Dr Foreign Quiroz sided diverticulosis-5 yr recall    TN EGD TRANSORAL BIOPSY SINGLE/MULTIPLE N/A 03/26/2018    Dr Ng-w/dilation over wire-51 Cymro-esophageal stricture-Garces's (+) dysplasia (-)--1 yr recall    SHOULDER SURGERY  2007    right after fall, detached the subclavicle muscle    [address]    Neck  surgery     UPPER GASTROINTESTINAL ENDOSCOPY N/A 02/12/2018    Dr Ann Prince Grade B esophagitis, hiatal hernia, stricture-(-) Марина, (+) Garces's (+) low grade dysplasia, active esophagogastritis, repeat: 3/26/18    UPPER GASTROINTESTINAL ENDOSCOPY  03/26/2018    Dr Ng-w/dilation over wire-51 Cymro-esophageal stricture-Garces's (+) dysplasia (-)--1 yr recall    UPPER GASTROINTESTINAL ENDOSCOPY N/A 05/02/2019    Dr Aretha Rodriguez (+) Garces's, (-) dysplasia       Home Medications:  Prior to Admission medications    Medication Sig Start Date End Date Taking? Authorizing Provider   Cholecalciferol (VITAMIN D3) 50 MCG (2000 UT) CAPS Take 2,000 Units by mouth daily    Historical Provider, MD   ZINC PO Take 1 tablet by mouth daily    Historical Provider, MD   sotalol (BETAPACE) 120 MG tablet Take 1 tablet by mouth 2 times daily 1/5/23   LUIS Mcclellan   losartan (COZAAR) 25 MG tablet Take 1 tablet by mouth daily 12/5/22   LUIS Ontiveros   celecoxib (CELEBREX) 200 MG capsule  11/4/22   Historical Provider, MD   simvastatin (ZOCOR) 10 MG tablet Take 1 tablet by mouth nightly 6/24/22   HORACE Mays DO   ondansetron (ZOFRAN ODT) 4 MG disintegrating tablet Take 1 tablet by mouth every 8 hours as needed for Nausea or Vomiting 3/29/22   Marlon Eldridge MD   Multiple Vitamins-Minerals (THERAPEUTIC MULTIVITAMIN-MINERALS) tablet Take 1 tablet by mouth daily    Historical Provider, MD   Multiple Vitamins-Minerals (ICAPS AREDS FORMULA PO) Take 1 capsule by mouth 2 times daily    Historical Provider, MD   aspirin 81 MG EC tablet Take 81 mg by mouth daily    Historical Provider, MD   Probiotic Product (PROBIOTIC FORMULA PO) Take 1 tablet by mouth daily     Historical Provider, MD   Ascorbic Acid (VITAMIN C PO) Take 1 tablet by mouth nightly     Historical Provider, MD   vitamin B-12 (CYANOCOBALAMIN) 1000 MCG tablet Take 1,000 mcg by mouth daily     Historical Provider, MD       Allergies:    Patient has no known allergies. Social History:    The patient currently lives at home with spouse  Tobacco:   reports that he has never smoked. He has never used smokeless tobacco.  Alcohol:   reports no history of alcohol use.   Illicit Drugs: denies    Family History:      Problem Relation Age of Onset    Cancer Mother     Lung Cancer Mother     Heart Disease Father     Heart Disease Sister     Diabetes Sister     Heart Disease Brother     Diabetes Brother     Colon Cancer Neg Hx     Colon Polyps Neg Hx     Esophageal Cancer Neg Hx     Liver Cancer Neg Hx Liver Disease Neg Hx     Rectal Cancer Neg Hx     Stomach Cancer Neg Hx        Review of Systems:   Review of Systems   Constitutional:  Positive for fatigue. Negative for chills, diaphoresis and fever. HENT:  Negative for congestion and ear pain. Eyes:  Negative for visual disturbance. Respiratory:  Positive for shortness of breath. Negative for cough and wheezing. Cardiovascular:  Negative for chest pain, palpitations and leg swelling. Gastrointestinal:  Negative for abdominal distention, abdominal pain, blood in stool, constipation, diarrhea, nausea and vomiting. Endocrine: Negative for cold intolerance and heat intolerance. Genitourinary:  Negative for difficulty urinating, flank pain, frequency and urgency. Musculoskeletal:  Negative for arthralgias and myalgias. Skin:  Negative for color change and wound. Neurological:  Negative for dizziness, syncope, weakness, light-headedness, numbness and headaches. Hematological:  Does not bruise/bleed easily. Psychiatric/Behavioral:  Negative for agitation, confusion and dysphoric mood. 14 point review of systems is negative except as specifically addressed above. Physical Examination:  BP (!) 138/96   Pulse 60   Temp 97 °F (36.1 °C) (Temporal)   Resp 16   Ht 6' (1.829 m)   Wt 220 lb (99.8 kg)   SpO2 94%   BMI 29.84 kg/m²   Physical Exam  Vitals and nursing note reviewed. Constitutional:       General: He is not in acute distress. Appearance: Normal appearance. He is not ill-appearing. HENT:      Head: Normocephalic and atraumatic. Right Ear: External ear normal.      Left Ear: External ear normal.      Nose: Nose normal.      Mouth/Throat:      Mouth: Mucous membranes are moist.   Eyes:      Extraocular Movements: Extraocular movements intact. Conjunctiva/sclera: Conjunctivae normal.      Pupils: Pupils are equal, round, and reactive to light. Cardiovascular:      Rate and Rhythm: Normal rate.  Rhythm irregularly irregular. Pulses: Normal pulses. Heart sounds: Normal heart sounds. Pulmonary:      Effort: Pulmonary effort is normal. No respiratory distress. Breath sounds: Normal breath sounds. No wheezing, rhonchi or rales. Abdominal:      General: Bowel sounds are normal. There is no distension. Palpations: Abdomen is soft. Tenderness: There is no abdominal tenderness. Musculoskeletal:         General: No swelling, tenderness or deformity. Normal range of motion. Cervical back: Normal range of motion and neck supple. No muscular tenderness. Right lower leg: No edema. Left lower leg: No edema. Skin:     General: Skin is warm and dry. Findings: No bruising or lesion. Neurological:      Mental Status: He is alert and oriented to person, place, and time. Psychiatric:         Mood and Affect: Mood normal.         Behavior: Behavior normal.         Thought Content: Thought content normal.        Diagnostic Data:  CBC:  Recent Labs     01/27/23  1511   WBC 5.6   HGB 13.3*   HCT 39.8*        BMP:  Recent Labs     01/27/23  1511      K 4.2      CO2 27   BUN 17   CREATININE 0.9   CALCIUM 9.2     Recent Labs     01/27/23  1511   AST 23   ALT 27   BILITOT <0.2   ALKPHOS 121     Coag Panel:   Recent Labs     01/27/23  1511   APTT 31.5     Cardiac Enzymes:   Recent Labs     01/27/23  1511   TROPONINI <0.01     ABGs:No results found for: PHART, PO2ART, HWW0GSK  Urinalysis:  Lab Results   Component Value Date/Time    NITRU Negative 03/29/2022 08:38 AM    WBCUA 2 03/29/2022 08:38 AM    BACTERIA NEGATIVE 03/29/2022 08:38 AM    RBCUA 158 03/29/2022 08:38 AM    BLOODU MODERATE 03/29/2022 08:38 AM    SPECGRAV 1.020 03/29/2022 08:38 AM    GLUCOSEU Negative 03/29/2022 08:38 AM     A1C: No results for input(s): LABA1C in the last 72 hours.   ABG:No results for input(s): PHART, MCZ0GGI, PO2ART, UHM3JHQ, BEART, HGBAE, U8YHOZBW, CARBOXHGBART in the last 72 hours.    XR CHEST PORTABLE    Result Date: 1/27/2023  CHEST X-RAY, 1 view INDICATION: SOB COMPARISON: None. TECHNIQUE: Single frontal view of the chest. FINDINGS: The lungs are clear. There is no pleural effusion. No pneumothorax. Cardiomediastinal silhouette is normal in size. No significant osseous findings. No acute abnormality. Assessment/Plan:  Principal Problem:    Atrial fibrillation with controlled ventricular rate (HCC)/Dyspnea/Paroxysmal atrial fibrillation (HCC)  -Admit to PCU-tele-full code  -Trend troponins  -Cardiology consulted in ED   -Heparin gtt per protocol   -Continue home Sotalol   -2D echo in the AM   -Vitals per unit routine   -Regular cardiac diet   -AM labs   -Trend troponins   -Up with assist   -Continue to monitor with supportive medical management     Active Problems:   Mixed hyperlipidemia   -Continue home statin therapy    Resolved Problems:    * No resolved hospital problems.  *       Signed:  LUIS Stanton, 1/27/2023 6:45 PM

## 2023-01-28 PROBLEM — I48.91 ATRIAL FIBRILLATION (HCC): Status: ACTIVE | Noted: 2023-01-28

## 2023-01-28 LAB
ANION GAP SERPL CALCULATED.3IONS-SCNC: 9 MMOL/L (ref 7–19)
APTT: 47.7 SEC (ref 26–36.2)
APTT: 53 SEC (ref 26–36.2)
APTT: 60.9 SEC (ref 26–36.2)
BASOPHILS ABSOLUTE: 0.1 K/UL (ref 0–0.2)
BASOPHILS RELATIVE PERCENT: 1.4 % (ref 0–1)
BUN BLDV-MCNC: 16 MG/DL (ref 8–23)
CALCIUM SERPL-MCNC: 9.5 MG/DL (ref 8.8–10.2)
CHLORIDE BLD-SCNC: 104 MMOL/L (ref 98–111)
CO2: 27 MMOL/L (ref 22–29)
CREAT SERPL-MCNC: 0.8 MG/DL (ref 0.5–1.2)
EOSINOPHILS ABSOLUTE: 0.2 K/UL (ref 0–0.6)
EOSINOPHILS RELATIVE PERCENT: 3.4 % (ref 0–5)
GFR SERPL CREATININE-BSD FRML MDRD: >60 ML/MIN/{1.73_M2}
GLUCOSE BLD-MCNC: 155 MG/DL (ref 74–109)
HCT VFR BLD CALC: 42.4 % (ref 42–52)
HEMOGLOBIN: 13.7 G/DL (ref 14–18)
IMMATURE GRANULOCYTES #: 0 K/UL
INR BLD: 1.02 (ref 0.88–1.18)
INR BLD: 1.03 (ref 0.88–1.18)
LV EF: 60 %
LVEF MODALITY: NORMAL
LYMPHOCYTES ABSOLUTE: 1.8 K/UL (ref 1.1–4.5)
LYMPHOCYTES RELATIVE PERCENT: 42.2 % (ref 20–40)
MCH RBC QN AUTO: 30.6 PG (ref 27–31)
MCHC RBC AUTO-ENTMCNC: 32.3 G/DL (ref 33–37)
MCV RBC AUTO: 94.9 FL (ref 80–94)
MONOCYTES ABSOLUTE: 0.3 K/UL (ref 0–0.9)
MONOCYTES RELATIVE PERCENT: 7.6 % (ref 0–10)
NEUTROPHILS ABSOLUTE: 2 K/UL (ref 1.5–7.5)
NEUTROPHILS RELATIVE PERCENT: 45.2 % (ref 50–65)
PDW BLD-RTO: 14.3 % (ref 11.5–14.5)
PLATELET # BLD: 250 K/UL (ref 130–400)
PMV BLD AUTO: 10.4 FL (ref 9.4–12.4)
POTASSIUM REFLEX MAGNESIUM: 4.9 MMOL/L (ref 3.5–5)
PROTHROMBIN TIME: 13.4 SEC (ref 12–14.6)
PROTHROMBIN TIME: 13.4 SEC (ref 12–14.6)
RBC # BLD: 4.47 M/UL (ref 4.7–6.1)
SODIUM BLD-SCNC: 140 MMOL/L (ref 136–145)
T4 FREE: 1.18 NG/DL (ref 0.93–1.7)
TSH SERPL DL<=0.05 MIU/L-ACNC: 2.25 UIU/ML (ref 0.27–4.2)
WBC # BLD: 4.4 K/UL (ref 4.8–10.8)

## 2023-01-28 PROCEDURE — 85610 PROTHROMBIN TIME: CPT

## 2023-01-28 PROCEDURE — 96376 TX/PRO/DX INJ SAME DRUG ADON: CPT

## 2023-01-28 PROCEDURE — 93005 ELECTROCARDIOGRAM TRACING: CPT | Performed by: INTERNAL MEDICINE

## 2023-01-28 PROCEDURE — 93306 TTE W/DOPPLER COMPLETE: CPT

## 2023-01-28 PROCEDURE — 85730 THROMBOPLASTIN TIME PARTIAL: CPT

## 2023-01-28 PROCEDURE — 6360000002 HC RX W HCPCS: Performed by: NURSE PRACTITIONER

## 2023-01-28 PROCEDURE — 84439 ASSAY OF FREE THYROXINE: CPT

## 2023-01-28 PROCEDURE — 84443 ASSAY THYROID STIM HORMONE: CPT

## 2023-01-28 PROCEDURE — 6370000000 HC RX 637 (ALT 250 FOR IP): Performed by: NURSE PRACTITIONER

## 2023-01-28 PROCEDURE — 85025 COMPLETE CBC W/AUTO DIFF WBC: CPT

## 2023-01-28 PROCEDURE — 6370000000 HC RX 637 (ALT 250 FOR IP): Performed by: INTERNAL MEDICINE

## 2023-01-28 PROCEDURE — 36415 COLL VENOUS BLD VENIPUNCTURE: CPT

## 2023-01-28 PROCEDURE — 2580000003 HC RX 258: Performed by: NURSE PRACTITIONER

## 2023-01-28 PROCEDURE — 2140000000 HC CCU INTERMEDIATE R&B

## 2023-01-28 PROCEDURE — 6370000000 HC RX 637 (ALT 250 FOR IP): Performed by: HOSPITALIST

## 2023-01-28 PROCEDURE — 99232 SBSQ HOSP IP/OBS MODERATE 35: CPT | Performed by: INTERNAL MEDICINE

## 2023-01-28 PROCEDURE — 6360000002 HC RX W HCPCS: Performed by: EMERGENCY MEDICINE

## 2023-01-28 PROCEDURE — 80048 BASIC METABOLIC PNL TOTAL CA: CPT

## 2023-01-28 RX ORDER — WARFARIN SODIUM 5 MG/1
5 TABLET ORAL
Status: COMPLETED | OUTPATIENT
Start: 2023-01-28 | End: 2023-01-28

## 2023-01-28 RX ADMIN — HEPARIN SODIUM 10 UNITS/KG/HR: 10000 INJECTION, SOLUTION INTRAVENOUS at 17:15

## 2023-01-28 RX ADMIN — SOTALOL HYDROCHLORIDE 160 MG: 80 TABLET ORAL at 08:28

## 2023-01-28 RX ADMIN — Medication 2000 UNITS: at 08:28

## 2023-01-28 RX ADMIN — SODIUM CHLORIDE, PRESERVATIVE FREE 10 ML: 5 INJECTION INTRAVENOUS at 08:28

## 2023-01-28 RX ADMIN — CYANOCOBALAMIN TAB 500 MCG 1000 MCG: 500 TAB at 08:28

## 2023-01-28 RX ADMIN — SOTALOL HYDROCHLORIDE 160 MG: 80 TABLET ORAL at 20:37

## 2023-01-28 RX ADMIN — HEPARIN SODIUM 2000 UNITS: 1000 INJECTION INTRAVENOUS; SUBCUTANEOUS at 02:52

## 2023-01-28 RX ADMIN — Medication 1 CAPSULE: at 08:28

## 2023-01-28 RX ADMIN — SODIUM CHLORIDE 15 ML/HR: 9 INJECTION, SOLUTION INTRAVENOUS at 02:57

## 2023-01-28 RX ADMIN — WARFARIN SODIUM 5 MG: 5 TABLET ORAL at 17:11

## 2023-01-28 RX ADMIN — MULTIPLE VITAMINS W/ MINERALS TAB 1 TABLET: TAB at 08:28

## 2023-01-28 RX ADMIN — SODIUM CHLORIDE 15 ML/HR: 9 INJECTION, SOLUTION INTRAVENOUS at 20:39

## 2023-01-28 RX ADMIN — ASPIRIN 81 MG: 81 TABLET, COATED ORAL at 08:28

## 2023-01-28 RX ADMIN — ATORVASTATIN CALCIUM 10 MG: 10 TABLET, FILM COATED ORAL at 20:37

## 2023-01-28 ASSESSMENT — ENCOUNTER SYMPTOMS
ABDOMINAL DISTENTION: 0
COUGH: 0
VOMITING: 0
COLOR CHANGE: 0
SHORTNESS OF BREATH: 1
ABDOMINAL PAIN: 0
CONSTIPATION: 0
NAUSEA: 0
BLOOD IN STOOL: 0
WHEEZING: 0
DIARRHEA: 0

## 2023-01-28 NOTE — PROGRESS NOTES
[facility] Hospitalists      Patient:  Fay Benítez  YOB: 1950  Date of Service: 1/28/2023  MRN: [id number]   Acct: [de-identified]   Primary Care Physician: Saravanan Gorman DO  Advance Directive: Full Code  Admit Date: 1/27/2023       Hospital Day: 0  Portions of this note have been copied forward, however, changed to reflect the most current clinical status of this patient. CHIEF COMPLAINT Irregular HR/SOB    SUBJECTIVE:  He states that he feels about the same. Remains in rate control a-fib. CUMULATIVE HOSPITAL STAY:  The patient is a 71 y.o. male with a PMH of HTN, HLD, paroxysmal atrial fibrillation and previous cardioversion who presented to [facility] ED on 1/27/2023 complaining of irregular heart rate and shortness of breath. He stated for approximately 2 weeks he has felt increasingly short of breath, increased fatigue with activities and decreased stamina. He states that he was additionally having some orthopnea. He checked his heart rate and it felt irregular and he thought he was out of rhythm. He is currently maintained on sotalol, however, he was not currently on any anticoagulation. He denied any CAD or cardiac stents. He denied overt chest pain. Further ED work-up revealed chemistries within normal limits with the exception of hyperglycemia. proBNP 575, initial troponin negative. WBC 5.6, H&H 13.3/39.8 and a platelet count 266. EKG showed atrial fibrillation with controlled rate no acute ST elevation. Patient was admitted to hospital medicine for atrial fibrillation and dyspnea cardiology consult. He was placed on a heparin gtt due to a C2V score of 2.  and betapace was increased with the plan to start on coumadin due to inaffordability of Eliquis. Will need to be here for 5 doses of Betapace per Dr. Adilene Enriquez. Possible DCCV with VAISHNAVI on 11/30/2023 with anesthesia if available. Labs and vitals are stable.      Review of Systems:   Review of Systems   Constitutional:  Positive for fatigue. Negative for chills, diaphoresis and fever. HENT:  Negative for congestion and ear pain. Eyes:  Negative for visual disturbance. Respiratory:  Positive for shortness of breath. Negative for cough and wheezing. Cardiovascular:  Negative for chest pain, palpitations and leg swelling. Gastrointestinal:  Negative for abdominal distention, abdominal pain, blood in stool, constipation, diarrhea, nausea and vomiting. Endocrine: Negative for cold intolerance and heat intolerance. Genitourinary:  Negative for difficulty urinating, flank pain, frequency and urgency. Musculoskeletal:  Negative for arthralgias and myalgias. Skin:  Negative for color change and wound. Neurological:  Negative for dizziness, syncope, weakness, light-headedness, numbness and headaches. Hematological:  Does not bruise/bleed easily. Psychiatric/Behavioral:  Negative for agitation, confusion and dysphoric mood. 14 point review of systems is negative except as specifically addressed above. Objective:   VITALS:  /76   Pulse 62   Temp 98.2 °F (36.8 °C) (Oral)   Resp 18   Ht 6' (1.829 m)   Wt 215 lb 6.4 oz (97.7 kg)   SpO2 95%   BMI 29.21 kg/m²   24HR INTAKE/OUTPUT:    Intake/Output Summary (Last 24 hours) at 1/28/2023 1456  Last data filed at 1/28/2023 1009  Gross per 24 hour   Intake 330 ml   Output 1100 ml   Net -770 ml       Physical Exam  Vitals and nursing note reviewed. Constitutional:       General: He is not in acute distress. Appearance: Normal appearance. He is not ill-appearing. HENT:      Head: Normocephalic and atraumatic. Right Ear: External ear normal.      Left Ear: External ear normal.      Nose: Nose normal.      Mouth/Throat:      Mouth: Mucous membranes are moist.   Eyes:      Extraocular Movements: Extraocular movements intact. Conjunctiva/sclera: Conjunctivae normal.      Pupils: Pupils are equal, round, and reactive to light.    Cardiovascular:      Rate and Rhythm: Normal rate and regular rhythm. Pulses: Normal pulses. Heart sounds: Normal heart sounds. Pulmonary:      Effort: Pulmonary effort is normal. No respiratory distress. Breath sounds: Normal breath sounds. No wheezing, rhonchi or rales. Abdominal:      General: Bowel sounds are normal. There is no distension. Palpations: Abdomen is soft. Tenderness: There is no abdominal tenderness. Musculoskeletal:         General: No swelling, tenderness or deformity. Normal range of motion. Cervical back: Normal range of motion and neck supple. No muscular tenderness. Right lower leg: No edema. Left lower leg: No edema. Skin:     General: Skin is warm and dry. Findings: No bruising or lesion. Neurological:      Mental Status: He is alert and oriented to person, place, and time. Psychiatric:         Mood and Affect: Mood normal.         Behavior: Behavior normal.         Thought Content: Thought content normal.           Medications:      heparin (PORCINE) Infusion 10 Units/kg/hr (01/28/23 0813)    sodium chloride 15 mL/hr (01/28/23 0257)      warfarin  5 mg Oral Once    aspirin  81 mg Oral Daily    [Held by provider] losartan  25 mg Oral Daily    lactobacillus  1 capsule Oral Daily    therapeutic multivitamin-minerals  1 tablet Oral Daily    vitamin B-12  1,000 mcg Oral Daily    Vitamin D  2,000 Units Oral Daily    sodium chloride flush  5-40 mL IntraVENous 2 times per day    atorvastatin  10 mg Oral Nightly    sotalol  160 mg Oral BID    warfarin placeholder: dosing by pharmacy   Other RX Placeholder     sodium chloride flush, sodium chloride, polyethylene glycol, acetaminophen **OR** acetaminophen, perflutren lipid microspheres, heparin (porcine), heparin (porcine)  ADULT DIET;  Regular; Low Fat/Low Chol/High Fiber/LISA     Lab and other Data:     Recent Labs     01/27/23  1511 01/28/23  0911   WBC 5.6 4.4*   HGB 13.3* 13.7*    250     Recent Labs 01/27/23  1511 01/28/23  0911    140   K 4.2 4.9    104   CO2 27 27   BUN 17 16   CREATININE 0.9 0.8   GLUCOSE 132* 155*     Recent Labs     01/27/23  1511   AST 23   ALT 27   BILITOT <0.2   ALKPHOS 121     Troponin T:   Recent Labs     01/27/23  1511 01/27/23  2010   TROPONINI <0.01 <0.01     Pro-BNP: No results for input(s): BNP in the last 72 hours. INR:   Recent Labs     01/27/23  1511 01/27/23  2333 01/28/23  0634   INR 0.93 1.03 1.02     RAD:   XR CHEST PORTABLE    Result Date: 1/27/2023  No acute abnormality. Echo:   Transthoracic Echocardiography Report (TTE)      Demographics      Patient Name Beryl Gomezr  Date of Study         01/28/2023                R      MRN          [id number]             Gender                Male      Date of      1950         Room Number           [id number]   Birth      Age          71 year(s)      Height:      72 inches          Referring Physician   Ayleen Bradley      Weight:      215 pounds         Sonographer           VALENTÍN Don      BSA:         2.2 m^2            Interpreting          Christelle Brooke MD                                   Physician      BMI:         29.16 kg/m^2     Procedure     Type of Study      TTE procedure:ECHO NO CONTRAST WITH DOP/COLR. Study Location: Echo Lab  Technical Quality: Adequate visualization     Patient Status: Inpatient     HR: 75 bpm BP: 116/84 mmHg     Indications:Atrial fibrillation and Dyspnea/SOB. Conclusions      Summary   LV is normal in size with normal systolic function. LV ejection fraction   estimated at 60%. Mild to moderate concentric LVH. Probable grade 2 diastolic dysfunction. RV is normal in size with normal systolic function. Mild left atrial enlargement. Normal right atrial size. Aortic valve is trileaflet with thickening of noncoronary leaflet and   focal calcification of left coronary leaflet. No significant stenosis or   regurgitation noted.    Mitral valve is structurally normal with normal leaflet mobility. No   stenosis. Trivial mitral regurgitation. Trivial tricuspid regurgitation. No significant pericardial effusion. Signature      ----------------------------------------------------------------   Electronically signed by Tyrell Brooke MD(Interpreting physician)   on 01/28/2023 03:45 PM   ----------------------------------------------------------------       Assessment/Plan   Principal Problem:    Atrial fibrillation with controlled ventricular rate (HCC)/Paroxysmal atrial fibrillation (HCC)/Dyspnea   -Cardiology following-ongoing recommendations appreciated   -tele   -Continue heparin gtt per protocol   -Betapace increased per cardiology   -Daily labs   -2D echo completed EF-60% mild to moderate- LVH grade 2 DD   -Daily EKG   -Monitor vital signs   -Daily labs   -Possible VAISHNAVI with DCCV on 1/30/2023    Active Problems:  Mixed hyperlipidemia     -Continue home statin    Resolved Problems:    * No resolved hospital problems.  *      DVT Prophylaxis: Heparin gtt    Disposition: GEORGETTE Guzmán, APRN, 1/28/2023 2:56 PM

## 2023-01-28 NOTE — PROGRESS NOTES
Abhinav Cabello arrived to room # [phone]. Presented with: afib  Mental Status: Patient is oriented, alert, coherent, logical, thought processes intact, and able to concentrate and follow conversation. Vitals:    01/27/23 1833   BP: (!) 138/96   Pulse: 60   Resp: 16   Temp: 97 °F (36.1 °C)   SpO2: 94%     Patient safety contract and falls prevention contract reviewed with patient Yes. Oriented Patient and Family to room. Call light within reach. Yes.   Needs, issues or concerns expressed at this time: no.      Electronically signed by Yair Driver RN on 1/27/2023 at 6:37 PM

## 2023-01-28 NOTE — CONSULTS
[facility]  Cardiology Consult      Requesting MD:  Kp Boyle MD   Admit Status:         History obtained from:   [] Patient  [] Other (specify):     PROBLEM LIST:    Patient Active Problem List    Diagnosis Date Noted    Atrial fibrillation with controlled ventricular rate ([address] 01/27/2023     Priority: Medium    Dyspnea 01/27/2023     Priority: Medium    Essential hypertension 12/13/2021     Priority: Low    Cholangitis      Priority: Low    Choledocholithiasis      Priority: Low    Acute cholecystitis 03/23/2021     Priority: Low    Paroxysmal atrial fibrillation ([address] 11/10/2020     Priority: Low    Sinoatrial node dysfunction ([address] 01/23/2020     Priority: Low     Overview Note:     9/24/2014  SE negative for myocardial ischemia  9/25/2014  Echo  Normal   5/7/2015  Echo  Normal   1/22/2020  Newly discovered AF      Garces's esophagus without dysplasia 01/30/2019     Priority: Low    Chronic GERD 01/30/2019     Priority: Low    Esophageal pain 02/05/2018     Priority: Low    History of kidney stones 12/22/2017     Priority: Low    Diverticulosis of intestine with bleeding 12/22/2017     Priority: Low    Esophageal dysphagia 12/22/2017     Priority: Low    Acute pain of right knee 12/22/2017     Priority: Low     Overview Note:     Secondary to a fall 1-1/2-2 months ago      Hyperlipoproteinemia 12/22/2017     Priority: Low    H/O cervical spine surgery 12/22/2017     Priority: Low     Overview Note:     2015 by Dr. Masoud Calero      Bloody diarrhea 04/26/2017     Priority: Low    Hypotension      Priority: Low    Gastrointestinal hemorrhage 03/12/2017     Priority: Low    Colon polyps      Priority: Low    Diverticulosis of large intestine with hemorrhage      Priority: Low    Acute blood loss anemia      Priority: Low    Nephrolithiasis 12/29/2015     Priority: Low    History of colon polyps 11/19/2014     Priority: Low    Decreased carotid pulse 09/19/2014     Priority: Low Numbness and tingling in right hand 09/19/2014     Priority: Low    Heart murmur 09/19/2014     Priority: Low    Chest pain 09/19/2014     Priority: Low    Deep vein thrombosis (DVT) ([facility] 05/14/2014     Priority: Low    Mixed hyperlipidemia 05/14/2014     Priority: Low     1. Paroxysmal atrial fibrillation, on Betapace, not on anticoagulation, prior DCCV 1/24/2020, normal LV ejection fraction, negative Lexiscan study 1/24/2020.  2.  Hypertension. 3.  History of lower extremity DVT 2014. PRESENTATION: Anya Hurley is a 71y.o. year old male who presents with 2 weeks of feeling fatigue and noting that he was unable to lie flat at night and sleep as he would wake up and have to sit up. No chest pain reported. He is usually very active. His hand-held device monitor told him he was in atrial fibrillation. He does get episodic atrial fibrillation which is short-lived. Has been mostly in sinus rhythm. Stop taking Eliquis as he was unable to afford it. Has been on Betapace. Previously Betapace was increased by Dr. Fabiola Sanchez when he was noted to have an increase in atrial fibrillation burden which resolved the issue. Noted to be in atrial fibrillation with normal ventricular rates. proBNP normal at 575. Troponins negative. EKG shows atrial fibrillation with  ms. Review of EKG show that he did have a bout of atrial fibrillation 3/23/2021 when he presented with acute cholecystitis but subsequently went back into sinus rhythm. REVIEW OF SYSTEMS:  Review of Systems   Constitutional:  Negative for activity change, diaphoresis and fatigue. HENT:  Negative for hearing loss, nosebleeds and tinnitus. Eyes:  Negative for visual disturbance. Respiratory:  Negative for cough, shortness of breath and wheezing. Cardiovascular:  Negative for chest pain, palpitations and leg swelling. Gastrointestinal:  Negative for abdominal distention, abdominal pain, blood in stool, diarrhea and vomiting. fever Endocrine: Negative for cold intolerance, heat intolerance, polydipsia, polyphagia and polyuria. Genitourinary:  Negative for difficulty urinating, flank pain and hematuria. Musculoskeletal:  Negative for arthralgias, back pain, joint swelling and myalgias. Skin:  Negative for pallor and rash. Neurological:  Negative for dizziness, seizures, syncope and headaches. Psychiatric/Behavioral:  Negative for behavioral problems and dysphoric mood. The patient is not nervous/anxious. Past Medical History:      Diagnosis Date    Arthritis     Atrial fibrillation [facility])     sees dr. Kaylin Dorman polyps     Difficult intubation     pt just had recent cervical fusion with plate 2 months ago    DVT (deep venous thrombosis) ([address] 2014    left leg    Elbow pain     torn bicep    Essential hypertension 12/13/2021    GERD (gastroesophageal reflux disease)     Hx of blood clots     left leg/ after heel surg/ jan. 2015    Hyperlipidemia     recently able to be taken off cholesterol meds    Kidney stone on left side        Past Surgical History:      Procedure Laterality Date    BICEPS TENDON REPAIR Right 07/16/2020    RIGHT BICEPS TENDON REPAIR performed by Rayo Armstrong MD at [address]      2015, oct    CHOLECYSTECTOMY, LAPAROSCOPIC N/A 03/23/2021    CHOLECYSTECTOMY LAPAROSCOPIC with IOC performed by Lenin Hamilton MD at [address]  10/30/2009    [facility]    COLONOSCOPY  02/2015    hyperplastic polyp (5 yr)    DILATATION, ESOPHAGUS      ERCP N/A 03/24/2021    Dr LITO Grider-w/sphincterotomy, placement of a 10F x 5 cm biliary stent, balloon sweep-Cholangitis with choledocholithiasis, repeat in 3 months    ERCP N/A 06/09/2021    Dr LITO Grider-Successful removal of retained biliary sludge/stone debris    EYE SURGERY      FOOT SURGERY  01/2014    Left foot spur removal    LITHOTRIPSY Left 12/29/2015    [facility] [address] LITHOTRIPSY performed by Dawson Chung MD at [address]  11/2015    PILONIDAL CYST EXCISION      MA COLONOSCOPY FLX DX W/COLLJ SPEC WHEN PFRMD N/A 05/08/2017    Dr Rhea Villanueva sided diverticulosis-5 yr recall    MA EGD TRANSORAL BIOPSY SINGLE/MULTIPLE N/A 03/26/2018    Dr Ng-w/dilation over wire-51 [place]-esophageal stricture-Garces's (+) dysplasia (-)--1 yr recall    SHOULDER SURGERY  2007    right after fall, detached the subclavicle muscle    [facility]    Neck  surgery     UPPER GASTROINTESTINAL ENDOSCOPY N/A 02/12/2018    Dr Flash King Grade B esophagitis, hiatal hernia, stricture-(-) Марина, (+) Garces's (+) low grade dysplasia, active esophagogastritis, repeat: 3/26/18    UPPER GASTROINTESTINAL ENDOSCOPY  03/26/2018    Dr Ng-w/dilation over wire-51 [place]-esophageal stricture-Garces's (+) dysplasia (-)--1 yr recall    UPPER GASTROINTESTINAL ENDOSCOPY N/A 05/02/2019    Dr Lillian Campoverde (+) Garces's, (-) dysplasia       Allergies:  Patient has no known allergies.     Past Social History:  Social History     Socioeconomic History    Marital status:      Spouse name: Not on file    Number of children: 2    Years of education: Not on file    Highest education level: Not on file   Occupational History    Not on file   Tobacco Use    Smoking status: Never    Smokeless tobacco: Never   Vaping Use    Vaping Use: Never used   Substance and Sexual Activity    Alcohol use: No    Drug use: No    Sexual activity: Yes     Partners: Female   Other Topics Concern    Not on file   Social History Narrative    Not on file     Social Determinants of Health     Financial Resource Strain: Low Risk     Difficulty of Paying Living Expenses: Not hard at all   Food Insecurity: No Food Insecurity    Worried About Running Out of Food in the Last Year: Never true    Ran Out of Food in the Last Year: Never true   Transportation Needs: Not on file   Physical Activity: Sufficiently Active    Days of Exercise per Week: 6 days    Minutes of Exercise per Session: 150+ min   Stress: Not on file   Social Connections: Not on file   Intimate Partner Violence: Not At Risk    Fear of Current or Ex-Partner: No    Emotionally Abused: No    Physically Abused: No    Sexually Abused: No   Housing Stability: Not on file       Family History:       Problem Relation Age of Onset    Cancer Mother     Lung Cancer Mother     Heart Disease Father     Heart Disease Sister     Diabetes Sister     Heart Disease Brother     Diabetes Brother     Colon Cancer Neg Hx     Colon Polyps Neg Hx     Esophageal Cancer Neg Hx     Liver Cancer Neg Hx     Liver Disease Neg Hx     Rectal Cancer Neg Hx     Stomach Cancer Neg Hx        Home Meds:  Prior to Admission medications    Medication Sig Start Date End Date Taking?  Authorizing Provider   Cholecalciferol (VITAMIN D3) 50 MCG (2000 UT) CAPS Take 2,000 Units by mouth daily    Historical Provider, MD   ZINC PO Take 1 tablet by mouth daily    Historical Provider, MD   sotalol (BETAPACE) 120 MG tablet Take 1 tablet by mouth 2 times daily 1/5/23   LUIS Archer   losartan (COZAAR) 25 MG tablet Take 1 tablet by mouth daily 12/5/22   LUIS Garcia   celecoxib (CELEBREX) 200 MG capsule  11/4/22   Historical Provider, MD   simvastatin (ZOCOR) 10 MG tablet Take 1 tablet by mouth nightly 6/24/22   HORACE Lynch Ours,    ondansetron (ZOFRAN ODT) 4 MG disintegrating tablet Take 1 tablet by mouth every 8 hours as needed for Nausea or Vomiting 3/29/22   José Almanzar MD   Multiple Vitamins-Minerals (THERAPEUTIC MULTIVITAMIN-MINERALS) tablet Take 1 tablet by mouth daily    Historical Provider, MD   Multiple Vitamins-Minerals (ICAPS AREDS FORMULA PO) Take 1 capsule by mouth 2 times daily    Historical Provider, MD   aspirin 81 MG EC tablet Take 81 mg by mouth daily    Historical Provider, MD   Probiotic Product (PROBIOTIC FORMULA PO) Take 1 tablet by mouth daily     Historical Provider, MD   Ascorbic Acid (VITAMIN C PO) Take 1 tablet by mouth nightly     Historical Provider, MD   vitamin B-12 (CYANOCOBALAMIN) 1000 MCG tablet Take 1,000 mcg by mouth daily     Historical Provider, MD       Current Meds:   [START ON 1/28/2023] aspirin  81 mg Oral Daily    [Held by provider] losartan  25 mg Oral Daily    [START ON 1/28/2023] lactobacillus  1 capsule Oral Daily    [START ON 1/28/2023] therapeutic multivitamin-minerals  1 tablet Oral Daily    [START ON 1/28/2023] vitamin B-12  1,000 mcg Oral Daily    [START ON 1/28/2023] Vitamin D  2,000 Units Oral Daily    sodium chloride flush  5-40 mL IntraVENous 2 times per day    atorvastatin  10 mg Oral Nightly    sotalol  160 mg Oral BID    warfarin placeholder: dosing by pharmacy   Other RX Placeholder    warfarin  5 mg Oral Once       Current Infused Meds:   heparin (PORCINE) Infusion 9 Units/kg/hr (01/27/23 1702)    sodium chloride         Physical Exam:  Vitals:    01/27/23 1946   BP: (!) 138/96   Pulse: 60   Resp:    Temp:    SpO2:      No intake or output data in the 24 hours ending 01/27/23 2131  Estimated body mass index is 29.84 kg/m² as calculated from the following:    Height as of this encounter: 6' (1.829 m). Weight as of this encounter: 220 lb (99.8 kg). Physical Exam  HENT:      Mouth/Throat:      Pharynx: No oropharyngeal exudate. Eyes:      General: No scleral icterus. Right eye: No discharge. Left eye: No discharge. Neck:      Thyroid: No thyromegaly. Vascular: No JVD. Cardiovascular:      Rate and Rhythm: Normal rate. Rhythm irregular. Heart sounds: No murmur heard. No friction rub. No gallop. Comments: No JVD  No edema  No significant systolic or diastolic murmurs noted  Pulmonary:      Effort: No respiratory distress. Breath sounds: No stridor. No wheezing or rales. Abdominal:      General: Bowel sounds are normal. There is no distension. Palpations: Abdomen is soft. There is no mass. Tenderness: There is no abdominal tenderness. There is no guarding or rebound. Comments: No palpable organomegaly   Musculoskeletal:         General: No deformity. Skin:     General: Skin is warm. Coloration: Skin is not pale. Findings: No erythema or rash. Neurological:      Mental Status: He is alert and oriented to person, place, and time. Motor: No abnormal muscle tone. Coordination: Coordination normal.      Deep Tendon Reflexes: Reflexes normal.         Labs:  Recent Labs     01/27/23  1511   WBC 5.6   HGB 13.3*          Recent Labs     01/27/23  1511      K 4.2      CO2 27   BUN 17   CREATININE 0.9   LABGLOM >60   MG 1.9   CALCIUM 9.2       CK, CKMB, Troponin: @LABRCNT (CKTOTAL:3, CKMB:3, TROPONINI:3)@    Last 3 BNP:          IMAGING:  XR CHEST PORTABLE    Result Date: 1/27/2023  CHEST X-RAY, 1 view INDICATION: SOB COMPARISON: None. TECHNIQUE: Single frontal view of the chest. FINDINGS: The lungs are clear. There is no pleural effusion. No pneumothorax. Cardiomediastinal silhouette is normal in size. No significant osseous findings. No acute abnormality. [id number] Score for Atrial Fibrillation Stroke Risk   Risk   Factors  Component Value   C CHF No 0   H HTN Yes 1   A2 Age >= 76 No,  (76 y.o.) 0   D DM No 0   S2 Prior Stroke/TIA No 0   V Vascular Disease No 0   A Age 74-69 Yes,  (76 y.o.) 1   Sc Sex male 0    [id number]  Score  2   Score last updated 1/27/23 4:66 PM CST    Click here for a link to the UpToDate guideline \"Atrial Fibrillation: Anticoagulation therapy to prevent embolization    Disclaimer: Risk Score calculation is dependent on accuracy of patient problem list and past encounter diagnosis. Assessment and Plan:     This is a 71y.o. year old male with past medical history of paroxysmal atrial fibrillation on Betapace, not on anticoagulation, prior DCCV 1/24/2020, prior history of DVT, hypertension, normal LV ejection fraction presenting with recurrent atrial fibrillation with good rate control but symptomatic. 1.  His C2 V score is 2. He benefits from being on anticoagulation. He was unable to afford Eliquis previously. Has been started on IV heparin and will start him on Coumadin which he is willing to do. Have discussed options with him including rate control strategy as well as rhythm control strategy. He is symptomatic in atrial fibrillation and would prefer to be in rhythm control. Bowie of atrial fibrillation appears to be increasing with age. He wishes to trial increasing his Betapace dose and will be monitored in-house with daily EKGs to monitor QTC. Increase Betapace to 160 mg twice daily. Can decide about VAISHNAVI/cardioversion on Monday if anesthesia is available (previously had significant discomfort with DCCV) versus returning in 4 to 6 weeks for DCCV if he is still in atrial fibrillation. We will need to monitor in house for 5 doses of Betapace.       Electronically signed by Luis Christie MD on 1/27/2023 at 9:31 PM

## 2023-01-28 NOTE — PROGRESS NOTES
Cardiology Progress Note Esme Galindo MD      Patient:  Shaye Lipoma  [id number]    Patient Active Problem List    Diagnosis Date Noted    Atrial fibrillation ([address] 01/28/2023     Priority: Medium    Atrial fibrillation with controlled ventricular rate ([address] 01/27/2023     Priority: Medium    Dyspnea 01/27/2023     Priority: Medium    Essential hypertension 12/13/2021     Priority: Low    Cholangitis      Priority: Low    Choledocholithiasis      Priority: Low    Acute cholecystitis 03/23/2021     Priority: Low    Paroxysmal atrial fibrillation ([address] 11/10/2020     Priority: Low    Sinoatrial node dysfunction ([address] 01/23/2020     Priority: Low     Overview Note:     9/24/2014  SE negative for myocardial ischemia  9/25/2014  Echo  Normal   5/7/2015  Echo  Normal   1/22/2020  Newly discovered AF      Garces's esophagus without dysplasia 01/30/2019     Priority: Low    Chronic GERD 01/30/2019     Priority: Low    Esophageal pain 02/05/2018     Priority: Low    History of kidney stones 12/22/2017     Priority: Low    Diverticulosis of intestine with bleeding 12/22/2017     Priority: Low    Esophageal dysphagia 12/22/2017     Priority: Low    Acute pain of right knee 12/22/2017     Priority: Low     Overview Note:     Secondary to a fall 1-1/2-2 months ago      Hyperlipoproteinemia 12/22/2017     Priority: Low    H/O cervical spine surgery 12/22/2017     Priority: Low     Overview Note:     2015 by Dr. Newman Pac      Bloody diarrhea 04/26/2017     Priority: Low    Hypotension      Priority: Low    Gastrointestinal hemorrhage 03/12/2017     Priority: Low    Colon polyps      Priority: Low    Diverticulosis of large intestine with hemorrhage      Priority: Low    Acute blood loss anemia      Priority: Low    Nephrolithiasis 12/29/2015     Priority: Low    History of colon polyps 11/19/2014     Priority: Low    Decreased carotid pulse 09/19/2014     Priority: Low    Numbness and tingling in right hand 09/19/2014 Priority: Low    Heart murmur 09/19/2014     Priority: Low    Chest pain 09/19/2014     Priority: Low    Deep vein thrombosis (DVT) ([facility] 05/14/2014     Priority: Low    Mixed hyperlipidemia 05/14/2014     Priority: Low       Admit Date:  1/27/2023    Admission Problem List: Present on Admission:   Atrial fibrillation with controlled ventricular rate (HCC)   Paroxysmal atrial fibrillation (HCC)   Mixed hyperlipidemia   Dyspnea   Atrial fibrillation ([address]      Cardiac Specific Data:  Specialty Problems          Cardiology Problems    * (Principal) Atrial fibrillation with controlled ventricular rate (HCC)        Atrial fibrillation (HCC)        Deep vein thrombosis (DVT) (HCC)        Mixed hyperlipidemia        Chest pain        Hypotension        Sinoatrial node dysfunction (HCC)        Paroxysmal atrial fibrillation ([facility]        Essential hypertension         1. Paroxysmal atrial fibrillation, on Betapace, not on anticoagulation, prior DCCV 1/24/2020, normal LV ejection fraction, negative Lexiscan study 1/24/2020.  2.  Hypertension. 3.  History of lower extremity DVT 2014. Subjective:  Mr. Kassy Judd reports no significant issues. Telemetry shows he remains in rate controlled atrial fibrillation.  ms. Objective:   /76   Pulse 62   Temp 98.2 °F (36.8 °C) (Oral)   Resp 18   Ht 6' (1.829 m)   Wt 215 lb 6.4 oz (97.7 kg)   SpO2 95%   BMI 29.21 kg/m²       Intake/Output Summary (Last 24 hours) at 1/28/2023 1558  Last data filed at 1/28/2023 1009  Gross per 24 hour   Intake 330 ml   Output 1100 ml   Net -770 ml       Prior to Admission medications    Medication Sig Start Date End Date Taking?  Authorizing Provider   Cholecalciferol (VITAMIN D3) 50 MCG (2000 UT) CAPS Take 2,000 Units by mouth daily    Historical Provider, MD   ZINC PO Take 1 tablet by mouth daily    Historical Provider, MD   sotalol (BETAPACE) 120 MG tablet Take 1 tablet by mouth 2 times daily 1/5/23   Bryan Agent, APRN losartan (COZAAR) 25 MG tablet Take 1 tablet by mouth daily 12/5/22   LUIS Chapman   celecoxib (CELEBREX) 200 MG capsule  11/4/22   Historical Provider, MD   simvastatin (ZOCOR) 10 MG tablet Take 1 tablet by mouth nightly 6/24/22   HORACE Lopez DO   ondansetron (ZOFRAN ODT) 4 MG disintegrating tablet Take 1 tablet by mouth every 8 hours as needed for Nausea or Vomiting 3/29/22   Sohail Phillips MD   Multiple Vitamins-Minerals (THERAPEUTIC MULTIVITAMIN-MINERALS) tablet Take 1 tablet by mouth daily    Historical Provider, MD   Multiple Vitamins-Minerals (ICAPS AREDS FORMULA PO) Take 1 capsule by mouth 2 times daily    Historical Provider, MD   aspirin 81 MG EC tablet Take 81 mg by mouth daily    Historical Provider, MD   Probiotic Product (PROBIOTIC FORMULA PO) Take 1 tablet by mouth daily     Historical Provider, MD   Ascorbic Acid (VITAMIN C PO) Take 1 tablet by mouth nightly     Historical Provider, MD   vitamin B-12 (CYANOCOBALAMIN) 1000 MCG tablet Take 1,000 mcg by mouth daily     Historical Provider, MD        warfarin  5 mg Oral Once    aspirin  81 mg Oral Daily    [Held by provider] losartan  25 mg Oral Daily    lactobacillus  1 capsule Oral Daily    therapeutic multivitamin-minerals  1 tablet Oral Daily    vitamin B-12  1,000 mcg Oral Daily    Vitamin D  2,000 Units Oral Daily    sodium chloride flush  5-40 mL IntraVENous 2 times per day    atorvastatin  10 mg Oral Nightly    sotalol  160 mg Oral BID    warfarin placeholder: dosing by pharmacy   Other RX Placeholder       TELEMETRY: Sinus     Physical Exam:      Physical Exam  HENT:      Mouth/Throat:      Pharynx: No oropharyngeal exudate. Eyes:      General: No scleral icterus. Right eye: No discharge. Left eye: No discharge. Neck:      Thyroid: No thyromegaly. Vascular: No JVD. Cardiovascular:      Rate and Rhythm: Normal rate. Rhythm irregular. Heart sounds: No murmur heard. No friction rub. No gallop. Comments: JVD  No edema    Pulmonary:      Effort: No respiratory distress. Breath sounds: No stridor. No wheezing or rales. Abdominal:      General: Bowel sounds are normal. There is no distension. Palpations: Abdomen is soft. There is no mass. Tenderness: There is no abdominal tenderness. There is no guarding or rebound. Comments: No palpable organomegaly   Musculoskeletal:         General: No deformity. Skin:     General: Skin is warm. Coloration: Skin is not pale. Findings: No erythema or rash. Neurological:      Mental Status: He is alert and oriented to person, place, and time. Motor: No abnormal muscle tone. Coordination: Coordination normal.      Deep Tendon Reflexes: Reflexes normal.               Lab Data:  CBC:   Recent Labs     01/27/23  1511 01/28/23  0911   WBC 5.6 4.4*   HGB 13.3* 13.7*   HCT 39.8* 42.4   MCV 93.4 94.9*    250     BMP:   Recent Labs     01/27/23  1511 01/28/23  0911    140   K 4.2 4.9    104   CO2 27 27   BUN 17 16   CREATININE 0.9 0.8     LIVER PROFILE:   Recent Labs     01/27/23  1511   AST 23   ALT 27   BILITOT <0.2   ALKPHOS 121     PT/INR:   Recent Labs     01/27/23  1511 01/27/23  2333 01/28/23  0634   PROTIME 12.3 13.4 13.4   INR 0.93 1.03 1.02     APTT:   Recent Labs     01/27/23  2333 01/28/23  0633 01/28/23  1451   APTT 47.7* 60.9* 53.0*     BNP:  No results for input(s): BNP in the last 72 hours. CK, CKMB, Troponin: @LABRCNT (CKTOTAL:3, CKMB:3, TROPONINI:3)@    IMAGING:  XR CHEST PORTABLE    Result Date: 1/27/2023  CHEST X-RAY, 1 view INDICATION: SOB COMPARISON: None. TECHNIQUE: Single frontal view of the chest. FINDINGS: The lungs are clear. There is no pleural effusion. No pneumothorax. Cardiomediastinal silhouette is normal in size. No significant osseous findings. No acute abnormality. Assessment and Plan:     This is a 71y.o. year old male with past medical history of paroxysmal atrial fibrillation on Betapace, not on anticoagulation, prior DCCV 1/24/2020, prior history of DVT, hypertension, normal LV ejection fraction presenting with recurrent atrial fibrillation with good rate control but symptomatic. 1.  Continue IV heparin and Coumadin. Continue Betapace at 160 mg twice daily. QTC acceptable at 482 ms. We will plan for possible VAISHNAVI/DCCV on Monday if anesthesia available. If not, we will plan for DCCV in 4 to 6 weeks on anticoagulation.     Chantel Ford MD, MD 1/28/2023 3:58 PM

## 2023-01-28 NOTE — CARE COORDINATION
SW met with pt and spouse at bedside. Sw discussed and answered all questions about the IMM.  Signed copy filed in soft chart and a copy given to the pt.   01/28/23 2948   IMM Letter   IMM Letter given to Patient/Family/Significant other/Guardian/POA/by: Yadiel Urbina   IMM Letter date given: 01/28/23   IMM Letter time given: 7473

## 2023-01-28 NOTE — PROGRESS NOTES
Clinical Pharmacy Note    Warfarin consult follow-up    Recent Labs     01/28/23  0634   INR 1.02     Recent Labs     01/27/23  1511 01/28/23  0911   HGB 13.3* 13.7*   HCT 39.8* 42.4    250       Significant Drug-Drug Interactions:  Current warfarin drug-drug interactions:   Tylenol-depends on dose: most likely with daily acetaminophen doses exceeding 1.3 or 2 g/day for multiple consecutive days. Acetaminophen may enhance the anticoagulant effect of Vitamin K Antagonists. Discontinued drug-drug interactions: None    Date INR Warfarin Dose   01/27/23 0.93 5 mg   01/28/23 1.02 5 mg                                               Notes: Give Warfarin 5 mg po x 1 tonight                     Daily PT/INR until stable within therapeutic range.      Electronically signed by Oniel Meza [facility] on 1/28/2023 at 10:51 AM

## 2023-01-28 NOTE — CARE COORDINATION
Sw was asked about Eliquis pricing. Pt was on Eliquis in the past and it was $600 a month. Spouse stated that on [facility] it was low $500's. Sw discussed the option of going through the  to get the medication cheaper and gave the pt the Eliquis coupon.

## 2023-01-29 LAB
ANION GAP SERPL CALCULATED.3IONS-SCNC: 7 MMOL/L (ref 7–19)
APTT: 54 SEC (ref 26–36.2)
BASOPHILS ABSOLUTE: 0.1 K/UL (ref 0–0.2)
BASOPHILS RELATIVE PERCENT: 1 % (ref 0–1)
BUN BLDV-MCNC: 15 MG/DL (ref 8–23)
CALCIUM SERPL-MCNC: 9.1 MG/DL (ref 8.8–10.2)
CHLORIDE BLD-SCNC: 104 MMOL/L (ref 98–111)
CO2: 28 MMOL/L (ref 22–29)
CREAT SERPL-MCNC: 0.9 MG/DL (ref 0.5–1.2)
EOSINOPHILS ABSOLUTE: 0.2 K/UL (ref 0–0.6)
EOSINOPHILS RELATIVE PERCENT: 4.1 % (ref 0–5)
GFR SERPL CREATININE-BSD FRML MDRD: >60 ML/MIN/{1.73_M2}
GLUCOSE BLD-MCNC: 125 MG/DL (ref 74–109)
HCT VFR BLD CALC: 40.8 % (ref 42–52)
HEMOGLOBIN: 13.4 G/DL (ref 14–18)
IMMATURE GRANULOCYTES #: 0 K/UL
INR BLD: 1.07 (ref 0.88–1.18)
LYMPHOCYTES ABSOLUTE: 1.8 K/UL (ref 1.1–4.5)
LYMPHOCYTES RELATIVE PERCENT: 37.3 % (ref 20–40)
MCH RBC QN AUTO: 30.9 PG (ref 27–31)
MCHC RBC AUTO-ENTMCNC: 32.8 G/DL (ref 33–37)
MCV RBC AUTO: 94 FL (ref 80–94)
MONOCYTES ABSOLUTE: 0.4 K/UL (ref 0–0.9)
MONOCYTES RELATIVE PERCENT: 9.1 % (ref 0–10)
NEUTROPHILS ABSOLUTE: 2.3 K/UL (ref 1.5–7.5)
NEUTROPHILS RELATIVE PERCENT: 48.3 % (ref 50–65)
PDW BLD-RTO: 14.3 % (ref 11.5–14.5)
PLATELET # BLD: 232 K/UL (ref 130–400)
PMV BLD AUTO: 10.2 FL (ref 9.4–12.4)
POTASSIUM REFLEX MAGNESIUM: 4.6 MMOL/L (ref 3.5–5)
PROTHROMBIN TIME: 13.8 SEC (ref 12–14.6)
RBC # BLD: 4.34 M/UL (ref 4.7–6.1)
SODIUM BLD-SCNC: 139 MMOL/L (ref 136–145)
WBC # BLD: 4.9 K/UL (ref 4.8–10.8)

## 2023-01-29 PROCEDURE — 2140000000 HC CCU INTERMEDIATE R&B

## 2023-01-29 PROCEDURE — 6370000000 HC RX 637 (ALT 250 FOR IP): Performed by: INTERNAL MEDICINE

## 2023-01-29 PROCEDURE — 85610 PROTHROMBIN TIME: CPT

## 2023-01-29 PROCEDURE — 36415 COLL VENOUS BLD VENIPUNCTURE: CPT

## 2023-01-29 PROCEDURE — 6360000002 HC RX W HCPCS: Performed by: EMERGENCY MEDICINE

## 2023-01-29 PROCEDURE — 6370000000 HC RX 637 (ALT 250 FOR IP): Performed by: NURSE PRACTITIONER

## 2023-01-29 PROCEDURE — 2580000003 HC RX 258: Performed by: NURSE PRACTITIONER

## 2023-01-29 PROCEDURE — 6370000000 HC RX 637 (ALT 250 FOR IP): Performed by: HOSPITALIST

## 2023-01-29 PROCEDURE — 99232 SBSQ HOSP IP/OBS MODERATE 35: CPT | Performed by: INTERNAL MEDICINE

## 2023-01-29 PROCEDURE — 85025 COMPLETE CBC W/AUTO DIFF WBC: CPT

## 2023-01-29 PROCEDURE — 80048 BASIC METABOLIC PNL TOTAL CA: CPT

## 2023-01-29 PROCEDURE — 93005 ELECTROCARDIOGRAM TRACING: CPT | Performed by: INTERNAL MEDICINE

## 2023-01-29 PROCEDURE — 85730 THROMBOPLASTIN TIME PARTIAL: CPT

## 2023-01-29 RX ORDER — WARFARIN SODIUM 7.5 MG/1
7.5 TABLET ORAL
Status: COMPLETED | OUTPATIENT
Start: 2023-01-29 | End: 2023-01-29

## 2023-01-29 RX ADMIN — ASPIRIN 81 MG: 81 TABLET, COATED ORAL at 08:43

## 2023-01-29 RX ADMIN — SODIUM CHLORIDE, PRESERVATIVE FREE 10 ML: 5 INJECTION INTRAVENOUS at 08:43

## 2023-01-29 RX ADMIN — SODIUM CHLORIDE, PRESERVATIVE FREE 10 ML: 5 INJECTION INTRAVENOUS at 20:47

## 2023-01-29 RX ADMIN — ATORVASTATIN CALCIUM 10 MG: 10 TABLET, FILM COATED ORAL at 20:47

## 2023-01-29 RX ADMIN — MULTIPLE VITAMINS W/ MINERALS TAB 1 TABLET: TAB at 08:42

## 2023-01-29 RX ADMIN — SOTALOL HYDROCHLORIDE 160 MG: 80 TABLET ORAL at 08:42

## 2023-01-29 RX ADMIN — SOTALOL HYDROCHLORIDE 160 MG: 80 TABLET ORAL at 20:47

## 2023-01-29 RX ADMIN — WARFARIN SODIUM 7.5 MG: 7.5 TABLET ORAL at 17:00

## 2023-01-29 RX ADMIN — HEPARIN SODIUM 10 UNITS/KG/HR: 10000 INJECTION, SOLUTION INTRAVENOUS at 17:59

## 2023-01-29 RX ADMIN — Medication 2000 UNITS: at 08:43

## 2023-01-29 RX ADMIN — CYANOCOBALAMIN TAB 500 MCG 1000 MCG: 500 TAB at 08:43

## 2023-01-29 RX ADMIN — Medication 1 CAPSULE: at 08:43

## 2023-01-29 ASSESSMENT — ENCOUNTER SYMPTOMS
COLOR CHANGE: 0
ABDOMINAL DISTENTION: 0
CONSTIPATION: 0
SHORTNESS OF BREATH: 1
COUGH: 0
DIARRHEA: 0
VOMITING: 0
NAUSEA: 0
WHEEZING: 0
BLOOD IN STOOL: 0
ABDOMINAL PAIN: 0

## 2023-01-29 NOTE — PROGRESS NOTES
[id number] [facility]      Patient:  Anya Hurley  YOB: 1950  Date of Service: 1/29/2023  MRN: [id number]   Acct: [de-identified]   Primary Care Physician: Christopher Henderson DO  Advance Directive: Full Code  Admit Date: 1/27/2023       Hospital Day: 1  Portions of this note have been copied forward, however, changed to reflect the most current clinical status of this patient. CHIEF COMPLAINT Irregular HR/SOB    SUBJECTIVE:  He does feel that his orthopnea is improving. He continues to remain in controlled rate A-fib despite increased betapace dosing. No other events or issues. CUMULATIVE HOSPITAL STAY:  The patient is a 71 y.o. male with a PMH of HTN, HLD, paroxysmal atrial fibrillation and previous cardioversion who presented to [facility] ED on 1/27/2023 complaining of irregular heart rate and shortness of breath. He stated for approximately 2 weeks he has felt increasingly short of breath, increased fatigue with activities and decreased stamina. He states that he was additionally having some orthopnea. He checked his heart rate and it felt irregular and he thought he was out of rhythm. He is currently maintained on sotalol, however, he was not currently on any anticoagulation. He denied any CAD or cardiac stents. He denied overt chest pain. Further ED work-up revealed chemistries within normal limits with the exception of hyperglycemia. proBNP 575, initial troponin negative. WBC 5.6, H&H 13.3/39.8 and a platelet count 290. EKG on admission showed atrial fibrillation with controlled rate no acute ST elevation. Patient was admitted to hospital medicine for atrial fibrillation and dyspnea cardiology consult. He was placed on a heparin gtt due to a C2V score of 2 and betapace was increased with the plan to start on coumadin due to inaffordability of Eliquis. Will need to be here for 5 doses of Betapace per Dr. Fay Washington. Possible DCCV with VAISHNAVI on 11/30/2023 with anesthesia if available.  Labs and vitals are stable. Review of Systems:   Review of Systems   Constitutional:  Positive for fatigue. Negative for chills, diaphoresis and fever. HENT:  Negative for congestion and ear pain. Eyes:  Negative for visual disturbance. Respiratory:  Positive for shortness of breath. Negative for cough and wheezing. Cardiovascular:  Negative for chest pain, palpitations and leg swelling. Gastrointestinal:  Negative for abdominal distention, abdominal pain, blood in stool, constipation, diarrhea, nausea and vomiting. Endocrine: Negative for cold intolerance and heat intolerance. Genitourinary:  Negative for difficulty urinating, flank pain, frequency and urgency. Musculoskeletal:  Negative for arthralgias and myalgias. Skin:  Negative for color change and wound. Neurological:  Negative for dizziness, syncope, weakness, light-headedness, numbness and headaches. Hematological:  Does not bruise/bleed easily. Psychiatric/Behavioral:  Negative for agitation, confusion and dysphoric mood. 14 point review of systems is negative except as specifically addressed above. Objective:   VITALS:  BP (!) 124/90   Pulse 62   Temp 97.3 °F (36.3 °C) (Temporal)   Resp 18   Ht 6' (1.829 m)   Wt 215 lb 6.4 oz (97.7 kg)   SpO2 93%   BMI 29.21 kg/m²   24HR INTAKE/OUTPUT:    Intake/Output Summary (Last 24 hours) at 1/29/2023 1308  Last data filed at 1/29/2023 1016  Gross per 24 hour   Intake 690 ml   Output 800 ml   Net -110 ml         Physical Exam  Vitals and nursing note reviewed. Constitutional:       General: He is not in acute distress. Appearance: Normal appearance. He is not ill-appearing. HENT:      Head: Normocephalic and atraumatic. Right Ear: External ear normal.      Left Ear: External ear normal.      Nose: Nose normal.      Mouth/Throat:      Mouth: Mucous membranes are moist.   Eyes:      Extraocular Movements: Extraocular movements intact.       Conjunctiva/sclera: Conjunctivae normal.      Pupils: Pupils are equal, round, and reactive to light. Cardiovascular:      Rate and Rhythm: Normal rate and regular rhythm. Pulses: Normal pulses. Heart sounds: Normal heart sounds. Pulmonary:      Effort: Pulmonary effort is normal. No respiratory distress. Breath sounds: Normal breath sounds. No wheezing, rhonchi or rales. Abdominal:      General: Bowel sounds are normal. There is no distension. Palpations: Abdomen is soft. Tenderness: There is no abdominal tenderness. Musculoskeletal:         General: No swelling, tenderness or deformity. Normal range of motion. Cervical back: Normal range of motion and neck supple. No muscular tenderness. Right lower leg: No edema. Left lower leg: No edema. Skin:     General: Skin is warm and dry. Findings: No bruising or lesion. Neurological:      Mental Status: He is alert and oriented to person, place, and time. Psychiatric:         Mood and Affect: Mood normal.         Behavior: Behavior normal.         Thought Content: Thought content normal.           Medications:      heparin (PORCINE) Infusion 10 Units/kg/hr (01/28/23 1715)    sodium chloride 15 mL/hr (01/28/23 2039)      warfarin  7.5 mg Oral Once    aspirin  81 mg Oral Daily    losartan  25 mg Oral Daily    lactobacillus  1 capsule Oral Daily    therapeutic multivitamin-minerals  1 tablet Oral Daily    vitamin B-12  1,000 mcg Oral Daily    Vitamin D  2,000 Units Oral Daily    sodium chloride flush  5-40 mL IntraVENous 2 times per day    atorvastatin  10 mg Oral Nightly    sotalol  160 mg Oral BID    warfarin placeholder: dosing by pharmacy   Other RX Placeholder     sodium chloride flush, sodium chloride, polyethylene glycol, acetaminophen **OR** acetaminophen, perflutren lipid microspheres, heparin (porcine), heparin (porcine)  ADULT DIET;  Regular; Low Fat/Low Chol/High Fiber/LISA     Lab and other Data:     Recent Labs 01/27/23  1511 01/28/23  0911 01/29/23  0619   WBC 5.6 4.4* 4.9   HGB 13.3* 13.7* 13.4*    250 232       Recent Labs     01/27/23  1511 01/28/23  0911 01/29/23  0619    140 139   K 4.2 4.9 4.6    104 104   CO2 27 27 28   BUN 17 16 15   CREATININE 0.9 0.8 0.9   GLUCOSE 132* 155* 125*       Recent Labs     01/27/23  1511   AST 23   ALT 27   BILITOT <0.2   ALKPHOS 121       Troponin T:   Recent Labs     01/27/23  1511 01/27/23  2010   TROPONINI <0.01 <0.01       Pro-BNP: No results for input(s): BNP in the last 72 hours. INR:   Recent Labs     01/27/23  2333 01/28/23  0634 01/29/23  0619   INR 1.03 1.02 1.07       RAD:   XR CHEST PORTABLE    Result Date: 1/27/2023  No acute abnormality. Echo:   Transthoracic Echocardiography Report (TTE)      Demographics      Patient Name Christine Fletcher  Date of Study         01/28/2023                R      MRN          [id number]             Gender                Male      Date of      1950         Room Number           [id number]   Birth      Age          71 year(s)      Height:      72 inches          Referring Physician   Mayda Del Valle      Weight:      215 pounds         Sonographer           VALENTÍN Colby      BSA:         2.2 m^2            Interpreting          Yolonda Homans Mani,MD                                   Physician      BMI:         29.16 kg/m^2     Procedure     Type of Study      TTE procedure:ECHO NO CONTRAST WITH DOP/COLR. Study Location: Echo Lab  Technical Quality: Adequate visualization     Patient Status: Inpatient     HR: 75 bpm BP: 116/84 mmHg     Indications:Atrial fibrillation and Dyspnea/SOB. Conclusions      Summary   LV is normal in size with normal systolic function. LV ejection fraction   estimated at 60%. Mild to moderate concentric LVH. Probable grade 2 diastolic dysfunction. RV is normal in size with normal systolic function. Mild left atrial enlargement. Normal right atrial size.    Aortic valve is trileaflet with thickening of noncoronary leaflet and   focal calcification of left coronary leaflet. No significant stenosis or   regurgitation noted. Mitral valve is structurally normal with normal leaflet mobility. No   stenosis. Trivial mitral regurgitation. Trivial tricuspid regurgitation. No significant pericardial effusion. Signature      ----------------------------------------------------------------   Electronically signed by Jesús Brooke MD(Interpreting physician)   on 01/28/2023 03:45 PM   ----------------------------------------------------------------       Assessment/Plan   Principal Problem:    Atrial fibrillation with controlled ventricular rate (HCC)/Paroxysmal atrial fibrillation (HCC)/Dyspnea   -Cardiology following-ongoing recommendations appreciated   -tele   -Continue heparin gtt per protocol   -Pharmacy to dose warfarin   -Betapace increased per cardiology   -Daily labs   -2D echo completed EF-60% mild to moderate- LVH grade 2 DD   -Daily EKG   -Monitor vital signs   -Daily labs   -Possible VAISHNAVI with DCCV on 1/30/2023    Active Problems:  Mixed hyperlipidemia     -Continue home statin    Resolved Problems:    * No resolved hospital problems.  *      DVT Prophylaxis: Heparin gtt/Warfarin    Disposition: LUIS Elder, 1/29/2023 1:08 PM

## 2023-01-29 NOTE — PROGRESS NOTES
Clinical Pharmacy Note    Warfarin consult follow-up    Recent Labs     01/29/23  0619   INR 1.07     Recent Labs     01/27/23  1511 01/28/23  0911 01/29/23  0619   HGB 13.3* 13.7* 13.4*   HCT 39.8* 42.4 40.8*    250 232       Significant Drug-Drug Interactions:  Current warfarin drug-drug interactions:   Tylenol-depends on dose: most likely with daily acetaminophen doses exceeding 1.3 or 2 g/day for multiple consecutive days. Acetaminophen may enhance the anticoagulant effect of Vitamin K Antagonists. Discontinued drug-drug interactions: None     Date INR Warfarin Dose   01/27/23 0.93 5 mg   01/28/23 1.02 5 mg   01/29/23 1.07 7.5 mg                                       Notes: Give warfarin 7.5 mg po x 1 tonight                     Daily PT/INR until stable within therapeutic range.      Electronically signed by Masoud Lyons [facility] on 1/29/2023 at 1:02 PM

## 2023-01-29 NOTE — PROGRESS NOTES
Cardiology Progress Note Bobby Fothergill, MD      Patient:  Estil Cough  [id number]    Patient Active Problem List    Diagnosis Date Noted    Atrial fibrillation ([address] 01/28/2023     Priority: Medium    Atrial fibrillation with controlled ventricular rate ([address] 01/27/2023     Priority: Medium    Dyspnea 01/27/2023     Priority: Medium    Essential hypertension 12/13/2021     Priority: Low    Cholangitis      Priority: Low    Choledocholithiasis      Priority: Low    Acute cholecystitis 03/23/2021     Priority: Low    Paroxysmal atrial fibrillation ([address] 11/10/2020     Priority: Low    Sinoatrial node dysfunction ([address] 01/23/2020     Priority: Low     Overview Note:     9/24/2014  SE negative for myocardial ischemia  9/25/2014  Echo  Normal   5/7/2015  Echo  Normal   1/22/2020  Newly discovered AF      Garces's esophagus without dysplasia 01/30/2019     Priority: Low    Chronic GERD 01/30/2019     Priority: Low    Esophageal pain 02/05/2018     Priority: Low    History of kidney stones 12/22/2017     Priority: Low    Diverticulosis of intestine with bleeding 12/22/2017     Priority: Low    Esophageal dysphagia 12/22/2017     Priority: Low    Acute pain of right knee 12/22/2017     Priority: Low     Overview Note:     Secondary to a fall 1-1/2-2 months ago      Hyperlipoproteinemia 12/22/2017     Priority: Low    H/O cervical spine surgery 12/22/2017     Priority: Low     Overview Note:     2015 by Dr. Marisol Malik      Bloody diarrhea 04/26/2017     Priority: Low    Hypotension      Priority: Low    Gastrointestinal hemorrhage 03/12/2017     Priority: Low    Colon polyps      Priority: Low    Diverticulosis of large intestine with hemorrhage      Priority: Low    Acute blood loss anemia      Priority: Low    Nephrolithiasis 12/29/2015     Priority: Low    History of colon polyps 11/19/2014     Priority: Low    Decreased carotid pulse 09/19/2014     Priority: Low    Numbness and tingling in right hand 09/19/2014 Priority: Low    Heart murmur 09/19/2014     Priority: Low    Chest pain 09/19/2014     Priority: Low    Deep vein thrombosis (DVT) ([facility] 05/14/2014     Priority: Low    Mixed hyperlipidemia 05/14/2014     Priority: Low       Admit Date:  1/27/2023    Admission Problem List: Present on Admission:   Atrial fibrillation with controlled ventricular rate (HCC)   Paroxysmal atrial fibrillation (HCC)   Mixed hyperlipidemia   Dyspnea   Atrial fibrillation ([facility]      Cardiac Specific Data:  Specialty Problems          Cardiology Problems    * (Principal) Atrial fibrillation with controlled ventricular rate (HCC)        Atrial fibrillation (HCC)        Deep vein thrombosis (DVT) (HCC)        Mixed hyperlipidemia        Chest pain        Hypotension        Sinoatrial node dysfunction (HCC)        Paroxysmal atrial fibrillation ([facility]        Essential hypertension            1. Paroxysmal atrial fibrillation, on Betapace, not on anticoagulation, prior DCCV 1/24/2020, normal LV ejection fraction, negative Lexiscan study 1/24/2020.  2.  Hypertension. 3.  History of lower extremity DVT 2014. Subjective:  Mr. Esteban Madden reports no significant issues. Remains in atrial fibrillation. Rates are well controlled.  ms. Has received 4 doses of Betapace. Objective:   BP (!) 124/90   Pulse 62   Temp 97.3 °F (36.3 °C) (Temporal)   Resp 18   Ht 6' (1.829 m)   Wt 215 lb 6.4 oz (97.7 kg)   SpO2 93%   BMI 29.21 kg/m²       Intake/Output Summary (Last 24 hours) at 1/29/2023 1305  Last data filed at 1/29/2023 1016  Gross per 24 hour   Intake 690 ml   Output 800 ml   Net -110 ml       Prior to Admission medications    Medication Sig Start Date End Date Taking?  Authorizing Provider   Cholecalciferol (VITAMIN D3) 50 MCG (2000 UT) CAPS Take 2,000 Units by mouth daily    Historical Provider, MD   ZINC PO Take 1 tablet by mouth daily    Historical Provider, MD   sotalol (BETAPACE) 120 MG tablet Take 1 tablet by mouth 2 times daily 1/5/23   CaridadLUIS Mccormack   losartan (COZAAR) 25 MG tablet Take 1 tablet by mouth daily 12/5/22   LUIS Rojas   celecoxib (CELEBREX) 200 MG capsule  11/4/22   Historical Provider, MD   simvastatin (ZOCOR) 10 MG tablet Take 1 tablet by mouth nightly 6/24/22   HORACE Wilkins DO   ondansetron (ZOFRAN ODT) 4 MG disintegrating tablet Take 1 tablet by mouth every 8 hours as needed for Nausea or Vomiting 3/29/22   Tripp Mckeon MD   Multiple Vitamins-Minerals (THERAPEUTIC MULTIVITAMIN-MINERALS) tablet Take 1 tablet by mouth daily    Historical Provider, MD   Multiple Vitamins-Minerals (ICAPS AREDS FORMULA PO) Take 1 capsule by mouth 2 times daily    Historical Provider, MD   aspirin 81 MG EC tablet Take 81 mg by mouth daily    Historical Provider, MD   Probiotic Product (PROBIOTIC FORMULA PO) Take 1 tablet by mouth daily     Historical Provider, MD   Ascorbic Acid (VITAMIN C PO) Take 1 tablet by mouth nightly     Historical Provider, MD   vitamin B-12 (CYANOCOBALAMIN) 1000 MCG tablet Take 1,000 mcg by mouth daily     Historical Provider, MD        warfarin  7.5 mg Oral Once    aspirin  81 mg Oral Daily    losartan  25 mg Oral Daily    lactobacillus  1 capsule Oral Daily    therapeutic multivitamin-minerals  1 tablet Oral Daily    vitamin B-12  1,000 mcg Oral Daily    Vitamin D  2,000 Units Oral Daily    sodium chloride flush  5-40 mL IntraVENous 2 times per day    atorvastatin  10 mg Oral Nightly    sotalol  160 mg Oral BID    warfarin placeholder: dosing by pharmacy   Other RX Placeholder       TELEMETRY: Atrial fibrillation     Physical Exam:      Physical Exam  HENT:      Mouth/Throat:      Pharynx: No oropharyngeal exudate. Eyes:      General: No scleral icterus. Right eye: No discharge. Left eye: No discharge. Neck:      Thyroid: No thyromegaly. Vascular: No JVD. Cardiovascular:      Rate and Rhythm: Normal rate. Rhythm irregular.       Heart sounds: No murmur heard.    No friction rub. No gallop. Comments: No JVD  No edema    Pulmonary:      Effort: No respiratory distress. Breath sounds: No stridor. No wheezing or rales. Abdominal:      General: Bowel sounds are normal. There is no distension. Palpations: Abdomen is soft. There is no mass. Tenderness: There is no abdominal tenderness. There is no guarding or rebound. Comments: No palpable organomegaly   Musculoskeletal:         General: No deformity. Skin:     General: Skin is warm. Coloration: Skin is not pale. Findings: No erythema or rash. Neurological:      Mental Status: He is alert and oriented to person, place, and time. Motor: No abnormal muscle tone. Coordination: Coordination normal.      Deep Tendon Reflexes: Reflexes normal.               Lab Data:  CBC:   Recent Labs     01/27/23  1511 01/28/23  0911 01/29/23  0619   WBC 5.6 4.4* 4.9   HGB 13.3* 13.7* 13.4*   HCT 39.8* 42.4 40.8*   MCV 93.4 94.9* 94.0    250 232     BMP:   Recent Labs     01/27/23  1511 01/28/23  0911 01/29/23  0619    140 139   K 4.2 4.9 4.6    104 104   CO2 27 27 28   BUN 17 16 15   CREATININE 0.9 0.8 0.9     LIVER PROFILE:   Recent Labs     01/27/23  1511   AST 23   ALT 27   BILITOT <0.2   ALKPHOS 121     PT/INR:   Recent Labs     01/27/23  2333 01/28/23  0634 01/29/23  0619   PROTIME 13.4 13.4 13.8   INR 1.03 1.02 1.07     APTT:   Recent Labs     01/28/23  0633 01/28/23  1451 01/29/23  0619   APTT 60.9* 53.0* 54.0*     BNP:  No results for input(s): BNP in the last 72 hours. CK, CKMB, Troponin: @LABRCNT (CKTOTAL:3, CKMB:3, TROPONINI:3)@    IMAGING:  XR CHEST PORTABLE    Result Date: 1/27/2023  CHEST X-RAY, 1 view INDICATION: SOB COMPARISON: None. TECHNIQUE: Single frontal view of the chest. FINDINGS: The lungs are clear. There is no pleural effusion. No pneumothorax. Cardiomediastinal silhouette is normal in size. No significant osseous findings.     No acute abnormality. Assessment and Plan: This is a 71y.o. year old male with past medical history of paroxysmal atrial fibrillation on Betapace, not on anticoagulation, prior DCCV 1/24/2020, prior history of DVT, hypertension, normal LV ejection fraction presenting with recurrent atrial fibrillation with good rate control but symptomatic. 1.  Remains on IV heparin and Coumadin. . Tolerating higher dose of Betapace but still in atrial fibrillation. Plan for VAISHNAVI DCCV with anesthesia on Monday if available. 2.  Blood pressures have risen slightly which patient notices. Losartan was being held. Restart losartan. Risks, benefits, alternatives of VAISHNAVI/DCCV discussed with the patient and full informed consent obtained.   Acceptable Mallampati score  Consent for moderate conscious sedation  ASA 3     Nimesh Singh MD, MD 1/29/2023 1:05 PM

## 2023-01-30 ENCOUNTER — ANESTHESIA EVENT (OUTPATIENT)
Dept: CARDIAC CATH/INVASIVE PROCEDURES | Age: 73
DRG: 310 | End: 2023-01-30
Payer: MEDICARE

## 2023-01-30 ENCOUNTER — ANESTHESIA (OUTPATIENT)
Dept: CARDIAC CATH/INVASIVE PROCEDURES | Age: 73
DRG: 310 | End: 2023-01-30
Payer: MEDICARE

## 2023-01-30 VITALS
RESPIRATION RATE: 19 BRPM | BODY MASS INDEX: 28.85 KG/M2 | SYSTOLIC BLOOD PRESSURE: 139 MMHG | HEIGHT: 72 IN | WEIGHT: 213 LBS | TEMPERATURE: 96.6 F | OXYGEN SATURATION: 97 % | HEART RATE: 61 BPM | DIASTOLIC BLOOD PRESSURE: 85 MMHG

## 2023-01-30 LAB
ANION GAP SERPL CALCULATED.3IONS-SCNC: 9 MMOL/L (ref 7–19)
APTT: 58.4 SEC (ref 26–36.2)
BASOPHILS ABSOLUTE: 0.1 K/UL (ref 0–0.2)
BASOPHILS RELATIVE PERCENT: 1 % (ref 0–1)
BUN BLDV-MCNC: 19 MG/DL (ref 8–23)
CALCIUM SERPL-MCNC: 8.8 MG/DL (ref 8.8–10.2)
CHLORIDE BLD-SCNC: 105 MMOL/L (ref 98–111)
CO2: 24 MMOL/L (ref 22–29)
CREAT SERPL-MCNC: 0.9 MG/DL (ref 0.5–1.2)
EKG P AXIS: 39 DEGREES
EKG P AXIS: NORMAL DEGREES
EKG P-R INTERVAL: 206 MS
EKG P-R INTERVAL: NORMAL MS
EKG Q-T INTERVAL: 438 MS
EKG Q-T INTERVAL: 466 MS
EKG Q-T INTERVAL: 474 MS
EKG Q-T INTERVAL: 474 MS
EKG Q-T INTERVAL: 532 MS
EKG QRS DURATION: 84 MS
EKG QRS DURATION: 86 MS
EKG QRS DURATION: 88 MS
EKG QTC CALCULATION (BAZETT): 460 MS
EKG QTC CALCULATION (BAZETT): 464 MS
EKG QTC CALCULATION (BAZETT): 467 MS
EKG QTC CALCULATION (BAZETT): 467 MS
EKG QTC CALCULATION (BAZETT): 518 MS
EKG T AXIS: 43 DEGREES
EKG T AXIS: 45 DEGREES
EKG T AXIS: 50 DEGREES
EKG T AXIS: 51 DEGREES
EKG T AXIS: 60 DEGREES
EOSINOPHILS ABSOLUTE: 0.2 K/UL (ref 0–0.6)
EOSINOPHILS RELATIVE PERCENT: 4.4 % (ref 0–5)
GFR SERPL CREATININE-BSD FRML MDRD: >60 ML/MIN/{1.73_M2}
GLUCOSE BLD-MCNC: 115 MG/DL (ref 74–109)
HCT VFR BLD CALC: 40 % (ref 42–52)
HEMOGLOBIN: 13 G/DL (ref 14–18)
IMMATURE GRANULOCYTES #: 0 K/UL
INR BLD: 1.24 (ref 0.88–1.18)
LYMPHOCYTES ABSOLUTE: 2.1 K/UL (ref 1.1–4.5)
LYMPHOCYTES RELATIVE PERCENT: 41.7 % (ref 20–40)
MCH RBC QN AUTO: 30.4 PG (ref 27–31)
MCHC RBC AUTO-ENTMCNC: 32.5 G/DL (ref 33–37)
MCV RBC AUTO: 93.5 FL (ref 80–94)
MONOCYTES ABSOLUTE: 0.5 K/UL (ref 0–0.9)
MONOCYTES RELATIVE PERCENT: 9.5 % (ref 0–10)
NEUTROPHILS ABSOLUTE: 2.2 K/UL (ref 1.5–7.5)
NEUTROPHILS RELATIVE PERCENT: 43.2 % (ref 50–65)
PDW BLD-RTO: 14.3 % (ref 11.5–14.5)
PLATELET # BLD: 230 K/UL (ref 130–400)
PMV BLD AUTO: 10.1 FL (ref 9.4–12.4)
POTASSIUM REFLEX MAGNESIUM: 4.4 MMOL/L (ref 3.5–5)
PROTHROMBIN TIME: 15.6 SEC (ref 12–14.6)
RBC # BLD: 4.28 M/UL (ref 4.7–6.1)
SODIUM BLD-SCNC: 138 MMOL/L (ref 136–145)
WBC # BLD: 5 K/UL (ref 4.8–10.8)

## 2023-01-30 PROCEDURE — 85730 THROMBOPLASTIN TIME PARTIAL: CPT

## 2023-01-30 PROCEDURE — 5A2204Z RESTORATION OF CARDIAC RHYTHM, SINGLE: ICD-10-PCS | Performed by: INTERNAL MEDICINE

## 2023-01-30 PROCEDURE — 93321 DOPPLER ECHO F-UP/LMTD STD: CPT

## 2023-01-30 PROCEDURE — 6370000000 HC RX 637 (ALT 250 FOR IP)

## 2023-01-30 PROCEDURE — 6360000002 HC RX W HCPCS

## 2023-01-30 PROCEDURE — 6360000002 HC RX W HCPCS: Performed by: INTERNAL MEDICINE

## 2023-01-30 PROCEDURE — 93010 ELECTROCARDIOGRAM REPORT: CPT | Performed by: INTERNAL MEDICINE

## 2023-01-30 PROCEDURE — 36415 COLL VENOUS BLD VENIPUNCTURE: CPT

## 2023-01-30 PROCEDURE — 93005 ELECTROCARDIOGRAM TRACING: CPT | Performed by: EMERGENCY MEDICINE

## 2023-01-30 PROCEDURE — 93312 ECHO TRANSESOPHAGEAL: CPT

## 2023-01-30 PROCEDURE — 2580000003 HC RX 258: Performed by: NURSE PRACTITIONER

## 2023-01-30 PROCEDURE — 2500000003 HC RX 250 WO HCPCS

## 2023-01-30 PROCEDURE — 80048 BASIC METABOLIC PNL TOTAL CA: CPT

## 2023-01-30 PROCEDURE — 3700000001 HC ADD 15 MINUTES (ANESTHESIA)

## 2023-01-30 PROCEDURE — 3700000000 HC ANESTHESIA ATTENDED CARE

## 2023-01-30 PROCEDURE — B244ZZ4 ULTRASONOGRAPHY OF RIGHT HEART, TRANSESOPHAGEAL: ICD-10-PCS | Performed by: INTERNAL MEDICINE

## 2023-01-30 PROCEDURE — 6370000000 HC RX 637 (ALT 250 FOR IP): Performed by: INTERNAL MEDICINE

## 2023-01-30 PROCEDURE — 94760 N-INVAS EAR/PLS OXIMETRY 1: CPT

## 2023-01-30 PROCEDURE — 6370000000 HC RX 637 (ALT 250 FOR IP): Performed by: NURSE PRACTITIONER

## 2023-01-30 PROCEDURE — 93005 ELECTROCARDIOGRAM TRACING: CPT | Performed by: INTERNAL MEDICINE

## 2023-01-30 PROCEDURE — 2580000003 HC RX 258: Performed by: INTERNAL MEDICINE

## 2023-01-30 PROCEDURE — 92960 CARDIOVERSION ELECTRIC EXT: CPT | Performed by: INTERNAL MEDICINE

## 2023-01-30 PROCEDURE — 93312 ECHO TRANSESOPHAGEAL: CPT | Performed by: INTERNAL MEDICINE

## 2023-01-30 PROCEDURE — 85610 PROTHROMBIN TIME: CPT

## 2023-01-30 PROCEDURE — 93325 DOPPLER ECHO COLOR FLOW MAPG: CPT

## 2023-01-30 PROCEDURE — 85025 COMPLETE CBC W/AUTO DIFF WBC: CPT

## 2023-01-30 PROCEDURE — 92960 CARDIOVERSION ELECTRIC EXT: CPT

## 2023-01-30 RX ORDER — SODIUM CHLORIDE 9 MG/ML
INJECTION, SOLUTION INTRAVENOUS CONTINUOUS
Status: DISCONTINUED | OUTPATIENT
Start: 2023-01-30 | End: 2023-01-30 | Stop reason: HOSPADM

## 2023-01-30 RX ORDER — SOTALOL HYDROCHLORIDE 160 MG/1
160 TABLET ORAL 2 TIMES DAILY
Qty: 60 TABLET | Refills: 3 | Status: SHIPPED | OUTPATIENT
Start: 2023-01-30

## 2023-01-30 RX ORDER — SODIUM CHLORIDE 9 MG/ML
INJECTION, SOLUTION INTRAVENOUS PRN
Status: DISCONTINUED | OUTPATIENT
Start: 2023-01-30 | End: 2023-01-30 | Stop reason: HOSPADM

## 2023-01-30 RX ORDER — ENOXAPARIN SODIUM 100 MG/ML
1 INJECTION SUBCUTANEOUS 2 TIMES DAILY
Status: DISCONTINUED | OUTPATIENT
Start: 2023-01-30 | End: 2023-01-30

## 2023-01-30 RX ORDER — SODIUM CHLORIDE 0.9 % (FLUSH) 0.9 %
5-40 SYRINGE (ML) INJECTION EVERY 12 HOURS SCHEDULED
Status: DISCONTINUED | OUTPATIENT
Start: 2023-01-30 | End: 2023-01-30 | Stop reason: HOSPADM

## 2023-01-30 RX ORDER — PROPOFOL 10 MG/ML
INJECTION, EMULSION INTRAVENOUS PRN
Status: DISCONTINUED | OUTPATIENT
Start: 2023-01-30 | End: 2023-01-30 | Stop reason: SDUPTHER

## 2023-01-30 RX ORDER — SODIUM CHLORIDE 0.9 % (FLUSH) 0.9 %
5-40 SYRINGE (ML) INJECTION PRN
Status: DISCONTINUED | OUTPATIENT
Start: 2023-01-30 | End: 2023-01-30 | Stop reason: SDUPTHER

## 2023-01-30 RX ORDER — SODIUM CHLORIDE 0.9 % (FLUSH) 0.9 %
5-40 SYRINGE (ML) INJECTION EVERY 12 HOURS SCHEDULED
Status: DISCONTINUED | OUTPATIENT
Start: 2023-01-30 | End: 2023-01-30 | Stop reason: SDUPTHER

## 2023-01-30 RX ORDER — WARFARIN SODIUM 5 MG/1
5 TABLET ORAL
Status: DISCONTINUED | OUTPATIENT
Start: 2023-01-30 | End: 2023-01-30 | Stop reason: ALTCHOICE

## 2023-01-30 RX ORDER — LIDOCAINE HYDROCHLORIDE 10 MG/ML
INJECTION, SOLUTION EPIDURAL; INFILTRATION; INTRACAUDAL; PERINEURAL PRN
Status: DISCONTINUED | OUTPATIENT
Start: 2023-01-30 | End: 2023-01-30 | Stop reason: SDUPTHER

## 2023-01-30 RX ORDER — SODIUM CHLORIDE 0.9 % (FLUSH) 0.9 %
5-40 SYRINGE (ML) INJECTION PRN
Status: DISCONTINUED | OUTPATIENT
Start: 2023-01-30 | End: 2023-01-30 | Stop reason: HOSPADM

## 2023-01-30 RX ADMIN — SOTALOL HYDROCHLORIDE 160 MG: 80 TABLET ORAL at 09:18

## 2023-01-30 RX ADMIN — Medication 1 CAPSULE: at 09:18

## 2023-01-30 RX ADMIN — LIDOCAINE HYDROCHLORIDE 50 MG: 10 INJECTION, SOLUTION EPIDURAL; INFILTRATION; INTRACAUDAL; PERINEURAL at 11:09

## 2023-01-30 RX ADMIN — MULTIPLE VITAMINS W/ MINERALS TAB 1 TABLET: TAB at 09:19

## 2023-01-30 RX ADMIN — CYANOCOBALAMIN TAB 500 MCG 1000 MCG: 500 TAB at 09:18

## 2023-01-30 RX ADMIN — SODIUM CHLORIDE, PRESERVATIVE FREE 10 ML: 5 INJECTION INTRAVENOUS at 09:19

## 2023-01-30 RX ADMIN — ASPIRIN 81 MG: 81 TABLET, COATED ORAL at 09:19

## 2023-01-30 RX ADMIN — PROPOFOL 240 MG: 10 INJECTION, EMULSION INTRAVENOUS at 11:09

## 2023-01-30 RX ADMIN — ENOXAPARIN SODIUM 100 MG: 100 INJECTION SUBCUTANEOUS at 15:06

## 2023-01-30 RX ADMIN — Medication 2000 UNITS: at 09:19

## 2023-01-30 RX ADMIN — SODIUM CHLORIDE: 9 INJECTION, SOLUTION INTRAVENOUS at 15:06

## 2023-01-30 RX ADMIN — LOSARTAN POTASSIUM 25 MG: 25 TABLET, FILM COATED ORAL at 09:18

## 2023-01-30 ASSESSMENT — ENCOUNTER SYMPTOMS: SHORTNESS OF BREATH: 1

## 2023-01-30 NOTE — PROGRESS NOTES
VAISHNAVI preliminary note:    No thrombus in cardiac chambers. Normal LV function. Trivial MR and tricuspid regurgitation. Successful DCCV to sinus rhythm. Plan: Patient wishes to start Eliquis instead of Coumadin. Transition to Eliquis 5 mg twice daily. Maintain on Betapace 160 mg twice daily. We will make a referral to Dr. Jacqui Rhodes for consideration of possible A. fib ablation given symptoms with atrial fibrillation. Can follow-up as an outpatient with cardiology.

## 2023-01-30 NOTE — ANESTHESIA PRE PROCEDURE
Department of Anesthesiology  Preprocedure Note       Name:  Abhinav Cabello   Age:  71 y.o.  :  1950                                          MRN:  [id number]         Date:  2023      Surgeon: * No surgeons listed *    Procedure: * No procedures listed *    Medications prior to admission:   Prior to Admission medications    Medication Sig Start Date End Date Taking? Authorizing Provider   Cholecalciferol (VITAMIN D3) 50 MCG ( UT) CAPS Take 2,000 Units by mouth daily    Historical Provider, MD   ZINC PO Take 1 tablet by mouth daily    Historical Provider, MD   sotalol (BETAPACE) 120 MG tablet Take 1 tablet by mouth 2 times daily 23   LUIS Dale   losartan (COZAAR) 25 MG tablet Take 1 tablet by mouth daily 22   LUIS Montilla   celecoxib (CELEBREX) 200 MG capsule  22   Historical Provider, MD   simvastatin (ZOCOR) 10 MG tablet Take 1 tablet by mouth nightly 22   HORACE Chung DO   ondansetron (ZOFRAN ODT) 4 MG disintegrating tablet Take 1 tablet by mouth every 8 hours as needed for Nausea or Vomiting 3/29/22   Elizabeth Borrero MD   Multiple Vitamins-Minerals (THERAPEUTIC MULTIVITAMIN-MINERALS) tablet Take 1 tablet by mouth daily    Historical Provider, MD   Multiple Vitamins-Minerals (ICAPS AREDS FORMULA PO) Take 1 capsule by mouth 2 times daily    Historical Provider, MD   aspirin 81 MG EC tablet Take 81 mg by mouth daily    Historical Provider, MD   Probiotic Product (PROBIOTIC FORMULA PO) Take 1 tablet by mouth daily     Historical Provider, MD   Ascorbic Acid (VITAMIN C PO) Take 1 tablet by mouth nightly     Historical Provider, MD   vitamin B-12 (CYANOCOBALAMIN) 1000 MCG tablet Take 1,000 mcg by mouth daily     Historical Provider, MD       Current medications:    No current facility-administered medications for this visit. No current outpatient medications on file.      Facility-Administered Medications Ordered in Other Visits   Medication Dose Route Frequency Provider Last Rate Last Admin    heparin 25,000 units in dextrose 5% 250 mL (premix) infusion  5-30 Units/kg/hr IntraVENous Continuous Mary De La Torre MD 10.4 mL/hr at 01/29/23 1759 10 Units/kg/hr at 01/29/23 1759    aspirin EC tablet 81 mg  81 mg Oral Daily Vevelyn Punt, APRN   81 mg at 01/30/23 0919    losartan (COZAAR) tablet 25 mg  25 mg Oral Daily Vevelyn Punt, APRN   25 mg at 01/30/23 0758    lactobacillus (CULTURELLE) capsule 1 capsule  1 capsule Oral Daily Vevelyn Punt, APRN   1 capsule at 01/30/23 0990    therapeutic multivitamin-minerals 1 tablet  1 tablet Oral Daily Vevelyn Punt, APRN   1 tablet at 01/30/23 0919    vitamin B-12 (CYANOCOBALAMIN) tablet 1,000 mcg  1,000 mcg Oral Daily Vevelyn Punt, APRN   1,000 mcg at 01/30/23 9558    Vitamin D (CHOLECALCIFEROL) tablet 2,000 Units  2,000 Units Oral Daily Vevelyn Punt, APRN   2,000 Units at 01/30/23 0919    sodium chloride flush 0.9 % injection 5-40 mL  5-40 mL IntraVENous 2 times per day Vevelyn Punt, APRN   10 mL at 01/30/23 0919    sodium chloride flush 0.9 % injection 5-40 mL  5-40 mL IntraVENous PRN Vevelyn Punt, APRN        0.9 % sodium chloride infusion   IntraVENous PRN Vevelyn Punt, APRN 15 mL/hr at 01/28/23 2039 15 mL/hr at 01/28/23 2039    polyethylene glycol (GLYCOLAX) packet 17 g  17 g Oral Daily PRN Vevelyn Punt, APRN        acetaminophen (TYLENOL) tablet 650 mg  650 mg Oral Q6H PRN Vevelyn Punt, APRN        Or    acetaminophen (TYLENOL) suppository 650 mg  650 mg Rectal Q6H PRN Vevelyn Punt, APRN        perflutren lipid microspheres (DEFINITY) injection 1.5 mL  1.5 mL IntraVENous ONCE PRN Vevelyn Punt, APRN        heparin (porcine) injection 4,000 Units  4,000 Units IntraVENous PRN Vevelyn Punt, APRN        heparin (porcine) injection 2,000 Units  2,000 Units IntraVENous PRN LUIS Espinosa   2,000 Units at 01/28/23 0252    atorvastatin (LIPITOR) tablet 10 mg  10 mg Oral Nightly Josy Laguerre MD   10 mg at 01/29/23 2047    sotalol (BETAPACE) tablet 160 mg  160 mg Oral BID Abilio Thompson MD   160 mg at 01/30/23 5490    warfarin placeholder: dosing by pharmacy   Other Soraida Perez MD           Allergies:  No Known Allergies    Problem List:    Patient Active Problem List   Diagnosis Code    Deep vein thrombosis (DVT) ([facility] I82.409    Mixed hyperlipidemia E78.2    Decreased carotid pulse R09.89    Numbness and tingling in right hand R20.0, R20.2    Heart murmur R01.1    Chest pain R07.9    History of colon polyps Z86.010    Nephrolithiasis N20.0    Colon polyps K63.5    Gastrointestinal hemorrhage K92.2    Diverticulosis of large intestine with hemorrhage K57.31    Acute blood loss anemia D62    Hypotension I95.9    Bloody diarrhea R19.7    History of kidney stones Z87.442    Diverticulosis of intestine with bleeding K57.91    Esophageal dysphagia R13.19    Acute pain of right knee M25.561    Hyperlipoproteinemia E78.5    H/O cervical spine surgery Z98.890    Esophageal pain K22.89    Garces's esophagus without dysplasia K22.70    Chronic GERD K21.9    Sinoatrial node dysfunction (HCC) I49.5    Paroxysmal atrial fibrillation (HCC) I48.0    Acute cholecystitis K81.0    Cholangitis K83.09    Choledocholithiasis K80.50    Essential hypertension I10    Atrial fibrillation with controlled ventricular rate (HCC) I48.91    Dyspnea R06.00    Atrial fibrillation (HCC) I48.91       Past Medical History:        Diagnosis Date    Arthritis     Atrial fibrillation [facility])     sees dr. Lenore Tijerina Difficult intubation     pt just had recent cervical fusion with plate 2 months ago    DVT (deep venous thrombosis) ([facility] 2014    left leg    Elbow pain     torn bicep    Essential hypertension 12/13/2021    GERD (gastroesophageal reflux disease)     Hx of blood clots     left leg/ after heel surg/ jan. 2015    Hyperlipidemia     recently able to be taken off cholesterol meds    Kidney stone on left side        Past Surgical History:        Procedure Laterality Date    BICEPS TENDON REPAIR Right 07/16/2020    RIGHT BICEPS TENDON REPAIR performed by García Martinez MD at [place] [address]      2015, oct    CHOLECYSTECTOMY, LAPAROSCOPIC N/A 03/23/2021    CHOLECYSTECTOMY LAPAROSCOPIC with IOC performed by Sivan Santana MD at [address] [place] [address]  10/30/2009    [facility]: hyperplastic polyp    COLONOSCOPY  02/2015    hyperplastic polyp (5 yr)    DILATATION, ESOPHAGUS      ERCP N/A 03/24/2021    Dr LITO Grider-w/sphincterotomy, placement of a 10F x 5 cm biliary stent, balloon sweep-Cholangitis with choledocholithiasis, repeat in 3 months    ERCP N/A 06/09/2021    Dr LITO Grider-Successful removal of retained biliary sludge/stone debris    EYE SURGERY      FOOT SURGERY  01/2014    Left foot spur removal    LITHOTRIPSY Left 12/29/2015    ESWL EXTRACORPEAL SHOCK WAVE LITHOTRIPSY performed by Kary Rodriguez MD at [place] [address]  11/2015    PILONIDAL CYST EXCISION      TN COLONOSCOPY FLX DX W/COLLJ SPEC WHEN PFRMD N/A 05/08/2017    Dr Betancourt Jump sided diverticulosis-5 yr recall    TN EGD TRANSORAL BIOPSY SINGLE/MULTIPLE N/A 03/26/2018    Dr Ng-w/dilation over wire-51 Filipino-esophageal stricture-Garces's (+) dysplasia (-)--1 yr recall    SHOULDER SURGERY  2007    right after fall, detached the subclavicle muscle    [facility]    Neck  surgery     UPPER GASTROINTESTINAL ENDOSCOPY N/A 02/12/2018    Dr Monica Rees Grade B esophagitis, hiatal hernia, stricture-(-) Марина, (+) Garces's (+) low grade dysplasia, active esophagogastritis, repeat: 3/26/18    UPPER GASTROINTESTINAL ENDOSCOPY  03/26/2018    Dr Ng-w/dilation over wire-51 Filipino-esophageal stricture-Garces's (+) dysplasia (-)--1 yr recall    UPPER GASTROINTESTINAL ENDOSCOPY N/A 05/02/2019    Dr Dyer Beams (+) Garces's, (-) dysplasia       Social History:    Social History     Tobacco Use    Smoking status: Never    Smokeless tobacco: Never   Substance Use Topics    Alcohol use: No                                Counseling given: Not Answered      Vital Signs (Current): There were no vitals filed for this visit. BP Readings from Last 3 Encounters:   01/30/23 104/64   01/10/23 120/78   01/06/23 121/80       NPO Status:                                                                                 BMI:   Wt Readings from Last 3 Encounters:   01/30/23 213 lb (96.6 kg)   01/10/23 220 lb (99.8 kg)   01/06/23 220 lb (99.8 kg)     There is no height or weight on file to calculate BMI.    CBC:   Lab Results   Component Value Date/Time    WBC 5.0 01/30/2023 02:18 AM    RBC 4.28 01/30/2023 02:18 AM    HGB 13.0 01/30/2023 02:18 AM    HCT 40.0 01/30/2023 02:18 AM    MCV 93.5 01/30/2023 02:18 AM    RDW 14.3 01/30/2023 02:18 AM     01/30/2023 02:18 AM       CMP:   Lab Results   Component Value Date/Time     01/30/2023 02:18 AM    K 4.4 01/30/2023 02:18 AM     01/30/2023 02:18 AM    CO2 24 01/30/2023 02:18 AM    BUN 19 01/30/2023 02:18 AM    CREATININE 0.9 01/30/2023 02:18 AM    GFRAA >59 06/03/2022 07:11 AM    LABGLOM >60 01/30/2023 02:18 AM    GLUCOSE 115 01/30/2023 02:18 AM    PROT 6.9 01/27/2023 03:11 PM    CALCIUM 8.8 01/30/2023 02:18 AM    BILITOT <0.2 01/27/2023 03:11 PM    ALKPHOS 121 01/27/2023 03:11 PM    AST 23 01/27/2023 03:11 PM    ALT 27 01/27/2023 03:11 PM       POC Tests: No results for input(s): POCGLU, POCNA, POCK, POCCL, POCBUN, POCHEMO, POCHCT in the last 72 hours.     Coags:   Lab Results   Component Value Date/Time    PROTIME 15.6 01/30/2023 02:18 AM    INR 1.24 01/30/2023 02:18 AM    APTT 58.4 01/30/2023 02:18 AM       HCG (If Applicable): No results found for: PREGTESTUR, PREGSERUM, HCG, HCGQUANT     ABGs: No results found for: PHART, PO2ART, QPO7WWD, QOH8GIH, BEART, T0YCHIIS     Type & Screen (If Applicable):  No results found for: LABABO, LABRH    Drug/Infectious Status (If Applicable):  No results found for: HIV, HEPCAB    COVID-19 Screening (If Applicable):   Lab Results   Component Value Date/Time    COVID19 Not Detected 01/27/2023 05:07 PM    COVID19 Not Detected 07/15/2020 10:49 AM           Anesthesia Evaluation  Patient summary reviewed history of anesthetic complications:   Airway: Mallampati: II  TM distance: >3 FB   Neck ROM: limited  Comment: S/p cervical fusion IN 2015, h/o difficult intubation  Mouth opening: > = 3 FB   Dental:          Pulmonary:   (+) shortness of breath:                             Cardiovascular:  Exercise tolerance: good (>4 METS),   (+) hypertension:, dysrhythmias: atrial fibrillation, hyperlipidemia        Rhythm: regular  Rate: normal           Beta Blocker:  Dose within 24 Hrs         Neuro/Psych:   Negative Neuro/Psych ROS              GI/Hepatic/Renal:   (+) GERD: no interval change, renal disease: kidney stones,          ROS comment: S/p ERCP with stent in CBD. Endo/Other:                     Abdominal:             Vascular:   + DVT, . Other Findings:             Anesthesia Plan      general and MAC     ASA 3       Induction: intravenous. Anesthetic plan and risks discussed with patient.                         LUIS Perez - CRNA   1/30/2023

## 2023-01-30 NOTE — PROGRESS NOTES
Clinical Pharmacy Note    Warfarin consult follow-up    Recent Labs     01/30/23  0218   INR 1.24*     Recent Labs     01/28/23  0911 01/29/23  0619 01/30/23  0218   HGB 13.7* 13.4* 13.0*   HCT 42.4 40.8* 40.0*    232 230       Significant Drug-Drug Interaction: None    Date INR Warfarin Dose   01/27/23 0.93 5 mg   01/28/23 1.02 5 mg   01/29/23 1.07 7.5 mg    01/30/23 1.24   5 mg                                 Notes:     Coumadin 5 mg po x 1 dose today               Daily PT/INR until stable within therapeutic range.      Electronically signed by Tom Desir RPh, BCPS, 1/30/2023,12:04 PM

## 2023-01-30 NOTE — PLAN OF CARE
Problem: Discharge Planning  Goal: Discharge to home or other facility with appropriate resources  1/30/2023 1650 by Yun Mcgregor RN  Outcome: Completed  1/30/2023 0744 by Yun Mcgregor RN  Outcome: Progressing     Problem: ABCDS Injury Assessment  Goal: Absence of physical injury  1/30/2023 1650 by Yun Mcgregor RN  Outcome: Completed  1/30/2023 0744 by Yun Mcgregor RN  Outcome: Progressing

## 2023-01-30 NOTE — PROCEDURES
Date of Procedure:  1/30/2023     Indications:  Atrial fibrillation with an appropriate duration of anticoagulation     Conscious Sedation Protocol Used During this Procedure -anesthesia provided by anesthesia service        Anesthesia: Moderate   Sedation: 0 mg Midazolam (Versed)     0 mcg Fentanyl   Start time: 1109   Stop time: 1123   ASA Class: 3   EBL Not applicable     A trained medical personnel administered medications at my direction. The patient was monitored continuously with ECG, pulse oximetry, blood pressure monitoring and direct observation. After obtaining informed written consent and an appropriate level of conscious sedation, DCCV with 360 J was successful in restoring sinus rhythm. Complications:  none      Impression:  Successful DC cardioversion of atrial fibrillation to normal sinus rhythm on Eliquis used for anticoagulation. Betapace at 160 mg twice daily.     Electronically signed by Abilio Thompson MD on 1/30/23

## 2023-01-30 NOTE — DISCHARGE INSTR - DIET

## 2023-01-30 NOTE — PROCEDURES
Patient Name: Jennifer Higgins    Medical Record Number: [id number]  Date: 1/30/2023     Performing Cardiologist: Dr Bj Membreno  Procedure Start Time: 1109     Procedure End Time: 3316       VAISHNAVI/Cardioversion Procedure Note    Indication: atrial fibrillation. VAISHNAVI performed to rule out clot prior to Cardioversion. Consent: The patient provided verbal consent for this procedure. The patient was brought to PACU bay 11. Time Out: All team members present. Correct patient, correct procedure, correct procedure site, and correct position verified. Allergies reviewed. Pertinent diagnostic results reviewed. Pre-Medication: See Anesthesia Record    Procedure:  Jennifer Higgins was turned on left side for VAISHNAVI . A bite block was inserted to facilitate passage of the VAISHNAVI probe. The probe was inserted without difficulty and images acquired. Images are Negative for clot. Decision made to proceed with Cardioversion. VAISHNAVI probe and bite block were removed. The patient was then placed in the supine position and the chest area was exposed. The cardioversion pads were applied in the standard manner and configuration. Attempt #1: The defibrillator was set on the synchronous mode and charged to 360 joules. A charge was then delivered which resulted in conversion to normal sinus rhythm. Attempt #2: Not necessary    Attempt #3: Not necessary    The patient tolerated the procedure well. Vital signs monitored throughout procedure See Anesthesia Record. Complications: None    Post procedure, patient taken to  Inpatient room # [phone]  in stable condition and report given.     A post procedure 12 lead ECG was completed and reviewed yes    A post procedure Device Interrogation completed N/A

## 2023-01-30 NOTE — DISCHARGE SUMMARY
Hospitalist  Discharge Summary    Patient ID: Michele Carrillo      Patient's PCP: Wendy Valdez DO    Admit Date: 1/27/2023     Discharge Date:   01/30/    Admitting Physician: Donny Glez MD     Discharge Physician: Shyanne Mckinney, APRN - CNP     Discharge Diagnoses:  Principal Problem:    Atrial fibrillation with controlled ventricular rate [facility])  Active Problems:    Dyspnea    Atrial fibrillation ([facility]    Mixed hyperlipidemia    Paroxysmal atrial fibrillation ([facility]  Resolved Problems:    * No resolved hospital problems. *       Active Hospital Problems    Diagnosis Date Noted    Atrial fibrillation [facility]) [I48.91] 01/28/2023     Priority: Medium    Atrial fibrillation with controlled ventricular rate ([facility] [I48.91] 01/27/2023     Priority: Medium    Dyspnea [R06.00] 01/27/2023     Priority: Medium    Paroxysmal atrial fibrillation ([facility] [I48.0] 11/10/2020    Mixed hyperlipidemia [E78.2] 05/14/2014       The patient was seen and examined on day of discharge and this discharge summary is in conjunction with any daily progress note from day of discharge. Hospital Course: The patient is a 71 y.o. male with a PMH of HTN, HLD, paroxysmal atrial fibrillation and previous cardioversion who presented to [facility] ED on 1/27/2023 complaining of irregular heart rate and shortness of breath. He stated for approximately 2 weeks he has felt increasingly short of breath, increased fatigue with activities and decreased stamina. He states that he was additionally having some orthopnea. He checked his heart rate and it felt irregular and he thought he was out of rhythm. He is currently maintained on sotalol, however, he was not currently on any anticoagulation. He denied any CAD or cardiac stents. He denied overt chest pain. Further ED work-up revealed chemistries within normal limits with the exception of hyperglycemia. proBNP 575, initial troponin negative. WBC 5.6, H&H 13.3/39.8 and a platelet count 904. EKG on admission showed atrial fibrillation with controlled rate no acute ST elevation. Patient was admitted to hospital medicine for atrial fibrillation and dyspnea cardiology consult. He was placed on a heparin gtt due to a C2V score of 2 and betapace was increased with the plan to start on coumadin due to inaffordability of Eliquis. Will need to be here for 5 doses of Betapace per Dr. Adilene Enriquez. VAISHNAVI on 11/30/2023 Nl. Labs and vitals are stable. Pt is ready to go home. He is aware of the change of his betapace dose and eliquis. Exam:   /85   Pulse 61   Temp (!) 96.6 °F (35.9 °C)   Resp 19   Ht 6' (1.829 m)   Wt 213 lb (96.6 kg)   SpO2 97%   BMI 28.89 kg/m²   General appearance: alert, appears stated age, cooperative and no distress  Head: Normocephalic, without obvious abnormality, atraumatic  Eyes: conjunctivae/corneas clear. PERRL, EOM's intact. Ears: normal external ears  Neck: no adenopathy, no carotid bruit, no JVD, supple, symmetrical, trachea midline and thyroid not enlarged, symmetric, no tenderness/mass/nodules  Lungs: clear to auscultation bilaterally,no rales or wheezes   Heart: regular rate and rhythm, S1, S2 normal, no murmur,  regular rate and rhythm   Abdomen:soft, non-tender; non-distended normal bowel sounds no masses, no organomegaly  Extremities:Pedal edema none  Homans sign is negative, no sign of DVT, warm and dry  Skin: Skin color, texture, turgor normal. No rashes or lesions  Lymphatic: No palpable lymph node enlargment  Neurologic: Alert and oriented X 3, normal strength and tone. Normal symmetric reflexes.  Mental status: Alert, oriented, thought content appropriate  Cranial nerves:II-XII Grossly intact Sensory: normal Motor:grossly normal  Psychiatric:  Alert and oriented, thought content appropriate, normal insight, mood appropriate      Consults Made:   IP CONSULT TO CARDIOLOGY  IP CONSULT TO CARDIOLOGY  PHARMACY TO DOSE WARFARIN      Significant Diagnostic Studies: XR CHEST PORTABLE    Result Date: 1/27/2023  CHEST X-RAY, 1 view INDICATION: SOB COMPARISON: None. TECHNIQUE: Single frontal view of the chest. FINDINGS: The lungs are clear. There is no pleural effusion. No pneumothorax. Cardiomediastinal silhouette is normal in size. No significant osseous findings. No acute abnormality. Disposition: Home    Discharge Instructions/Follow-up:  FU with PCP and Cardiology    Code Status:  Full Code     Activity: activity as tolerated    Diet: cardiac diet    Labs: For convenience and continuity at follow-up the following most recent labs are provided:  CBC:   Recent Labs     01/28/23  0911 01/29/23  0619 01/30/23 0218   WBC 4.4* 4.9 5.0   HGB 13.7* 13.4* 13.0*   HCT 42.4 40.8* 40.0*    232 230       Renal:   Recent Labs     01/28/23  0911 01/29/23 0619 01/30/23 0218    139 138   K 4.9 4.6 4.4    104 105   CO2 27 28 24   BUN 16 15 19   CREATININE 0.8 0.9 0.9   GLUCOSE 155* 125* 115*   CALCIUM 9.5 9.1 8.8        Other:  No results found for: LABA1C  No results found for: EAG  Lab Results   Component Value Date    TSH 2.250 01/28/2023     Lab Results   Component Value Date    CHOL 129 (L) 12/29/2022     Lab Results   Component Value Date    TRIG 133 12/29/2022     Lab Results   Component Value Date    HDL 33 (L) 12/29/2022     Lab Results   Component Value Date    LDLCHOLESTEROL 78 12/13/2017    LDLCALC 69 12/29/2022     Lab Results   Component Value Date    VLDL 34 09/03/2014     Lab Results   Component Value Date    CHOLHDLRATIO 2.3 09/03/2014       ABGs:   No results found for: PHART, PO2ART, ZCM1VCI    Culture:   No results for input(s): BC, BLOODCULT2, ORG in the last 72 hours.     Discharge Medications:   Current Discharge Medication List             Details   apixaban (ELIQUIS) 5 MG TABS tablet Take 1 tablet by mouth 2 times daily  Qty: 180 tablet, Refills: 1                Details   sotalol (BETAPACE) 160 MG tablet Take 1 tablet by mouth 2 times daily  Qty: 60 tablet, Refills: 3                Details   Cholecalciferol (VITAMIN D3) 50 MCG (2000 UT) CAPS Take 2,000 Units by mouth daily      ZINC PO Take 1 tablet by mouth daily      losartan (COZAAR) 25 MG tablet Take 1 tablet by mouth daily  Qty: 90 tablet, Refills: 3      celecoxib (CELEBREX) 200 MG capsule       simvastatin (ZOCOR) 10 MG tablet Take 1 tablet by mouth nightly  Qty: 90 tablet, Refills: 3    Associated Diagnoses: Mixed hyperlipidemia      ondansetron (ZOFRAN ODT) 4 MG disintegrating tablet Take 1 tablet by mouth every 8 hours as needed for Nausea or Vomiting  Qty: 30 tablet, Refills: 0      !! Multiple Vitamins-Minerals (THERAPEUTIC MULTIVITAMIN-MINERALS) tablet Take 1 tablet by mouth daily      !! Multiple Vitamins-Minerals (ICAPS AREDS FORMULA PO) Take 1 capsule by mouth 2 times daily      aspirin 81 MG EC tablet Take 81 mg by mouth daily      Probiotic Product (PROBIOTIC FORMULA PO) Take 1 tablet by mouth daily       Ascorbic Acid (VITAMIN C PO) Take 1 tablet by mouth nightly       vitamin B-12 (CYANOCOBALAMIN) 1000 MCG tablet Take 1,000 mcg by mouth daily        ! ! - Potential duplicate medications found. Please discuss with provider.         Current Facility-Administered Medications   Medication Dose Route Frequency Provider Last Rate Last Admin    sodium chloride flush 0.9 % injection 5-40 mL  5-40 mL IntraVENous 2 times per day Asim Cruz MD        sodium chloride flush 0.9 % injection 5-40 mL  5-40 mL IntraVENous PRN Asim Cruz MD        0.9 % sodium chloride infusion   IntraVENous PRN Asim Cruz MD        0.9 % sodium chloride infusion   IntraVENous Continuous Asim Cruz MD 75 mL/hr at 01/30/23 1506 New Bag at 01/30/23 1506    0.9 % sodium chloride infusion   IntraVENous PRN Asim Cruz MD        enoxaparin (LOVENOX) injection 100 mg  1 mg/kg SubCUTAneous BID Asim Cruz MD   100 mg at 01/30/23 1506    apixaban (ELIQUIS) tablet 5 mg  5 mg Oral BID Asim Cruz MD        aspirin EC tablet 81 mg  81 mg Oral Daily Virlinda Mt, APRN   81 mg at 01/30/23 0919    losartan (COZAAR) tablet 25 mg  25 mg Oral Daily Virlinda Mt, APRN   25 mg at 01/30/23 7226    lactobacillus (CULTURELLE) capsule 1 capsule  1 capsule Oral Daily Virlinda Havana, APRN   1 capsule at 01/30/23 0610    therapeutic multivitamin-minerals 1 tablet  1 tablet Oral Daily Virlinda Havana, APRN   1 tablet at 01/30/23 0919    vitamin B-12 (CYANOCOBALAMIN) tablet 1,000 mcg  1,000 mcg Oral Daily Virlinda Mt, APRN   1,000 mcg at 01/30/23 4709    Vitamin D (CHOLECALCIFEROL) tablet 2,000 Units  2,000 Units Oral Daily Virlinda Mt, APRN   2,000 Units at 01/30/23 0919    sodium chloride flush 0.9 % injection 5-40 mL  5-40 mL IntraVENous 2 times per day Virlinda Havana, APRN   10 mL at 01/30/23 0919    sodium chloride flush 0.9 % injection 5-40 mL  5-40 mL IntraVENous PRN Virlinda Mt, APRN        0.9 % sodium chloride infusion   IntraVENous PRN Virlinda Havana, APRN 15 mL/hr at 01/28/23 2039 15 mL/hr at 01/28/23 2039    polyethylene glycol (GLYCOLAX) packet 17 g  17 g Oral Daily PRN Virlinda Mt, APRN        acetaminophen (TYLENOL) tablet 650 mg  650 mg Oral Q6H PRN Virlinda Mt, APRN        Or    acetaminophen (TYLENOL) suppository 650 mg  650 mg Rectal Q6H PRN Virlinda Mt, APRN        perflutren lipid microspheres (DEFINITY) injection 1.5 mL  1.5 mL IntraVENous ONCE PRN Virlinda Mt, APRN        atorvastatin (LIPITOR) tablet 10 mg  10 mg Oral Nightly Rolando Comer MD   10 mg at 01/29/23 2047    sotalol (BETAPACE) tablet 160 mg  160 mg Oral BID Barbara Olmos MD   160 mg at 01/30/23 1920          DISCHARGE STATUS:    Condition: Good  Disposition:   Extended Emergency Contact Information  Primary Emergency Contact: Arely Pineda  Address: [facility], [facility] [place] [address] Phone: [phone]  Mobile Phone: [phone]  Relation: Spouse  Preferred language: [place]   needed? No         Time Spent on discharge is more than 30 minutes in the examination, evaluation, counseling and review of medications and discharge plan. LUIS Rai - POPPY  Internal Medicine Hospitalist    Thank you MINOO June DO for the opportunity to be involved in this patient's care.  If you have any questions or concerns please feel free to contact me at [phone]

## 2023-01-31 ENCOUNTER — CARE COORDINATION (OUTPATIENT)
Dept: CASE MANAGEMENT | Age: 73
End: 2023-01-31

## 2023-01-31 DIAGNOSIS — I48.91 ATRIAL FIBRILLATION WITH CONTROLLED VENTRICULAR RATE (HCC): Primary | ICD-10-CM

## 2023-01-31 NOTE — CARE COORDINATION
[facility] Care Transitions Initial Follow Up Call    Call within 2 business days of discharge: Yes    Spoke with: N/A    Patient: Bea Saini Patient : 1950   MRN: [id number]  Reason for Admission:   Discharge Date: 23 RARS: Readmission Risk Score: 7.5      Last Discharge  Street       Date Complaint Diagnosis Description Type Department Provider    23 Irregular Heart Beat; Shortness of Breath Paroxysmal atrial fibrillation ([facility] [address] . .. ED to Hosp-Admission (Discharged) (ADMITTED) L FARIDA Navarro MD; Idalia Post. .. Care Transitions 24 Hour Call    Do you have all of your prescriptions and are they filled?: Yes  Care Transitions Interventions         Follow Up : Attempted to make contact with Dustin for an initial follow up call post discharge from the hospital without success. Unable to leave a message regarding intent of call and call back information. Will try again my next business day.      Future Appointments   Date Time Provider Lexy Sanchez   2023  9:45 AM SCHEDULE, [facility] CARDIOLOGY PRO TIME N [facility] Cardio [place]   2023  2:30 PM Ricardo Waite MD N [facility] Cardio [place]   7/10/2023  1:00 PM HORACE Paulino, [address] [place] [address] 141       Micky Calderon RN

## 2023-02-01 ENCOUNTER — CARE COORDINATION (OUTPATIENT)
Dept: CASE MANAGEMENT | Age: 73
End: 2023-02-01

## 2023-02-01 NOTE — CARE COORDINATION
[facility] Care Transitions Initial Follow Up Call    Call within 2 business days of discharge: Yes    Spoke with: N/A    Patient: [place] Patient : 1950   MRN: [id number]  Reason for Admission:   Discharge Date: 23 RARS: Readmission Risk Score: 7.5      Last Discharge [address]       Date Complaint Diagnosis Description Type Department Provider    23 Irregular Heart Beat; Shortness of Breath Paroxysmal atrial fibrillation ([address] . .. ED to Hosp-Admission (Discharged) (ADMITTED) ROCHELLEL FARIDA Luna MD; Jhonathan Polo. .. Care Transitions 24 Hour Call    Do you have all of your prescriptions and are they filled?: Yes  Care Transitions Interventions         Follow Up : Attempt #2 to make contact with Dustin for an initial follow up call post discharge from the hospital without success. Unable to leave a message regarding intent of call and call back information. Will discharge from CTN services at this time due to inability to make contact.     Future Appointments   Date Time Provider Lexy Sanchez   2/15/2023  2:00 PM LUIS Guy [facility] Cardio [place]   7/10/2023  1:00 PM [address] [facility],[address], [address], [address] [place] [address]

## 2023-02-02 ENCOUNTER — TELEPHONE (OUTPATIENT)
Dept: CARDIOLOGY CLINIC | Age: 73
End: 2023-02-02

## 2023-02-02 ENCOUNTER — TELEPHONE (OUTPATIENT)
Dept: FAMILY MEDICINE CLINIC | Age: 73
End: 2023-02-02

## 2023-02-02 NOTE — TELEPHONE ENCOUNTER
Date: TBD    Cardiologist: Dr. Vance Rivera    Procedure: colonoscopy     Surgeon: Dr. Le Cool    Last Office Visit: 6-13-22    Reason for office visit and medical concerns addressed at this office visit: PAF, HTN, DVT, hyperlipidemia    Testing Performed and Date of Service:  Cardioversion 1-27-23    RCRI = 0 pts, low, 0.4%   METs 4    Current Medications: betapace, eliquis, vit D3, cozaar, celebrex, zocor, ASA, probiotic, vit C, Vit B-12    Is the patient currently taking an anticoagulant? If so, what is the diagnosis the patient has been given to warrant the need for the anticoagulant?  ASA and Eliquis     Additional Notes: Cardiac Risk Request and med hold on Eliquis and ASA

## 2023-02-02 NOTE — TELEPHONE ENCOUNTER
Patient was just admitted for atrial fibrillation within the last week. I think he needs to come into the office and be sure he is stable before proceeding with colonoscopy.

## 2023-02-02 NOTE — TELEPHONE ENCOUNTER
Care Transitions Initial Follow Up Call    Outreach made within 2 business days of discharge: Yes we were closed  and  for weather    Patient: Anya Hurley Patient : 1950   MRN: [id number]  Reason for Admission: There are no discharge diagnoses documented for the most recent discharge. Discharge Date: 23       Spoke with: pt    Discharge department/facility: [address] [place] [address] Interactive Patient Contact:  Was patient able to fill all prescriptions: Yes  Was patient instructed to bring all medications to the follow-up visit: Yes  Is patient taking all medications as directed in the discharge summary? Yes  Does patient understand their discharge instructions: Yes  Does patient have questions or concerns that need addressed prior to 7-14 day follow up office visit: no pt states he is doing very well. Doesn't need to follow up with us at this time.  Will call if needs anything    Scheduled appointment with PCP within 7-14 days    Follow Up  Future Appointments   Date Time Provider Lexy Sanchez   2/15/2023  2:00 PM Sophie 49, APRN N [facility] Cardio [place]   7/10/2023  1:00 PM DO David Edmond [address] [place], [place]

## 2023-02-03 NOTE — ANESTHESIA POSTPROCEDURE EVALUATION
HISTORY OF PRESENT ILLNESS:   Ibeth Izquierdo is a 68 year old female with a 3-4 year history of a right parotid tail mass.  Patient states recently she noticed that it has enlarged.  CT soft tissue neck obtained on 11/11/2022 independently read by me demonstrates a 1.1 right tail of parotid mass.  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  Patient states the lesion causes her no pain.  She is here for a 2nd opinion.    I have reviewed my nurse's/assistant's note for PMHX, PSURGHX, FAMHX, SOCHX, MEDS, ALL, and ROS and I agree.    PHYSICAL EXAMINATION:   CONSTITUTIONAL:   Visit Vitals  /78 (BP Location: LUE - Left upper extremity, Patient Position: Sitting, Cuff Size: Regular)   Pulse 74   LMP 03/01/2011   SpO2 94%     Well developed.  PSYCHIATRIC: Appropriate mood & affect.  A&O to person and surroundings.  SKIN:  No concerning facial skin or subcutaneous lesions seen or palpated.  EYES:  Conjunctivae not injected.  Lids without edema. Pupils equal and round.  EARS:             No ear canal lesions.  TM's clear.  NOSE:             No pus or polyps.  Mouth/Throat: No exudate or concerning lesions.  NECK:  Trachea midline.  No palpable thyroid masses.  1.5 cm firm discrete right tail of parotid mass that was nontender and mobile.  LYMPHATIC: No facial lymphadenopathy. No cervical lymphadenopathy.    DATA:    Most recent labs reviewed by me.    ASSESSMENT:  1. Right parotid mass    PLAN:  1. Had a discussion with the patient regarding parotid tumors.  Majority are benign.  The cytology report is of a benign lesion.  Treatment options include observation or surgical removal.  Arguments to remove the lesion sooner rather than later include definitive pathologic diagnosis, prevent the small possibility of malignant degeneration in the future and remove tumor when it is small reducing the risk of injury to the facial nerve.  2. We discussed the risks,  Department of Anesthesiology  Postprocedure Note    Patient: Doris Lloyd  MRN: [id number]  YOB: 1950  Date of evaluation: 1/30/2023      Procedure Summary     Date: 01/30/23 Room / Location: [facility] CATH LAB    Anesthesia Start: 6799 Anesthesia Stop:     Procedure: VAISHNAVI/DCCV W/ ANES Diagnosis:     Scheduled Providers:  Responsible Provider: LUIS Campos CRNA    Anesthesia Type: general, MAC ASA Status: 3          Anesthesia Type: No value filed.     Ari Phase I:      Ari Phase II:        Anesthesia Post Evaluation    Patient location during evaluation: bedside  Patient participation: complete - patient participated  Level of consciousness: sleepy but conscious  Airway patency: patent  Nausea & Vomiting: no nausea and no vomiting  Complications: no  Cardiovascular status: hemodynamically stable  Respiratory status: nasal cannula  Hydration status: euvolemic  Comments: BP 95/63   Pulse 53   Temp 97 °F (36.1 °C) (Temporal)   Resp 19   Ht 6' (1.829 m)   Wt 213 lb (96.6 kg)   SpO2 94%   BMI 28.89 kg/m²     Multimodal analgesia pain management approach benefits and complications with the surgery.  We discussed the perioperative course.  Patient expresses understanding of this plan and would like to consider surgical removal.  Surgery scheduling information is listed below.  She can call and schedule at any time.    The potential risks and complications of performing a parotidectomy include but are not limited to: injury to facial nerve resulting in paralysis/weakness of the face, scar, numbness (in particular the adjacent ear lobe and skin around the incision), hematoma/seroma (blood/tissue fluid collecting under the incision), sialocele (saliva collecting under the incision), gustatory sweating/Samira's syndrome (skin overlying parotid gland may begin to sweat when eating), First Bite Syndrome (rare, but intense pain with first bite of meals), pain, infection, recurrence of symptoms/disease.    I have reviewed the risks, benefits, indications, complications, alternatives and monica-operative course with the patient/parent. An opportunity to ask questions provided and all were answered. Patient/parent expresses understanding of the recommended procedure(s) outlined below and wishes to proceed with the surgery.    Surgery scheduling requirements include:  Procedure:   Parotidectomty lateral lobe  RT - 88705    Facility: [address]  Admission Type: Outpatient Day Surgery   Time Needed: 90 min  Anesthesia: General  Surgical Assist: yes, surgical assistant  Special equipment: None  Pathology: Yes    Pre-Op labs: As per anesthesia guidelines  Schedule post-op appointment: 1 week    Facility Orders: (Pre op Antibiotics/Meds)   - Dexamethasone 10mg IV    I would like to thank Ana Owusu* for the kind consult.  Arnaldo Packer MD  2/3/2023

## 2023-02-14 NOTE — PROGRESS NOTES
Physician Progress Note      PATIENT:               Feliciano Verdin  CSN #:                  [id number]  :                       1950  ADMIT DATE:       2023 2:41 PM  [address] DATE:        2023 5:43 PM  RESPONDING  PROVIDER #:        Abdirizak Weiner          QUERY TEXT:    Patient admitted with Paroxysmal Atrial Fibrillation. If possible, please   document in progress notes and discharge summary further specificity regarding   the type of atrial fibrillation: The medical record reflects the following:  Risk Factors: AFIB  Clinical Indicators: DC summary notes Paroxysmal, other progress notes state   \"atrial fibrillation\",  Treatment: Betapace, Eliquis, Cardioversion    Chronic: nonspecific term that could be referring to paroxysmal, persistent,   or permanent  Longstanding persistent: persistent and continuous, lasting > 1 year. Paroxysmal - self-terminating or intermittent; resolves with or without   intervention within 7 days of onset; may recur with various frequency. Persistent - Fails to terminate within 7 days; Often requires meds or   cardioversion to restore to NSR. Permanent - longstanding & persistent; Medication has been ineffective in   restoring NSR &/or cardioversion is contraindicated    Definitions per MS-DRG Training Guide and Quick Reference Guide, Gagan Heróis [facility] 112 5   Diseases and Disorders of the Circulatory System; 2019; . Software content   from the [employer]? Advanced CDI Transformation Program    Thank you in advance,    Leanna Iglesias, RN-BSN, [facility], [place]  Clinical   [email or web address]  [place], [address]  Options provided:  -- Longstanding Persistent Atrial Fibrillation  -- Permanent Atrial Fibrillation  -- Persistent Atrial Fibrillation  -- Chronic Atrial Fibrillation, unspecified  -- Other - I will add my own diagnosis  -- Disagree - Not applicable / Not valid  -- Disagree - Clinically unable to determine / Unknown  -- Refer to Clinical Documentation Reviewer    PROVIDER RESPONSE TEXT:    This patient has persistent atrial fibrillation. Query created by: Erica Lowery on 2/6/2023 2:53 PM      QUERY TEXT:    Patient admitted with AFIB, noted to be on Betapace and Eliquis. If possible,   please document in progress notes and discharge summary if you are evaluating   and/or treating any of the following: The medical record reflects the following:  Risk Factors: AFIB, HTN,  Clinical Indicators: treatment with Coumadin. The patient is a 71 yr old male   with HTN. Successful Cardioversion, Transition to Eliquis  Treatment: Coumadin, Transition to Eliquis 5mg PO BID    Thank you in advance,    Ad Cooper RN-BSN, [facility]  Clinical   [email or web address] Reza  Options provided:  -- Secondary hypercoagulable state in a patient with atrial fibrillation  -- Other - I will add my own diagnosis  -- Disagree - Not applicable / Not valid  -- Disagree - Clinically unable to determine / Unknown  -- Refer to Clinical Documentation Reviewer    PROVIDER RESPONSE TEXT:    This patient has secondary hypercoagulable state in a patient with atrial   fibrillation.     Query created by: Erica Lowery on 2/7/2023 12:56 PM      Electronically signed by:  Corey Rios 2/14/2023 8:14 AM

## 2023-02-15 ENCOUNTER — OFFICE VISIT (OUTPATIENT)
Dept: CARDIOLOGY CLINIC | Age: 73
End: 2023-02-15
Payer: MEDICARE

## 2023-02-15 VITALS
DIASTOLIC BLOOD PRESSURE: 80 MMHG | SYSTOLIC BLOOD PRESSURE: 128 MMHG | HEART RATE: 78 BPM | WEIGHT: 228 LBS | BODY MASS INDEX: 30.88 KG/M2 | HEIGHT: 72 IN | OXYGEN SATURATION: 98 %

## 2023-02-15 DIAGNOSIS — I48.19 PERSISTENT ATRIAL FIBRILLATION (HCC): ICD-10-CM

## 2023-02-15 DIAGNOSIS — E78.5 HYPERLIPIDEMIA, UNSPECIFIED HYPERLIPIDEMIA TYPE: ICD-10-CM

## 2023-02-15 DIAGNOSIS — Z79.01 CHRONIC ANTICOAGULATION: ICD-10-CM

## 2023-02-15 DIAGNOSIS — I10 ESSENTIAL HYPERTENSION: Primary | ICD-10-CM

## 2023-02-15 PROCEDURE — 3079F DIAST BP 80-89 MM HG: CPT | Performed by: NURSE PRACTITIONER

## 2023-02-15 PROCEDURE — G8417 CALC BMI ABV UP PARAM F/U: HCPCS | Performed by: NURSE PRACTITIONER

## 2023-02-15 PROCEDURE — 3074F SYST BP LT 130 MM HG: CPT | Performed by: NURSE PRACTITIONER

## 2023-02-15 PROCEDURE — 1111F DSCHRG MED/CURRENT MED MERGE: CPT | Performed by: NURSE PRACTITIONER

## 2023-02-15 PROCEDURE — 3017F COLORECTAL CA SCREEN DOC REV: CPT | Performed by: NURSE PRACTITIONER

## 2023-02-15 PROCEDURE — 99214 OFFICE O/P EST MOD 30 MIN: CPT | Performed by: NURSE PRACTITIONER

## 2023-02-15 PROCEDURE — 93000 ELECTROCARDIOGRAM COMPLETE: CPT | Performed by: NURSE PRACTITIONER

## 2023-02-15 PROCEDURE — G8484 FLU IMMUNIZE NO ADMIN: HCPCS | Performed by: NURSE PRACTITIONER

## 2023-02-15 PROCEDURE — G8427 DOCREV CUR MEDS BY ELIG CLIN: HCPCS | Performed by: NURSE PRACTITIONER

## 2023-02-15 PROCEDURE — 1036F TOBACCO NON-USER: CPT | Performed by: NURSE PRACTITIONER

## 2023-02-15 PROCEDURE — 1123F ACP DISCUSS/DSCN MKR DOCD: CPT | Performed by: NURSE PRACTITIONER

## 2023-02-15 NOTE — PROGRESS NOTES
Dear Jael Saunders DO,    Thank you for allowing me to participate in the care of Mr. Garcia Minors. He presents today at the [facility]. As you know, Mr. Romie Castro is a 71 y.o. male with history of hypertension, hyperlipidemia, arthritis, DVT, PAF, and chronic anticoagulation who presents with the chief complaint of hospital follow-up. He is a patient Dr. Neeraj Tran. He presented to the ER on 1/27 with complaints of irregular heart rate and increased shortness of breath. He was noted to be in A-fib. He was admitted and seen by Dr. Neeraj Tran in consultation. They increase Betapace to 160 mg twice a day. He underwent a VAISHNAVI and cardioversion and was discharged on Eliquis. At first patient went to be started on Coumadin due to cost however changed his mind and wanted to do Eliquis. Dr. Neeraj Tran also recommended being evaluated by Dr. Joanne Ovalle in [place]. The referral has been placed. He saw Dr. Joanne Ovalle a couple weeks ago. He is scheduled for an ablation on 3/13. He remains on the Betapace increased dose of 160 mg twice a day. He remains on Eliquis without any bleeding issues. He denies any chest pain. His only complaint is fatigue and some shortness of breath when out of rhythm. he is sleeping on 1 pillow at night. he is not waking gasping for air. he denies any swelling. he has been compliant with his medications. his BP has been controlled. PCP follows labs and lipids. He otherwise denies chest pain, KINSEY, PND, orthopnea, syncope, or near syncope. He has no other complaints. Review of Systems   Constitutional: Positive for fatigue. Negative for fever, chills, diaphoresis, activity change, appetite change,  and unexpected weight change. Eyes: Negative for photophobia, pain, redness and visual disturbance. Respiratory: Positive for shortness of breath. Negative for apnea, cough, chest tightness, wheezing and stridor.     Cardiovascular: Negative for chest pain, palpitations and leg swelling.   Gastrointestinal: Negative for abdominal distention.   Genitourinary: Negative for dysuria, urgency and frequency.   Musculoskeletal: Negative for myalgias, arthralgias and gait problem.   Skin: Negative for color change, pallor, rash and wound.   Neurological: Negative for dizziness, tremors, speech difficulty, weakness and numbness.   Hematological: Does not bruise/bleed easily.   Psychiatric/Behavioral: Negative.        Past Medical History:   Diagnosis Date    Arthritis     Atrial fibrillation (HCC)     sees dr. bustamante    Colon polyps     Difficult intubation     pt just had recent cervical fusion with plate 2 months ago    DVT (deep venous thrombosis) (HCC) 2014    left leg    Elbow pain     torn bicep    Essential hypertension 12/13/2021    GERD (gastroesophageal reflux disease)     Hx of blood clots     left leg/ after heel surg/ jan. 2015    Hyperlipidemia     recently able to be taken off cholesterol meds    Kidney stone on left side        Past Surgical History:   Procedure Laterality Date    BICEPS TENDON REPAIR Right 07/16/2020    RIGHT BICEPS TENDON REPAIR performed by Kade Lyons MD at [facility] OR    CARPAL TUNNEL RELEASE      CERVICAL FUSION      2015, oct    CHOLECYSTECTOMY, LAPAROSCOPIC N/A 03/23/2021    CHOLECYSTECTOMY LAPAROSCOPIC with IOC performed by July Gray MD at [facility] OR    COLONOSCOPY  10/30/2009    [facility]: hyperplastic polyp    COLONOSCOPY  02/2015    hyperplastic polyp (5 yr)    DILATATION, ESOPHAGUS      ERCP N/A 03/24/2021    Dr LITO Grider-w/sphincterotomy, placement of a 10F x 5 cm biliary stent, balloon sweep-Cholangitis with choledocholithiasis, repeat in 3 months    ERCP N/A 06/09/2021    Dr LITO Grider-Successful removal of retained biliary sludge/stone debris    EYE SURGERY      FOOT SURGERY  01/2014    Left foot spur removal    LITHOTRIPSY Left 12/29/2015    ESWL EXTRACORPEAL SHOCK WAVE LITHOTRIPSY performed by Kameron Greco MD at [facility]  OR    NECK SURGERY  11/2015    PILONIDAL CYST EXCISION      ID COLONOSCOPY FLX DX W/COLLJ SPEC WHEN PFRMD N/A 05/08/2017    Dr Lorraine Schirmer sided diverticulosis-5 yr recall    ID EGD TRANSORAL BIOPSY SINGLE/MULTIPLE N/A 03/26/2018    Dr Ng-w/dilation over wire-52 [place]-esophageal stricture-Garces's (+) dysplasia (-)--1 yr recall    SHOULDER SURGERY  2007    right after fall, detached the subclavicle muscle    [address]    Neck  surgery     UPPER GASTROINTESTINAL ENDOSCOPY N/A 02/12/2018    Dr Sue Rogel Grade B esophagitis, hiatal hernia, stricture-(-) Марина, (+) Garces's (+) low grade dysplasia, active esophagogastritis, repeat: 3/26/18    UPPER GASTROINTESTINAL ENDOSCOPY  03/26/2018    Dr Ng-w/dilation over wire-52 [place]-esophageal stricture-Garces's (+) dysplasia (-)--1 yr recall    UPPER GASTROINTESTINAL ENDOSCOPY N/A 05/02/2019    Dr Marce Castellanos (+) Garces's, (-) dysplasia       Family History   Problem Relation Age of Onset    Cancer Mother     Lung Cancer Mother     Heart Disease Father     Heart Disease Sister     Diabetes Sister     Heart Disease Brother     Diabetes Brother     Colon Cancer Neg Hx     Colon Polyps Neg Hx     Esophageal Cancer Neg Hx     Liver Cancer Neg Hx     Liver Disease Neg Hx     Rectal Cancer Neg Hx     Stomach Cancer Neg Hx        Social History     Socioeconomic History    Marital status:      Spouse name: Not on file    Number of children: 2    Years of education: Not on file    Highest education level: Not on file   Occupational History    Not on file   Tobacco Use    Smoking status: Never    Smokeless tobacco: Never   Vaping Use    Vaping Use: Never used   Substance and Sexual Activity    Alcohol use: No    Drug use: No    Sexual activity: Yes     Partners: Female   Other Topics Concern    Not on file   Social History Narrative    Not on file     Social Determinants of Health     Financial Resource Strain: Low Risk     Difficulty of Paying Living Expenses: Not hard at all   Food Insecurity: No Food Insecurity    Worried About Running Out of Food in the Last Year: Never true    Ran Out of Food in the Last Year: Never true   Transportation Needs: Not on file   Physical Activity: Sufficiently Active    Days of Exercise per Week: 6 days    Minutes of Exercise per Session: 150+ min   Stress: Not on file   Social Connections: Not on file   Intimate Partner Violence: Not At Risk    Fear of Current or Ex-Partner: No    Emotionally Abused: No    Physically Abused: No    Sexually Abused: No   Housing Stability: Not on file       No Known Allergies      Current Outpatient Medications:     sotalol (BETAPACE) 160 MG tablet, Take 1 tablet by mouth 2 times daily, Disp: 60 tablet, Rfl: 3    apixaban (ELIQUIS) 5 MG TABS tablet, Take 1 tablet by mouth 2 times daily, Disp: 180 tablet, Rfl: 1    Cholecalciferol (VITAMIN D3) 50 MCG (2000 UT) CAPS, Take 2,000 Units by mouth daily, Disp: , Rfl:     ZINC PO, Take 1 tablet by mouth daily, Disp: , Rfl:     losartan (COZAAR) 25 MG tablet, Take 1 tablet by mouth daily, Disp: 90 tablet, Rfl: 3    celecoxib (CELEBREX) 200 MG capsule, , Disp: , Rfl:     simvastatin (ZOCOR) 10 MG tablet, Take 1 tablet by mouth nightly, Disp: 90 tablet, Rfl: 3    ondansetron (ZOFRAN ODT) 4 MG disintegrating tablet, Take 1 tablet by mouth every 8 hours as needed for Nausea or Vomiting, Disp: 30 tablet, Rfl: 0    Multiple Vitamins-Minerals (THERAPEUTIC MULTIVITAMIN-MINERALS) tablet, Take 1 tablet by mouth daily, Disp: , Rfl:     Multiple Vitamins-Minerals (ICAPS AREDS FORMULA PO), Take 1 capsule by mouth 2 times daily, Disp: , Rfl:     Probiotic Product (PROBIOTIC FORMULA PO), Take 1 tablet by mouth daily , Disp: , Rfl:     Ascorbic Acid (VITAMIN C PO), Take 1 tablet by mouth nightly , Disp: , Rfl:     vitamin B-12 (CYANOCOBALAMIN) 1000 MCG tablet, Take 1,000 mcg by mouth daily , Disp: , Rfl:     PE:  Vitals:    02/15/23 1400   BP: 128/80   Pulse: 78   SpO2: 98% Estimated body mass index is 30.92 kg/m² as calculated from the following:    Height as of this encounter: 6' (1.829 m). Weight as of this encounter: 228 lb (103.4 kg). Constitutional: He is oriented to person, place, and time. He appears well-developed and well-nourished in no acute distress. Neck:  Neck supple without JVD present. No trachea deviation present. No thyromegaly present. Eyes:Conjunctivae and EOM are normal. Pupils equal and reactive to light. ENT:Hearing appears normal.conjunctiva and lids are normal, ears and nose appear normal.  Cardiovascular: Normal rate, Irregular rhythm, normal heart sounds. No murmur ascultated. No gallop and no friction rub. No carotid bruits. No peripheral edema. Pulmonary/Chest:  Lungs clear to auscultation bilaterally without evidence of respiratory distress. He without wheezes. He without rales or ronchi. Musculoskeletal: Normal range of motion. Gait is normal.  Head is normocephalic and atraumatic. Skin: Skin is warm and dry without rash or pallor. Psychiatric:He is alert and oriented to person, place, and time. He has a normal mood and affect. His behavior is normal. Thought content normal.       Lab Results   Component Value Date/Time    CREATININE 0.9 01/30/2023 02:18 AM    CREATININE 0.9 01/29/2023 06:19 AM    CREATININE 0.8 01/28/2023 09:11 AM    HGB 13.0 01/30/2023 02:18 AM    HGB 13.4 01/29/2023 06:19 AM    HGB 13.7 01/28/2023 09:11 AM    PROBNP 575 01/27/2023 03:11 PM    PROBNP 351 03/23/2021 12:24 AM         ECG 02/15/23  Atrial Fibrillation, QTc 439 msec, HR 72 bpm    TTE- 1/28/2   LV is normal in size with normal systolic function. LV ejection fraction   estimated at 60%. Mild to moderate concentric LVH. Probable grade 2 diastolic dysfunction. RV is normal in size with normal systolic function. Mild left atrial enlargement. Normal right atrial size.    Aortic valve is trileaflet with thickening of noncoronary leaflet and   focal calcification of left coronary leaflet. No significant stenosis or   regurgitation noted. Mitral valve is structurally normal with normal leaflet mobility. No   stenosis. Trivial mitral regurgitation. Trivial tricuspid regurgitation. No significant pericardial effusion    VAISHNAVI-1/30/23   LV is normal in size with normal systolic function. No mass or thrombus in   left ventricle. RV is normal in size with with normal systolic function. No mass or   thrombus in right ventricle. Left atrium is mildly dilated. No mass or thrombus in left atrium. Left   atrial appendage with low velocity. No mass or thrombus noted in left   atrial appendage. Right atrium is normal in size. No mass or thrombus in right atrium. Interatrial septum with possible small slitlike PFO visualized by color   Doppler. Aortic valve is trileaflet with normal leaflet mobility. No stenosis or   significant regurgitation. No vegetations. Mitral valve is structurally normal with normal leaflet mobility. No   stenosis with trivial regurgitation. No vegetations. Mild tricuspid regurgitation. Tricuspid valve is otherwise unremarkable. Mild pulmonic regurgitation. Pulmonic valve is otherwise unremarkable. No significant pericardial effusion. Descending thoracic aorta with mild atheromatous changes. Assessment, Recommendations, & Plan:  71 y.o. male with persistent atrial fibrillation, Chronic anticoagulation, HTN, & HLD    Persistent atrial fibrillation/Chronic anticoagulation-he remains in A-fib even with increased dose of Betapace 260 mg twice a day. He has seen Dr. Sarah Damian in consultation. He is schedule for ablation with Dr. Sarah Damian on 3/13. He remains anticoagulated on Eliquis without bleeding issues. He did have an endoscopy and colonoscopy scheduled for routine purposes.   I have recommended that he not have this done and have the Eliquis stopped for period of time until after the ablation and we can get him back into normal sinus rhythm. HTN-controlled on Cozaar. Continue current regimen    HLD-followed by PCP, on Zocor, no changes      Disposition - RTC in 3 months or sooner if needed      Please do not hesitate to contact me for any questions or concerns.     Sincerely yours,    Benjie Batista, APRN None known

## 2023-02-15 NOTE — PATIENT INSTRUCTIONS
Atrial Fibrillation     Definition   Atrial fibrillation is an abnormal heart rhythm. The heart's electrical system normally sends regularly spaced signals telling the heart muscle to beat. The heart has two upper chambers, called atria, and two lower chambers, called ventricles. Each signal starts in the atria and travels to the rest of the heart. In atrial fibrillation, the electrical signals from the atria are fast and irregular. The atria quiver, rather than contract. Some signals do not reach the ventricles and the ventricles continue pumping, usually irregularly and sometimes rapidly. This uncoordinated rhythm can reduce the heart's efficiency at pumping blood out to the body. Blood left in the heart chambers can form clots. These clots may sometimes break away, travel to the brain, and cause a stroke . Atrial Fibrillation        [address] [place] [address].   Causes   In most cases, atrial fibrillation is due to an existing heart condition. But atrial fibrillation can occur in people with no structural heart problems. A thyroid disorder or other condition may cause the abnormal rhythm. In some cases, the cause is unknown. Risk Factors   Risk factors include:   Cardiovascular diseases:   High blood pressure   Coronary artery disease   Congestive heart failure   Heart attack   Heart valve disease   Endocarditis (infection of a heart valve)   Cardiomyopathy (disease of the heart muscle)   Congenital heart disease   Prior episode of atrial fibrillation   Lung diseases:   Emphysema   Asthma   Blood clots in the lungs   Age: 54 or older   Smoking   Chronic conditions:   Overactive thyroid   Diabetes   Excessive alcohol intake   Use of stimulant drugs, including caffeine   Sex: male   Undergoing general anesthesia   Stress, either emotional or physical   Family history of atrial fibrillation     Symptoms   Symptoms can vary from mild to severe, depending on your heart function and overall health.  Some people may not notice any symptoms.   Symptoms include:   Irregular or rapid pulse or heart beat   Racing feeling in the chest   Palpitations, or a pounding feeling in the chest   Dizziness, lightheadedness, or fainting   Sweating   Pain or pressure in the chest   Shortness of breath   Fatigue or weakness   Exercise intolerance     Treatment   The goals of treatment are to:   Restore a regular rhythm, if possible   Keep heart rate as close to normal   Your doctor will tell you what your target heart rate is. In general, the your resting rate should be between 60-80 beats per minute, and  beats per minute during moderate exercise.   Prevent blood clots from forming   If an underlying cause of atrial fibrillation is found, it may be treated. Some patients return to a normal rhythm without treatment.     Treatments include:   Medication   Drugs to slow the heart rate, such as:   Digitalis   Verapamil   Diltiazem   Metoprolol   Atenolol   Drugs to keep the heart in a regular rhythm, such as:   Sotalol (Betapace)  Propafenone (Rythmol)  Amiodarone   Multaq  Drugs to prevent clot formation, such as warfarin (Coumadin) or Pradaxa    If you are started on a blood thinners, you will need at least 6 weeks prior to trying to get your heart back into a normal rhythm.  If you are on Coumadin, you will need weekly blood checks to monitor your INR.  You will need to keep your INR between 2.0-2.5 or 2.0 and 3.0 as your doctor's directions.    Cardioversion   Cardioversion is a procedure that uses an electrical current or drugs to help normalize the heart rhythm.  You will be in the hospital approximately 2-3 days to start antiarrhythmics (to keep your heart in rhythm) and shock to get you back in a normal rhythm.

## 2023-04-04 ENCOUNTER — OFFICE VISIT (OUTPATIENT)
Dept: CARDIOLOGY CLINIC | Age: 73
End: 2023-04-04
Payer: MEDICARE

## 2023-04-04 DIAGNOSIS — I48.19 PERSISTENT ATRIAL FIBRILLATION (HCC): Primary | ICD-10-CM

## 2023-04-04 DIAGNOSIS — I48.0 PAROXYSMAL ATRIAL FIBRILLATION (HCC): ICD-10-CM

## 2023-04-04 PROCEDURE — 93000 ELECTROCARDIOGRAM COMPLETE: CPT | Performed by: NURSE PRACTITIONER

## 2023-04-04 PROCEDURE — 99999 PR OFFICE/OUTPT VISIT,PROCEDURE ONLY: CPT | Performed by: NURSE PRACTITIONER

## 2023-04-04 NOTE — PROGRESS NOTES
EKG 4/4/23 s/p ablation with Dr. Leopoldo Fortis on 3/13/23  Sinus Bradycardia, Qtc 451 msec, HR 58 bpm

## 2023-05-12 ENCOUNTER — HOSPITAL ENCOUNTER (OUTPATIENT)
Age: 73
Setting detail: OUTPATIENT SURGERY
Discharge: HOME | End: 2023-05-12
Attending: INTERNAL MEDICINE | Admitting: INTERNAL MEDICINE
Payer: MEDICARE

## 2023-05-12 ENCOUNTER — ANESTHESIA EVENT (OUTPATIENT)
Dept: ENDOSCOPY | Age: 73
End: 2023-05-12
Payer: MEDICARE

## 2023-05-12 ENCOUNTER — ANESTHESIA (OUTPATIENT)
Dept: ENDOSCOPY | Age: 73
End: 2023-05-12
Payer: MEDICARE

## 2023-05-12 VITALS
RESPIRATION RATE: 16 BRPM | OXYGEN SATURATION: 100 % | HEIGHT: 72 IN | WEIGHT: 228 LBS | DIASTOLIC BLOOD PRESSURE: 79 MMHG | HEART RATE: 53 BPM | SYSTOLIC BLOOD PRESSURE: 122 MMHG | BODY MASS INDEX: 30.88 KG/M2 | TEMPERATURE: 98.1 F

## 2023-05-12 DIAGNOSIS — Z86.010 HX OF COLONIC POLYP: ICD-10-CM

## 2023-05-12 DIAGNOSIS — K22.70 BARRETT'S ESOPHAGUS WITHOUT DYSPLASIA: ICD-10-CM

## 2023-05-12 PROCEDURE — 88342 IMHCHEM/IMCYTCHM 1ST ANTB: CPT

## 2023-05-12 PROCEDURE — 88305 TISSUE EXAM BY PATHOLOGIST: CPT

## 2023-05-12 PROCEDURE — 2709999900 HC NON-CHARGEABLE SUPPLY: Performed by: INTERNAL MEDICINE

## 2023-05-12 PROCEDURE — 2580000003 HC RX 258: Performed by: INTERNAL MEDICINE

## 2023-05-12 PROCEDURE — 43239 EGD BIOPSY SINGLE/MULTIPLE: CPT | Performed by: INTERNAL MEDICINE

## 2023-05-12 PROCEDURE — 6360000002 HC RX W HCPCS

## 2023-05-12 PROCEDURE — 45385 COLONOSCOPY W/LESION REMOVAL: CPT | Performed by: INTERNAL MEDICINE

## 2023-05-12 PROCEDURE — 2500000003 HC RX 250 WO HCPCS

## 2023-05-12 PROCEDURE — 3609010600 HC COLONOSCOPY POLYPECTOMY SNARE/COLD BIOPSY: Performed by: INTERNAL MEDICINE

## 2023-05-12 PROCEDURE — 3700000001 HC ADD 15 MINUTES (ANESTHESIA): Performed by: INTERNAL MEDICINE

## 2023-05-12 PROCEDURE — 2580000003 HC RX 258

## 2023-05-12 PROCEDURE — 3609012400 HC EGD TRANSORAL BIOPSY SINGLE/MULTIPLE: Performed by: INTERNAL MEDICINE

## 2023-05-12 PROCEDURE — 3700000000 HC ANESTHESIA ATTENDED CARE: Performed by: INTERNAL MEDICINE

## 2023-05-12 PROCEDURE — 7100000010 HC PHASE II RECOVERY - FIRST 15 MIN: Performed by: INTERNAL MEDICINE

## 2023-05-12 PROCEDURE — 7100000011 HC PHASE II RECOVERY - ADDTL 15 MIN: Performed by: INTERNAL MEDICINE

## 2023-05-12 RX ORDER — SODIUM CHLORIDE, SODIUM LACTATE, POTASSIUM CHLORIDE, CALCIUM CHLORIDE 600; 310; 30; 20 MG/100ML; MG/100ML; MG/100ML; MG/100ML
INJECTION, SOLUTION INTRAVENOUS CONTINUOUS PRN
Status: DISCONTINUED | OUTPATIENT
Start: 2023-05-12 | End: 2023-05-12 | Stop reason: SDUPTHER

## 2023-05-12 RX ORDER — PROPOFOL 10 MG/ML
INJECTION, EMULSION INTRAVENOUS PRN
Status: DISCONTINUED | OUTPATIENT
Start: 2023-05-12 | End: 2023-05-12

## 2023-05-12 RX ORDER — LIDOCAINE HYDROCHLORIDE 10 MG/ML
INJECTION, SOLUTION EPIDURAL; INFILTRATION; INTRACAUDAL; PERINEURAL PRN
Status: DISCONTINUED | OUTPATIENT
Start: 2023-05-12 | End: 2023-05-12 | Stop reason: SDUPTHER

## 2023-05-12 RX ORDER — SODIUM CHLORIDE, SODIUM LACTATE, POTASSIUM CHLORIDE, CALCIUM CHLORIDE 600; 310; 30; 20 MG/100ML; MG/100ML; MG/100ML; MG/100ML
INJECTION, SOLUTION INTRAVENOUS CONTINUOUS
Status: DISCONTINUED | OUTPATIENT
Start: 2023-05-12 | End: 2023-05-12 | Stop reason: HOSPADM

## 2023-05-12 RX ORDER — PROPOFOL 10 MG/ML
INJECTION, EMULSION INTRAVENOUS PRN
Status: DISCONTINUED | OUTPATIENT
Start: 2023-05-12 | End: 2023-05-12 | Stop reason: SDUPTHER

## 2023-05-12 RX ADMIN — LIDOCAINE HYDROCHLORIDE 50 MG: 10 INJECTION, SOLUTION EPIDURAL; INFILTRATION; INTRACAUDAL; PERINEURAL at 11:33

## 2023-05-12 RX ADMIN — SODIUM CHLORIDE, SODIUM LACTATE, POTASSIUM CHLORIDE, AND CALCIUM CHLORIDE: 600; 310; 30; 20 INJECTION, SOLUTION INTRAVENOUS at 11:26

## 2023-05-12 RX ADMIN — PROPOFOL 320 MG: 10 INJECTION, EMULSION INTRAVENOUS at 11:33

## 2023-05-12 RX ADMIN — SODIUM CHLORIDE, POTASSIUM CHLORIDE, SODIUM LACTATE AND CALCIUM CHLORIDE: 600; 310; 30; 20 INJECTION, SOLUTION INTRAVENOUS at 09:46

## 2023-05-12 ASSESSMENT — LIFESTYLE VARIABLES: SMOKING_STATUS: 0

## 2023-05-12 ASSESSMENT — PAIN - FUNCTIONAL ASSESSMENT: PAIN_FUNCTIONAL_ASSESSMENT: 0-10

## 2023-05-12 ASSESSMENT — ENCOUNTER SYMPTOMS: SHORTNESS OF BREATH: 1

## 2023-05-12 NOTE — OP NOTE
Endoscopic Procedure Note    Patient: Sergio Kaye : 1950  Med Rec#: [id number] Acc#: [id number]     Primary Care Provider MINOO Ng DO  Referring Provider:     Endoscopist: Mirela Hayden MD    Date of Procedure:  2023    Procedure:   1. EGD with biopsy    Indications:   1. Dyspepsia   2. Known h/o Garces's esophagus. Anesthesia:  Sedation was administered by anesthesia who monitored the patient during the procedure. Estimated Blood Loss: minimal    Procedure:   After reviewing the patient's chart and obtaining informed consent, the patient was placed in the left lateral decubitus position. A forward-viewing Olympus endoscope was lubricated and inserted through the mouth into the oropharynx. Under direct visualization, the upper esophagus was intubated. The scope was advanced to the level of the third portion of duodenum. Scope was slowly withdrawn with careful inspection of the mucosal surfaces. The scope was retroflexed for inspection of the gastric fundus and incisura. Findings and maneuvers are listed in impression below. The patient tolerated the procedure well. The scope was removed. There were no immediate complications. Findings:   Esophagus: abnormal: mucosal changes c/w Garces's esophagus noted in the distal esophagus. No ulcerations or nodularity. Multiple biopsies obtained to evaluate for dysplasia. There is no hiatal hernia present. Stomach:  abnormal: mild mucosal changes suggestive of gastritis noted -  Gastric biopsies were taken from the antrum and body to rule out Helicobacter pylori infection. Duodenum: normal      IMPRESSION:  1. Garces's esophagus - biopsied   2. S/p gastric biopsies      RECOMMENDATIONS:    1. Await path results  2. Continue PPI   3. Minimize/avoid NSAIDs    The results were discussed with the patient and family. A copy of the images obtained were given to the patient.      Mary Jackson MD  2023  11:55 AM

## 2023-05-12 NOTE — OP NOTE
Patient: Prateek Mccormick : 1950  Med Rec#: [id number] Acc#: [id number]   Primary Care Provider MINOO Kwok DO    Date of Procedure:  2023    Endoscopist: Jaime Bernardo MD    Referring Provider: Agnes Cornell DO,     Operation Performed: Colonoscopy with snare polypectomy    Indications: screening    Anesthesia:  Sedation was administered by anesthesia who monitored the patient during the procedure. I met with Prateek Mccormick prior to procedure. We discussed the procedure itself, and I have discussed the risks of endoscopy (including-- but not limited to-- pain, discomfort, bleeding potentially requiring second endoscopic procedure and/or blood transfusion, organ perforation requiring operative repair, damage to organs near the colon, infection, aspiration, cardiopulmonary/allergic reaction), benefits, indications to endoscopy. Additionally, we discussed options other than colonoscopy. The patient expressed understanding. All questions answered. The patient decided to proceed with the procedure. Signed informed consent was placed on the chart. Blood Loss: minimal    Withdrawal time: >  6 minutes  Bowel Prep: adequate     Complications: no immediate complications    DESCRIPTION OF PROCEDURE:     A time out was performed. After written informed consent was obtained, the patient was placed in the left lateral position. The perianal area was inspected, and a digital rectal exam was performed. A rectal exam was performed: normal tone, no palpable lesions. At this point, a forward viewing Olympus colonoscope was inserted into the anus and carefully advanced to the cecum. The cecum was identified by the ileocecal valve and the appendiceal orifice. The colonoscope was then slowly withdrawn with careful inspection of the mucosa in a linear and circumferential fashion. The scope was retroflexed in the rectum.  Suction was utilized during the procedure to remove as much air as

## 2023-05-12 NOTE — ANESTHESIA POSTPROCEDURE EVALUATION
Department of Anesthesiology  Postprocedure Note    Patient: Wesley Meza  MRN: [id number]  [place]: 1950  Date of evaluation: 5/12/2023      Procedure Summary     Date: 05/12/23 Room / Location: [address]    Anesthesia Start: 1129 Anesthesia Stop: 7190    Procedures:       EGD BIOPSY (Esophagus)      COLONOSCOPY POLYPECTOMY SNARE/COLD BIOPSY (Abdomen) Diagnosis:       Garces's esophagus without dysplasia      Hx of colonic polyp      (Garces's esophagus without dysplasia [K22.70])      (Hx of colonic polyp [Z86.010])    Surgeons: Carmen Agarwal MD Responsible Provider: LUIS Palomares Cha, CRNA    Anesthesia Type: general, TIVA ASA Status: 3          Anesthesia Type: No value filed.     Ari Phase I: Ari Score: 10    Ari Phase II:        Anesthesia Post Evaluation    Patient location during evaluation: bedside  Patient participation: waiting for patient participation  Level of consciousness: sleepy but conscious  Pain score: 0  Airway patency: patent  Nausea & Vomiting: no nausea and no vomiting  Complications: no  Cardiovascular status: hemodynamically stable and blood pressure returned to baseline  Respiratory status: acceptable and room air  Hydration status: stable  Comments: /83   Pulse 58   Temp 97 °F (36.1 °C)   Resp 18   Ht 6' (1.829 m)   Wt 228 lb (103.4 kg)   SpO2 98%   BMI 30.92 kg/m²

## 2023-05-12 NOTE — H&P
Patient Name: Keshia Connor  : 1950  MRN: [id number]  DATE: 23    Allergies: Allergies   Allergen Reactions    Sotalol         ENDOSCOPY  History and Physical    Procedure:    [] Diagnostic Colonoscopy       [x] Screening Colonoscopy  [x] EGD      [] ERCP      [] EUS       [] Other    [x] Previous office notes/History and Physical reviewed from the patients chart. Please see EMR for further details of HPI. I have examined the patient's status immediately prior to the procedure and:      Indications/HPI:    []Abdominal Pain   [x]Barretts  [x]Screening/Surveillance   []History of Polyps  []Dysphagia            [] +Cologard/DNA testing  []Abnormal Imaging              []EOE Hx              [] Family Hx of CRC/Polyps  []Anemia                            []Food Impaction       []Recent Poor Prep  []GI Bleed             []Lymphadenopathy  []History of Polyps  []Change in bowel habits []Heartburn/Reflux  []Cancer- GI/Lung  []Chest Pain - Non Cardiac []Heme (+) Stool []Ulcers  []Constipation  []Hemoptysis  []Incontinence    []Diarrhea  []Hypoxemia  []Rectal Bleed (BRBPR)  []Nausea/Vomiting   [] Varices  []Crohns/Colitis  []Pancreatic Cyst   [] Cirrhosis   []Pancreatitis    []Abnormal MRCP  []Elevated LFT [] Stent Removal, Previous ERCP  []Other:     Anesthesia:   [x] MAC [] Moderate Sedation   [] General   [] None     ROS: 12 pt Review of Symptoms was negative unless mentioned above    Medications:   Prior to Admission medications    Medication Sig Start Date End Date Taking?  Authorizing Provider   sotalol (BETAPACE) 160 MG tablet Take 1 tablet by mouth 2 times daily 23   LUIS Rodríguez CNP   apixaban (ELIQUIS) 5 MG TABS tablet Take 1 tablet by mouth 2 times daily 23   LUIS Rodríguez CNP   Cholecalciferol (VITAMIN D3) 50 MCG ( UT) CAPS Take 1 capsule by mouth daily    Historical Provider, MD   ZINC PO Take 1 tablet by mouth daily    Historical Provider, MD   losartan

## 2023-05-12 NOTE — DISCHARGE INSTRUCTIONS
RECOMMENDATIONS:    1. Await path results  2. Continue PPI - I would consider increasing your Prilosec to 40mg twice daily for 2-3 months, then decreasing back to current dose. If you need a new Rx sent, please contact our office or your PCP. 3.  Minimize/avoid NSAIDs  4. Repeat Colonoscopy in 5 yrs for screening Upper GI Endoscopy and Colonoscopy: What to Expect at [address] [place] [address]    After an upper GI endoscopy, you may have a sore throat for a day or two after the test.    After a colonoscopy you may be bloated or have gas pains. You may need to pass gas. If a biopsy was done or a polyp was removed, you may have streaks of blood in your stool (feces) for a few days. Problems such as heavy rectal bleeding may not occur until several weeks after the test. This isn't common. But it can happen after polyps are removed. How can you care for yourself at home? Activity   Rest as much as you need to after you go home. You should be able to go back to your usual activities the day after the test.  You should not drive or operate equipment for the remainder of today. Diet   Follow your doctor's directions for eating after the test.  Unless your doctor has told you not to, drink plenty of fluids. This helps to replace the fluids that were lost during the colon prep. Do not drink alcohol for 24 hours    Medications   If you have a sore throat the day after the test, use an over-the-counter spray to numb your throat. If polyps were removed or biopsies removed, your doctor may tell you to continue holding your anticoagulants or NSAIDS. Check with your doctor to see when you can resume these types of medications. Follow-up care is a key part of your treatment and safety. Be sure to make and go to all appointments, and call your doctor if you are having problems. It's also a good idea to know your test results and keep a list of the medicines you take. When should you call for help?    Call 911 anytime

## 2023-05-12 NOTE — ANESTHESIA PRE PROCEDURE
dysplasia (-)--1 yr recall    UPPER GASTROINTESTINAL ENDOSCOPY N/A 05/02/2019    Dr Jacklyn Garcia (+) Garces's, (-) dysplasia       Social History:    Social History     Tobacco Use    Smoking status: Never    Smokeless tobacco: Never   Substance Use Topics    Alcohol use: No                                Counseling given: Not Answered      Vital Signs (Current): There were no vitals filed for this visit. BP Readings from Last 3 Encounters:   05/12/23 133/83   02/15/23 128/80   01/30/23 139/85       NPO Status:                                                                                 BMI:   Wt Readings from Last 3 Encounters:   05/12/23 228 lb (103.4 kg)   02/15/23 228 lb (103.4 kg)   01/30/23 213 lb (96.6 kg)     There is no height or weight on file to calculate BMI.    CBC:   Lab Results   Component Value Date/Time    WBC 5.0 01/30/2023 02:18 AM    RBC 4.28 01/30/2023 02:18 AM    HGB 13.0 01/30/2023 02:18 AM    HCT 40.0 01/30/2023 02:18 AM    MCV 93.5 01/30/2023 02:18 AM    RDW 14.3 01/30/2023 02:18 AM     01/30/2023 02:18 AM       CMP:   Lab Results   Component Value Date/Time     01/30/2023 02:18 AM    K 4.4 01/30/2023 02:18 AM     01/30/2023 02:18 AM    CO2 24 01/30/2023 02:18 AM    BUN 19 01/30/2023 02:18 AM    CREATININE 0.9 01/30/2023 02:18 AM    GFRAA >59 06/03/2022 07:11 AM    LABGLOM >60 01/30/2023 02:18 AM    GLUCOSE 115 01/30/2023 02:18 AM    PROT 6.9 01/27/2023 03:11 PM    CALCIUM 8.8 01/30/2023 02:18 AM    BILITOT <0.2 01/27/2023 03:11 PM    ALKPHOS 121 01/27/2023 03:11 PM    AST 23 01/27/2023 03:11 PM    ALT 27 01/27/2023 03:11 PM       POC Tests: No results for input(s): POCGLU, POCNA, POCK, POCCL, POCBUN, POCHEMO, POCHCT in the last 72 hours.     Coags:   Lab Results   Component Value Date/Time    PROTIME 15.6 01/30/2023 02:18 AM    INR 1.24 01/30/2023 02:18 AM    APTT 58.4 01/30/2023 02:18 AM       HCG (If Applicable): No results

## 2023-05-16 ENCOUNTER — TELEPHONE (OUTPATIENT)
Dept: CARDIOLOGY CLINIC | Age: 73
End: 2023-05-16

## 2023-05-16 ENCOUNTER — OFFICE VISIT (OUTPATIENT)
Dept: CARDIOLOGY CLINIC | Age: 73
End: 2023-05-16
Payer: MEDICARE

## 2023-05-16 VITALS
HEART RATE: 55 BPM | DIASTOLIC BLOOD PRESSURE: 80 MMHG | HEIGHT: 72 IN | WEIGHT: 216 LBS | BODY MASS INDEX: 29.26 KG/M2 | SYSTOLIC BLOOD PRESSURE: 124 MMHG

## 2023-05-16 DIAGNOSIS — Z79.01 CHRONIC ANTICOAGULATION: ICD-10-CM

## 2023-05-16 DIAGNOSIS — I10 ESSENTIAL HYPERTENSION: ICD-10-CM

## 2023-05-16 DIAGNOSIS — I48.0 PAROXYSMAL ATRIAL FIBRILLATION (HCC): Primary | ICD-10-CM

## 2023-05-16 DIAGNOSIS — Z86.79 S/P ABLATION OF ATRIAL FIBRILLATION: ICD-10-CM

## 2023-05-16 DIAGNOSIS — E78.5 HYPERLIPIDEMIA, UNSPECIFIED HYPERLIPIDEMIA TYPE: ICD-10-CM

## 2023-05-16 DIAGNOSIS — Z98.890 S/P ABLATION OF ATRIAL FIBRILLATION: ICD-10-CM

## 2023-05-16 PROCEDURE — G8427 DOCREV CUR MEDS BY ELIG CLIN: HCPCS | Performed by: NURSE PRACTITIONER

## 2023-05-16 PROCEDURE — G8417 CALC BMI ABV UP PARAM F/U: HCPCS | Performed by: NURSE PRACTITIONER

## 2023-05-16 PROCEDURE — 99214 OFFICE O/P EST MOD 30 MIN: CPT | Performed by: NURSE PRACTITIONER

## 2023-05-16 PROCEDURE — 3017F COLORECTAL CA SCREEN DOC REV: CPT | Performed by: NURSE PRACTITIONER

## 2023-05-16 PROCEDURE — 3079F DIAST BP 80-89 MM HG: CPT | Performed by: NURSE PRACTITIONER

## 2023-05-16 PROCEDURE — 1036F TOBACCO NON-USER: CPT | Performed by: NURSE PRACTITIONER

## 2023-05-16 PROCEDURE — 1123F ACP DISCUSS/DSCN MKR DOCD: CPT | Performed by: NURSE PRACTITIONER

## 2023-05-16 PROCEDURE — 3074F SYST BP LT 130 MM HG: CPT | Performed by: NURSE PRACTITIONER

## 2023-05-16 PROCEDURE — 93000 ELECTROCARDIOGRAM COMPLETE: CPT | Performed by: NURSE PRACTITIONER

## 2023-05-16 NOTE — TELEPHONE ENCOUNTER
Date: TBD     Cardiologist: Dr. Denise Raymundo     Procedure: colonoscopy      Surgeon: Dr. Rice Headings     Last Office Visit: 5/16/23     Reason for office visit and medical concerns addressed at this office visit: PAF, HTN, DVT, hyperlipidemia     Testing Performed and Date of Service:  Cardioversion 1-27-23  Ablation in March with Dr. Jacqui Rhodes     RCRI = 0 pts, low, 0.4%   METs 4     Current Medications: betapace, eliquis, vit D3, cozaar, celebrex, zocor, ASA, probiotic, vit C, Vit B-12     Is the patient currently taking an anticoagulant? If so, what is the diagnosis the patient has been given to warrant the need for the anticoagulant?  ASA and Eliquis      Additional Notes: Cardiac Risk Request and med hold on Eliquis and ASA

## 2023-06-20 DIAGNOSIS — I10 ESSENTIAL HYPERTENSION: ICD-10-CM

## 2023-06-20 DIAGNOSIS — Z13.29 THYROID DISORDER SCREEN: ICD-10-CM

## 2023-06-20 DIAGNOSIS — I48.19 PERSISTENT ATRIAL FIBRILLATION (HCC): Primary | ICD-10-CM

## 2023-06-20 DIAGNOSIS — Z12.5 SCREENING PSA (PROSTATE SPECIFIC ANTIGEN): ICD-10-CM

## 2023-06-20 DIAGNOSIS — E78.2 MIXED HYPERLIPIDEMIA: ICD-10-CM

## 2023-07-07 SDOH — ECONOMIC STABILITY: FOOD INSECURITY: WITHIN THE PAST 12 MONTHS, YOU WORRIED THAT YOUR FOOD WOULD RUN OUT BEFORE YOU GOT MONEY TO BUY MORE.: NEVER TRUE

## 2023-07-07 SDOH — ECONOMIC STABILITY: FOOD INSECURITY: WITHIN THE PAST 12 MONTHS, THE FOOD YOU BOUGHT JUST DIDN'T LAST AND YOU DIDN'T HAVE MONEY TO GET MORE.: NEVER TRUE

## 2023-07-07 SDOH — ECONOMIC STABILITY: HOUSING INSECURITY
IN THE LAST 12 MONTHS, WAS THERE A TIME WHEN YOU DID NOT HAVE A STEADY PLACE TO SLEEP OR SLEPT IN A SHELTER (INCLUDING NOW)?: NO

## 2023-07-07 SDOH — ECONOMIC STABILITY: TRANSPORTATION INSECURITY
IN THE PAST 12 MONTHS, HAS LACK OF TRANSPORTATION KEPT YOU FROM MEETINGS, WORK, OR FROM GETTING THINGS NEEDED FOR DAILY LIVING?: NO

## 2023-07-07 SDOH — ECONOMIC STABILITY: INCOME INSECURITY: HOW HARD IS IT FOR YOU TO PAY FOR THE VERY BASICS LIKE FOOD, HOUSING, MEDICAL CARE, AND HEATING?: NOT VERY HARD

## 2023-07-07 SDOH — HEALTH STABILITY: PHYSICAL HEALTH: ON AVERAGE, HOW MANY MINUTES DO YOU ENGAGE IN EXERCISE AT THIS LEVEL?: 50 MIN

## 2023-07-07 SDOH — HEALTH STABILITY: PHYSICAL HEALTH: ON AVERAGE, HOW MANY DAYS PER WEEK DO YOU ENGAGE IN MODERATE TO STRENUOUS EXERCISE (LIKE A BRISK WALK)?: 4 DAYS

## 2023-07-07 ASSESSMENT — LIFESTYLE VARIABLES
HOW MANY STANDARD DRINKS CONTAINING ALCOHOL DO YOU HAVE ON A TYPICAL DAY: PATIENT DOES NOT DRINK
HOW OFTEN DO YOU HAVE A DRINK CONTAINING ALCOHOL: 1
HOW OFTEN DO YOU HAVE SIX OR MORE DRINKS ON ONE OCCASION: 1
HOW MANY STANDARD DRINKS CONTAINING ALCOHOL DO YOU HAVE ON A TYPICAL DAY: 0
HOW OFTEN DO YOU HAVE A DRINK CONTAINING ALCOHOL: NEVER

## 2023-07-07 ASSESSMENT — PATIENT HEALTH QUESTIONNAIRE - PHQ9
SUM OF ALL RESPONSES TO PHQ QUESTIONS 1-9: 0
1. LITTLE INTEREST OR PLEASURE IN DOING THINGS: 0
SUM OF ALL RESPONSES TO PHQ QUESTIONS 1-9: 0
SUM OF ALL RESPONSES TO PHQ9 QUESTIONS 1 & 2: 0
SUM OF ALL RESPONSES TO PHQ QUESTIONS 1-9: 0
2. FEELING DOWN, DEPRESSED OR HOPELESS: 0
SUM OF ALL RESPONSES TO PHQ QUESTIONS 1-9: 0

## 2023-07-10 ENCOUNTER — OFFICE VISIT (OUTPATIENT)
Dept: FAMILY MEDICINE CLINIC | Age: 73
End: 2023-07-10
Payer: MEDICARE

## 2023-07-10 VITALS
BODY MASS INDEX: 29.53 KG/M2 | HEART RATE: 61 BPM | WEIGHT: 218 LBS | DIASTOLIC BLOOD PRESSURE: 86 MMHG | SYSTOLIC BLOOD PRESSURE: 132 MMHG | HEIGHT: 72 IN | TEMPERATURE: 98.5 F | OXYGEN SATURATION: 97 %

## 2023-07-10 DIAGNOSIS — I10 ESSENTIAL HYPERTENSION: ICD-10-CM

## 2023-07-10 DIAGNOSIS — R10.11 CHRONIC RUQ PAIN: Primary | ICD-10-CM

## 2023-07-10 DIAGNOSIS — I48.0 PAROXYSMAL ATRIAL FIBRILLATION (HCC): ICD-10-CM

## 2023-07-10 DIAGNOSIS — Z13.29 THYROID DISORDER SCREEN: ICD-10-CM

## 2023-07-10 DIAGNOSIS — Z00.00 MEDICARE ANNUAL WELLNESS VISIT, SUBSEQUENT: ICD-10-CM

## 2023-07-10 DIAGNOSIS — I48.19 PERSISTENT ATRIAL FIBRILLATION (HCC): ICD-10-CM

## 2023-07-10 DIAGNOSIS — K22.70 BARRETT'S ESOPHAGUS WITHOUT DYSPLASIA: ICD-10-CM

## 2023-07-10 DIAGNOSIS — Z12.5 SCREENING PSA (PROSTATE SPECIFIC ANTIGEN): ICD-10-CM

## 2023-07-10 DIAGNOSIS — I10 PRIMARY HYPERTENSION: ICD-10-CM

## 2023-07-10 DIAGNOSIS — K21.9 CHRONIC GERD: ICD-10-CM

## 2023-07-10 DIAGNOSIS — E78.2 MIXED HYPERLIPIDEMIA: ICD-10-CM

## 2023-07-10 DIAGNOSIS — N20.0 NEPHROLITHIASIS: ICD-10-CM

## 2023-07-10 DIAGNOSIS — G89.29 CHRONIC RUQ PAIN: Primary | ICD-10-CM

## 2023-07-10 PROBLEM — M25.561 ACUTE PAIN OF RIGHT KNEE: Status: RESOLVED | Noted: 2017-12-22 | Resolved: 2023-07-10

## 2023-07-10 PROBLEM — R19.7 BLOODY DIARRHEA: Status: RESOLVED | Noted: 2017-04-26 | Resolved: 2023-07-10

## 2023-07-10 PROBLEM — K81.0 ACUTE CHOLECYSTITIS: Status: RESOLVED | Noted: 2021-03-23 | Resolved: 2023-07-10

## 2023-07-10 PROBLEM — I48.91 ATRIAL FIBRILLATION WITH CONTROLLED VENTRICULAR RATE (HCC): Status: RESOLVED | Noted: 2023-01-27 | Resolved: 2023-07-10

## 2023-07-10 LAB
ALBUMIN SERPL-MCNC: 4.2 G/DL (ref 3.5–5.2)
ALP SERPL-CCNC: 134 U/L (ref 40–130)
ALT SERPL-CCNC: 30 U/L (ref 5–41)
ANION GAP SERPL CALCULATED.3IONS-SCNC: 12 MMOL/L (ref 7–19)
AST SERPL-CCNC: 22 U/L (ref 5–40)
BASOPHILS # BLD: 0.1 K/UL (ref 0–0.2)
BASOPHILS NFR BLD: 1 % (ref 0–1)
BILIRUB SERPL-MCNC: 0.3 MG/DL (ref 0.2–1.2)
BUN SERPL-MCNC: 19 MG/DL (ref 8–23)
CALCIUM SERPL-MCNC: 9.2 MG/DL (ref 8.8–10.2)
CHLORIDE SERPL-SCNC: 102 MMOL/L (ref 98–111)
CHOLEST SERPL-MCNC: 147 MG/DL (ref 160–199)
CO2 SERPL-SCNC: 25 MMOL/L (ref 22–29)
CREAT SERPL-MCNC: 0.8 MG/DL (ref 0.5–1.2)
CREAT UR-MCNC: 102.3 MG/DL (ref 39–259)
EOSINOPHIL # BLD: 0.1 K/UL (ref 0–0.6)
EOSINOPHIL NFR BLD: 2.8 % (ref 0–5)
ERYTHROCYTE [DISTWIDTH] IN BLOOD BY AUTOMATED COUNT: 14.4 % (ref 11.5–14.5)
GLUCOSE SERPL-MCNC: 111 MG/DL (ref 74–109)
HCT VFR BLD AUTO: 39.8 % (ref 42–52)
HDLC SERPL-MCNC: 30 MG/DL (ref 55–121)
HGB BLD-MCNC: 13.1 G/DL (ref 14–18)
IMM GRANULOCYTES # BLD: 0 K/UL
LDLC SERPL CALC-MCNC: 94 MG/DL
LYMPHOCYTES # BLD: 1.8 K/UL (ref 1.1–4.5)
LYMPHOCYTES NFR BLD: 35.3 % (ref 20–40)
MCH RBC QN AUTO: 31.3 PG (ref 27–31)
MCHC RBC AUTO-ENTMCNC: 32.9 G/DL (ref 33–37)
MCV RBC AUTO: 95 FL (ref 80–94)
MICROALBUMIN UR-MCNC: <1.2 MG/DL (ref 0–19)
MICROALBUMIN/CREAT UR-RTO: NORMAL MG/G
MONOCYTES # BLD: 0.5 K/UL (ref 0–0.9)
MONOCYTES NFR BLD: 10.4 % (ref 0–10)
NEUTROPHILS # BLD: 2.5 K/UL (ref 1.5–7.5)
NEUTS SEG NFR BLD: 50.3 % (ref 50–65)
PLATELET # BLD AUTO: 261 K/UL (ref 130–400)
PMV BLD AUTO: 10.4 FL (ref 9.4–12.4)
POTASSIUM SERPL-SCNC: 4.4 MMOL/L (ref 3.5–5)
PROT SERPL-MCNC: 7.5 G/DL (ref 6.6–8.7)
PSA SERPL-MCNC: 0.78 NG/ML (ref 0–4)
RBC # BLD AUTO: 4.19 M/UL (ref 4.7–6.1)
SODIUM SERPL-SCNC: 139 MMOL/L (ref 136–145)
T4 FREE SERPL-MCNC: 1.1 NG/DL (ref 0.93–1.7)
TRIGL SERPL-MCNC: 113 MG/DL (ref 0–149)
TSH SERPL DL<=0.005 MIU/L-ACNC: 2.17 UIU/ML (ref 0.27–4.2)
WBC # BLD AUTO: 5 K/UL (ref 4.8–10.8)

## 2023-07-10 PROCEDURE — G0439 PPPS, SUBSEQ VISIT: HCPCS | Performed by: PEDIATRICS

## 2023-07-10 PROCEDURE — 99214 OFFICE O/P EST MOD 30 MIN: CPT | Performed by: PEDIATRICS

## 2023-07-10 PROCEDURE — G8417 CALC BMI ABV UP PARAM F/U: HCPCS | Performed by: PEDIATRICS

## 2023-07-10 PROCEDURE — 3079F DIAST BP 80-89 MM HG: CPT | Performed by: PEDIATRICS

## 2023-07-10 PROCEDURE — 3075F SYST BP GE 130 - 139MM HG: CPT | Performed by: PEDIATRICS

## 2023-07-10 PROCEDURE — 1123F ACP DISCUSS/DSCN MKR DOCD: CPT | Performed by: PEDIATRICS

## 2023-07-10 PROCEDURE — G8427 DOCREV CUR MEDS BY ELIG CLIN: HCPCS | Performed by: PEDIATRICS

## 2023-07-10 PROCEDURE — 3017F COLORECTAL CA SCREEN DOC REV: CPT | Performed by: PEDIATRICS

## 2023-07-10 PROCEDURE — 1036F TOBACCO NON-USER: CPT | Performed by: PEDIATRICS

## 2023-07-10 RX ORDER — METOPROLOL SUCCINATE 50 MG/1
1 TABLET, EXTENDED RELEASE ORAL DAILY
COMMUNITY
Start: 2023-05-22

## 2023-07-10 ASSESSMENT — ENCOUNTER SYMPTOMS
RESPIRATORY NEGATIVE: 1
EYES NEGATIVE: 1
ALLERGIC/IMMUNOLOGIC NEGATIVE: 1
CONSTIPATION: 1

## 2023-07-10 NOTE — PROGRESS NOTES
[address] [place] [address]  Phone [phone]   Fax [phone]      OFFICE VISIT: 7/10/2023    Lakisha León Pineda-: 1950      HPI  Reason For Visit:  Dai Hughes is a 68 y.o. Medicare AWV (He is still having tenderness in right side of abdomin since gallbladder removed 2 years ago. It comes and goes, he may only have a spell once a month but is very tender to cough, take deep breaths or laugh. )    Patient presents on routine annual wellness evaluation. He also presents with multiple health issues. Present concerns:    He is having pain in his right upper quadrant of his abdomen. He notes that this has been present ever since he had his gallbladder removed 2 years ago. This is especially tender if he coughs or takes a deep breath. He also like to go over his blood work that he had performed today. He had blood work done before his visit today. Reviewed his labs in the visit today and discussed associated issues      Hypertension:   BP today was   BP Readings from Last 1 Encounters:   07/10/23 132/86      Recent BP readings:    BP Readings from Last 3 Encounters:   07/10/23 132/86   23 124/80   23 122/79     Medication   Losartan 25 mg daily   Metoprolol succinate 50 mg daily  Medication compliance:  compliant most of the time  Home blood pressure monitoring: Yes -controlled. He  is somewhat  adherent to a low sodium diet. Symptoms: None  Laboratory:  Lab Results   Component Value Date    BUN 19 07/10/2023    CREATININE 0.8 07/10/2023       Hyperlipidemia:   Medication:   simvastatin (Zocor) 10 mg nightly  Low Fat, Low Choleterol Diet:  yes -some  Myalgias or GI upset: no  The patient exercises intermittently.   Laboratory:    Lab Results   Component Value Date    CHOL 147 (L) 07/10/2023    TRIG 113 07/10/2023    HDL 30 (L) 07/10/2023    LDLCALC 94 07/10/2023      Lab Results   Component Value Date    ALT 30 07/10/2023    AST 22 07/10/2023

## 2023-07-10 NOTE — PATIENT INSTRUCTIONS
[place] [employer] online. You need 6357-5912 mg of calcium and 8412-1880 IU of vitamin D per day. It is possible to meet your calcium requirement with diet alone, but a vitamin D supplement is usually necessary to meet this goal.  When exposed to the sun, use a sunscreen that protects against both UVA and UVB radiation with an SPF of 30 or greater. Reapply every 2 to 3 hours or after sweating, drying off with a towel, or swimming. Always wear a seat belt when traveling in a car. Always wear a helmet when riding a bicycle or motorcycle.

## 2023-07-12 ENCOUNTER — TELEPHONE (OUTPATIENT)
Dept: FAMILY MEDICINE CLINIC | Age: 73
End: 2023-07-12

## 2023-07-12 NOTE — TELEPHONE ENCOUNTER
----- Message from [address],  sent at 7/11/2023  7:01 PM CDT -----  We reviewed his labs in the office

## 2023-09-18 DIAGNOSIS — E78.2 MIXED HYPERLIPIDEMIA: ICD-10-CM

## 2023-09-19 RX ORDER — SIMVASTATIN 10 MG
10 TABLET ORAL NIGHTLY
Qty: 90 TABLET | Refills: 3 | Status: SHIPPED | OUTPATIENT
Start: 2023-09-19

## 2023-09-19 NOTE — TELEPHONE ENCOUNTER
Sent rx to pharmacy for pt    Requested Prescriptions     Signed Prescriptions Disp Refills    simvastatin (ZOCOR) 10 MG tablet 90 tablet 3     Sig: Take 1 tablet by mouth nightly     Authorizing Provider: Jeananne Nyhan     Ordering User: Devin Renee

## 2023-09-19 NOTE — TELEPHONE ENCOUNTER
Received fax from pharmacy requesting refill on pts medication(s). Pt was last seen in office on 7/10/2023  and has a follow up scheduled for 1/10/2024. Will send request to  Dr. Dhaval Baker  for patient.      Requested Prescriptions     Pending Prescriptions Disp Refills    simvastatin (ZOCOR) 10 MG tablet [Pharmacy Med Name: Simvastatin 10 MG Oral Tablet] 90 tablet 3     Sig: Take 1 tablet by mouth nightly

## 2023-10-14 NOTE — TELEPHONE ENCOUNTER
----- Message from Roma De La Rosa sent at 5/31/2022  1:10 PM CDT -----  Subject: Referral Request    QUESTIONS   Reason for referral request? Labs   Has the physician seen you for this condition before? No   Preferred Specialist (if applicable)? Do you already have an appointment scheduled? No  Additional Information for Provider? Patient has new patient appointment   6/14/2022 @10:00am, he would like labs before appointment to discuss   result with Provider  ---------------------------------------------------------------------------  --------------  Zackary Patten INFO  What is the best way for the office to contact you? OK to leave message on   voicemail  Preferred Call Back Phone Number? [phone]  ---------------------------------------------------------------------------  --------------  SCRIPT ANSWERS  Relationship to Patient?  Self
Pt aware that fasting labs have been ordered
Pt is a former pt of  is the only reason I am sending this to you to see if you wanted to go ahead and do labwork
Routine labs will be ordered
Positive reinforcement

## 2023-11-15 ENCOUNTER — OFFICE VISIT (OUTPATIENT)
Dept: CARDIOLOGY CLINIC | Age: 73
End: 2023-11-15

## 2023-11-15 VITALS
HEIGHT: 72 IN | SYSTOLIC BLOOD PRESSURE: 128 MMHG | DIASTOLIC BLOOD PRESSURE: 78 MMHG | HEART RATE: 70 BPM | WEIGHT: 216 LBS | BODY MASS INDEX: 29.26 KG/M2

## 2023-11-15 DIAGNOSIS — E78.5 HYPERLIPIDEMIA, UNSPECIFIED HYPERLIPIDEMIA TYPE: ICD-10-CM

## 2023-11-15 DIAGNOSIS — I48.0 PAROXYSMAL ATRIAL FIBRILLATION (HCC): Primary | ICD-10-CM

## 2023-11-15 DIAGNOSIS — Z79.01 CHRONIC ANTICOAGULATION: ICD-10-CM

## 2023-11-15 DIAGNOSIS — I10 ESSENTIAL HYPERTENSION: ICD-10-CM

## 2023-11-15 NOTE — PROGRESS NOTES
Dear Jael Ornelas DO,    Thank you for allowing me to participate in the care of Mr. Erin Mi. He presents today at the [facility]. As you know, Mr. Ghazala Crowder is a 68 y.o. male with history of hypertension, hyperlipidemia, arthritis, DVT, PAF s/p ablation (Dr. Celia Johnson),  and chronic anticoagulation who presents with the chief complaint of follow-up for chronic cardiac conditions. He is a patient Dr. Rajan Askew. Since last seen, he has been stable from a cardiac standpoint. He denies any exertional chest pain or SOA. He denies any fast or slow heart rhythms. He denies any bleeding issues on the Eliquis. he is sleeping on 1 pillow at night. he is not waking gasping for air. he denies any swelling. he has been compliant with his medications. his BP has been controlled. PCP follows labs and lipids. He otherwise denies chest pain, KINSEY, PND, orthopnea, syncope, or near syncope. He has no other complaints. Review of Systems   Constitutional: Negative for fever, fatigue, chills, diaphoresis, activity change, appetite change,  and unexpected weight change. Eyes: Negative for photophobia, pain, redness and visual disturbance. Respiratory: Negative for apnea, cough, chest tightness, shortness of breath, wheezing and stridor. Cardiovascular: Negative for chest pain, palpitations and leg swelling. Gastrointestinal: Negative for abdominal distention. Genitourinary: Negative for dysuria, urgency and frequency. Musculoskeletal: Negative for myalgias, arthralgias and gait problem. Skin: Negative for color change, pallor, rash and wound. Neurological: Negative for dizziness, tremors, speech difficulty, weakness and numbness. Hematological: Does not bruise/bleed easily. Psychiatric/Behavioral: Negative.         Past Medical History:   Diagnosis Date    Arthritis     Atrial fibrillation [facility])     sees dr. Michael Crandall    Garces's esophageal ulceration     Colon

## 2023-12-11 RX ORDER — LOSARTAN POTASSIUM 25 MG/1
25 TABLET ORAL DAILY
Qty: 90 TABLET | Refills: 3 | Status: SHIPPED | OUTPATIENT
Start: 2023-12-11

## 2024-01-10 ENCOUNTER — OFFICE VISIT (OUTPATIENT)
Dept: FAMILY MEDICINE CLINIC | Age: 74
End: 2024-01-10
Payer: MEDICARE

## 2024-01-10 VITALS
SYSTOLIC BLOOD PRESSURE: 118 MMHG | BODY MASS INDEX: 29.57 KG/M2 | TEMPERATURE: 97.1 F | WEIGHT: 218 LBS | DIASTOLIC BLOOD PRESSURE: 70 MMHG | HEART RATE: 70 BPM | OXYGEN SATURATION: 94 %

## 2024-01-10 DIAGNOSIS — K22.70 BARRETT'S ESOPHAGUS WITHOUT DYSPLASIA: ICD-10-CM

## 2024-01-10 DIAGNOSIS — I48.0 PAROXYSMAL ATRIAL FIBRILLATION (HCC): ICD-10-CM

## 2024-01-10 DIAGNOSIS — I82.5Z9 CHRONIC DEEP VEIN THROMBOSIS (DVT) OF DISTAL VEIN OF LOWER EXTREMITY, UNSPECIFIED LATERALITY (HCC): ICD-10-CM

## 2024-01-10 DIAGNOSIS — E78.2 MIXED HYPERLIPIDEMIA: ICD-10-CM

## 2024-01-10 DIAGNOSIS — I10 PRIMARY HYPERTENSION: Primary | ICD-10-CM

## 2024-01-10 PROBLEM — E78.5 HYPERLIPOPROTEINEMIA: Status: RESOLVED | Noted: 2017-12-22 | Resolved: 2024-01-10

## 2024-01-10 PROBLEM — I48.91 ATRIAL FIBRILLATION (HCC): Status: RESOLVED | Noted: 2023-01-28 | Resolved: 2024-01-10

## 2024-01-10 PROCEDURE — G8417 CALC BMI ABV UP PARAM F/U: HCPCS | Performed by: PEDIATRICS

## 2024-01-10 PROCEDURE — G8427 DOCREV CUR MEDS BY ELIG CLIN: HCPCS | Performed by: PEDIATRICS

## 2024-01-10 PROCEDURE — 1123F ACP DISCUSS/DSCN MKR DOCD: CPT | Performed by: PEDIATRICS

## 2024-01-10 PROCEDURE — 3078F DIAST BP <80 MM HG: CPT | Performed by: PEDIATRICS

## 2024-01-10 PROCEDURE — 3074F SYST BP LT 130 MM HG: CPT | Performed by: PEDIATRICS

## 2024-01-10 PROCEDURE — 3017F COLORECTAL CA SCREEN DOC REV: CPT | Performed by: PEDIATRICS

## 2024-01-10 PROCEDURE — G8484 FLU IMMUNIZE NO ADMIN: HCPCS | Performed by: PEDIATRICS

## 2024-01-10 PROCEDURE — 99214 OFFICE O/P EST MOD 30 MIN: CPT | Performed by: PEDIATRICS

## 2024-01-10 PROCEDURE — 1036F TOBACCO NON-USER: CPT | Performed by: PEDIATRICS

## 2024-01-10 SDOH — ECONOMIC STABILITY: FOOD INSECURITY: WITHIN THE PAST 12 MONTHS, YOU WORRIED THAT YOUR FOOD WOULD RUN OUT BEFORE YOU GOT MONEY TO BUY MORE.: NEVER TRUE

## 2024-01-10 SDOH — ECONOMIC STABILITY: INCOME INSECURITY: HOW HARD IS IT FOR YOU TO PAY FOR THE VERY BASICS LIKE FOOD, HOUSING, MEDICAL CARE, AND HEATING?: NOT HARD AT ALL

## 2024-01-10 SDOH — ECONOMIC STABILITY: FOOD INSECURITY: WITHIN THE PAST 12 MONTHS, THE FOOD YOU BOUGHT JUST DIDN'T LAST AND YOU DIDN'T HAVE MONEY TO GET MORE.: NEVER TRUE

## 2024-01-10 ASSESSMENT — PATIENT HEALTH QUESTIONNAIRE - PHQ9
1. LITTLE INTEREST OR PLEASURE IN DOING THINGS: 0
2. FEELING DOWN, DEPRESSED OR HOPELESS: 0
SUM OF ALL RESPONSES TO PHQ QUESTIONS 1-9: 0
SUM OF ALL RESPONSES TO PHQ9 QUESTIONS 1 & 2: 0
SUM OF ALL RESPONSES TO PHQ QUESTIONS 1-9: 0

## 2024-01-10 ASSESSMENT — ENCOUNTER SYMPTOMS
ALLERGIC/IMMUNOLOGIC NEGATIVE: 1
RESPIRATORY NEGATIVE: 1
EYES NEGATIVE: 1
CONSTIPATION: 1

## 2024-01-10 NOTE — PROGRESS NOTES
[facility] [place] [address]  Phone [phone]   Fax [phone]      OFFICE VISIT: 1/10/2024    Dustin Pineda-: 1950      HPI  Reason For Visit:  Dustin is a 73 y.o.     6 Month Follow-Up (Pts is here for 6 month follow up. Is not able to get eliquis right now, would like to discuss.)    Patient presents on follow-up for multiple health issues.  Present concerns:  He is unable to afford Eliquis now and he does not want to go on Coumadin.      Hypertension:   BP today was   BP Readings from Last 1 Encounters:   01/10/24 118/70      Recent BP readings:    BP Readings from Last 3 Encounters:   01/10/24 118/70   11/15/23 128/78   07/10/23 132/86     Medication   Losartan 25 mg daily   Metoprolol succinate 50 mg daily  Medication compliance:  compliant most of the time  Home blood pressure monitoring: Yes - controlled.    He  is somewhat  adherent to a low sodium diet.     Symptoms: none  Laboratory:  Lab Results   Component Value Date    BUN 19 07/10/2023    CREATININE 0.8 07/10/2023       Hyperlipidemia:   Medication:   simvastatin (Zocor) 10 mg nightly  Low Fat, Low Choleterol Diet:  yes - some  Myalgias or GI upset: no  The patient exercises intermittently.  Laboratory:    Lab Results   Component Value Date    CHOL 147 (L) 07/10/2023    TRIG 113 07/10/2023    HDL 30 (L) 07/10/2023    LDLCALC 94 07/10/2023      Lab Results   Component Value Date    ALT 30 07/10/2023    AST 22 07/10/2023       Paroxysmal Atrial Fibrillation:  Medications:              Rate control:                           Metoprolol succinate 50 mg daily              Anticoagulation:                      Eliquis 5 mg twice daily (insurance will no longer cover)              Rhythm management:            None  Symptoms: no recent symptoms of Atrial fibrillation.  He does follow with cardiology at [facility] Next appointment in 3 months.  He does not sense when he is in Atrial fibrillation.  He

## 2024-05-15 ENCOUNTER — OFFICE VISIT (OUTPATIENT)
Dept: CARDIOLOGY CLINIC | Age: 74
End: 2024-05-15
Payer: MEDICARE

## 2024-05-15 VITALS
SYSTOLIC BLOOD PRESSURE: 134 MMHG | BODY MASS INDEX: 29.39 KG/M2 | HEIGHT: 72 IN | WEIGHT: 217 LBS | DIASTOLIC BLOOD PRESSURE: 80 MMHG | HEART RATE: 64 BPM

## 2024-05-15 DIAGNOSIS — I48.0 PAROXYSMAL ATRIAL FIBRILLATION (HCC): Primary | ICD-10-CM

## 2024-05-15 DIAGNOSIS — I10 ESSENTIAL HYPERTENSION: ICD-10-CM

## 2024-05-15 PROCEDURE — 3079F DIAST BP 80-89 MM HG: CPT | Performed by: INTERNAL MEDICINE

## 2024-05-15 PROCEDURE — G8417 CALC BMI ABV UP PARAM F/U: HCPCS | Performed by: INTERNAL MEDICINE

## 2024-05-15 PROCEDURE — 3017F COLORECTAL CA SCREEN DOC REV: CPT | Performed by: INTERNAL MEDICINE

## 2024-05-15 PROCEDURE — 1123F ACP DISCUSS/DSCN MKR DOCD: CPT | Performed by: INTERNAL MEDICINE

## 2024-05-15 PROCEDURE — G8427 DOCREV CUR MEDS BY ELIG CLIN: HCPCS | Performed by: INTERNAL MEDICINE

## 2024-05-15 PROCEDURE — 1036F TOBACCO NON-USER: CPT | Performed by: INTERNAL MEDICINE

## 2024-05-15 PROCEDURE — 99214 OFFICE O/P EST MOD 30 MIN: CPT | Performed by: INTERNAL MEDICINE

## 2024-05-15 PROCEDURE — 3075F SYST BP GE 130 - 139MM HG: CPT | Performed by: INTERNAL MEDICINE

## 2024-05-15 ASSESSMENT — ENCOUNTER SYMPTOMS
ABDOMINAL PAIN: 0
COUGH: 0
SHORTNESS OF BREATH: 0
VOMITING: 0
DIARRHEA: 0
WHEEZING: 0
BACK PAIN: 0
BLOOD IN STOOL: 0
ABDOMINAL DISTENTION: 0

## 2024-05-15 NOTE — PROGRESS NOTES
[facility]  Cardiology Office Note  [address] [facility]  [address]  Phone: [phone]  Fax: [phone]                            Date:  5/15/2024  Patient: Dustin Pineda  Age:  74 y.o., 1950    Referral: No ref. provider found      PROBLEM LIST:    Patient Active Problem List    Diagnosis Date Noted    Dyspnea 01/27/2023     Priority: Medium    Primary hypertension 12/13/2021     Priority: Low    Paroxysmal atrial fibrillation (HCC) 11/10/2020     Priority: Low    Sinoatrial node dysfunction (HCC) 01/23/2020     Priority: Low     Overview Note:     9/24/2014  SE negative for myocardial ischemia  9/25/2014  Echo  Normal   5/7/2015  Echo  Normal   1/22/2020  Newly discovered AF      Garces's esophagus without dysplasia 01/30/2019     Priority: Low    Chronic GERD 01/30/2019     Priority: Low    Esophageal pain 02/05/2018     Priority: Low    History of kidney stones 12/22/2017     Priority: Low    Diverticulosis of intestine with bleeding 12/22/2017     Priority: Low    Esophageal dysphagia 12/22/2017     Priority: Low    H/O cervical spine surgery 12/22/2017     Priority: Low     Overview Note:     2015 by Dr. Bob Lopez      Hypotension      Priority: Low    Gastrointestinal hemorrhage 03/12/2017     Priority: Low    Colon polyps      Priority: Low    Diverticulosis of large intestine with hemorrhage      Priority: Low    Nephrolithiasis 12/29/2015     Priority: Low    History of colon polyps 11/19/2014     Priority: Low    Decreased carotid pulse 09/19/2014     Priority: Low    Numbness and tingling in right hand 09/19/2014     Priority: Low    Heart murmur 09/19/2014     Priority: Low    Deep vein thrombosis (DVT) (HCC) 05/14/2014     Priority: Low    Mixed hyperlipidemia 05/14/2014     Priority: Low     1.  Paroxysmal atrial fibrillation, previously on Eliquis and Betapace, prior DCCV 1/24/2020, VAISHNAVI/DCCV 1/27/2023, A-fib PVI ablation 3/13/2023

## 2024-05-20 RX ORDER — METOPROLOL SUCCINATE 50 MG/1
50 TABLET, EXTENDED RELEASE ORAL DAILY
Qty: 90 TABLET | Refills: 3 | Status: SHIPPED | OUTPATIENT
Start: 2024-05-20

## 2024-07-09 SDOH — HEALTH STABILITY: PHYSICAL HEALTH: ON AVERAGE, HOW MANY DAYS PER WEEK DO YOU ENGAGE IN MODERATE TO STRENUOUS EXERCISE (LIKE A BRISK WALK)?: 6 DAYS

## 2024-07-09 SDOH — HEALTH STABILITY: PHYSICAL HEALTH: ON AVERAGE, HOW MANY MINUTES DO YOU ENGAGE IN EXERCISE AT THIS LEVEL?: 60 MIN

## 2024-07-09 ASSESSMENT — LIFESTYLE VARIABLES
HOW OFTEN DO YOU HAVE A DRINK CONTAINING ALCOHOL: 1
HOW MANY STANDARD DRINKS CONTAINING ALCOHOL DO YOU HAVE ON A TYPICAL DAY: 0
HOW OFTEN DO YOU HAVE SIX OR MORE DRINKS ON ONE OCCASION: 1
HOW OFTEN DO YOU HAVE A DRINK CONTAINING ALCOHOL: NEVER
HOW MANY STANDARD DRINKS CONTAINING ALCOHOL DO YOU HAVE ON A TYPICAL DAY: PATIENT DOES NOT DRINK

## 2024-07-09 ASSESSMENT — PATIENT HEALTH QUESTIONNAIRE - PHQ9
SUM OF ALL RESPONSES TO PHQ QUESTIONS 1-9: 0
SUM OF ALL RESPONSES TO PHQ9 QUESTIONS 1 & 2: 0
1. LITTLE INTEREST OR PLEASURE IN DOING THINGS: NOT AT ALL
SUM OF ALL RESPONSES TO PHQ QUESTIONS 1-9: 0
2. FEELING DOWN, DEPRESSED OR HOPELESS: NOT AT ALL
SUM OF ALL RESPONSES TO PHQ QUESTIONS 1-9: 0
SUM OF ALL RESPONSES TO PHQ QUESTIONS 1-9: 0

## 2024-07-12 ENCOUNTER — OFFICE VISIT (OUTPATIENT)
Dept: FAMILY MEDICINE CLINIC | Age: 74
End: 2024-07-12

## 2024-07-12 VITALS
HEIGHT: 71 IN | DIASTOLIC BLOOD PRESSURE: 72 MMHG | TEMPERATURE: 98.1 F | OXYGEN SATURATION: 99 % | WEIGHT: 212 LBS | SYSTOLIC BLOOD PRESSURE: 126 MMHG | BODY MASS INDEX: 29.68 KG/M2 | HEART RATE: 76 BPM

## 2024-07-12 DIAGNOSIS — I82.5Z9 CHRONIC DEEP VEIN THROMBOSIS (DVT) OF DISTAL VEIN OF LOWER EXTREMITY, UNSPECIFIED LATERALITY (HCC): ICD-10-CM

## 2024-07-12 DIAGNOSIS — I48.0 PAROXYSMAL ATRIAL FIBRILLATION (HCC): ICD-10-CM

## 2024-07-12 DIAGNOSIS — K22.70 BARRETT'S ESOPHAGUS WITHOUT DYSPLASIA: ICD-10-CM

## 2024-07-12 DIAGNOSIS — Z00.00 MEDICARE ANNUAL WELLNESS VISIT, SUBSEQUENT: ICD-10-CM

## 2024-07-12 DIAGNOSIS — Z12.5 ENCOUNTER FOR PROSTATE CANCER SCREENING: ICD-10-CM

## 2024-07-12 DIAGNOSIS — E78.2 MIXED HYPERLIPIDEMIA: ICD-10-CM

## 2024-07-12 DIAGNOSIS — I10 PRIMARY HYPERTENSION: ICD-10-CM

## 2024-07-12 DIAGNOSIS — K21.9 CHRONIC GERD: ICD-10-CM

## 2024-07-12 DIAGNOSIS — I10 PRIMARY HYPERTENSION: Primary | ICD-10-CM

## 2024-07-12 PROBLEM — I49.5 SINOATRIAL NODE DYSFUNCTION (HCC): Status: RESOLVED | Noted: 2020-01-23 | Resolved: 2024-07-12

## 2024-07-12 PROBLEM — K22.89 ESOPHAGEAL PAIN: Status: RESOLVED | Noted: 2018-02-05 | Resolved: 2024-07-12

## 2024-07-12 PROBLEM — R06.00 DYSPNEA: Status: RESOLVED | Noted: 2023-01-27 | Resolved: 2024-07-12

## 2024-07-12 PROBLEM — R13.19 ESOPHAGEAL DYSPHAGIA: Status: RESOLVED | Noted: 2017-12-22 | Resolved: 2024-07-12

## 2024-07-12 LAB
ALBUMIN SERPL-MCNC: 4.4 G/DL (ref 3.5–5.2)
ALP SERPL-CCNC: 138 U/L (ref 40–130)
ALT SERPL-CCNC: 28 U/L (ref 5–41)
ANION GAP SERPL CALCULATED.3IONS-SCNC: 16 MMOL/L (ref 7–19)
AST SERPL-CCNC: 21 U/L (ref 5–40)
BASOPHILS # BLD: 0 K/UL (ref 0–0.2)
BASOPHILS NFR BLD: 0.7 % (ref 0–1)
BILIRUB SERPL-MCNC: 0.3 MG/DL (ref 0.2–1.2)
BUN SERPL-MCNC: 29 MG/DL (ref 8–23)
CALCIUM SERPL-MCNC: 9.4 MG/DL (ref 8.8–10.2)
CHLORIDE SERPL-SCNC: 102 MMOL/L (ref 98–111)
CHOLEST SERPL-MCNC: 119 MG/DL (ref 160–199)
CO2 SERPL-SCNC: 22 MMOL/L (ref 22–29)
CREAT SERPL-MCNC: 0.9 MG/DL (ref 0.5–1.2)
CREAT UR-MCNC: 242.7 MG/DL (ref 39–259)
EOSINOPHIL # BLD: 0.2 K/UL (ref 0–0.6)
EOSINOPHIL NFR BLD: 2.8 % (ref 0–5)
ERYTHROCYTE [DISTWIDTH] IN BLOOD BY AUTOMATED COUNT: 14.6 % (ref 11.5–14.5)
GLUCOSE SERPL-MCNC: 112 MG/DL (ref 74–109)
HCT VFR BLD AUTO: 41.7 % (ref 42–52)
HDLC SERPL-MCNC: 36 MG/DL (ref 55–121)
HGB BLD-MCNC: 13 G/DL (ref 14–18)
IMM GRANULOCYTES # BLD: 0 K/UL
LDLC SERPL CALC-MCNC: 64 MG/DL
LYMPHOCYTES # BLD: 2.1 K/UL (ref 1.1–4.5)
LYMPHOCYTES NFR BLD: 36.9 % (ref 20–40)
MCH RBC QN AUTO: 29.9 PG (ref 27–31)
MCHC RBC AUTO-ENTMCNC: 31.2 G/DL (ref 33–37)
MCV RBC AUTO: 95.9 FL (ref 80–94)
MICROALBUMIN UR-MCNC: <1.2 MG/DL (ref 0–19)
MICROALBUMIN/CREAT UR-RTO: NORMAL MG/G
MONOCYTES # BLD: 0.6 K/UL (ref 0–0.9)
MONOCYTES NFR BLD: 10.2 % (ref 0–10)
NEUTROPHILS # BLD: 2.8 K/UL (ref 1.5–7.5)
NEUTS SEG NFR BLD: 49 % (ref 50–65)
PLATELET # BLD AUTO: 246 K/UL (ref 130–400)
PMV BLD AUTO: 10.8 FL (ref 9.4–12.4)
POTASSIUM SERPL-SCNC: 4.5 MMOL/L (ref 3.5–5)
PROT SERPL-MCNC: 7.3 G/DL (ref 6.6–8.7)
PSA SERPL-MCNC: 0.62 NG/ML (ref 0–4)
RBC # BLD AUTO: 4.35 M/UL (ref 4.7–6.1)
SODIUM SERPL-SCNC: 140 MMOL/L (ref 136–145)
T4 FREE SERPL-MCNC: 1.13 NG/DL (ref 0.93–1.7)
TRIGL SERPL-MCNC: 94 MG/DL (ref 0–149)
TSH SERPL DL<=0.005 MIU/L-ACNC: 1.78 UIU/ML (ref 0.27–4.2)
WBC # BLD AUTO: 5.7 K/UL (ref 4.8–10.8)

## 2024-07-12 ASSESSMENT — ENCOUNTER SYMPTOMS
RESPIRATORY NEGATIVE: 1
EYES NEGATIVE: 1
ALLERGIC/IMMUNOLOGIC NEGATIVE: 1
CONSTIPATION: 1

## 2024-07-12 NOTE — PROGRESS NOTES
prostate cancer screening  Z12.5 PSA Screening      8. Medicare annual wellness visit, subsequent  Z00.00             PLAN    1. Primary hypertension  Blood pressure is excellently controlled.  Will continue present medication regimen.  Labs are all appropriate at this time.    2. Mixed hyperlipidemia  Lipids are excellently controlled as well.  HDL is slightly low.  We discussed increasing exercise in order to get this good cholesterol value up a little bit higher    3. Chronic GERD  He is taking omeprazole on a routine basis.  He should be good until May 2026 for his next EGD    4. Garces's esophagus without dysplasia  He does have Garces's.  We are following this with serial EGD and biopsies.  He is on appropriate PPI    5. Paroxysmal atrial fibrillation (HCC)  No recent episodes of atrial fibrillation.  He does not really sense this on his own, but he does use his [employer] home alie.  We did discuss potential apparatus such as an Apple Watch etc. he will consider this.    6. Chronic deep vein thrombosis (DVT) of distal vein of lower extremity, unspecified laterality (HCC)  There is continued controversy as to whether he should be anticoagulated or not.  He is presently choosing not to pursue anticoagulation.  If he does have any additional clots or carly episodes of atrial fibrillation, we will have to reassess risk-benefit ratio of medications to prevent clots    7. Encounter for prostate cancer screening  Check PSA with next blood draw.  This can be added onto his previous blood draw  - PSA Screening; Future    8. Medicare annual wellness visit, subsequent  Routine age-appropriate anticipatory guidance was provided.  Annual wellness visit was performed in a separate note and issues were addressed as identified.         Orders Placed This Encounter   Procedures    PSA Screening        Return in about 6 months (around 1/12/2025) for 30.         This was an in-house visit.    Medicare Annual Wellness

## 2024-07-15 ENCOUNTER — TELEPHONE (OUTPATIENT)
Dept: FAMILY MEDICINE CLINIC | Age: 74
End: 2024-07-15

## 2024-07-15 NOTE — TELEPHONE ENCOUNTER
----- Message from HORACE Rhodes DO sent at 7/15/2024 12:28 PM CDT -----  PSA has actually gone down or improved in the past year.  All good news

## 2024-07-15 NOTE — TELEPHONE ENCOUNTER
----- Message from HORACE Rhodes DO sent at 7/12/2024  4:47 PM CDT -----  BUN/creatinine ratio is greater than 30-1.  This indicates dehydration.  Lipids are excellently controlled.  Mild anemia on CBC this is stable over time.  No significant protein excretion in the urine.  Thyroid is normal.  PSA is still pending

## 2024-07-15 NOTE — TELEPHONE ENCOUNTER
Called patient, spoke with: Patient regarding the results of the patients most recent labs.  I advised Patient of Dr. Rhodes recommendations.   Patient did voice understanding

## 2024-09-05 DIAGNOSIS — E78.2 MIXED HYPERLIPIDEMIA: ICD-10-CM

## 2024-09-05 RX ORDER — SIMVASTATIN 10 MG
10 TABLET ORAL NIGHTLY
Qty: 90 TABLET | Refills: 3 | Status: SHIPPED | OUTPATIENT
Start: 2024-09-05

## 2024-09-05 NOTE — TELEPHONE ENCOUNTER
Received fax from pharmacy requesting refill on pts medication(s). Pt was last seen in office on 7/12/2024  and has a follow up scheduled for 1/13/2025. Will send request to  Dr. Rhodes  for authorization.     Requested Prescriptions     Pending Prescriptions Disp Refills    simvastatin (ZOCOR) 10 MG tablet [Pharmacy Med Name: Simvastatin 10 MG Oral Tablet] 90 tablet 3     Sig: Take 1 tablet by mouth nightly

## 2024-11-13 ENCOUNTER — OFFICE VISIT (OUTPATIENT)
Dept: CARDIOLOGY CLINIC | Age: 74
End: 2024-11-13

## 2024-11-13 VITALS — SYSTOLIC BLOOD PRESSURE: 128 MMHG | OXYGEN SATURATION: 98 % | DIASTOLIC BLOOD PRESSURE: 82 MMHG | HEART RATE: 61 BPM

## 2024-11-13 DIAGNOSIS — I10 ESSENTIAL HYPERTENSION: ICD-10-CM

## 2024-11-13 DIAGNOSIS — E78.5 HYPERLIPIDEMIA, UNSPECIFIED HYPERLIPIDEMIA TYPE: ICD-10-CM

## 2024-11-13 DIAGNOSIS — Z98.890 S/P ABLATION OF ATRIAL FIBRILLATION: ICD-10-CM

## 2024-11-13 DIAGNOSIS — Z86.79 S/P ABLATION OF ATRIAL FIBRILLATION: ICD-10-CM

## 2024-11-13 DIAGNOSIS — I48.0 PAROXYSMAL ATRIAL FIBRILLATION (HCC): Primary | ICD-10-CM

## 2024-11-13 NOTE — PROGRESS NOTES
Dear HORACE Bolaños DO,    Thank you for allowing me to participate in the care of Mr. Dustin Pineda. He presents today at the [facility]. As you know, Mr. Pineda is a 74 y.o. male with history of hypertension, hyperlipidemia, arthritis, DVT, PAF s/p ablation (Dr. Singleton,3/13/23),  and chronic anticoagulation who presents with the chief complaint of follow-up for chronic cardiac conditions.  He is a patient Dr. Brooke.  Since last seen, he has been stable from a cardiac standpoint.  He denies any exertional chest pain or shortness of breath.  He denies any fast or slow heart rhythms.he is sleeping on 1 pillow at night. he is not waking gasping for air. he denies any swelling. he has been compliant with his medications. his BP has been controlled. PCP follows labs and lipids.      He otherwise denies chest pain, KINSEY, PND, orthopnea, syncope, or near syncope. He has no other complaints.    Review of Systems   Constitutional: Negative for fever, fatigue, chills, diaphoresis, activity change, appetite change,  and unexpected weight change.   Eyes: Negative for photophobia, pain, redness and visual disturbance.   Respiratory: Negative for apnea, cough, chest tightness, shortness of breath, wheezing and stridor.    Cardiovascular: Negative for chest pain, palpitations and leg swelling.   Gastrointestinal: Negative for abdominal distention.   Genitourinary: Negative for dysuria, urgency and frequency.   Musculoskeletal: Negative for myalgias, arthralgias and gait problem.   Skin: Negative for color change, pallor, rash and wound.   Neurological: Negative for dizziness, tremors, speech difficulty, weakness and numbness.   Hematological: Does not bruise/bleed easily.   Psychiatric/Behavioral: Negative.        Past Medical History:   Diagnosis Date    Arthritis     Atrial fibrillation (HCC)     sees dr. bustamante    Garces's esophageal ulceration     Colon polyps     Difficult intubation

## 2024-12-04 RX ORDER — LOSARTAN POTASSIUM 25 MG/1
25 TABLET ORAL DAILY
Qty: 90 TABLET | Refills: 3 | Status: SHIPPED | OUTPATIENT
Start: 2024-12-04

## 2025-01-10 SDOH — ECONOMIC STABILITY: FOOD INSECURITY: WITHIN THE PAST 12 MONTHS, THE FOOD YOU BOUGHT JUST DIDN'T LAST AND YOU DIDN'T HAVE MONEY TO GET MORE.: NEVER TRUE

## 2025-01-10 SDOH — ECONOMIC STABILITY: FOOD INSECURITY: WITHIN THE PAST 12 MONTHS, YOU WORRIED THAT YOUR FOOD WOULD RUN OUT BEFORE YOU GOT MONEY TO BUY MORE.: NEVER TRUE

## 2025-01-10 SDOH — ECONOMIC STABILITY: INCOME INSECURITY: IN THE LAST 12 MONTHS, WAS THERE A TIME WHEN YOU WERE NOT ABLE TO PAY THE MORTGAGE OR RENT ON TIME?: NO

## 2025-01-10 SDOH — ECONOMIC STABILITY: TRANSPORTATION INSECURITY
IN THE PAST 12 MONTHS, HAS THE LACK OF TRANSPORTATION KEPT YOU FROM MEDICAL APPOINTMENTS OR FROM GETTING MEDICATIONS?: NO

## 2025-01-10 ASSESSMENT — PATIENT HEALTH QUESTIONNAIRE - PHQ9
1. LITTLE INTEREST OR PLEASURE IN DOING THINGS: NOT AT ALL
SUM OF ALL RESPONSES TO PHQ9 QUESTIONS 1 & 2: 0
SUM OF ALL RESPONSES TO PHQ QUESTIONS 1-9: 0
2. FEELING DOWN, DEPRESSED OR HOPELESS: NOT AT ALL
SUM OF ALL RESPONSES TO PHQ9 QUESTIONS 1 & 2: 0
1. LITTLE INTEREST OR PLEASURE IN DOING THINGS: NOT AT ALL
SUM OF ALL RESPONSES TO PHQ QUESTIONS 1-9: 0
2. FEELING DOWN, DEPRESSED OR HOPELESS: NOT AT ALL

## 2025-01-13 ENCOUNTER — OFFICE VISIT (OUTPATIENT)
Age: 75
End: 2025-01-13

## 2025-01-13 VITALS
TEMPERATURE: 97 F | SYSTOLIC BLOOD PRESSURE: 126 MMHG | HEIGHT: 71 IN | BODY MASS INDEX: 29.68 KG/M2 | WEIGHT: 212 LBS | OXYGEN SATURATION: 98 % | DIASTOLIC BLOOD PRESSURE: 74 MMHG | HEART RATE: 66 BPM

## 2025-01-13 DIAGNOSIS — K22.70 BARRETT'S ESOPHAGUS WITHOUT DYSPLASIA: ICD-10-CM

## 2025-01-13 DIAGNOSIS — I82.5Z9 CHRONIC DEEP VEIN THROMBOSIS (DVT) OF DISTAL VEIN OF LOWER EXTREMITY, UNSPECIFIED LATERALITY (HCC): ICD-10-CM

## 2025-01-13 DIAGNOSIS — D12.3 ADENOMATOUS POLYP OF TRANSVERSE COLON: ICD-10-CM

## 2025-01-13 DIAGNOSIS — R74.8 ELEVATED ALKALINE PHOSPHATASE LEVEL: ICD-10-CM

## 2025-01-13 DIAGNOSIS — E78.2 MIXED HYPERLIPIDEMIA: ICD-10-CM

## 2025-01-13 DIAGNOSIS — I48.0 PAROXYSMAL ATRIAL FIBRILLATION (HCC): ICD-10-CM

## 2025-01-13 DIAGNOSIS — I10 PRIMARY HYPERTENSION: Primary | ICD-10-CM

## 2025-01-13 DIAGNOSIS — E55.9 VITAMIN D DEFICIENCY: ICD-10-CM

## 2025-01-13 DIAGNOSIS — K92.2 GASTROINTESTINAL HEMORRHAGE, UNSPECIFIED GASTROINTESTINAL HEMORRHAGE TYPE: ICD-10-CM

## 2025-01-13 DIAGNOSIS — N20.0 NEPHROLITHIASIS: ICD-10-CM

## 2025-01-13 SDOH — ECONOMIC STABILITY: FOOD INSECURITY: WITHIN THE PAST 12 MONTHS, YOU WORRIED THAT YOUR FOOD WOULD RUN OUT BEFORE YOU GOT MONEY TO BUY MORE.: NEVER TRUE

## 2025-01-13 SDOH — ECONOMIC STABILITY: FOOD INSECURITY: WITHIN THE PAST 12 MONTHS, THE FOOD YOU BOUGHT JUST DIDN'T LAST AND YOU DIDN'T HAVE MONEY TO GET MORE.: NEVER TRUE

## 2025-01-13 ASSESSMENT — ENCOUNTER SYMPTOMS
EYES NEGATIVE: 1
CONSTIPATION: 1
RESPIRATORY NEGATIVE: 1
ALLERGIC/IMMUNOLOGIC NEGATIVE: 1

## 2025-01-13 NOTE — PROGRESS NOTES
Adenomatous polyp of transverse colon  D12.3       9. Elevated alkaline phosphatase level  R74.8 Comprehensive Metabolic Panel      10. Vitamin D deficiency  E55.9 Vitamin D 25 Hydroxy            PLAN    1. Primary hypertension  Blood pressure is excellently controlled on present medication regimen.  Continue same  - Comprehensive Metabolic Panel; Future  - CBC with Auto Differential; Future  - Albumin/Creatinine Ratio, Urine; Future    2. Paroxysmal atrial fibrillation (HCC)  No appreciated episodes of atrial fibrillation.  He is status post ablation.  He does have a Kardia device that he uses at times.  No atrial fibrillation has been noted on this device.  We will check thyroid.  He does use a mouthpiece for snoring.  - T4, Free; Future  - TSH; Future    3. Mixed hyperlipidemia  Excellently controlled on present medication regimen.  Recheck lipids on therapy  - Lipid Panel; Future    4. Nephrolithiasis  No confirmed kidney stones in the recent past he has had some symptoms.  We are going to eliminate vitamin C from his medication regimen and increase fluids.  He is going to increase his water consumption    5. Gastrointestinal hemorrhage, unspecified gastrointestinal hemorrhage type  Well-controlled on present regimen.  Continue same    6. Garces's esophagus without dysplasia  He is following with GI and will be scheduled for repeat EGD in 2026    7. Chronic deep vein thrombosis (DVT) of distal vein of lower extremity, unspecified laterality (HCC)  This was postoperative and he has not had any problems since.  He is not presently on any anticoagulation other than aspirin 81 mg daily    8. Adenomatous polyp of transverse colon  Polyp was found in 2023 with 5-year follow-up    9. Elevated alkaline phosphatase level  He is on vitamin D therapy.  He does not have elevated calcium.  We will recheck this in the near future as well  - Comprehensive Metabolic Panel; Future    10. Vitamin D deficiency  Check vitamin D

## 2025-01-15 ENCOUNTER — TELEPHONE (OUTPATIENT)
Age: 75
End: 2025-01-15

## 2025-01-15 DIAGNOSIS — I10 PRIMARY HYPERTENSION: ICD-10-CM

## 2025-01-15 DIAGNOSIS — E78.2 MIXED HYPERLIPIDEMIA: ICD-10-CM

## 2025-01-15 DIAGNOSIS — I48.0 PAROXYSMAL ATRIAL FIBRILLATION (HCC): ICD-10-CM

## 2025-01-15 DIAGNOSIS — R74.8 ELEVATED ALKALINE PHOSPHATASE LEVEL: ICD-10-CM

## 2025-01-15 DIAGNOSIS — E55.9 VITAMIN D DEFICIENCY: ICD-10-CM

## 2025-01-15 LAB
25(OH)D3 SERPL-MCNC: 56.6 NG/ML
ALBUMIN SERPL-MCNC: 4.3 G/DL (ref 3.5–5.2)
ALP SERPL-CCNC: 125 U/L (ref 40–129)
ALT SERPL-CCNC: 26 U/L (ref 5–41)
ANION GAP SERPL CALCULATED.3IONS-SCNC: 11 MMOL/L (ref 7–19)
AST SERPL-CCNC: 20 U/L (ref 5–40)
BASOPHILS # BLD: 0 K/UL (ref 0–0.2)
BASOPHILS NFR BLD: 0.7 % (ref 0–1)
BILIRUB SERPL-MCNC: 0.3 MG/DL (ref 0.2–1.2)
BUN SERPL-MCNC: 18 MG/DL (ref 8–23)
CALCIUM SERPL-MCNC: 9.2 MG/DL (ref 8.8–10.2)
CHLORIDE SERPL-SCNC: 102 MMOL/L (ref 98–111)
CHOLEST SERPL-MCNC: 117 MG/DL (ref 0–199)
CO2 SERPL-SCNC: 27 MMOL/L (ref 22–29)
CREAT SERPL-MCNC: 0.8 MG/DL (ref 0.7–1.2)
CREAT UR-MCNC: 135.4 MG/DL (ref 39–259)
EOSINOPHIL # BLD: 0.2 K/UL (ref 0–0.6)
EOSINOPHIL NFR BLD: 2.6 % (ref 0–5)
ERYTHROCYTE [DISTWIDTH] IN BLOOD BY AUTOMATED COUNT: 14.2 % (ref 11.5–14.5)
GLUCOSE SERPL-MCNC: 101 MG/DL (ref 70–99)
HCT VFR BLD AUTO: 42 % (ref 42–52)
HDLC SERPL-MCNC: 33 MG/DL (ref 40–60)
HGB BLD-MCNC: 13.6 G/DL (ref 14–18)
IMM GRANULOCYTES # BLD: 0 K/UL
LDLC SERPL CALC-MCNC: 58 MG/DL
LYMPHOCYTES # BLD: 2.1 K/UL (ref 1.1–4.5)
LYMPHOCYTES NFR BLD: 35.5 % (ref 20–40)
MCH RBC QN AUTO: 30.8 PG (ref 27–31)
MCHC RBC AUTO-ENTMCNC: 32.4 G/DL (ref 33–37)
MCV RBC AUTO: 95 FL (ref 80–94)
MICROALBUMIN UR-MCNC: <1.2 MG/DL (ref 0–1.99)
MICROALBUMIN/CREAT UR-RTO: NORMAL MG/G
MONOCYTES # BLD: 0.6 K/UL (ref 0–0.9)
MONOCYTES NFR BLD: 10.9 % (ref 0–10)
NEUTROPHILS # BLD: 2.9 K/UL (ref 1.5–7.5)
NEUTS SEG NFR BLD: 50.1 % (ref 50–65)
PLATELET # BLD AUTO: 266 K/UL (ref 130–400)
PMV BLD AUTO: 10.5 FL (ref 9.4–12.4)
POTASSIUM SERPL-SCNC: 4.4 MMOL/L (ref 3.5–5)
PROT SERPL-MCNC: 7.2 G/DL (ref 6.4–8.3)
RBC # BLD AUTO: 4.42 M/UL (ref 4.7–6.1)
SODIUM SERPL-SCNC: 140 MMOL/L (ref 136–145)
T4 FREE SERPL-MCNC: 1.11 NG/DL (ref 0.93–1.7)
TRIGL SERPL-MCNC: 129 MG/DL (ref 0–149)
TSH SERPL DL<=0.005 MIU/L-ACNC: 1.97 UIU/ML (ref 0.27–4.2)
WBC # BLD AUTO: 5.8 K/UL (ref 4.8–10.8)

## 2025-01-15 NOTE — TELEPHONE ENCOUNTER
----- Message from Dr. HORACE Rhodes DO sent at 1/15/2025  2:37 PM CST -----  Your metabolic profile is normal.  This includes kidney and liver functions as well as electrolytes.  Lipids are excellently controlled.  HDL is low.  Recommend trying to exercise is much as possible to get this good cholesterol value up.  CBC shows a stable mild anemia with nonspecific indices and this is chronic and expected.  Thyroid is normal.  Vitamin D level is normal.  There is no significant protein excretion in urine.

## 2025-04-30 ENCOUNTER — OFFICE VISIT (OUTPATIENT)
Age: 75
End: 2025-04-30

## 2025-04-30 VITALS
HEIGHT: 71 IN | SYSTOLIC BLOOD PRESSURE: 124 MMHG | DIASTOLIC BLOOD PRESSURE: 70 MMHG | OXYGEN SATURATION: 97 % | HEART RATE: 78 BPM | BODY MASS INDEX: 29.68 KG/M2 | WEIGHT: 212 LBS | TEMPERATURE: 97.8 F

## 2025-04-30 DIAGNOSIS — M54.50 ACUTE RIGHT-SIDED LOW BACK PAIN WITHOUT SCIATICA: Primary | ICD-10-CM

## 2025-04-30 RX ORDER — PREDNISONE 10 MG/1
TABLET ORAL
Qty: 43 TABLET | Refills: 1 | Status: SHIPPED | OUTPATIENT
Start: 2025-04-30

## 2025-04-30 SDOH — ECONOMIC STABILITY: FOOD INSECURITY: WITHIN THE PAST 12 MONTHS, THE FOOD YOU BOUGHT JUST DIDN'T LAST AND YOU DIDN'T HAVE MONEY TO GET MORE.: NEVER TRUE

## 2025-04-30 SDOH — ECONOMIC STABILITY: FOOD INSECURITY: WITHIN THE PAST 12 MONTHS, YOU WORRIED THAT YOUR FOOD WOULD RUN OUT BEFORE YOU GOT MONEY TO BUY MORE.: NEVER TRUE

## 2025-04-30 ASSESSMENT — ENCOUNTER SYMPTOMS
BACK PAIN: 1
CONSTIPATION: 1
RESPIRATORY NEGATIVE: 1
ALLERGIC/IMMUNOLOGIC NEGATIVE: 1
EYES NEGATIVE: 1

## 2025-04-30 ASSESSMENT — PATIENT HEALTH QUESTIONNAIRE - PHQ9
SUM OF ALL RESPONSES TO PHQ QUESTIONS 1-9: 0
SUM OF ALL RESPONSES TO PHQ QUESTIONS 1-9: 0
1. LITTLE INTEREST OR PLEASURE IN DOING THINGS: NOT AT ALL
2. FEELING DOWN, DEPRESSED OR HOPELESS: NOT AT ALL
SUM OF ALL RESPONSES TO PHQ QUESTIONS 1-9: 0
SUM OF ALL RESPONSES TO PHQ QUESTIONS 1-9: 0

## 2025-04-30 NOTE — PROGRESS NOTES
Dustin Pineda (:  1950) is a 75 y.o. male, Established patient, here for evaluation of the following chief complaint(s):  Back Pain (1. Lower back pain for 3 weeks, somewhat better but still having issues resting and turning in bed. In  Dr Muñoz had him do an MRI of his back and he went to see Dr Low in [facility] for physical therapy and injections )         Assessment & Plan  1. Low back pain: Acute.  - Symptoms suggest a potential rupture of the L5-S1 disc, which may be impinging on the spinal cord. This could explain the bowel sensations experienced.  - Prescribed prednisone 60 mg with instructions to reduce to 40 mg if necessary. A refill is provided.  - Advised to perform specific exercises for ruptured discs found online, with caution to stop if pain worsens.  - Emphasized the importance of not overexerting even if symptoms improve.    Follow-up  - If pain persists, contact immediately.    ASSESSMENT AND PLAN (FURTHER COMMENTS):  There are no diagnoses linked to this encounter.    I have reviewed the following with [facility]  Lab Review  Orders Only on 01/15/2025   Component Date Value    Vit D, 25-Hydroxy 01/15/2025 56.6     TSH 01/15/2025 1.970     T4 Free 01/15/2025 1.11     Albumin Urine 01/15/2025 <1.20     Creatinine, Ur 01/15/2025 135.4     Albumin/Creatinine Ratio 01/15/2025 see below     Cholesterol, Total 01/15/2025 117     Triglycerides 01/15/2025 129     HDL 01/15/2025 33 (L)     LDL Cholesterol 01/15/2025 58     WBC 01/15/2025 5.8     RBC 01/15/2025 4.42 (L)     Hemoglobin 01/15/2025 13.6 (L)     Hematocrit 01/15/2025 42.0     MCV 01/15/2025 95.0 (H)     MCH 01/15/2025 30.8     MCHC 01/15/2025 32.4 (L)     RDW 01/15/2025 14.2     Platelets 01/15/2025 266     MPV 01/15/2025 10.5     Neutrophils % 01/15/2025 50.1     Lymphocytes % 01/15/2025 35.5     Monocytes % 01/15/2025 10.9 (H)     Eosinophils % 01/15/2025 2.6     Basophils % 01/15/2025 0.7

## 2025-06-04 ENCOUNTER — OFFICE VISIT (OUTPATIENT)
Dept: CARDIOLOGY CLINIC | Age: 75
End: 2025-06-04
Payer: MEDICARE

## 2025-06-04 VITALS
HEIGHT: 71 IN | DIASTOLIC BLOOD PRESSURE: 88 MMHG | SYSTOLIC BLOOD PRESSURE: 134 MMHG | HEART RATE: 63 BPM | WEIGHT: 214 LBS | BODY MASS INDEX: 29.96 KG/M2

## 2025-06-04 DIAGNOSIS — I48.0 PAROXYSMAL ATRIAL FIBRILLATION (HCC): Primary | ICD-10-CM

## 2025-06-04 DIAGNOSIS — I10 ESSENTIAL HYPERTENSION: ICD-10-CM

## 2025-06-04 PROCEDURE — G8427 DOCREV CUR MEDS BY ELIG CLIN: HCPCS | Performed by: INTERNAL MEDICINE

## 2025-06-04 PROCEDURE — 3017F COLORECTAL CA SCREEN DOC REV: CPT | Performed by: INTERNAL MEDICINE

## 2025-06-04 PROCEDURE — 1159F MED LIST DOCD IN RCRD: CPT | Performed by: INTERNAL MEDICINE

## 2025-06-04 PROCEDURE — 1036F TOBACCO NON-USER: CPT | Performed by: INTERNAL MEDICINE

## 2025-06-04 PROCEDURE — G8417 CALC BMI ABV UP PARAM F/U: HCPCS | Performed by: INTERNAL MEDICINE

## 2025-06-04 PROCEDURE — 3079F DIAST BP 80-89 MM HG: CPT | Performed by: INTERNAL MEDICINE

## 2025-06-04 PROCEDURE — 99214 OFFICE O/P EST MOD 30 MIN: CPT | Performed by: INTERNAL MEDICINE

## 2025-06-04 PROCEDURE — 1123F ACP DISCUSS/DSCN MKR DOCD: CPT | Performed by: INTERNAL MEDICINE

## 2025-06-04 PROCEDURE — 3075F SYST BP GE 130 - 139MM HG: CPT | Performed by: INTERNAL MEDICINE

## 2025-06-04 ASSESSMENT — ENCOUNTER SYMPTOMS
DIARRHEA: 0
BLOOD IN STOOL: 0
BACK PAIN: 0
ABDOMINAL DISTENTION: 0
ABDOMINAL PAIN: 0
WHEEZING: 0
VOMITING: 0
COUGH: 0
SHORTNESS OF BREATH: 0

## 2025-06-04 NOTE — PROGRESS NOTES
[facility]  Cardiology Office Note  [address], [place]  [address]  Phone: [phone]  Fax: [phone]                            Date:  6/4/2025  Patient: Dustin Pineda  Age:  75 y.o., 1950    Referral: No ref. provider found      PROBLEM LIST:    Patient Active Problem List    Diagnosis Date Noted    Primary hypertension 12/13/2021     Priority: Low    Paroxysmal atrial fibrillation (HCC) 11/10/2020     Priority: Low    Garces's esophagus without dysplasia 01/30/2019     Priority: Low    Chronic GERD 01/30/2019     Priority: Low    History of kidney stones 12/22/2017     Priority: Low    Diverticulosis of intestine with bleeding 12/22/2017     Priority: Low    H/O cervical spine surgery 12/22/2017     Priority: Low     Overview Note:     2015 by Dr. Bob Lopez      Gastrointestinal hemorrhage 03/12/2017     Priority: Low    Colon polyps      Priority: Low    Diverticulosis of large intestine with hemorrhage      Priority: Low    Nephrolithiasis 12/29/2015     Priority: Low    Numbness and tingling in right hand 09/19/2014     Priority: Low    Deep vein thrombosis (DVT) (HCC) 05/14/2014     Priority: Low    Mixed hyperlipidemia 05/14/2014     Priority: Low     1.  Paroxysmal atrial fibrillation, previously on Eliquis and Betapace, prior DCCV 1/24/2020, VAISHNAVI/DCCV 1/27/2023, A-fib PVI ablation 3/13/2023 ([facility]), normal LV ejection fraction, negative Lexiscan study 1/24/2020.  2.  Hypertension.  3.  History of lower extremity DVT 2014, prior cervical spine fusion surgeries.  4.  Aortic valve sclerosis.       PRESENTATION: Dustin Pineda is a 75 y.o. year old male presents for follow-up evaluation.  He has been doing quite well with no recurrent atrial fibrillation.  No chest pain or shortness of breath.  No leg swelling.  Continues to monitor his rhythm through [employer] monitor and does not report any atrial fibrillation since his

## 2025-07-12 SDOH — HEALTH STABILITY: PHYSICAL HEALTH: ON AVERAGE, HOW MANY DAYS PER WEEK DO YOU ENGAGE IN MODERATE TO STRENUOUS EXERCISE (LIKE A BRISK WALK)?: 5 DAYS

## 2025-07-12 SDOH — HEALTH STABILITY: PHYSICAL HEALTH: ON AVERAGE, HOW MANY MINUTES DO YOU ENGAGE IN EXERCISE AT THIS LEVEL?: 60 MIN

## 2025-07-12 ASSESSMENT — LIFESTYLE VARIABLES
HOW MANY STANDARD DRINKS CONTAINING ALCOHOL DO YOU HAVE ON A TYPICAL DAY: PATIENT DOES NOT DRINK
HOW OFTEN DO YOU HAVE SIX OR MORE DRINKS ON ONE OCCASION: 1
HOW MANY STANDARD DRINKS CONTAINING ALCOHOL DO YOU HAVE ON A TYPICAL DAY: 0
HOW OFTEN DO YOU HAVE A DRINK CONTAINING ALCOHOL: NEVER
HOW OFTEN DO YOU HAVE A DRINK CONTAINING ALCOHOL: 1

## 2025-07-12 ASSESSMENT — PATIENT HEALTH QUESTIONNAIRE - PHQ9
SUM OF ALL RESPONSES TO PHQ QUESTIONS 1-9: 0
1. LITTLE INTEREST OR PLEASURE IN DOING THINGS: NOT AT ALL
SUM OF ALL RESPONSES TO PHQ QUESTIONS 1-9: 0
2. FEELING DOWN, DEPRESSED OR HOPELESS: NOT AT ALL

## 2025-07-14 ENCOUNTER — OFFICE VISIT (OUTPATIENT)
Age: 75
End: 2025-07-14

## 2025-07-14 VITALS
HEART RATE: 72 BPM | OXYGEN SATURATION: 98 % | DIASTOLIC BLOOD PRESSURE: 72 MMHG | SYSTOLIC BLOOD PRESSURE: 124 MMHG | HEIGHT: 71 IN | WEIGHT: 212 LBS | BODY MASS INDEX: 29.68 KG/M2 | TEMPERATURE: 98 F

## 2025-07-14 DIAGNOSIS — M54.41 CHRONIC RIGHT-SIDED LOW BACK PAIN WITH RIGHT-SIDED SCIATICA: ICD-10-CM

## 2025-07-14 DIAGNOSIS — E78.2 MIXED HYPERLIPIDEMIA: ICD-10-CM

## 2025-07-14 DIAGNOSIS — K21.9 CHRONIC GERD: ICD-10-CM

## 2025-07-14 DIAGNOSIS — G89.29 CHRONIC RIGHT-SIDED LOW BACK PAIN WITH RIGHT-SIDED SCIATICA: ICD-10-CM

## 2025-07-14 DIAGNOSIS — K92.2 GASTROINTESTINAL HEMORRHAGE, UNSPECIFIED GASTROINTESTINAL HEMORRHAGE TYPE: ICD-10-CM

## 2025-07-14 DIAGNOSIS — I10 PRIMARY HYPERTENSION: ICD-10-CM

## 2025-07-14 DIAGNOSIS — Z00.00 MEDICARE ANNUAL WELLNESS VISIT, SUBSEQUENT: Primary | ICD-10-CM

## 2025-07-14 DIAGNOSIS — I48.0 PAROXYSMAL ATRIAL FIBRILLATION (HCC): ICD-10-CM

## 2025-07-14 DIAGNOSIS — Z12.5 ENCOUNTER FOR SCREENING FOR MALIGNANT NEOPLASM OF PROSTATE: ICD-10-CM

## 2025-07-14 DIAGNOSIS — Z12.5 SCREENING FOR PROSTATE CANCER: ICD-10-CM

## 2025-07-14 DIAGNOSIS — K57.31 DIVERTICULOSIS OF LARGE INTESTINE WITH HEMORRHAGE: ICD-10-CM

## 2025-07-14 DIAGNOSIS — N20.0 NEPHROLITHIASIS: ICD-10-CM

## 2025-07-14 DIAGNOSIS — K22.70 BARRETT'S ESOPHAGUS WITHOUT DYSPLASIA: ICD-10-CM

## 2025-07-14 SDOH — ECONOMIC STABILITY: FOOD INSECURITY: WITHIN THE PAST 12 MONTHS, THE FOOD YOU BOUGHT JUST DIDN'T LAST AND YOU DIDN'T HAVE MONEY TO GET MORE.: NEVER TRUE

## 2025-07-14 SDOH — ECONOMIC STABILITY: FOOD INSECURITY: WITHIN THE PAST 12 MONTHS, YOU WORRIED THAT YOUR FOOD WOULD RUN OUT BEFORE YOU GOT MONEY TO BUY MORE.: NEVER TRUE

## 2025-07-14 ASSESSMENT — ENCOUNTER SYMPTOMS
RESPIRATORY NEGATIVE: 1
BACK PAIN: 1
EYES NEGATIVE: 1
CONSTIPATION: 1
ALLERGIC/IMMUNOLOGIC NEGATIVE: 1

## 2025-07-14 ASSESSMENT — PATIENT HEALTH QUESTIONNAIRE - PHQ9
SUM OF ALL RESPONSES TO PHQ QUESTIONS 1-9: 0
1. LITTLE INTEREST OR PLEASURE IN DOING THINGS: NOT AT ALL
2. FEELING DOWN, DEPRESSED OR HOPELESS: NOT AT ALL
SUM OF ALL RESPONSES TO PHQ QUESTIONS 1-9: 0

## 2025-07-14 ASSESSMENT — LIFESTYLE VARIABLES
HOW OFTEN DO YOU HAVE A DRINK CONTAINING ALCOHOL: NEVER
HOW MANY STANDARD DRINKS CONTAINING ALCOHOL DO YOU HAVE ON A TYPICAL DAY: PATIENT DOES NOT DRINK

## 2025-07-14 NOTE — PROGRESS NOTES
Dustin Pineda (:  1950) is a 75 y.o. male, Established patient, here for evaluation of the following chief complaint(s):  Medicare AWV, Hypertension (Follow up chronic condition ), and Back Pain (Chronic condition but worsening - lower back/Steroids helped him but comes back after finishing them )         Assessment & Plan  1. Hypertension: Stable.  - Blood pressure 124/72, pulse 72. Most recent BUN 18, creatinine 0.8, estimated GFR >90. No significant protein excretion in urine. No problems from medications or blood pressure being elevated or too low.  - Continue losartan 25 mg daily and metoprolol succinate 50 mg daily.    2. Hyperlipidemia: Stable.  - Taking simvastatin 10 mg nightly. Most recent lipid profile on 01/15/2025 showed total cholesterol 117, HDL 33, LDL 58. No myalgias or GI distress from medications. Goal LDL <100, preferred <70, and achieving this.    3. Atrial fibrillation status post ablation: Stable.  - Ablation was successful. No formal anticoagulation. Taking aspirin 81 mg daily. No reported palpitations or episodes since ablation. [id number] score 2, indicating a 2.8% risk of stroke without treatment. Monitors rhythm with a [employer] rhythm analysis machine.    4. Gastroesophageal reflux disease (GERD) with history of gastrointestinal hemorrhage and Garces's esophagus: Stable.  - On omeprazole 20 mg daily; symptoms well controlled. Most recent EGD on 2023; rescheduled in 3 to 5 years. Garces's biopsy report showed no intestinal metaplasia. Biopsy on distal esophagus; four quadrants not performed.    5. Nephrolithiasis.  - History of kidney stones; unaware of specific stone evaluation.  - Advised to stop vitamin C supplementation.    6. Constipation.  - Relatively well controlled with diet and increased water consumption.    7. Back pain: Chronic.  - Reports significant back pain affecting daily activities. MRI will be ordered to evaluate current status. Further

## 2025-07-17 DIAGNOSIS — Z00.00 MEDICARE ANNUAL WELLNESS VISIT, SUBSEQUENT: ICD-10-CM

## 2025-07-17 DIAGNOSIS — K92.2 GASTROINTESTINAL HEMORRHAGE, UNSPECIFIED GASTROINTESTINAL HEMORRHAGE TYPE: ICD-10-CM

## 2025-07-17 DIAGNOSIS — I10 PRIMARY HYPERTENSION: ICD-10-CM

## 2025-07-17 DIAGNOSIS — K21.9 CHRONIC GERD: ICD-10-CM

## 2025-07-17 DIAGNOSIS — Z12.5 ENCOUNTER FOR SCREENING FOR MALIGNANT NEOPLASM OF PROSTATE: ICD-10-CM

## 2025-07-17 DIAGNOSIS — I48.0 PAROXYSMAL ATRIAL FIBRILLATION (HCC): ICD-10-CM

## 2025-07-17 DIAGNOSIS — E78.2 MIXED HYPERLIPIDEMIA: ICD-10-CM

## 2025-07-17 DIAGNOSIS — Z12.5 SCREENING FOR PROSTATE CANCER: ICD-10-CM

## 2025-07-17 DIAGNOSIS — K57.31 DIVERTICULOSIS OF LARGE INTESTINE WITH HEMORRHAGE: ICD-10-CM

## 2025-07-17 DIAGNOSIS — N20.0 NEPHROLITHIASIS: ICD-10-CM

## 2025-07-17 DIAGNOSIS — K22.70 BARRETT'S ESOPHAGUS WITHOUT DYSPLASIA: ICD-10-CM

## 2025-07-17 LAB
ALBUMIN SERPL-MCNC: 4.2 G/DL (ref 3.5–5.2)
ALP SERPL-CCNC: 126 U/L (ref 40–129)
ALT SERPL-CCNC: 26 U/L (ref 10–50)
ANION GAP SERPL CALCULATED.3IONS-SCNC: 11 MMOL/L (ref 8–16)
AST SERPL-CCNC: 25 U/L (ref 10–50)
BASOPHILS # BLD: 0.1 K/UL (ref 0–0.2)
BASOPHILS NFR BLD: 1 % (ref 0–1)
BILIRUB SERPL-MCNC: 0.3 MG/DL (ref 0.2–1.2)
BUN SERPL-MCNC: 22 MG/DL (ref 8–23)
CALCIUM SERPL-MCNC: 9.2 MG/DL (ref 8.8–10.2)
CHLORIDE SERPL-SCNC: 102 MMOL/L (ref 98–107)
CHOLEST SERPL-MCNC: 110 MG/DL (ref 0–199)
CO2 SERPL-SCNC: 25 MMOL/L (ref 22–29)
CREAT SERPL-MCNC: 0.9 MG/DL (ref 0.7–1.2)
CREAT UR-MCNC: 163 MG/DL (ref 39–259)
EOSINOPHIL # BLD: 0.2 K/UL (ref 0–0.6)
EOSINOPHIL NFR BLD: 2.7 % (ref 0–5)
ERYTHROCYTE [DISTWIDTH] IN BLOOD BY AUTOMATED COUNT: 14.4 % (ref 11.5–14.5)
GLUCOSE SERPL-MCNC: 114 MG/DL (ref 70–99)
HCT VFR BLD AUTO: 40.1 % (ref 42–52)
HCV AB SERPL QL IA: NORMAL
HDLC SERPL-MCNC: 33 MG/DL (ref 40–60)
HGB BLD-MCNC: 13 G/DL (ref 14–18)
IMM GRANULOCYTES # BLD: 0 K/UL
LDLC SERPL CALC-MCNC: 57 MG/DL
LYMPHOCYTES # BLD: 1.9 K/UL (ref 1.1–4.5)
LYMPHOCYTES NFR BLD: 29.9 % (ref 20–40)
MCH RBC QN AUTO: 31.2 PG (ref 27–31)
MCHC RBC AUTO-ENTMCNC: 32.4 G/DL (ref 33–37)
MCV RBC AUTO: 96.2 FL (ref 80–94)
MICROALBUMIN UR-MCNC: <1.2 MG/DL (ref 0–1.99)
MICROALBUMIN/CREAT UR-RTO: NORMAL MG/G (ref 0–29)
MONOCYTES # BLD: 0.6 K/UL (ref 0–0.9)
MONOCYTES NFR BLD: 9.6 % (ref 0–10)
NEUTROPHILS # BLD: 3.6 K/UL (ref 1.5–7.5)
NEUTS SEG NFR BLD: 56.6 % (ref 50–65)
PLATELET # BLD AUTO: 246 K/UL (ref 130–400)
PMV BLD AUTO: 10.5 FL (ref 9.4–12.4)
POTASSIUM SERPL-SCNC: 4.3 MMOL/L (ref 3.5–5.1)
PROT SERPL-MCNC: 7 G/DL (ref 6.4–8.3)
PSA SERPL-MCNC: 0.66 NG/ML (ref 0–4)
RBC # BLD AUTO: 4.17 M/UL (ref 4.7–6.1)
SODIUM SERPL-SCNC: 138 MMOL/L (ref 136–145)
T4 FREE SERPL-MCNC: 1.16 NG/DL (ref 0.93–1.7)
TRIGL SERPL-MCNC: 101 MG/DL (ref 0–149)
TSH SERPL DL<=0.005 MIU/L-ACNC: 1.88 UIU/ML (ref 0.27–4.2)
WBC # BLD AUTO: 6.3 K/UL (ref 4.8–10.8)

## 2025-07-24 ENCOUNTER — HOSPITAL ENCOUNTER (OUTPATIENT)
Dept: MRI IMAGING | Age: 75
Discharge: HOME | End: 2025-07-24
Attending: PEDIATRICS
Payer: MEDICARE

## 2025-07-24 DIAGNOSIS — G89.29 CHRONIC RIGHT-SIDED LOW BACK PAIN WITH RIGHT-SIDED SCIATICA: ICD-10-CM

## 2025-07-24 DIAGNOSIS — M54.41 CHRONIC RIGHT-SIDED LOW BACK PAIN WITH RIGHT-SIDED SCIATICA: ICD-10-CM

## 2025-07-24 PROCEDURE — 72148 MRI LUMBAR SPINE W/O DYE: CPT

## 2025-08-22 ENCOUNTER — ANTI-COAG VISIT (OUTPATIENT)
Age: 75
End: 2025-08-22

## (undated) DEVICE — LOOP LIG SUT SZ 0 L18IN ABSRB POLYDIOXANONE MFIL PDS II

## (undated) DEVICE — SPHINCTEROTOME: Brand: DREAMTOME™ RX 44

## (undated) DEVICE — E-Z CLEAN, NON-STICK, PTFE COATED, ELECTROSURGICAL BLADE ELECTRODE, MODIFIED EXTENDED INSULATION, 4 INCH (10.2 CM): Brand: MEGADYNE

## (undated) DEVICE — Z DUPLICATE USE 2392649 LARYNGOSCOPE VID TI SPECTRM LOPRO S4 GLIDESCOPE

## (undated) DEVICE — TUBE ET 7.5MM NSL ORAL BASIC CUF INTMED MURPHY EYE RADPQ

## (undated) DEVICE — SUTURE MCRYL SZ 4-0 L18IN ABSRB UD L19MM PS-2 3/8 CIR PRIM Y496G

## (undated) DEVICE — ENDO KIT,LOURDES HOSPITAL: Brand: MEDLINE INDUSTRIES, INC.

## (undated) DEVICE — SUTURE SZ 0 27IN 5/8 CIR UR-6  TAPER PT VIOLET ABSRB VICRYL J603H

## (undated) DEVICE — FORCEPS BX L240CM DIA2.4MM L NDL RAD JAW 4 133340

## (undated) DEVICE — SUTURE VCRL SZ 2-0 L36IN ABSRB UD L36MM CT-1 1/2 CIR J945H

## (undated) DEVICE — GLOVE SURG SZ 85 L12IN FNGR THK94MIL TRNSLUC YEL LTX

## (undated) DEVICE — GLOVE ORTHO 8   MSG9480

## (undated) DEVICE — GENERAL LAP CDS

## (undated) DEVICE — DRAIN JACKSON PRATT ROUND 15FR: Brand: CARDINAL HEALTH

## (undated) DEVICE — GLOVE SURG SZ 85 L12IN FNGR ORTHO 126MIL CRM LTX FREE

## (undated) DEVICE — RETRIEVAL BALLOON CATHETER: Brand: EXTRACTOR™ PRO RX

## (undated) DEVICE — Device

## (undated) DEVICE — PAD,ABDOMINAL,8"X10",ST,LF: Brand: MEDLINE

## (undated) DEVICE — TROCAR: Brand: KII SHIELDED BLADED ACCESS SYSTEM

## (undated) DEVICE — JACKSON-PRATT 100CC BULB RESERVOIR: Brand: CARDINAL HEALTH

## (undated) DEVICE — GLOVE SURG SZ 8 L12IN FNGR THK79MIL GRN LTX FREE

## (undated) DEVICE — SUTURE VCRL SZ 3-0 L27IN ABSRB UD L19MM PS-2 3/8 CIR PRIM J427H

## (undated) DEVICE — 2, DISPOSABLE SUCTION/IRRIGATOR WITH DISPOSABLE TIP: Brand: STRYKEFLOW

## (undated) DEVICE — CONTRAST IOTHALAMATE MEGLUMINE 60% 50 ML INJ CONRAY 60

## (undated) DEVICE — FORCEPS BX 240CM 2.4MM L NDL RAD JAW 4 M00513334

## (undated) DEVICE — SNARE ENDOSCP L240CM LOOP W13MM SHTH DIA2.4MM SM OVL FLX

## (undated) DEVICE — TUBE ET 8MM NSL ORAL BASIC CUF INTMED MURPHY EYE RADPQ MRK

## (undated) DEVICE — SOLUTION IV 1000ML 0.9% SOD CHL PH 5 INJ USP VIAFLX PLAS

## (undated) DEVICE — ANESTHESIA CIRCUIT ADULT-LF: Brand: MEDLINE INDUSTRIES, INC.

## (undated) DEVICE — BANDAGE COMPR W3INXL15FT BGE E SGL LAYERED CLP CLSR

## (undated) DEVICE — Z INVALID PART USE 2392642 LARYNGOSCOPE VID TI LO PROF S3 BLDE SPECTRM GLIDESCOPE GO

## (undated) DEVICE — SYSTEM BX CAP BILI RAP EXCHG CAP LOK DEV COMPATIBLE W/ OLY

## (undated) DEVICE — TUBE ENDOTRACH NASL ORL MURPHY EYE 7.5MM

## (undated) DEVICE — SOLUTION IV IRRIG POUR BRL 0.9% SODIUM CHL 2F7124

## (undated) DEVICE — APPLIER CLP M/L SHFT DIA5MM 15 LIG LIGAMAX 5

## (undated) DEVICE — TOWEL,OR,DSP,ST,BLUE,DLX,4/PK,20PK/CS: Brand: MEDLINE

## (undated) DEVICE — DRAPE,UTILITY,XL,4/PK,STERILE: Brand: MEDLINE

## (undated) DEVICE — ZIMMER® STERILE DISPOSABLE TOURNIQUET CUFF WITH PLC, DUAL PORT, SINGLE BLADDER, 18 IN. (46 CM)

## (undated) DEVICE — ADHESIVE SKIN CLSR 0.7ML TOP DERMBND ADV

## (undated) DEVICE — FORCEPS BX L240CM JAW DIA2.8MM L CAP W/ NDL MIC MESH TOOTH

## (undated) DEVICE — KIT INSTR W/ 2.4MM GUIDEPIN SUT PASS WIRE NO2 FIBERWIRE

## (undated) DEVICE — DECANTER VI VENT W/ VLV FOR ASEP TRNSF OF FLD

## (undated) DEVICE — GLOVE SURG SZ 85 L12IN FNGR THK79MIL GRN LTX FREE

## (undated) DEVICE — GLOVE SURG SZ 85 CRM LTX FREE POLYISOPRENE POLYMER BEAD ANTI

## (undated) DEVICE — GLOVE SURG SZ 7 CRM LTX FREE POLYISOPRENE POLYMER BEAD ANTI

## (undated) DEVICE — ROYAL SILK SURGICAL GOWN, XXL: Brand: CONVERTORS

## (undated) DEVICE — SURGICAL PROCEDURE PACK LOWER EXTREMITY LOURDES HOSP

## (undated) DEVICE — CHLORAPREP 26ML ORANGE

## (undated) DEVICE — BAG SPEC REM 224ML W4XL6IN DIA10MM 1 HND GYN DISP ENDOPCH

## (undated) DEVICE — TRAP POLYP ETRAP